# Patient Record
Sex: FEMALE | Race: WHITE | Employment: OTHER | URBAN - NONMETROPOLITAN AREA
[De-identification: names, ages, dates, MRNs, and addresses within clinical notes are randomized per-mention and may not be internally consistent; named-entity substitution may affect disease eponyms.]

---

## 2017-01-05 ENCOUNTER — TELEPHONE (OUTPATIENT)
Dept: FAMILY MEDICINE CLINIC | Facility: CLINIC | Age: 72
End: 2017-01-05

## 2017-01-05 ENCOUNTER — PATIENT OUTREACH (OUTPATIENT)
Dept: CASE MANAGEMENT | Age: 72
End: 2017-01-05

## 2017-01-05 RX ORDER — FLUTICASONE PROPIONATE AND SALMETEROL 250; 50 UG/1; UG/1
POWDER RESPIRATORY (INHALATION)
Qty: 2 EACH | Refills: 0 | COMMUNITY
Start: 2017-01-05 | End: 2017-04-13

## 2017-01-05 RX ORDER — ALBUTEROL SULFATE 90 UG/1
2 AEROSOL, METERED RESPIRATORY (INHALATION) EVERY 4 HOURS PRN
Qty: 1 INHALER | Refills: 1 | Status: SHIPPED | OUTPATIENT
Start: 2017-01-05 | End: 2017-03-16

## 2017-01-05 NOTE — TELEPHONE ENCOUNTER
Patient given samples of Advair and advised to use Albuterol inhaler or nebulizer as needed.  Dr Kapil FRITZ RN

## 2017-02-13 RX ORDER — PRAVASTATIN SODIUM 40 MG
TABLET ORAL
Qty: 90 TABLET | Refills: 1 | Status: SHIPPED | OUTPATIENT
Start: 2017-02-13 | End: 2017-09-27

## 2017-02-13 NOTE — TELEPHONE ENCOUNTER
Last rf 10/4/16 #90x1  Last ov 11/10/16  Last labs 11/10/16    Future Appointments  Date Time Provider Scarlett Aileen   5/16/2017 11:00 AM Malick Rene MD [facility]

## 2017-02-27 RX ORDER — SPIRONOLACTONE 25 MG/1
25 TABLET ORAL 2 TIMES DAILY
Qty: 180 TABLET | Refills: 0 | Status: SHIPPED | OUTPATIENT
Start: 2017-02-27 | End: 2017-05-31

## 2017-03-06 ENCOUNTER — TELEPHONE (OUTPATIENT)
Dept: FAMILY MEDICINE CLINIC | Facility: CLINIC | Age: 72
End: 2017-03-06

## 2017-03-13 RX ORDER — FUROSEMIDE 40 MG/1
TABLET ORAL
Qty: 180 TABLET | Refills: 0 | Status: SHIPPED | OUTPATIENT
Start: 2017-03-13 | End: 2017-07-01

## 2017-03-16 ENCOUNTER — TELEPHONE (OUTPATIENT)
Dept: FAMILY MEDICINE CLINIC | Facility: CLINIC | Age: 72
End: 2017-03-16

## 2017-03-16 ENCOUNTER — OFFICE VISIT (OUTPATIENT)
Dept: FAMILY MEDICINE CLINIC | Facility: CLINIC | Age: 72
End: 2017-03-16

## 2017-03-16 VITALS
HEART RATE: 70 BPM | DIASTOLIC BLOOD PRESSURE: 80 MMHG | SYSTOLIC BLOOD PRESSURE: 120 MMHG | TEMPERATURE: 99 F | OXYGEN SATURATION: 98 % | BODY MASS INDEX: 37 KG/M2 | WEIGHT: 210 LBS

## 2017-03-16 DIAGNOSIS — R05.9 COUGH: Primary | ICD-10-CM

## 2017-03-16 PROCEDURE — 99213 OFFICE O/P EST LOW 20 MIN: CPT | Performed by: INTERNAL MEDICINE

## 2017-03-16 NOTE — TELEPHONE ENCOUNTER
Patient said she has been wheezing and sob with back pain below her shoulders this morning and she has a head cold She said she had a \"pinching in her chest\" for a couple days last week.  She has been using her Combivent 4-5 times daily and Advair when sh

## 2017-03-17 ENCOUNTER — TELEPHONE (OUTPATIENT)
Dept: FAMILY MEDICINE CLINIC | Facility: CLINIC | Age: 72
End: 2017-03-17

## 2017-03-17 NOTE — PROGRESS NOTES
HPI:   Leonardo Flores is a 70year old female who presents for upper respiratory symptoms for  3  days. Patient reports congestion, dry cough, chest pain from coughing. She is stressed over her sons poor health. .  He has now lost his job and vision in Esophageal reflux 01/12/2012   • Generalized and unspecified atherosclerosis 11/29/2011   • Congestive heart failure, unspecified ([facility] 11/29/2011   • Coronary atherosclerosis of unspecified type of vessel, native or graft 11/29/2011   • Chronic airway obs auscultation  CARDIO: RRR without murmur  GI: good BS's,no masses, HSM or tenderness    ASSESSMENT AND PLAN:   Vernard Osgood is a 70year old female who presents with Viral Syndrome. PLAN: OTC decongestants, throat lozenges and tylenol.   Please use inh

## 2017-03-29 ENCOUNTER — TELEPHONE (OUTPATIENT)
Dept: FAMILY MEDICINE CLINIC | Facility: CLINIC | Age: 72
End: 2017-03-29

## 2017-03-29 NOTE — TELEPHONE ENCOUNTER
Patient advised, she said that the Asmanex  so that wont work. Patient advised that we did get approval for Combivent she needs samples of something in the meantime.  Patient advised samples of Asmanex left up front for her

## 2017-03-29 NOTE — TELEPHONE ENCOUNTER
Enrolled through December 31st 2017, prescription is currently processing for Combivent per Darya Bustillo they wanted to confirm directions.

## 2017-03-29 NOTE — TELEPHONE ENCOUNTER
What directions for the Asmanex and she also has Albuterol Sulfate (Ventolin), can she use this also?

## 2017-04-05 ENCOUNTER — MED REC SCAN ONLY (OUTPATIENT)
Dept: FAMILY MEDICINE CLINIC | Facility: CLINIC | Age: 72
End: 2017-04-05

## 2017-04-11 ENCOUNTER — TELEPHONE (OUTPATIENT)
Dept: FAMILY MEDICINE CLINIC | Facility: CLINIC | Age: 72
End: 2017-04-11

## 2017-04-11 NOTE — TELEPHONE ENCOUNTER
Patient said she got Combvent delivered. She said she has a friend of hers works at a free clinic in [place] that has inhalers that they could give to her if Dr Ida Cross can fax an order to [phone].  They have Advair 100 or 500, Dulera 100 or 200, Symbic

## 2017-04-13 ENCOUNTER — TELEPHONE (OUTPATIENT)
Dept: FAMILY MEDICINE CLINIC | Facility: CLINIC | Age: 72
End: 2017-04-13

## 2017-04-13 RX ORDER — FLUTICASONE PROPIONATE AND SALMETEROL 250; 50 UG/1; UG/1
1 POWDER RESPIRATORY (INHALATION) EVERY 12 HOURS SCHEDULED
Qty: 1 EACH | Refills: 11 | Status: SHIPPED | OUTPATIENT
Start: 2017-04-13 | End: 2017-11-09 | Stop reason: DRUGHIGH

## 2017-04-13 RX ORDER — FLUTICASONE PROPIONATE AND SALMETEROL 250; 50 UG/1; UG/1
1 POWDER RESPIRATORY (INHALATION) EVERY 12 HOURS SCHEDULED
Qty: 1 EACH | Refills: 11 | Status: SHIPPED | OUTPATIENT
Start: 2017-04-13 | End: 2017-04-13

## 2017-05-16 ENCOUNTER — PRIOR ORIGINAL RECORDS (OUTPATIENT)
Dept: OTHER | Age: 72
End: 2017-05-16

## 2017-05-16 ENCOUNTER — LAB ENCOUNTER (OUTPATIENT)
Dept: LAB | Age: 72
End: 2017-05-16
Attending: INTERNAL MEDICINE
Payer: MEDICARE

## 2017-05-16 ENCOUNTER — OFFICE VISIT (OUTPATIENT)
Dept: FAMILY MEDICINE CLINIC | Facility: CLINIC | Age: 72
End: 2017-05-16

## 2017-05-16 VITALS
HEIGHT: 62 IN | WEIGHT: 205.38 LBS | HEART RATE: 76 BPM | DIASTOLIC BLOOD PRESSURE: 64 MMHG | TEMPERATURE: 98 F | SYSTOLIC BLOOD PRESSURE: 114 MMHG | BODY MASS INDEX: 37.79 KG/M2 | RESPIRATION RATE: 20 BRPM

## 2017-05-16 DIAGNOSIS — E78.2 MIXED HYPERLIPIDEMIA: ICD-10-CM

## 2017-05-16 DIAGNOSIS — D68.2 FACTOR XII DEFICIENCY (HCC): ICD-10-CM

## 2017-05-16 DIAGNOSIS — Z23 NEED FOR VACCINATION: ICD-10-CM

## 2017-05-16 DIAGNOSIS — N18.4 CKD (CHRONIC KIDNEY DISEASE) STAGE 4, GFR 15-29 ML/MIN (HCC): ICD-10-CM

## 2017-05-16 DIAGNOSIS — J43.2 CENTRILOBULAR EMPHYSEMA (HCC): ICD-10-CM

## 2017-05-16 DIAGNOSIS — I48.91 ATRIAL FIBRILLATION, UNSPECIFIED TYPE (HCC): ICD-10-CM

## 2017-05-16 DIAGNOSIS — I50.22 CHRONIC SYSTOLIC CONGESTIVE HEART FAILURE (HCC): ICD-10-CM

## 2017-05-16 DIAGNOSIS — Z12.31 ENCOUNTER FOR SCREENING MAMMOGRAM FOR BREAST CANCER: ICD-10-CM

## 2017-05-16 DIAGNOSIS — M10.00 ACUTE IDIOPATHIC GOUT, UNSPECIFIED SITE: ICD-10-CM

## 2017-05-16 DIAGNOSIS — I10 ESSENTIAL HYPERTENSION: ICD-10-CM

## 2017-05-16 DIAGNOSIS — R73.9 ELEVATED BLOOD SUGAR: ICD-10-CM

## 2017-05-16 DIAGNOSIS — Z00.00 ENCOUNTER FOR ANNUAL HEALTH EXAMINATION: Primary | ICD-10-CM

## 2017-05-16 DIAGNOSIS — I25.708: ICD-10-CM

## 2017-05-16 DIAGNOSIS — R91.1 INCIDENTAL PULMONARY NODULE, > 3MM AND < 8MM: ICD-10-CM

## 2017-05-16 PROCEDURE — 85025 COMPLETE CBC W/AUTO DIFF WBC: CPT

## 2017-05-16 PROCEDURE — 80061 LIPID PANEL: CPT

## 2017-05-16 PROCEDURE — 80053 COMPREHEN METABOLIC PANEL: CPT

## 2017-05-16 PROCEDURE — G0009 ADMIN PNEUMOCOCCAL VACCINE: HCPCS | Performed by: INTERNAL MEDICINE

## 2017-05-16 PROCEDURE — G0439 PPPS, SUBSEQ VISIT: HCPCS | Performed by: INTERNAL MEDICINE

## 2017-05-16 PROCEDURE — 36415 COLL VENOUS BLD VENIPUNCTURE: CPT

## 2017-05-16 PROCEDURE — 83880 ASSAY OF NATRIURETIC PEPTIDE: CPT

## 2017-05-16 PROCEDURE — 90670 PCV13 VACCINE IM: CPT | Performed by: INTERNAL MEDICINE

## 2017-05-16 PROCEDURE — 83036 HEMOGLOBIN GLYCOSYLATED A1C: CPT

## 2017-05-16 NOTE — PROGRESS NOTES
HPI:   Glenn Hurst is a 70year old female who presents for a Medicare Subsequent Annual Wellness visit (Pt already had Initial Annual Wellness). Pt has been at her baseline now.   She still has difficulty finding the funds for respiratory medica puff into the lungs 4 (four) times daily. furosemide 40 MG Oral Tab TAKE 1 TABLET TWICE DAILY   spironolactone (ALDACTONE) 25 MG Oral Tab Take 1 tablet (25 mg total) by mouth 2 (two) times daily.    PRAVASTATIN SODIUM 40 MG Oral Tab TAKE 1 TABLET EVERY DA not drink alcohol or use illicit drugs.      REVIEW OF SYSTEMS:   GENERAL: feels well otherwise  SKIN: denies any unusual skin lesions  EYES: denies blurred vision or double vision  HEENT: denies nasal congestion, sinus pain or ST  LUNGS: denies shortness o in Epic:    Shellie Rosas does not have a Power of  for [employer] on file in [facility]  The patient has this document but we do not have it in [facility], and patient is instructed to get our office a copy of it for scanning into Epic          PLAN:  Th your day to day activities?: 0-No     Have you had any memory issues?: 0-No    Fall/Risk Scoring: 3          Depression Screening (PHQ-2/PHQ-9): Over the LAST 2 WEEKS   Little interest or pleasure in doing things (over the last two weeks)?: Not at all    F Ophthalmology Visit Annually: Diabetics, FHx Glaucoma, AA>50, > 65 No flowsheet data found. Bone Density Screening      Dexascan Every two years Last Dexa Scan:   XR DEXA BONE DENSITOMETRY (CPT=77080) 02/01/2016    No flowsheet data found.     Pa anticonvulsants.)    Potassium  Annually POTASSIUM (mmol/L)   Date Value   11/10/2016 4.7   04/09/2014 4.3   08/13/2012 4.8    No flowsheet data found.     Creatinine  Annually CREATININE (mg/dL)   Date Value   11/10/2016 1.61*   04/09/2014 0.89    No flows

## 2017-05-16 NOTE — PATIENT INSTRUCTIONS
Edith Eubanks's SCREENING SCHEDULE   Tests on this list are recommended by your physician but may not be covered, or covered at this frequency, by your insurer. Please check with your insurance carrier before scheduling to verify coverage.    Gordo Martinez old and have smoked more than 100 cigarettes in their lifetime   • Anyone with a family history    Colorectal Cancer Screening  Covered up to Age 76     Colonoscopy Screen   Covered every 10 years- more often if abnormal Colonoscopy,10 Years due on 02/12/2 11/10/16  -FLU VACC PRSV FREE INC ANTIG    Please get every year    Pneumococcal 13 (Prevnar)  Covered Once after 65   Orders placed or performed in visit on 05/09/16  -PNEUMOCOCCAL VACC, 13 BBOBI IM    Please get once after your 65th birthday    Rhea Moran

## 2017-05-17 DIAGNOSIS — R73.9 ELEVATED BLOOD SUGAR: Primary | ICD-10-CM

## 2017-05-17 PROBLEM — N18.4 CKD (CHRONIC KIDNEY DISEASE) STAGE 4, GFR 15-29 ML/MIN (HCC): Status: ACTIVE | Noted: 2017-05-17

## 2017-05-18 ENCOUNTER — TELEPHONE (OUTPATIENT)
Dept: FAMILY MEDICINE CLINIC | Facility: CLINIC | Age: 72
End: 2017-05-18

## 2017-05-18 ENCOUNTER — PRIOR ORIGINAL RECORDS (OUTPATIENT)
Dept: OTHER | Age: 72
End: 2017-05-18

## 2017-05-18 DIAGNOSIS — E87.5 HYPERKALEMIA: Primary | ICD-10-CM

## 2017-05-18 RX ORDER — POTASSIUM CHLORIDE 750 MG/1
TABLET, FILM COATED, EXTENDED RELEASE ORAL
Qty: 180 TABLET | Refills: 1 | COMMUNITY
Start: 2017-05-18 | End: 2018-07-30

## 2017-05-18 NOTE — TELEPHONE ENCOUNTER
Patient advised of lab results, she is concerned that her potassium is slightly elevated and said she is taking 8 Potassium pills daily. She is seeing Dr Nathalie Sánchez next month.

## 2017-05-18 NOTE — TELEPHONE ENCOUNTER
Patient advised, she said she is taking the Furosemide and  Spironolactone twice daily.  She does not want her Potassium to get low like it did before so is afraid to stop the Potassium completely

## 2017-05-18 NOTE — TELEPHONE ENCOUNTER
----- Message from Charisse Hui MD sent at 5/17/2017  7:50 AM CDT -----  See if you can add an A1C and fax labs to [employer] for upcoming appt in [place].

## 2017-05-24 LAB
ALBUMIN: 4.7 G/DL
ALKALINE PHOSPHATATE(ALK PHOS): 135 IU/L
BILIRUBIN TOTAL: 0.6 MG/DL
BNP: 55 PMOL/L
BUN: 37 MG/DL
CALCIUM: 10 MG/DL
CHLORIDE: 101 MEQ/L
CHOLESTEROL, TOTAL: 163 MG/DL
CREATININE, SERUM: 1.88 MG/DL
GLUCOSE: 102 MG/DL
HDL CHOLESTEROL: 46 MG/DL
HEMATOCRIT: 47.7 %
HEMOGLOBIN A1C: 5.7 %
HEMOGLOBIN: 15.2 G/DL
LDL CHOLESTEROL: 93 MG/DL
PLATELETS: 196 K/UL
POTASSIUM, SERUM: 5.9 MEQ/L
PROTEIN, TOTAL: 8.2 G/DL
RED BLOOD COUNT: 5.1 X 10-6/U
SGOT (AST): 19 IU/L
SGPT (ALT): 29 IU/L
SODIUM: 136 MEQ/L
TRIGLYCERIDES: 120 MG/DL
WHITE BLOOD COUNT: 10.7 X 10-3/U

## 2017-05-31 RX ORDER — SPIRONOLACTONE 25 MG/1
25 TABLET ORAL 2 TIMES DAILY
Qty: 180 TABLET | Refills: 0 | Status: SHIPPED | OUTPATIENT
Start: 2017-05-31 | End: 2017-09-02

## 2017-06-21 ENCOUNTER — PRIOR ORIGINAL RECORDS (OUTPATIENT)
Dept: OTHER | Age: 72
End: 2017-06-21

## 2017-06-22 ENCOUNTER — LAB ENCOUNTER (OUTPATIENT)
Dept: LAB | Age: 72
End: 2017-06-22
Attending: INTERNAL MEDICINE
Payer: MEDICARE

## 2017-06-22 ENCOUNTER — PRIOR ORIGINAL RECORDS (OUTPATIENT)
Dept: OTHER | Age: 72
End: 2017-06-22

## 2017-06-22 DIAGNOSIS — Z79.01 LONG TERM (CURRENT) USE OF ANTICOAGULANTS: ICD-10-CM

## 2017-06-22 DIAGNOSIS — I25.10 CORONARY ATHEROSCLEROSIS OF NATIVE CORONARY ARTERY: Primary | ICD-10-CM

## 2017-06-22 DIAGNOSIS — Z95.1 POSTSURGICAL AORTOCORONARY BYPASS STATUS: ICD-10-CM

## 2017-06-22 DIAGNOSIS — I48.0 PAROXYSMAL ATRIAL FIBRILLATION (HCC): ICD-10-CM

## 2017-06-22 DIAGNOSIS — E78.00 PURE HYPERCHOLESTEROLEMIA: ICD-10-CM

## 2017-06-22 PROCEDURE — 80048 BASIC METABOLIC PNL TOTAL CA: CPT

## 2017-06-22 PROCEDURE — 36415 COLL VENOUS BLD VENIPUNCTURE: CPT

## 2017-06-22 PROCEDURE — 83880 ASSAY OF NATRIURETIC PEPTIDE: CPT

## 2017-06-28 ENCOUNTER — PRIOR ORIGINAL RECORDS (OUTPATIENT)
Dept: OTHER | Age: 72
End: 2017-06-28

## 2017-07-01 ENCOUNTER — TELEPHONE (OUTPATIENT)
Dept: FAMILY MEDICINE CLINIC | Facility: CLINIC | Age: 72
End: 2017-07-01

## 2017-07-01 RX ORDER — FUROSEMIDE 40 MG/1
TABLET ORAL
Qty: 180 TABLET | Refills: 0 | Status: SHIPPED | OUTPATIENT
Start: 2017-07-01 | End: 2017-09-05

## 2017-07-03 ENCOUNTER — TELEPHONE (OUTPATIENT)
Dept: FAMILY MEDICINE CLINIC | Facility: CLINIC | Age: 72
End: 2017-07-03

## 2017-07-03 RX ORDER — DILTIAZEM HYDROCHLORIDE 240 MG/1
240 CAPSULE, COATED, EXTENDED RELEASE ORAL DAILY
Qty: 90 CAPSULE | Refills: 3 | Status: SHIPPED | OUTPATIENT
Start: 2017-07-03 | End: 2018-07-03

## 2017-07-03 NOTE — TELEPHONE ENCOUNTER
Diltiazem HCl ER Coated Beads 240 MG Oral Capsule SR 24 Hr   *PLEASE SEND REFILL TO [facility] IN [place]

## 2017-07-05 LAB
BNP: 97 PMOL/L
BUN: 39 MG/DL
CALCIUM: 9.7 MG/DL
CHLORIDE: 100 MEQ/L
CREATININE, SERUM: 1.86 MG/DL
GLUCOSE: 86 MG/DL
POTASSIUM, SERUM: 5.2 MEQ/L
SODIUM: 135 MEQ/L

## 2017-09-02 RX ORDER — SPIRONOLACTONE 25 MG/1
25 TABLET ORAL 2 TIMES DAILY
Qty: 180 TABLET | Refills: 0 | Status: SHIPPED | OUTPATIENT
Start: 2017-09-02 | End: 2017-10-11

## 2017-09-05 RX ORDER — FUROSEMIDE 40 MG/1
TABLET ORAL
Qty: 180 TABLET | Refills: 0 | Status: SHIPPED | OUTPATIENT
Start: 2017-09-05 | End: 2017-10-26

## 2017-09-27 RX ORDER — PRAVASTATIN SODIUM 40 MG
TABLET ORAL
Qty: 90 TABLET | Refills: 0 | Status: SHIPPED | OUTPATIENT
Start: 2017-09-27 | End: 2018-09-27

## 2017-10-12 ENCOUNTER — TELEPHONE (OUTPATIENT)
Dept: FAMILY MEDICINE CLINIC | Facility: CLINIC | Age: 72
End: 2017-10-12

## 2017-10-12 RX ORDER — SPIRONOLACTONE 25 MG/1
TABLET ORAL
Qty: 180 TABLET | Refills: 0 | Status: SHIPPED | OUTPATIENT
Start: 2017-10-12 | End: 2018-01-09

## 2017-10-24 RX ORDER — PROBENECID 500 MG/1
500 TABLET, FILM COATED ORAL 2 TIMES DAILY
Qty: 180 TABLET | Refills: 0 | Status: SHIPPED | OUTPATIENT
Start: 2017-10-24 | End: 2018-05-03 | Stop reason: ALTCHOICE

## 2017-10-26 ENCOUNTER — TELEPHONE (OUTPATIENT)
Dept: FAMILY MEDICINE CLINIC | Facility: CLINIC | Age: 72
End: 2017-10-26

## 2017-10-26 RX ORDER — PREDNISONE 20 MG/1
40 TABLET ORAL DAILY
Qty: 10 TABLET | Refills: 0 | Status: SHIPPED | OUTPATIENT
Start: 2017-10-26 | End: 2017-10-31

## 2017-10-26 RX ORDER — FUROSEMIDE 40 MG/1
TABLET ORAL
Qty: 180 TABLET | Refills: 0 | Status: SHIPPED | OUTPATIENT
Start: 2017-10-26 | End: 2018-04-17

## 2017-10-26 NOTE — TELEPHONE ENCOUNTER
probenecid IS A TIER 3 & THERE IS NO WAY SHE CAN AFFORD IT, IT IS OVER $100, CAN THIS BE CANCELLED & CAN SHE GET ALLOPURINOL 300MG INSTEAD, SEND TO [place] [facility], CALL PT

## 2017-10-26 NOTE — TELEPHONE ENCOUNTER
You cannot treat gouty flare with allopurinol, it will make it worse. She can take prednisone (cheep) or colchicine   (expensive).  I will order prednisone

## 2017-10-26 NOTE — TELEPHONE ENCOUNTER
Patient said that she has not had to take Probenecid since last February but she said her foot is \"killing her\". She said that she has taken Allopurinol in the past and it will only cost $7. She cancelled the Allopruinol.

## 2017-11-04 ENCOUNTER — TELEPHONE (OUTPATIENT)
Dept: FAMILY MEDICINE CLINIC | Facility: CLINIC | Age: 72
End: 2017-11-04

## 2017-11-09 ENCOUNTER — OFFICE VISIT (OUTPATIENT)
Dept: FAMILY MEDICINE CLINIC | Facility: CLINIC | Age: 72
End: 2017-11-09

## 2017-11-09 ENCOUNTER — APPOINTMENT (OUTPATIENT)
Dept: LAB | Age: 72
End: 2017-11-09
Attending: INTERNAL MEDICINE
Payer: MEDICARE

## 2017-11-09 ENCOUNTER — TELEPHONE (OUTPATIENT)
Dept: FAMILY MEDICINE CLINIC | Facility: CLINIC | Age: 72
End: 2017-11-09

## 2017-11-09 ENCOUNTER — PRIOR ORIGINAL RECORDS (OUTPATIENT)
Dept: OTHER | Age: 72
End: 2017-11-09

## 2017-11-09 VITALS
WEIGHT: 196.13 LBS | SYSTOLIC BLOOD PRESSURE: 100 MMHG | HEART RATE: 95 BPM | DIASTOLIC BLOOD PRESSURE: 58 MMHG | TEMPERATURE: 98 F | BODY MASS INDEX: 36 KG/M2 | OXYGEN SATURATION: 95 %

## 2017-11-09 DIAGNOSIS — J42 CHRONIC BRONCHITIS, UNSPECIFIED CHRONIC BRONCHITIS TYPE (HCC): ICD-10-CM

## 2017-11-09 DIAGNOSIS — I50.22 CHRONIC SYSTOLIC CONGESTIVE HEART FAILURE (HCC): Primary | ICD-10-CM

## 2017-11-09 PROCEDURE — 99214 OFFICE O/P EST MOD 30 MIN: CPT | Performed by: INTERNAL MEDICINE

## 2017-11-09 PROCEDURE — 80053 COMPREHEN METABOLIC PANEL: CPT

## 2017-11-09 PROCEDURE — 36415 COLL VENOUS BLD VENIPUNCTURE: CPT

## 2017-11-09 RX ORDER — FLUTICASONE PROPIONATE AND SALMETEROL 500; 50 UG/1; UG/1
1 POWDER RESPIRATORY (INHALATION) 2 TIMES DAILY
Qty: 60 EACH | Refills: 0 | Status: SHIPPED | OUTPATIENT
Start: 2017-11-09 | End: 2017-11-09

## 2017-11-09 RX ORDER — PREDNISONE 20 MG/1
20 TABLET ORAL DAILY
Qty: 7 TABLET | Refills: 0 | Status: SHIPPED | OUTPATIENT
Start: 2017-11-09 | End: 2017-11-16

## 2017-11-09 RX ORDER — AMOXICILLIN AND CLAVULANATE POTASSIUM 875; 125 MG/1; MG/1
1 TABLET, FILM COATED ORAL 2 TIMES DAILY
Qty: 20 TABLET | Refills: 0 | Status: SHIPPED | OUTPATIENT
Start: 2017-11-09 | End: 2017-11-10 | Stop reason: SINTOL

## 2017-11-09 RX ORDER — FLUTICASONE PROPIONATE AND SALMETEROL 500; 50 UG/1; UG/1
1 POWDER RESPIRATORY (INHALATION) 2 TIMES DAILY
Qty: 60 EACH | Refills: 0 | COMMUNITY
Start: 2017-11-09 | End: 2017-12-09

## 2017-11-10 ENCOUNTER — TELEPHONE (OUTPATIENT)
Dept: FAMILY MEDICINE CLINIC | Facility: CLINIC | Age: 72
End: 2017-11-10

## 2017-11-10 RX ORDER — CEFUROXIME AXETIL 250 MG/1
250 TABLET ORAL 2 TIMES DAILY
Qty: 20 TABLET | Refills: 0 | Status: SHIPPED | OUTPATIENT
Start: 2017-11-10 | End: 2017-11-20

## 2017-11-10 NOTE — TELEPHONE ENCOUNTER
I would hold off on additional antibiotics until diarrhea resolves.   Would recommend use of probiotics and fiber supplement twice daily  Once symptoms resolve would recommend cefuroxime 250 mg twice daily ×10 days

## 2017-11-10 NOTE — TELEPHONE ENCOUNTER
Amoxicillin-Pot Clavulanate 875-125 MG Oral Tab   MICHELLE TOOK THIS YESTERDAY AND STARTED THROWING UP, SHOULD SHE STOP TAKING THIS?

## 2017-11-10 NOTE — TELEPHONE ENCOUNTER
Calling the patient- she took one dose at 7:30pm last night and vomited at 11:30pm and 12:30pm. She did take it with food     She has not taken anything this morning yet.  No vomiting no nausea; but she has developed the diarrhea already (Dr Navneet James advised t

## 2017-11-10 NOTE — PROGRESS NOTES
HPI:   Marisol Aaron is a 67year old female who presents for upper respiratory symptoms for  2  weeks.  Patient reports congestion, cough with yellow to green colored sputum, cough is not keeping pt up at night, sinus pain, wheezing, OTC cold meds have MCG Oral Cap Take 1 tablet by mouth daily. Disp:  Rfl:    aspirin 81 MG Oral Chew Tab Chew 1 tablet (81 mg total) by mouth daily.  Disp: 30 tablet Rfl: 11      Past Medical History:   Diagnosis Date   • Acute bronchitis 11/29/2011   • Chest pain, unspecifie BMI 35.87 kg/m²   GENERAL: well developed, well nourished,in no apparent distress  SKIN: no rashes,no suspicious lesions  EYES:PERRLA, EOMI, normal optic disk,conjunctiva are clear  HEENT: atraumatic, normocephalic,ears and throat are clear  NECK: supple,n

## 2017-11-11 ENCOUNTER — TELEPHONE (OUTPATIENT)
Dept: FAMILY MEDICINE CLINIC | Facility: CLINIC | Age: 72
End: 2017-11-11

## 2017-11-11 NOTE — TELEPHONE ENCOUNTER
Patient advised of lab results. She said she felt better last night so she started the other antibiotics and she has diarrhea again. She had not started the probiotics, advised to start probiotics, push fluids.

## 2017-11-13 ENCOUNTER — PRIOR ORIGINAL RECORDS (OUTPATIENT)
Dept: OTHER | Age: 72
End: 2017-11-13

## 2017-11-20 ENCOUNTER — TELEPHONE (OUTPATIENT)
Dept: FAMILY MEDICINE CLINIC | Facility: CLINIC | Age: 72
End: 2017-11-20

## 2017-11-20 RX ORDER — PREDNISONE 20 MG/1
20 TABLET ORAL DAILY
Qty: 21 TABLET | Refills: 0 | Status: SHIPPED | OUTPATIENT
Start: 2017-11-20 | End: 2017-11-27

## 2017-11-20 RX ORDER — LEVOFLOXACIN 500 MG/1
500 TABLET, FILM COATED ORAL DAILY
Qty: 10 TABLET | Refills: 0 | Status: SHIPPED | OUTPATIENT
Start: 2017-11-20 | End: 2017-11-25

## 2017-11-20 NOTE — TELEPHONE ENCOUNTER
Patient advised. She said that she had a pain under her breast that \"comes and goes\". She said she has been coughing a lot and her neighbor said that maybe she pulled a muscle.  Patient denies pressure in her chest. Advised that if she develops pressure i

## 2017-11-20 NOTE — TELEPHONE ENCOUNTER
ONLY HAS 1 DAY LEFT OF MEDS, STILL COUGHING, CONGESTED, HEADACHE, FEELS LIKE SOMEONE IS STEPPING ON HER BACK, CALL PT

## 2017-11-20 NOTE — TELEPHONE ENCOUNTER
Patient said that her head still hurts and she is wheezing with exertion and feels like an \"elephant is standing on her chest\". She is using her inhalers and doing albuterol nebs 4x daily. She said she took her last antibiotic this morning.

## 2017-11-24 ENCOUNTER — TELEPHONE (OUTPATIENT)
Dept: FAMILY MEDICINE CLINIC | Facility: CLINIC | Age: 72
End: 2017-11-24

## 2017-11-24 RX ORDER — CEFPROZIL 250 MG/1
250 TABLET, FILM COATED ORAL 2 TIMES DAILY
Qty: 20 TABLET | Refills: 0 | Status: SHIPPED | OUTPATIENT
Start: 2017-11-24 | End: 2018-01-26 | Stop reason: ALTCHOICE

## 2017-11-24 NOTE — TELEPHONE ENCOUNTER
Stop the Levaquin. Consider her allergic. Begin Cefzil 250 mg twice a day for the next 10 days.   Call with an update to Dr. Viri Montana tomorrow

## 2017-11-24 NOTE — TELEPHONE ENCOUNTER
Patient called and states that she was started on Levaquin and Prednisone 11/20/17. Patient states since starting medications she gets the shakes and feels like someone is pushing her head back off and on.  Patient states the bottoms of her feet are bright

## 2017-11-24 NOTE — TELEPHONE ENCOUNTER
Patient notified. Patient verbalized understanding. Medication sent to pharmacy. Patient advised if symptoms worsen before calling with update tomorrow to go to ER. Levaquin added to allergy.    Hector Fisher, 11/24/17, 9:41 AM

## 2017-11-24 NOTE — TELEPHONE ENCOUNTER
Pt is taking levaquin and prednisone, she has a horrible headache, bright red feed, allergic reaction to medicine? call Tabatha Davis.

## 2017-11-25 ENCOUNTER — TELEPHONE (OUTPATIENT)
Dept: FAMILY MEDICINE CLINIC | Facility: CLINIC | Age: 72
End: 2017-11-25

## 2017-11-25 NOTE — TELEPHONE ENCOUNTER
Patient said that she was \"shaking so bad\" that she couldn't even feed herself. She said she could not sleep and was having \"anxiety attacks\" and between her toes turned bright red and she was having trouble urinating. and even walking .  She took [employer]

## 2017-11-25 NOTE — TELEPHONE ENCOUNTER
STARTING TO FEEL BETTER, STILL SHAKING A LITTLE BIT. SHE HAD A REACTION TO THE ANTIBIOTIC SO ANOTHER DR SENT A DIFFERENT ONE IN FOR HER YESTERDAY. IF YOU HAVE ANY QUESTIONS YOU CAN CALL HER BACK.

## 2017-11-28 ENCOUNTER — OFFICE VISIT (OUTPATIENT)
Dept: FAMILY MEDICINE CLINIC | Facility: CLINIC | Age: 72
End: 2017-11-28

## 2017-11-28 VITALS
HEIGHT: 62 IN | BODY MASS INDEX: 35.79 KG/M2 | WEIGHT: 194.5 LBS | TEMPERATURE: 98 F | SYSTOLIC BLOOD PRESSURE: 116 MMHG | DIASTOLIC BLOOD PRESSURE: 72 MMHG

## 2017-11-28 DIAGNOSIS — J44.1 COPD EXACERBATION (HCC): Primary | ICD-10-CM

## 2017-11-28 PROCEDURE — 99213 OFFICE O/P EST LOW 20 MIN: CPT | Performed by: INTERNAL MEDICINE

## 2017-11-28 NOTE — PROGRESS NOTES
HPI:   Mariana Rodriguez is a 67year old female who presents for upper respiratory symptoms for  10  days. Patient reports congestion, dry cough, sinus pain.       Current Outpatient Prescriptions:  Cefprozil 250 MG Oral Tab Take 1 tablet (250 mg total) by unspecified type of vessel, native or graft 11/29/2011   • Edema 01/12/2012   • Esophageal reflux 01/12/2012   • Factor XII deficiency disease ([facility] 1999    [place]   • Generalized and unspecified atherosclerosis 11/29/2011   • Hematemesis 12/13/2011 year old female who presents with COPD exacerbation. PLAN: OTC decongestants, throat lozenges and tylenol and on antibiotics and inhailre and feeling better now. .  The patient indicates understanding of these issues and agrees to the plan.   The patient is

## 2017-12-04 LAB
ALBUMIN: 4 G/DL
ALKALINE PHOSPHATATE(ALK PHOS): 128 IU/L
BILIRUBIN TOTAL: 0.7 MG/DL
BUN: 36 MG/DL
CALCIUM: 9.5 MG/DL
CHLORIDE: 96 MEQ/L
CREATININE, SERUM: 1.71 MG/DL
GLUCOSE: 92 MG/DL
POTASSIUM, SERUM: 4.6 MEQ/L
PROTEIN, TOTAL: 8.1 G/DL
SGOT (AST): 19 IU/L
SGPT (ALT): 27 IU/L
SODIUM: 132 MEQ/L

## 2017-12-20 ENCOUNTER — PRIOR ORIGINAL RECORDS (OUTPATIENT)
Dept: OTHER | Age: 72
End: 2017-12-20

## 2018-01-09 ENCOUNTER — TELEPHONE (OUTPATIENT)
Dept: FAMILY MEDICINE CLINIC | Facility: CLINIC | Age: 73
End: 2018-01-09

## 2018-01-09 RX ORDER — SPIRONOLACTONE 25 MG/1
TABLET ORAL
Qty: 180 TABLET | Refills: 0 | Status: SHIPPED | OUTPATIENT
Start: 2018-01-09 | End: 2018-04-10

## 2018-01-15 ENCOUNTER — MED REC SCAN ONLY (OUTPATIENT)
Dept: FAMILY MEDICINE CLINIC | Facility: CLINIC | Age: 73
End: 2018-01-15

## 2018-01-26 ENCOUNTER — TELEPHONE (OUTPATIENT)
Dept: FAMILY MEDICINE CLINIC | Facility: CLINIC | Age: 73
End: 2018-01-26

## 2018-01-26 ENCOUNTER — OFFICE VISIT (OUTPATIENT)
Dept: FAMILY MEDICINE CLINIC | Facility: CLINIC | Age: 73
End: 2018-01-26

## 2018-01-26 VITALS
TEMPERATURE: 98 F | DIASTOLIC BLOOD PRESSURE: 70 MMHG | OXYGEN SATURATION: 98 % | SYSTOLIC BLOOD PRESSURE: 120 MMHG | HEART RATE: 98 BPM | WEIGHT: 200 LBS | BODY MASS INDEX: 37 KG/M2

## 2018-01-26 DIAGNOSIS — M10.232 ACUTE DRUG-INDUCED GOUT OF LEFT WRIST: Primary | ICD-10-CM

## 2018-01-26 DIAGNOSIS — N18.32 CHRONIC KIDNEY DISEASE (CKD) STAGE G3B/A1, MODERATELY DECREASED GLOMERULAR FILTRATION RATE (GFR) BETWEEN 30-44 ML/MIN/1.73 SQUARE METER AND ALBUMINURIA CREATININE RATIO LESS THAN 30 MG/G (HCC): ICD-10-CM

## 2018-01-26 PROCEDURE — 36415 COLL VENOUS BLD VENIPUNCTURE: CPT | Performed by: FAMILY MEDICINE

## 2018-01-26 PROCEDURE — 84550 ASSAY OF BLOOD/URIC ACID: CPT | Performed by: FAMILY MEDICINE

## 2018-01-26 PROCEDURE — 80053 COMPREHEN METABOLIC PANEL: CPT | Performed by: FAMILY MEDICINE

## 2018-01-26 PROCEDURE — 86140 C-REACTIVE PROTEIN: CPT | Performed by: FAMILY MEDICINE

## 2018-01-26 PROCEDURE — 99213 OFFICE O/P EST LOW 20 MIN: CPT | Performed by: FAMILY MEDICINE

## 2018-01-26 RX ORDER — METHYLPREDNISOLONE 4 MG/1
TABLET ORAL
Qty: 1 KIT | Refills: 0 | Status: SHIPPED | OUTPATIENT
Start: 2018-01-26 | End: 2018-02-13 | Stop reason: ALTCHOICE

## 2018-01-26 NOTE — TELEPHONE ENCOUNTER
Pt was having some pain in her hand yesterday. Today, she said she cannot use her hand. Said she cannot bend her fingers, only slightly swollen. Said the pain is radiating through her arm and she couldn't sleep.  Put her in at 2:45 with Dr. Teresa Peña, would

## 2018-01-26 NOTE — TELEPHONE ENCOUNTER
Hand started hurting yesterday. Palm and wrist.  Couldn't  her purse. By last night she couldn't bend her fingers. Took 2 Tylenol with minimal relief. Pain radiates with movement. No known injury. No redness. + swelling.   Can't bend back or f

## 2018-01-26 NOTE — PATIENT INSTRUCTIONS
I discussed the likely diagnosis of acute gouty arthritis in the left wrist.  I discussed medications that may contribute to this especially her diuretics.   I have asked her to increase her fluids and use warm compresses on the left wrist  We will start Me

## 2018-01-26 NOTE — PROGRESS NOTES
HPI:    Patient ID: Leonardo Flores is a 67year old female.   Patient presents with:  Wrist Pain: LEFT WRIST AND HAND PAIN--- NO INJURY--    HPI c/o pain left wrist since yesterday, no injury noted, pt reports past hx of gout, patient with chronic renal vomiting  Codeine                   Colchicine              Other (See Comments)    Comment:She became very swollen and red  Levaquin [Levofloxa*      Norco [Hydrocodone-*       PHYSICAL EXAM:   Physical Exam   Constitutional: She is oriented to person, pl C-Reactive Protein [E]      *Venipuncture    Meds This Visit:  Signed Prescriptions Disp Refills    methylPREDNISolone (MEDROL) 4 MG Oral Tablet Therapy Pack 1 kit 0      Sig: As directed.            Imaging & Referrals:  None       EI#[id number]

## 2018-01-27 ENCOUNTER — TELEPHONE (OUTPATIENT)
Dept: FAMILY MEDICINE CLINIC | Facility: CLINIC | Age: 73
End: 2018-01-27

## 2018-01-27 LAB
ALBUMIN SERPL-MCNC: 4.2 G/DL (ref 3.5–4.8)
ALP LIVER SERPL-CCNC: 111 U/L (ref 55–142)
ALT SERPL-CCNC: 22 U/L (ref 14–54)
AST SERPL-CCNC: 17 U/L (ref 15–41)
BILIRUB SERPL-MCNC: 0.6 MG/DL (ref 0.1–2)
BUN BLD-MCNC: 36 MG/DL (ref 8–20)
C-REACTIVE PROTEIN: 0.77 MG/DL (ref ?–1)
CALCIUM BLD-MCNC: 9.4 MG/DL (ref 8.3–10.3)
CHLORIDE: 100 MMOL/L (ref 101–111)
CO2: 28 MMOL/L (ref 22–32)
CREAT BLD-MCNC: 1.66 MG/DL (ref 0.55–1.02)
GLUCOSE BLD-MCNC: 95 MG/DL (ref 70–99)
M PROTEIN MFR SERPL ELPH: 8.2 G/DL (ref 6.1–8.3)
POTASSIUM SERPL-SCNC: 5.1 MMOL/L (ref 3.6–5.1)
SODIUM SERPL-SCNC: 134 MMOL/L (ref 136–144)
URIC ACID: 12.6 MG/DL (ref 2.4–8)

## 2018-01-27 NOTE — TELEPHONE ENCOUNTER
Dontrell Late had labs drawn yesterday are the results back? She may need a prescription for medicine. Please call pt today.

## 2018-01-29 ENCOUNTER — TELEPHONE (OUTPATIENT)
Dept: FAMILY MEDICINE CLINIC | Facility: CLINIC | Age: 73
End: 2018-01-29

## 2018-01-29 DIAGNOSIS — M10.232 ACUTE DRUG-INDUCED GOUT OF LEFT WRIST: Primary | ICD-10-CM

## 2018-01-29 RX ORDER — ALLOPURINOL 300 MG/1
300 TABLET ORAL DAILY
Qty: 30 TABLET | Refills: 1 | Status: SHIPPED | OUTPATIENT
Start: 2018-01-29 | End: 2018-03-14

## 2018-01-29 NOTE — TELEPHONE ENCOUNTER
Pt calls with an update. States her arm is ~95% better. Has some \"throbbing on inside of wrist\", but is able to bend better.

## 2018-02-02 ENCOUNTER — TELEPHONE (OUTPATIENT)
Dept: FAMILY MEDICINE CLINIC | Facility: CLINIC | Age: 73
End: 2018-02-02

## 2018-02-02 NOTE — TELEPHONE ENCOUNTER
Please fax a copy of the order for Combivent to [phone]  Please include a cover sheet with patient's name,  and address    They provide 90 day supply of medication

## 2018-02-05 ENCOUNTER — TELEPHONE (OUTPATIENT)
Dept: FAMILY MEDICINE CLINIC | Facility: CLINIC | Age: 73
End: 2018-02-05

## 2018-02-05 RX ORDER — DOXYCYCLINE HYCLATE 100 MG
100 TABLET ORAL 2 TIMES DAILY
Qty: 20 TABLET | Refills: 0 | Status: SHIPPED | OUTPATIENT
Start: 2018-02-05 | End: 2018-05-03 | Stop reason: ALTCHOICE

## 2018-02-05 RX ORDER — PREDNISONE 20 MG/1
40 TABLET ORAL DAILY
Qty: 10 TABLET | Refills: 0 | Status: SHIPPED | OUTPATIENT
Start: 2018-02-05 | End: 2018-02-10

## 2018-02-05 NOTE — TELEPHONE ENCOUNTER
Patient said that she is coughing and sneezing, she has a headache from congestion and she has green drainage. She denies a fever. She said if she exerts herself then she has sob. She has been sleeping all day.  She said that someone may be able to pick med

## 2018-02-12 ENCOUNTER — TELEPHONE (OUTPATIENT)
Dept: FAMILY MEDICINE CLINIC | Facility: CLINIC | Age: 73
End: 2018-02-12

## 2018-02-12 NOTE — TELEPHONE ENCOUNTER
Benadryl will make her secretions thicker, use mucinex DM for congested cough, and make appt this week.

## 2018-02-12 NOTE — TELEPHONE ENCOUNTER
[facility]\" She said her face is swollen and bright red, and her sob is worse  this happens after she takes the antibiotic. She thought it was the Prednisone but she stopped that the 10th.  She denies any hives or i

## 2018-02-13 ENCOUNTER — OFFICE VISIT (OUTPATIENT)
Dept: FAMILY MEDICINE CLINIC | Facility: CLINIC | Age: 73
End: 2018-02-13

## 2018-02-13 VITALS
HEIGHT: 62.5 IN | TEMPERATURE: 98 F | DIASTOLIC BLOOD PRESSURE: 70 MMHG | SYSTOLIC BLOOD PRESSURE: 112 MMHG | BODY MASS INDEX: 35.37 KG/M2 | HEART RATE: 88 BPM | OXYGEN SATURATION: 91 % | WEIGHT: 197.13 LBS

## 2018-02-13 DIAGNOSIS — J44.1 COPD WITH ACUTE EXACERBATION (HCC): Primary | ICD-10-CM

## 2018-02-13 DIAGNOSIS — I50.31 ACUTE DIASTOLIC CONGESTIVE HEART FAILURE (HCC): ICD-10-CM

## 2018-02-13 PROCEDURE — 99214 OFFICE O/P EST MOD 30 MIN: CPT | Performed by: INTERNAL MEDICINE

## 2018-02-13 RX ORDER — CEFPROZIL 250 MG/1
250 TABLET, FILM COATED ORAL 2 TIMES DAILY
Qty: 20 TABLET | Refills: 0 | Status: SHIPPED | OUTPATIENT
Start: 2018-02-13 | End: 2018-02-13

## 2018-02-13 RX ORDER — PREDNISONE 10 MG/1
10 TABLET ORAL DAILY
Qty: 20 TABLET | Refills: 0 | Status: SHIPPED | OUTPATIENT
Start: 2018-02-13 | End: 2018-05-03 | Stop reason: ALTCHOICE

## 2018-02-13 RX ORDER — CEFPROZIL 250 MG/1
250 TABLET, FILM COATED ORAL 2 TIMES DAILY
Qty: 20 TABLET | Refills: 0 | Status: SHIPPED | OUTPATIENT
Start: 2018-02-13 | End: 2018-02-22

## 2018-02-13 NOTE — PROGRESS NOTES
HPI:   Valentina Allen is a 67year old female who presents for upper respiratory symptoms for  1/1/2  weeks. Patient reports congestion, green colored nasal discharge, sinus pain.       Current Outpatient Prescriptions:  Cefprozil 250 MG Oral Tab Take 1 airway obstruction, not elsewhere classified 11/29/2011   • Congestive heart failure, unspecified 11/29/2011   • Coronary atherosclerosis of unspecified type of vessel, native or graft 11/29/2011   • Edema 01/12/2012   • Esophageal reflux 01/12/2012   • Fa atraumatic, normocephalic,ears and throat are clear  NECK: supple,no adenopathy,no bruits  LUNGS: clear to auscultation  CARDIO: RRR without murmur  GI: good BS's,no masses, HSM or tenderness    ASSESSMENT AND PLAN:   Suma Nolasco is a 67year old fem

## 2018-02-17 ENCOUNTER — TELEPHONE (OUTPATIENT)
Dept: FAMILY MEDICINE CLINIC | Facility: CLINIC | Age: 73
End: 2018-02-17

## 2018-02-17 NOTE — TELEPHONE ENCOUNTER
Patient advised, she said she also has a terrible headache she thinks it is from sinus pressure and she is coughin. She said she has Tums or Ranitidine at home, can she take one of these and can she also the Ibuprofen for her head?

## 2018-02-17 NOTE — TELEPHONE ENCOUNTER
MICHELLE HAS A QUESTIONS ABOUT TAKING AN ACID REDUCER WITH ALL THE MEDICATIONS SHE IS ON. SHE WASN'T SURE OF THE NAME OF IT, PLEASE CALL HER BACK.

## 2018-02-19 ENCOUNTER — TELEPHONE (OUTPATIENT)
Dept: FAMILY MEDICINE CLINIC | Facility: CLINIC | Age: 73
End: 2018-02-19

## 2018-02-19 NOTE — TELEPHONE ENCOUNTER
Patient said that she is still coughing and SOB, she said that the congestion has loosened up and her head feels better as well as her stomach. She is still taking the Prednisone and antibiotics.  Advised that Dr Barber Butts will address tomorrow, go to ER if ext

## 2018-02-20 ENCOUNTER — TELEPHONE (OUTPATIENT)
Dept: FAMILY MEDICINE CLINIC | Facility: CLINIC | Age: 73
End: 2018-02-20

## 2018-02-20 NOTE — TELEPHONE ENCOUNTER
Sylvia  From the patient assistance program given directions for the Combivent and to dispense 3 inhalers with 3 refills. ESTELA Mcgrath RN

## 2018-02-20 NOTE — TELEPHONE ENCOUNTER
Patient said she is feeling a lot better today, she said she was able to take a shower. She said she still gets SOB with exertion and he face and legs are very swollen. Patient advised that Dr Regino Pizano wants her to take an extra 40mg of Lasix today only.

## 2018-02-22 ENCOUNTER — TELEPHONE (OUTPATIENT)
Dept: FAMILY MEDICINE CLINIC | Facility: CLINIC | Age: 73
End: 2018-02-22

## 2018-02-22 RX ORDER — CEFPROZIL 250 MG/1
250 TABLET, FILM COATED ORAL 2 TIMES DAILY
Qty: 20 TABLET | Refills: 0 | Status: SHIPPED | OUTPATIENT
Start: 2018-02-22 | End: 2018-05-03 | Stop reason: ALTCHOICE

## 2018-02-22 NOTE — TELEPHONE ENCOUNTER
Patient said that she is still coughing, but her cough is more productive with thick yellow drainage. She said that she has one more antibiotic for today and she has 10 more Prednisone left. She said she is still very SOB with exertion.  She said the ana m

## 2018-03-05 ENCOUNTER — TELEPHONE (OUTPATIENT)
Dept: FAMILY MEDICINE CLINIC | Facility: CLINIC | Age: 73
End: 2018-03-05

## 2018-03-05 ENCOUNTER — MED REC SCAN ONLY (OUTPATIENT)
Dept: FAMILY MEDICINE CLINIC | Facility: CLINIC | Age: 73
End: 2018-03-05

## 2018-03-05 NOTE — TELEPHONE ENCOUNTER
Patient advised. Patient also advised the enrollment into [address] Patient assistance Program has been approved. She said she already received her inhaler.

## 2018-03-05 NOTE — TELEPHONE ENCOUNTER
Patient said that she was at [facility] Friday and Saturday they were so swollen that they \"turned black and blue\" and the edema is pitting, she said her face was swollen too so she took an extra 40mg of Lasix (took 3 pills that day) but it [facility]

## 2018-03-06 ENCOUNTER — OFFICE VISIT (OUTPATIENT)
Dept: FAMILY MEDICINE CLINIC | Facility: CLINIC | Age: 73
End: 2018-03-06

## 2018-03-06 VITALS
TEMPERATURE: 98 F | BODY MASS INDEX: 37 KG/M2 | OXYGEN SATURATION: 92 % | DIASTOLIC BLOOD PRESSURE: 80 MMHG | WEIGHT: 203.5 LBS | HEART RATE: 83 BPM | SYSTOLIC BLOOD PRESSURE: 110 MMHG

## 2018-03-06 DIAGNOSIS — J43.2 CENTRILOBULAR EMPHYSEMA (HCC): ICD-10-CM

## 2018-03-06 DIAGNOSIS — I50.812 CHRONIC RIGHT-SIDED CONGESTIVE HEART FAILURE (HCC): ICD-10-CM

## 2018-03-06 DIAGNOSIS — R25.2 CRAMPING OF HANDS: Primary | ICD-10-CM

## 2018-03-06 PROCEDURE — 85025 COMPLETE CBC W/AUTO DIFF WBC: CPT | Performed by: INTERNAL MEDICINE

## 2018-03-06 PROCEDURE — 84443 ASSAY THYROID STIM HORMONE: CPT | Performed by: INTERNAL MEDICINE

## 2018-03-06 PROCEDURE — 99214 OFFICE O/P EST MOD 30 MIN: CPT | Performed by: INTERNAL MEDICINE

## 2018-03-06 PROCEDURE — 80053 COMPREHEN METABOLIC PANEL: CPT | Performed by: INTERNAL MEDICINE

## 2018-03-06 PROCEDURE — 83880 ASSAY OF NATRIURETIC PEPTIDE: CPT | Performed by: INTERNAL MEDICINE

## 2018-03-07 DIAGNOSIS — E83.52 HYPERCALCEMIA: Primary | ICD-10-CM

## 2018-03-07 LAB
ALBUMIN SERPL-MCNC: 4.6 G/DL (ref 3.5–4.8)
ALP LIVER SERPL-CCNC: 92 U/L (ref 55–142)
ALT SERPL-CCNC: 30 U/L (ref 14–54)
AST SERPL-CCNC: 20 U/L (ref 15–41)
BASOPHILS # BLD AUTO: 0.08 X10(3) UL (ref 0–0.1)
BASOPHILS NFR BLD AUTO: 0.6 %
BETA NATRIURETIC PEPTIDE: 67 PG/ML (ref 2–99)
BILIRUB SERPL-MCNC: 0.8 MG/DL (ref 0.1–2)
BUN BLD-MCNC: 40 MG/DL (ref 8–20)
CALCIUM BLD-MCNC: 10.9 MG/DL (ref 8.3–10.3)
CHLORIDE: 95 MMOL/L (ref 101–111)
CO2: 29 MMOL/L (ref 22–32)
CREAT BLD-MCNC: 1.46 MG/DL (ref 0.55–1.02)
EOSINOPHIL # BLD AUTO: 0.26 X10(3) UL (ref 0–0.3)
EOSINOPHIL NFR BLD AUTO: 2 %
ERYTHROCYTE [DISTWIDTH] IN BLOOD BY AUTOMATED COUNT: 15.5 % (ref 11.5–16)
GLUCOSE BLD-MCNC: 89 MG/DL (ref 70–99)
HCT VFR BLD AUTO: 47 % (ref 34–50)
HGB BLD-MCNC: 15.2 G/DL (ref 12–16)
IMMATURE GRANULOCYTE COUNT: 0.31 X10(3) UL (ref 0–1)
IMMATURE GRANULOCYTE RATIO %: 2.3 %
LYMPHOCYTES # BLD AUTO: 2.11 X10(3) UL (ref 0.9–4)
LYMPHOCYTES NFR BLD AUTO: 15.9 %
M PROTEIN MFR SERPL ELPH: 8.3 G/DL (ref 6.1–8.3)
MCH RBC QN AUTO: 31.6 PG (ref 27–33.2)
MCHC RBC AUTO-ENTMCNC: 32.3 G/DL (ref 31–37)
MCV RBC AUTO: 97.7 FL (ref 81–100)
MONOCYTES # BLD AUTO: 1.07 X10(3) UL (ref 0.1–1)
MONOCYTES NFR BLD AUTO: 8 %
NEUTROPHIL ABS PRELIM: 9.48 X10 (3) UL (ref 1.3–6.7)
NEUTROPHILS # BLD AUTO: 9.48 X10(3) UL (ref 1.3–6.7)
NEUTROPHILS NFR BLD AUTO: 71.2 %
PLATELET # BLD AUTO: 191 10(3)UL (ref 150–450)
POTASSIUM SERPL-SCNC: 4.9 MMOL/L (ref 3.6–5.1)
RBC # BLD AUTO: 4.81 X10(6)UL (ref 3.8–5.1)
RED CELL DISTRIBUTION WIDTH-SD: 55.5 FL (ref 35.1–46.3)
SODIUM SERPL-SCNC: 133 MMOL/L (ref 136–144)
TSI SER-ACNC: 4.92 MIU/ML (ref 0.35–5.5)
WBC # BLD AUTO: 13.3 X10(3) UL (ref 4–13)

## 2018-03-07 NOTE — PROGRESS NOTES
Rebecca Burkett is a 67year old female. HPI:   Pt barnes. s had cough and congestion and she was started on an antibiotic and steroid. She has some swelling in the feet and bruising more recently and was asked to take additional lasix.   Her overall crampi mouth daily. Disp:  Rfl:    aspirin 81 MG Oral Chew Tab Chew 1 tablet (81 mg total) by mouth daily.  Disp: 30 tablet Rfl: 11      Past Medical History:   Diagnosis Date   • Acute bronchitis 11/29/2011   • Chest pain, unspecified 12/15/2011   • Chronic airwa I need to check her potassium and BNP. She may have elevated blood sugars with prednisone as well. Low HG would increase work for breathing and pulse which worsens heart failure as well. Recheck TSH.      Orders Placed This Encounter      Comp Metabolic Pa

## 2018-03-12 ENCOUNTER — TELEPHONE (OUTPATIENT)
Dept: FAMILY MEDICINE CLINIC | Facility: CLINIC | Age: 73
End: 2018-03-12

## 2018-03-12 NOTE — TELEPHONE ENCOUNTER
Patient said her hands keep \"locking up, and her jawlocked when she yawned\". She is wondering if there is anything that should be done about her elevate calcium level. She said that this happened after she ate chili with beans.  She said she read somethin

## 2018-03-13 ENCOUNTER — LABORATORY ENCOUNTER (OUTPATIENT)
Dept: LAB | Age: 73
End: 2018-03-13
Attending: INTERNAL MEDICINE
Payer: MEDICARE

## 2018-03-13 DIAGNOSIS — E83.52 HYPERCALCEMIA: ICD-10-CM

## 2018-03-13 DIAGNOSIS — E83.52 HYPERCALCEMIA: Primary | ICD-10-CM

## 2018-03-13 DIAGNOSIS — M10.232 ACUTE DRUG-INDUCED GOUT OF LEFT WRIST: ICD-10-CM

## 2018-03-13 LAB
25-HYDROXYVITAMIN D (TOTAL): 32.8 NG/ML (ref 30–100)
PTH-INTACT SERPL-MCNC: 193.9 PG/ML (ref 11.1–79.5)
URIC ACID: 6.1 MG/DL (ref 2.4–8)

## 2018-03-13 PROCEDURE — 82652 VIT D 1 25-DIHYDROXY: CPT

## 2018-03-13 PROCEDURE — 36415 COLL VENOUS BLD VENIPUNCTURE: CPT

## 2018-03-13 PROCEDURE — 83970 ASSAY OF PARATHORMONE: CPT

## 2018-03-13 PROCEDURE — 84550 ASSAY OF BLOOD/URIC ACID: CPT

## 2018-03-13 PROCEDURE — 82306 VITAMIN D 25 HYDROXY: CPT

## 2018-03-14 ENCOUNTER — TELEPHONE (OUTPATIENT)
Dept: FAMILY MEDICINE CLINIC | Facility: CLINIC | Age: 73
End: 2018-03-14

## 2018-03-14 DIAGNOSIS — E21.3 HYPERPARATHYROIDISM (HCC): Primary | ICD-10-CM

## 2018-03-14 RX ORDER — ALLOPURINOL 300 MG/1
300 TABLET ORAL DAILY
Qty: 30 TABLET | Refills: 1 | Status: SHIPPED | OUTPATIENT
Start: 2018-03-14 | End: 2018-06-05

## 2018-03-14 RX ORDER — ERGOCALCIFEROL 1.25 MG/1
50000 CAPSULE ORAL WEEKLY
Qty: 4 CAPSULE | Refills: 2 | Status: SHIPPED | OUTPATIENT
Start: 2018-03-14 | End: 2018-05-03

## 2018-03-14 NOTE — TELEPHONE ENCOUNTER
----- Message from [employer] sent at 3/14/2018  4:10 PM CDT -----  Contact: PT  CALL PT BACK, SAID SHE WAS EXPECTING CALL FROM OUR OFFICE, BUT HAS NOT HEARD ANYTHING

## 2018-03-14 NOTE — TELEPHONE ENCOUNTER
Patient asked if the headaches could be caused by the hyperparathyroidism. Advised Dr Rafal Hansen said it is a possibility.

## 2018-03-14 NOTE — TELEPHONE ENCOUNTER
Regarding thyroid and the severe headaches she is having.   Forgot to mention when she spoke with you

## 2018-03-14 NOTE — TELEPHONE ENCOUNTER
She has primary hyperparathyroidism and needs to see endocrinology this can cause high calcium and the symptoms she is having. Dr Andrew Burton is in [facility] if that is were she wants to be referred.  THEY will have to watch this and determine when to remove one

## 2018-03-15 ENCOUNTER — TELEPHONE (OUTPATIENT)
Dept: FAMILY MEDICINE CLINIC | Facility: CLINIC | Age: 73
End: 2018-03-15

## 2018-03-15 LAB — 1,25-DIHYDROXYVITAMIN D: 39.3 PG/ML

## 2018-03-15 NOTE — TELEPHONE ENCOUNTER
Please resend the referral/order to Dr Army Lake.   They do not have    Patient called back with the fax number  [phone]

## 2018-03-27 ENCOUNTER — TELEPHONE (OUTPATIENT)
Dept: FAMILY MEDICINE CLINIC | Facility: CLINIC | Age: 73
End: 2018-03-27

## 2018-03-27 NOTE — TELEPHONE ENCOUNTER
Last Tuesday Pt went to [facility], wants to know if we received the results of  The tests that were done.

## 2018-03-27 NOTE — TELEPHONE ENCOUNTER
Patient advised of below and verbalizes understanding. Sees Dr. Valerie Lorenzo next Wednesday. She wanted to make sure Dr. Viri Montana got the results.

## 2018-04-09 ENCOUNTER — TELEPHONE (OUTPATIENT)
Dept: FAMILY MEDICINE CLINIC | Facility: CLINIC | Age: 73
End: 2018-04-09

## 2018-04-09 ENCOUNTER — OFFICE VISIT (OUTPATIENT)
Dept: FAMILY MEDICINE CLINIC | Facility: CLINIC | Age: 73
End: 2018-04-09

## 2018-04-09 VITALS
OXYGEN SATURATION: 94 % | DIASTOLIC BLOOD PRESSURE: 60 MMHG | WEIGHT: 203 LBS | BODY MASS INDEX: 37.36 KG/M2 | TEMPERATURE: 98 F | HEIGHT: 62 IN | SYSTOLIC BLOOD PRESSURE: 94 MMHG | HEART RATE: 65 BPM

## 2018-04-09 DIAGNOSIS — R30.0 DYSURIA: Primary | ICD-10-CM

## 2018-04-09 DIAGNOSIS — N93.9 VAGINAL BLEEDING: ICD-10-CM

## 2018-04-09 PROCEDURE — 99214 OFFICE O/P EST MOD 30 MIN: CPT | Performed by: INTERNAL MEDICINE

## 2018-04-09 PROCEDURE — 87086 URINE CULTURE/COLONY COUNT: CPT | Performed by: INTERNAL MEDICINE

## 2018-04-09 PROCEDURE — 88175 CYTOPATH C/V AUTO FLUID REDO: CPT | Performed by: INTERNAL MEDICINE

## 2018-04-09 PROCEDURE — 81003 URINALYSIS AUTO W/O SCOPE: CPT | Performed by: INTERNAL MEDICINE

## 2018-04-09 NOTE — TELEPHONE ENCOUNTER
SHE FORGOT TO ASK DR PINO WHEN SHE WAS HERE THIS MORNING IF SHE CAN USE EAR DROPS BECAUSE THEY ITCH AND ARE DRY

## 2018-04-10 ENCOUNTER — TELEPHONE (OUTPATIENT)
Dept: FAMILY MEDICINE CLINIC | Facility: CLINIC | Age: 73
End: 2018-04-10

## 2018-04-10 RX ORDER — SPIRONOLACTONE 25 MG/1
25 TABLET ORAL 2 TIMES DAILY
Qty: 180 TABLET | Refills: 0 | Status: SHIPPED | OUTPATIENT
Start: 2018-04-10 | End: 2018-07-03

## 2018-04-10 NOTE — PROGRESS NOTES
Tabatha Lyn is a 67year old female. HPI:   Blood pressure is low normal. She has suprapubic pain and hx of bleeding. She consents to a pelvic exam today and has not have one since 1982. She has not had blood in the urine.  She has not had a colonsc by mouth daily. Disp:  Rfl:    aspirin 81 MG Oral Chew Tab Chew 1 tablet (81 mg total) by mouth daily.  Disp: 30 tablet Rfl: 11      Past Medical History:   Diagnosis Date   • Acute bronchitis 11/29/2011   • Chest pain, unspecified 12/15/2011   • Chronic ai tumor.     Orders Placed This Encounter      Urine Dip, auto without Micro      ThinPrep PAP with HPV Reflex Request      Urine Culture, Routine [E]    Meds & Refills for this Visit:  No prescriptions requested or ordered in this encounter    Imaging & Con

## 2018-04-11 ENCOUNTER — TELEPHONE (OUTPATIENT)
Dept: FAMILY MEDICINE CLINIC | Facility: CLINIC | Age: 73
End: 2018-04-11

## 2018-04-11 RX ORDER — CEPHALEXIN 500 MG/1
500 CAPSULE ORAL 4 TIMES DAILY
Qty: 40 CAPSULE | Refills: 0 | Status: SHIPPED | OUTPATIENT
Start: 2018-04-11 | End: 2018-05-03

## 2018-04-17 ENCOUNTER — TELEPHONE (OUTPATIENT)
Dept: FAMILY MEDICINE CLINIC | Facility: CLINIC | Age: 73
End: 2018-04-17

## 2018-04-17 RX ORDER — FUROSEMIDE 40 MG/1
TABLET ORAL
Qty: 180 TABLET | Refills: 0 | Status: SHIPPED | OUTPATIENT
Start: 2018-04-17 | End: 2018-06-18

## 2018-04-26 ENCOUNTER — TELEPHONE (OUTPATIENT)
Dept: FAMILY MEDICINE CLINIC | Facility: CLINIC | Age: 73
End: 2018-04-26

## 2018-04-26 ENCOUNTER — NURSE ONLY (OUTPATIENT)
Dept: FAMILY MEDICINE CLINIC | Facility: CLINIC | Age: 73
End: 2018-04-26

## 2018-04-26 DIAGNOSIS — R30.0 DYSURIA: Primary | ICD-10-CM

## 2018-04-26 PROCEDURE — 87086 URINE CULTURE/COLONY COUNT: CPT | Performed by: INTERNAL MEDICINE

## 2018-04-26 PROCEDURE — 81003 URINALYSIS AUTO W/O SCOPE: CPT | Performed by: INTERNAL MEDICINE

## 2018-04-26 NOTE — TELEPHONE ENCOUNTER
Pt called stated she was on an antibiotic for a UTI and she stated that last night she was in a lot of pain. She stated she finished the antibiotic sat.

## 2018-04-27 ENCOUNTER — TELEPHONE (OUTPATIENT)
Dept: FAMILY MEDICINE CLINIC | Facility: CLINIC | Age: 73
End: 2018-04-27

## 2018-04-27 NOTE — TELEPHONE ENCOUNTER
BAD HEADACHE AND STIFF NECK AFTER TAKING BACTRIM THAT WAS PRESCRIBED YESTERDAY. PLEASE ADVISE IF SHE SHOULD TAKE IT AGAIN.

## 2018-04-27 NOTE — TELEPHONE ENCOUNTER
Pt. Advised of treatment per Dr. Princess Suresh. Pt. Is to stop taking Bactrim and go to ER if headache gets any worse.

## 2018-04-27 NOTE — TELEPHONE ENCOUNTER
Stop the bactrim  No meds at moment  We are waiting for the culture and sensitivity to return    If headache is worse  Would report to the er

## 2018-04-27 NOTE — TELEPHONE ENCOUNTER
Pt. Was treated for cellulitis, bladder infection, and sinus infection with Bactrim yesterday. Pt. Took Bactrim at 7:40 PM and approx. An hour later had an excrutiating headache.   She has had a stiff neck for the past couple months previous to this causing

## 2018-04-28 ENCOUNTER — TELEPHONE (OUTPATIENT)
Dept: FAMILY MEDICINE CLINIC | Facility: CLINIC | Age: 73
End: 2018-04-28

## 2018-04-28 RX ORDER — CEPHALEXIN 500 MG/1
500 CAPSULE ORAL 4 TIMES DAILY
Qty: 40 CAPSULE | Refills: 0 | Status: SHIPPED | OUTPATIENT
Start: 2018-04-28 | End: 2018-05-08

## 2018-04-28 NOTE — TELEPHONE ENCOUNTER
Jean Owusu said that Dr Sebastian Chicas advised her to stop the Bactrim because she had an allergic reaction to it. She said the rash is gone but her legs look \"really bad\" and she still has pelvic pain.

## 2018-04-28 NOTE — TELEPHONE ENCOUNTER
DR Jacqulynn Spatz STOPPED HER ANTIBIOTIC YESTERDAY DUE TO SIDE EFFECTS.    SHE STILL THINKS SHE NEEDS AN ANTIBIOTIC    PLEASE CALL PATIENT

## 2018-05-03 ENCOUNTER — TELEPHONE (OUTPATIENT)
Dept: FAMILY MEDICINE CLINIC | Facility: CLINIC | Age: 73
End: 2018-05-03

## 2018-05-03 ENCOUNTER — OFFICE VISIT (OUTPATIENT)
Dept: FAMILY MEDICINE CLINIC | Facility: CLINIC | Age: 73
End: 2018-05-03

## 2018-05-03 ENCOUNTER — HOSPITAL ENCOUNTER (OUTPATIENT)
Dept: GENERAL RADIOLOGY | Age: 73
Discharge: HOME | End: 2018-05-03
Attending: INTERNAL MEDICINE
Payer: MEDICARE

## 2018-05-03 VITALS
OXYGEN SATURATION: 90 % | HEART RATE: 115 BPM | BODY MASS INDEX: 38.74 KG/M2 | SYSTOLIC BLOOD PRESSURE: 122 MMHG | HEIGHT: 62 IN | TEMPERATURE: 99 F | WEIGHT: 210.5 LBS | DIASTOLIC BLOOD PRESSURE: 70 MMHG

## 2018-05-03 DIAGNOSIS — M54.2 NECK PAIN: ICD-10-CM

## 2018-05-03 DIAGNOSIS — M54.2 NECK PAIN: Primary | ICD-10-CM

## 2018-05-03 PROCEDURE — 72052 X-RAY EXAM NECK SPINE 6/>VWS: CPT | Performed by: INTERNAL MEDICINE

## 2018-05-03 PROCEDURE — 99212 OFFICE O/P EST SF 10 MIN: CPT | Performed by: INTERNAL MEDICINE

## 2018-05-03 RX ORDER — ERGOCALCIFEROL 1.25 MG/1
50000 CAPSULE ORAL WEEKLY
Qty: 12 CAPSULE | Refills: 2 | Status: SHIPPED | OUTPATIENT
Start: 2018-05-03 | End: 2019-02-04

## 2018-05-03 NOTE — PROGRESS NOTES
Leonardo Flores is a 67year old female. HPI:   Severe head and neck pain for weeks. Sleeping in a recliner. Current Outpatient Prescriptions:  cephALEXin (KEFLEX) 500 MG Oral Cap Take 1 capsule (500 mg total) by mouth 4 (four) times daily.  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

## 2018-05-03 NOTE — TELEPHONE ENCOUNTER
Patient said that she has had a headache and stiff neck since February. She said that she has pain up into her skull and \"brain\" and into her ear and it is making her nauseated.  She said that Tylenol has not been helping, she cannot even lay her head donta

## 2018-05-10 ENCOUNTER — TELEPHONE (OUTPATIENT)
Dept: FAMILY MEDICINE CLINIC | Facility: CLINIC | Age: 73
End: 2018-05-10

## 2018-05-10 DIAGNOSIS — G89.29 CHRONIC NECK PAIN: Primary | ICD-10-CM

## 2018-05-10 DIAGNOSIS — M54.2 CHRONIC NECK PAIN: Primary | ICD-10-CM

## 2018-05-10 NOTE — TELEPHONE ENCOUNTER
MICHELLE IS STILL HAVING NECK ISSUES, SHE WAS WONDERING IF SHE NEEDS TO COME BACK TO SEE DR Eloina Cruz OR GO TO A CHRIOPRACTOR?

## 2018-05-10 NOTE — TELEPHONE ENCOUNTER
Patient said the neck pain is still there and here ears are bothering her. She said she doesn't know if she needs to see an ENT or another specialist. She said it hurts more when she looks up and it \"hurts her brain\". She said it is not constant.

## 2018-05-16 ENCOUNTER — TELEPHONE (OUTPATIENT)
Dept: FAMILY MEDICINE CLINIC | Facility: CLINIC | Age: 73
End: 2018-05-16

## 2018-05-16 NOTE — TELEPHONE ENCOUNTER
MICHELLE WOULD LIKE TO SPEAK TO ANAMIKA ABOUT THE PAIN WITH HER EAR, SHE COULDN'T OPEN HER MOUTH TO EAT BECUASE IT HURT SO BAD. PLEASE CALL HER.

## 2018-05-16 NOTE — TELEPHONE ENCOUNTER
Patient called back and said that she spoke with the tech who started her MRI yesterday and she said that if she has metal in there from surgery it would be surgical steel which is acceptable in the MRI.  Everette Lynch would still like to know if there is somethi

## 2018-05-16 NOTE — TELEPHONE ENCOUNTER
I am concerned about where the pain is coming from the MRI will help us understand ---may have trigeminal neuralgia.

## 2018-05-16 NOTE — TELEPHONE ENCOUNTER
Patient said that she was supposed to have an MRI yesterday and could not complete it because she \"panicked\" and couldn't breath.  She said that the radiology tech also works at [employer] and told her that they have larger MRI and it would be quicker and she

## 2018-05-19 ENCOUNTER — TELEPHONE (OUTPATIENT)
Dept: FAMILY MEDICINE CLINIC | Facility: CLINIC | Age: 73
End: 2018-05-19

## 2018-05-21 ENCOUNTER — PRIOR ORIGINAL RECORDS (OUTPATIENT)
Dept: OTHER | Age: 73
End: 2018-05-21

## 2018-05-21 ENCOUNTER — OFFICE VISIT (OUTPATIENT)
Dept: FAMILY MEDICINE CLINIC | Facility: CLINIC | Age: 73
End: 2018-05-21

## 2018-05-21 ENCOUNTER — APPOINTMENT (OUTPATIENT)
Dept: LAB | Age: 73
End: 2018-05-21
Attending: INTERNAL MEDICINE
Payer: MEDICARE

## 2018-05-21 VITALS
TEMPERATURE: 98 F | SYSTOLIC BLOOD PRESSURE: 110 MMHG | HEART RATE: 83 BPM | OXYGEN SATURATION: 95 % | DIASTOLIC BLOOD PRESSURE: 80 MMHG | HEIGHT: 62 IN | WEIGHT: 200.38 LBS | BODY MASS INDEX: 36.87 KG/M2

## 2018-05-21 DIAGNOSIS — N18.4 CKD (CHRONIC KIDNEY DISEASE) STAGE 4, GFR 15-29 ML/MIN (HCC): ICD-10-CM

## 2018-05-21 DIAGNOSIS — M10.00 ACUTE IDIOPATHIC GOUT, UNSPECIFIED SITE: ICD-10-CM

## 2018-05-21 DIAGNOSIS — Z11.59 NEED FOR HEPATITIS C SCREENING TEST: ICD-10-CM

## 2018-05-21 DIAGNOSIS — I48.0 PAROXYSMAL ATRIAL FIBRILLATION (HCC): ICD-10-CM

## 2018-05-21 DIAGNOSIS — Z13.6 SCREENING FOR CARDIOVASCULAR CONDITION: ICD-10-CM

## 2018-05-21 DIAGNOSIS — Z12.31 VISIT FOR SCREENING MAMMOGRAM: ICD-10-CM

## 2018-05-21 DIAGNOSIS — D68.2 FACTOR XII DEFICIENCY (HCC): ICD-10-CM

## 2018-05-21 DIAGNOSIS — I50.32 CHRONIC DIASTOLIC CONGESTIVE HEART FAILURE (HCC): Primary | ICD-10-CM

## 2018-05-21 DIAGNOSIS — J42 CHRONIC BRONCHITIS, UNSPECIFIED CHRONIC BRONCHITIS TYPE (HCC): ICD-10-CM

## 2018-05-21 DIAGNOSIS — Z23 NEED FOR VACCINATION: ICD-10-CM

## 2018-05-21 DIAGNOSIS — I25.10 CORONARY ARTERY DISEASE INVOLVING NATIVE CORONARY ARTERY OF NATIVE HEART WITHOUT ANGINA PECTORIS: ICD-10-CM

## 2018-05-21 DIAGNOSIS — Z00.00 ENCOUNTER FOR ANNUAL HEALTH EXAMINATION: ICD-10-CM

## 2018-05-21 DIAGNOSIS — E78.1 PURE HYPERGLYCERIDEMIA: ICD-10-CM

## 2018-05-21 DIAGNOSIS — R91.1 INCIDENTAL PULMONARY NODULE, > 3MM AND < 8MM: ICD-10-CM

## 2018-05-21 DIAGNOSIS — M54.12 CERVICAL RADICULOPATHY: ICD-10-CM

## 2018-05-21 DIAGNOSIS — I10 ESSENTIAL HYPERTENSION: ICD-10-CM

## 2018-05-21 PROCEDURE — 90732 PPSV23 VACC 2 YRS+ SUBQ/IM: CPT | Performed by: INTERNAL MEDICINE

## 2018-05-21 PROCEDURE — G0439 PPPS, SUBSEQ VISIT: HCPCS | Performed by: INTERNAL MEDICINE

## 2018-05-21 PROCEDURE — 80061 LIPID PANEL: CPT

## 2018-05-21 PROCEDURE — G0009 ADMIN PNEUMOCOCCAL VACCINE: HCPCS | Performed by: INTERNAL MEDICINE

## 2018-05-21 PROCEDURE — 86803 HEPATITIS C AB TEST: CPT

## 2018-05-21 PROCEDURE — 36415 COLL VENOUS BLD VENIPUNCTURE: CPT

## 2018-05-21 RX ORDER — TRAMADOL HYDROCHLORIDE 50 MG/1
50 TABLET ORAL EVERY 6 HOURS PRN
Qty: 30 TABLET | Refills: 1 | Status: SHIPPED | OUTPATIENT
Start: 2018-05-21 | End: 2019-06-17 | Stop reason: ALTCHOICE

## 2018-05-21 RX ORDER — PREDNISONE 20 MG/1
20 TABLET ORAL DAILY
Qty: 10 TABLET | Refills: 0 | Status: SHIPPED | OUTPATIENT
Start: 2018-05-21 | End: 2018-05-21 | Stop reason: ALTCHOICE

## 2018-05-21 NOTE — PROGRESS NOTES
HPI:   Mattie Mcnair is a 67year old female who presents for a Medicare Subsequent Annual Wellness visit (Pt already had Initial Annual Wellness)    Pt has been having more neck pain and HA.   She thinks her sleep position may have something to do wi greater than 12 months ago.   Smoking status: Former Smoker                                                              Packs/day: 1.00      Years: 50.00        Types: Cigarettes  Smokeless tobacco: Never Used                           Ms. Aleena Ashraf already capsule (50,000 Units total) by mouth once a week. furosemide 40 MG Oral Tab TAKE 1 TABLET TWICE DAILY   spironolactone 25 MG Oral Tab Take 1 tablet (25 mg total) by mouth 2 (two) times daily.    allopurinol 300 MG Oral Tab Take 1 tablet (300 mg total) by Her smoking use included Cigarettes. She has a 50.00 pack-year smoking history. She has never used smokeless tobacco. She reports that she does not drink alcohol or use drugs.      REVIEW OF SYSTEMS:        EXAM:   /80 (BP Location: Left arm, Patient SINGLE D ([address]062) FLU CLINIC 11/10/2016   • Influenza 12/13/2011, 11/07/2014, 11/03/2017   • Pneumococcal (Prevnar 13) 05/16/2017   • Pneumovax 23 05/21/2018   Deferred Date(s) Deferred   • Pneumococcal (Prevnar 13) 11/10/2016        ASSESSMENT AND OTHER RE unspecified chronic bronchitis type ([facility]  CCM, wheezing occasionally     8. Factor XII deficiency ([facility]  No issues at this time, anticoagulated    9. Incidental pulmonary nodule, > 3mm and < 8mm  No imaging needed at this time.      10. Acute idiopathic go Preventative Physical Exam only, or if medically necessary Electrocardiogram date05/09/2016       Colorectal Cancer Screening      Colonoscopy Screen every 10 years Colonoscopy,10 Years due on 02/12/2026 Update Health Maintenance if applicable    Flex Sigm Medicare Part B No vaccine history found This may be covered with your prescription benefits, but Medicare does not cover unless Medically needed    Zoster  Not covered by Medicare Part B No vaccine history found This may be covered with your pharmacy  pre

## 2018-05-21 NOTE — PATIENT INSTRUCTIONS
Edith Eubanks's SCREENING SCHEDULE   Tests on this list are recommended by your physician but may not be covered, or covered at this frequency, by your insurer. Please check with your insurance carrier before scheduling to verify coverage.    Mahi Escobedo previous visit.  Limited to patients who meet one of the following criteria:   • Men who are 73-68 years old and have smoked more than 100 cigarettes in their lifetime   • Anyone with a family history    Colorectal Cancer Screening  Covered up to Age 76 regularly   Immunizations      Influenza  Covered Annually   Orders placed or performed in visit on 11/10/16  -FLU VACC PRSV FREE INC ANTIG    Please get every year    Pneumococcal 13 (Prevnar)  Covered Once after 65   Orders placed or performed in visit o in [place]  www. [email or web address] org  This link also has information from the [facility], [facility] regarding Advance Directives.

## 2018-05-22 ENCOUNTER — HOSPITAL ENCOUNTER (OUTPATIENT)
Dept: GENERAL RADIOLOGY | Age: 73
Discharge: HOME | End: 2018-05-22
Attending: INTERNAL MEDICINE
Payer: MEDICARE

## 2018-05-22 ENCOUNTER — TELEPHONE (OUTPATIENT)
Dept: FAMILY MEDICINE CLINIC | Facility: CLINIC | Age: 73
End: 2018-05-22

## 2018-05-22 ENCOUNTER — HOSPITAL ENCOUNTER (OUTPATIENT)
Dept: MAMMOGRAPHY | Age: 73
Discharge: HOME | End: 2018-05-22
Attending: INTERNAL MEDICINE
Payer: MEDICARE

## 2018-05-22 DIAGNOSIS — Z12.31 VISIT FOR SCREENING MAMMOGRAM: ICD-10-CM

## 2018-05-22 DIAGNOSIS — M25.532 LEFT WRIST PAIN: ICD-10-CM

## 2018-05-22 DIAGNOSIS — M25.532 LEFT WRIST PAIN: Primary | ICD-10-CM

## 2018-05-22 PROCEDURE — 73110 X-RAY EXAM OF WRIST: CPT | Performed by: INTERNAL MEDICINE

## 2018-05-22 PROCEDURE — 77067 SCR MAMMO BI INCL CAD: CPT | Performed by: INTERNAL MEDICINE

## 2018-05-22 NOTE — TELEPHONE ENCOUNTER
Patient very concerned about taking the new medication Dr prescribed. One of the side effects is trouble breathing: patient stated she already has trouble breathing.   She is scared to take it

## 2018-05-22 NOTE — TELEPHONE ENCOUNTER
Barrera Gross said she has patient there for a mammogram. She said that she forgot to mention to Dr Maricarmen Milian when she was in that her left wrist has been bothering her since last week. It is swollen and tender to the touch.  Barrera Gross wants to know if she can xray it wh

## 2018-05-23 ENCOUNTER — TELEPHONE (OUTPATIENT)
Dept: FAMILY MEDICINE CLINIC | Facility: CLINIC | Age: 73
End: 2018-05-23

## 2018-05-26 NOTE — TELEPHONE ENCOUNTER
Patient said that both of her feet and lower legs are swollen, red and hard to touch she said it is pitting. She said she had a wrist xray the other day and never heard anything back.  She said she gets nauseated when she turns her head a certain way she sa

## 2018-05-29 ENCOUNTER — TELEPHONE (OUTPATIENT)
Dept: FAMILY MEDICINE CLINIC | Facility: CLINIC | Age: 73
End: 2018-05-29

## 2018-05-29 NOTE — TELEPHONE ENCOUNTER
Patient advised, she said she is going to the Neurosurgeon Tuesday to see the PA. She said she feels like she has \"something in her ear\". She said that she pulled out a small piece of wax. Should she come in or can she try Debrox?

## 2018-06-04 ENCOUNTER — TELEPHONE (OUTPATIENT)
Dept: FAMILY MEDICINE CLINIC | Facility: CLINIC | Age: 73
End: 2018-06-04

## 2018-06-04 ENCOUNTER — PRIOR ORIGINAL RECORDS (OUTPATIENT)
Dept: OTHER | Age: 73
End: 2018-06-04

## 2018-06-05 ENCOUNTER — TELEPHONE (OUTPATIENT)
Dept: FAMILY MEDICINE CLINIC | Facility: CLINIC | Age: 73
End: 2018-06-05

## 2018-06-05 ENCOUNTER — OFFICE VISIT (OUTPATIENT)
Dept: SURGERY | Facility: CLINIC | Age: 73
End: 2018-06-05

## 2018-06-05 VITALS
HEART RATE: 80 BPM | WEIGHT: 200 LBS | SYSTOLIC BLOOD PRESSURE: 110 MMHG | DIASTOLIC BLOOD PRESSURE: 62 MMHG | RESPIRATION RATE: 18 BRPM | BODY MASS INDEX: 37 KG/M2

## 2018-06-05 DIAGNOSIS — M54.2 BILATERAL POSTERIOR NECK PAIN: ICD-10-CM

## 2018-06-05 DIAGNOSIS — M47.812 ARTHROPATHY OF CERVICAL FACET JOINT: ICD-10-CM

## 2018-06-05 DIAGNOSIS — M48.02 FORAMINAL STENOSIS OF CERVICAL REGION: ICD-10-CM

## 2018-06-05 DIAGNOSIS — M50.30 DDD (DEGENERATIVE DISC DISEASE), CERVICAL: Primary | ICD-10-CM

## 2018-06-05 DIAGNOSIS — H92.01 RIGHT EAR PAIN: ICD-10-CM

## 2018-06-05 DIAGNOSIS — M25.78 OSTEOPHYTE OF CERVICAL SPINE: ICD-10-CM

## 2018-06-05 PROCEDURE — 99215 OFFICE O/P EST HI 40 MIN: CPT | Performed by: PHYSICIAN ASSISTANT

## 2018-06-05 RX ORDER — ALLOPURINOL 300 MG/1
300 TABLET ORAL DAILY
Qty: 30 TABLET | Refills: 1 | Status: SHIPPED | OUTPATIENT
Start: 2018-06-05 | End: 2018-08-03

## 2018-06-05 NOTE — PATIENT INSTRUCTIONS
Refill policies:    • Allow 2-3 business days for refills; controlled substances may take longer.   • Contact your pharmacy at least 5 days prior to running out of medication and have them send an electronic request or submit request through the “request re entire amount billed. Precertification and Prior Authorizations: If your physician has recommended that you have a procedure or additional testing performed.   [facility]) will contact your insurance carrier to obtain pre-certi

## 2018-06-05 NOTE — H&P
NEUROSURGERY CLINIC VISIT    Vernard Osgood  8/4/1945      Patient presents with:  Neck Pain        HPI:   Vernard Osgood is a 67year old female with an extensive managment. The patient rates the pain as 0/10 without movement, 8 or 9 out of 10 with movement. States shortness of breath due to COPD. No current chest pain. Fatigued from COPD and HF. No bladder/bowel incontinence/retention. No current chest pain.     No spironolactone 25 MG Oral Tab Take 1 tablet (25 mg total) by mouth 2 (two) times daily. Disp: 180 tablet Rfl: 0   Ipratropium-Albuterol (COMBIVENT RESPIMAT)  MCG/ACT Inhalation Aero Soln Inhale 1 puff into the lungs 4 (four) times daily.  Disp: 3 In became very swollen and red    REVIEW OF SYSTEMS:  Comprehensive review of systems done. Negative except what is outlined in the above HPI.       PHYSICAL EXAMINATION:  Wt 200 lb   BMI 36.58 kg/m²   GENERAL:  Pleasant patient is a 67year old female in no a Patellar     Ankle    Right       2+             2+      2+         2+       2+    Left       3+            2+        2+         2+       2+              DIAGNOSTIC DATA: None reviewed      IMAGING:  PROCEDURE:  XR CERVICAL SPINE COMPLETE W/FLEX + EXT (CPT Tramadol per PCP  2. Imaging:    - MRI and XR cervical, please see above imaging section for review   - Ordered today:    -Due to significant motion artifact on MRI cervical spine, recommend MRI cervical imaging.   At this time patient states she does not w

## 2018-06-05 NOTE — PROGRESS NOTES
Location of Pain: neck and ear and top of head    Date Pain Began: 1/2018          Work Related:   No        Receiving Work Comp/Disability:   No    Numeric Rating Scale:  Pain at Present:  5

## 2018-06-09 ENCOUNTER — TELEPHONE (OUTPATIENT)
Dept: FAMILY MEDICINE CLINIC | Facility: CLINIC | Age: 73
End: 2018-06-09

## 2018-06-09 DIAGNOSIS — M54.81 OCCIPITAL NEURALGIA, UNSPECIFIED LATERALITY: Primary | ICD-10-CM

## 2018-06-09 NOTE — TELEPHONE ENCOUNTER
Patient said that she saw Dr Irene MYERS and she said that she was told that \"there are people who are in more pain than she is\" and that she should she should get an order for PT and see an ENT.  She said that she is still having the pain up her neck to h

## 2018-06-11 NOTE — TELEPHONE ENCOUNTER
Patient advised, she said that she does not really want to drive out to [place]. She also said that the PA told her that she should do PT 3x weekly for 6 weeks but she said that if they are going to move her neck she doesn't \"want them to touch her\".

## 2018-06-19 RX ORDER — FUROSEMIDE 40 MG/1
TABLET ORAL
Qty: 180 TABLET | Refills: 0 | Status: SHIPPED | OUTPATIENT
Start: 2018-06-19 | End: 2018-08-20

## 2018-06-21 ENCOUNTER — TELEPHONE (OUTPATIENT)
Dept: FAMILY MEDICINE CLINIC | Facility: CLINIC | Age: 73
End: 2018-06-21

## 2018-06-21 LAB
CHOLESTEROL, TOTAL: 133 MG/DL
HDL CHOLESTEROL: 41 MG/DL
LDL CHOLESTEROL: 67 MG/DL
TRIGLYCERIDES: 124 MG/DL

## 2018-06-21 RX ORDER — CYCLOBENZAPRINE HCL 10 MG
10 TABLET ORAL 3 TIMES DAILY
Qty: 30 TABLET | Refills: 1 | Status: SHIPPED | OUTPATIENT
Start: 2018-06-21 | End: 2018-11-21

## 2018-06-21 NOTE — TELEPHONE ENCOUNTER
MICHELLE WAS USING A MASSAGER ON HER NECK AND IT TENSED UP ALL HER MUSCLES. THE WAY IT HAPPENED SHE THOUGHT SHE AS HAVING A STROKE. SHE IS DIZZY AND FEELS LIKE SHE IS FALLING BACKWARDS. SHE THINKS MAYBE A MUSCLE RELAXER WILL HELP. PLEASE CALL HER.  SHE F

## 2018-06-21 NOTE — TELEPHONE ENCOUNTER
Patient said that she was massaging her neck 2 nights ago with a heated massager and it felt good but then \"all of a sudden tensed up\" and her pain in her neck, head and jaw is now \"worse than it has ever been\".  She said she did not make an appointment

## 2018-06-23 ENCOUNTER — TELEPHONE (OUTPATIENT)
Dept: FAMILY MEDICINE CLINIC | Facility: CLINIC | Age: 73
End: 2018-06-23

## 2018-06-23 NOTE — TELEPHONE ENCOUNTER
Patient said that her \"entire head is hurting inside\" and she can barely move her head from side to side and she is having a difficult time swallowing. Appointment scheduled with Dr Reinaldo Cyr Monday at 330pm , advised to go to  if worse.

## 2018-06-25 ENCOUNTER — OFFICE VISIT (OUTPATIENT)
Dept: FAMILY MEDICINE CLINIC | Facility: CLINIC | Age: 73
End: 2018-06-25

## 2018-06-25 VITALS
WEIGHT: 200 LBS | SYSTOLIC BLOOD PRESSURE: 110 MMHG | TEMPERATURE: 99 F | BODY MASS INDEX: 36.8 KG/M2 | HEIGHT: 62 IN | HEART RATE: 87 BPM | DIASTOLIC BLOOD PRESSURE: 60 MMHG | OXYGEN SATURATION: 92 %

## 2018-06-25 DIAGNOSIS — J01.00 SUBACUTE MAXILLARY SINUSITIS: Primary | ICD-10-CM

## 2018-06-25 DIAGNOSIS — M54.81 BILATERAL OCCIPITAL NEURALGIA: ICD-10-CM

## 2018-06-25 PROCEDURE — 99213 OFFICE O/P EST LOW 20 MIN: CPT | Performed by: INTERNAL MEDICINE

## 2018-06-25 RX ORDER — CEPHALEXIN 500 MG/1
500 CAPSULE ORAL 4 TIMES DAILY
Qty: 40 CAPSULE | Refills: 0 | Status: SHIPPED | OUTPATIENT
Start: 2018-06-25 | End: 2018-10-05 | Stop reason: ALTCHOICE

## 2018-06-25 RX ORDER — GABAPENTIN 100 MG/1
100 CAPSULE ORAL 3 TIMES DAILY
Qty: 90 CAPSULE | Refills: 0 | Status: SHIPPED | OUTPATIENT
Start: 2018-06-25 | End: 2019-06-17 | Stop reason: ALTCHOICE

## 2018-06-26 NOTE — PROGRESS NOTES
Jessica Monday is a 67year old female. HPI:   Continued headaches and neck pain since Feb.  Hard to sleep, hard to chew, neck imaging shows DDD. I think she has occipital neuralgia. She is very reluctant to take meds due to her poor breathing.  Incre tab cut in half BID) Disp: 360 tablet Rfl: 0   Biotin 5000 MCG Oral Cap Take 1 tablet by mouth daily. Disp:  Rfl:    aspirin 81 MG Oral Chew Tab Chew 1 tablet (81 mg total) by mouth daily.  Disp: 30 tablet Rfl: 11      Past Medical History:   Diagnosis Date tenderness  EXTREMITIES: no cyanosis, clubbing or edema    ASSESSMENT AND PLAN:   Subacute maxillary sinusitis  (primary encounter diagnosis)  Bilateral occipital neuralgia  Treated sinus disease and will see neurology for headaches.   Needs HELP with neck

## 2018-07-03 ENCOUNTER — PRIOR ORIGINAL RECORDS (OUTPATIENT)
Dept: OTHER | Age: 73
End: 2018-07-03

## 2018-07-03 ENCOUNTER — LABORATORY ENCOUNTER (OUTPATIENT)
Dept: LAB | Age: 73
End: 2018-07-03
Attending: INTERNAL MEDICINE
Payer: MEDICARE

## 2018-07-03 ENCOUNTER — TELEPHONE (OUTPATIENT)
Dept: FAMILY MEDICINE CLINIC | Facility: CLINIC | Age: 73
End: 2018-07-03

## 2018-07-03 DIAGNOSIS — E21.2 OTHER HYPERPARATHYROIDISM (HCC): Primary | ICD-10-CM

## 2018-07-03 LAB
25-HYDROXYVITAMIN D (TOTAL): 69 NG/ML (ref 30–100)
BUN BLD-MCNC: 39 MG/DL (ref 8–20)
CALCIUM BLD-MCNC: 10.2 MG/DL (ref 8.3–10.3)
CHLORIDE: 98 MMOL/L (ref 101–111)
CO2: 27 MMOL/L (ref 22–32)
CREAT BLD-MCNC: 1.64 MG/DL (ref 0.55–1.02)
GLUCOSE BLD-MCNC: 88 MG/DL (ref 70–99)
POTASSIUM SERPL-SCNC: 4.9 MMOL/L (ref 3.6–5.1)
PTH-INTACT SERPL-MCNC: 113.4 PG/ML (ref 11.1–79.5)
SODIUM SERPL-SCNC: 134 MMOL/L (ref 136–144)

## 2018-07-03 PROCEDURE — 82306 VITAMIN D 25 HYDROXY: CPT

## 2018-07-03 PROCEDURE — 36415 COLL VENOUS BLD VENIPUNCTURE: CPT

## 2018-07-03 PROCEDURE — 83970 ASSAY OF PARATHORMONE: CPT

## 2018-07-03 PROCEDURE — 80048 BASIC METABOLIC PNL TOTAL CA: CPT

## 2018-07-03 RX ORDER — SPIRONOLACTONE 25 MG/1
25 TABLET ORAL 2 TIMES DAILY
Qty: 180 TABLET | Refills: 1 | Status: SHIPPED | OUTPATIENT
Start: 2018-07-03 | End: 2019-01-09

## 2018-07-03 RX ORDER — DILTIAZEM HYDROCHLORIDE 240 MG/1
240 CAPSULE, COATED, EXTENDED RELEASE ORAL DAILY
Qty: 90 CAPSULE | Refills: 1 | Status: SHIPPED | OUTPATIENT
Start: 2018-07-03 | End: 2018-12-20

## 2018-07-05 ENCOUNTER — TELEPHONE (OUTPATIENT)
Dept: FAMILY MEDICINE CLINIC | Facility: CLINIC | Age: 73
End: 2018-07-05

## 2018-07-05 NOTE — TELEPHONE ENCOUNTER
Patient advised that Dr Noreen Jimenez said it was ok for her to try Flonase it is OTC.  V.O. Dr Jose Pineda RN

## 2018-07-05 NOTE — TELEPHONE ENCOUNTER
Patient advised that we will forward results to Dr Robert Barfield, she said she has a follow up appointment with Dr Robert Barfield next week. She said that she only has 2 antibiotics left and she still feels the pressure in her ear.  She said a friend of hers had sinus iss

## 2018-07-11 ENCOUNTER — TELEPHONE (OUTPATIENT)
Dept: FAMILY MEDICINE CLINIC | Facility: CLINIC | Age: 73
End: 2018-07-11

## 2018-07-18 ENCOUNTER — MYAURORA ACCOUNT LINK (OUTPATIENT)
Dept: OTHER | Age: 73
End: 2018-07-18

## 2018-07-18 ENCOUNTER — PRIOR ORIGINAL RECORDS (OUTPATIENT)
Dept: OTHER | Age: 73
End: 2018-07-18

## 2018-07-18 LAB
BUN: 39 MG/DL
CALCIUM: 10.2 MG/DL
CHLORIDE: 98 MEQ/L
CREATININE, SERUM: 1.64 MG/DL
GLUCOSE: 88 MG/DL
POTASSIUM, SERUM: 4.9 MEQ/L
PTH, INTACT: 113.4 PG/ML
SODIUM: 134 MEQ/L
VITAMIN D 25-OH: 69 NG/ML

## 2018-07-23 ENCOUNTER — MED REC SCAN ONLY (OUTPATIENT)
Dept: FAMILY MEDICINE CLINIC | Facility: CLINIC | Age: 73
End: 2018-07-23

## 2018-07-30 ENCOUNTER — TELEPHONE (OUTPATIENT)
Dept: FAMILY MEDICINE CLINIC | Facility: CLINIC | Age: 73
End: 2018-07-30

## 2018-07-30 RX ORDER — POTASSIUM CHLORIDE 750 MG/1
TABLET, FILM COATED, EXTENDED RELEASE ORAL
Qty: 180 TABLET | Refills: 1 | COMMUNITY
Start: 2018-07-30 | End: 2018-12-10

## 2018-07-30 NOTE — TELEPHONE ENCOUNTER
Patient said that her wrist is swollen and there is a lump. She said that she is taking Allopurinol 300mg daily that Dr Leah Zambrano had put her on for gout awhile ago. She said she had xrays on this wrist in May.  She also said that she saw Dr Atul Orozco in [place]

## 2018-07-30 NOTE — TELEPHONE ENCOUNTER
Patient advised, she said she still has a lump and swelling in her wrist and that she has been taking Tylenol with some relief. She will continue, advised to call if no improvement.

## 2018-07-30 NOTE — TELEPHONE ENCOUNTER
Patient called back and said that she wants Dr Zenon Spatz to know that Dr Karel Lisa decreased her Potassium to 2 tabs of Potassium 20meq.  Changed on med list.

## 2018-08-03 ENCOUNTER — TELEPHONE (OUTPATIENT)
Dept: FAMILY MEDICINE CLINIC | Facility: CLINIC | Age: 73
End: 2018-08-03

## 2018-08-03 RX ORDER — ALLOPURINOL 300 MG/1
300 TABLET ORAL DAILY
Qty: 30 TABLET | Refills: 0 | Status: SHIPPED | OUTPATIENT
Start: 2018-08-03 | End: 2018-08-30

## 2018-08-03 NOTE — TELEPHONE ENCOUNTER
Last office visit: 2018    Last refill for Allopurinol 300 m2018     Future Appointments  Date Time Provider Scarlett Gallagher   2018 8:30 AM REF [facility] REF [facility] Ref Lab [facility]

## 2018-08-03 NOTE — TELEPHONE ENCOUNTER
allopurinol 300 MG Oral Tab    Call to [facility] in [place]    Patient will be out by end of weekend

## 2018-08-21 RX ORDER — FUROSEMIDE 40 MG/1
TABLET ORAL
Qty: 180 TABLET | Refills: 0 | Status: SHIPPED | OUTPATIENT
Start: 2018-08-21 | End: 2018-10-22

## 2018-08-21 NOTE — TELEPHONE ENCOUNTER
Last refill: 06/19/18  Qty: 180  W/ 0 refills  Last ov: 06/25/18    Future Appointments  Date Time Provider Scarlett Gallagher   8/27/2018 8:30 AM REF [facility] REF [facility] Ref Lab [place]         Pending Prescriptions Disp Refills    FUROSEMIDE 40 MG Ora

## 2018-08-27 ENCOUNTER — LABORATORY ENCOUNTER (OUTPATIENT)
Dept: LAB | Age: 73
End: 2018-08-27
Attending: INTERNAL MEDICINE
Payer: MEDICARE

## 2018-08-27 DIAGNOSIS — R06.00 DYSPNEA, PAROXYSMAL NOCTURNAL: ICD-10-CM

## 2018-08-27 DIAGNOSIS — E78.00 PURE HYPERCHOLESTEROLEMIA: Primary | ICD-10-CM

## 2018-08-27 DIAGNOSIS — I10 ESSENTIAL HYPERTENSION, MALIGNANT: ICD-10-CM

## 2018-08-27 LAB
ANION GAP SERPL CALC-SCNC: 9 MMOL/L (ref 0–18)
BUN BLD-MCNC: 39 MG/DL (ref 8–20)
BUN/CREAT SERPL: 23.1 (ref 10–20)
CALCIUM BLD-MCNC: 10 MG/DL (ref 8.3–10.3)
CHLORIDE SERPL-SCNC: 97 MMOL/L (ref 101–111)
CO2 SERPL-SCNC: 29 MMOL/L (ref 22–32)
CREAT BLD-MCNC: 1.69 MG/DL (ref 0.55–1.02)
GLUCOSE BLD-MCNC: 97 MG/DL (ref 70–99)
OSMOLALITY SERPL CALC.SUM OF ELEC: 289 MOSM/KG (ref 275–295)
POTASSIUM SERPL-SCNC: 4.9 MMOL/L (ref 3.6–5.1)
SODIUM SERPL-SCNC: 135 MMOL/L (ref 136–144)

## 2018-08-27 PROCEDURE — 80048 BASIC METABOLIC PNL TOTAL CA: CPT

## 2018-08-27 PROCEDURE — 36415 COLL VENOUS BLD VENIPUNCTURE: CPT

## 2018-08-30 RX ORDER — ALLOPURINOL 300 MG/1
300 TABLET ORAL DAILY
Qty: 30 TABLET | Refills: 0 | Status: SHIPPED | OUTPATIENT
Start: 2018-08-30 | End: 2018-10-01

## 2018-09-27 RX ORDER — PRAVASTATIN SODIUM 40 MG
TABLET ORAL
Qty: 90 TABLET | Refills: 0 | Status: SHIPPED | OUTPATIENT
Start: 2018-09-27 | End: 2018-11-29

## 2018-10-01 RX ORDER — ALLOPURINOL 300 MG/1
TABLET ORAL
Qty: 30 TABLET | Refills: 0 | Status: SHIPPED | OUTPATIENT
Start: 2018-10-01 | End: 2018-10-31

## 2018-10-05 ENCOUNTER — OFFICE VISIT (OUTPATIENT)
Dept: FAMILY MEDICINE CLINIC | Facility: CLINIC | Age: 73
End: 2018-10-05
Payer: MEDICARE

## 2018-10-05 VITALS
WEIGHT: 194.5 LBS | BODY MASS INDEX: 36 KG/M2 | SYSTOLIC BLOOD PRESSURE: 120 MMHG | OXYGEN SATURATION: 88 % | HEART RATE: 113 BPM | TEMPERATURE: 99 F | DIASTOLIC BLOOD PRESSURE: 78 MMHG

## 2018-10-05 DIAGNOSIS — J32.0 CHRONIC MAXILLARY SINUSITIS: ICD-10-CM

## 2018-10-05 DIAGNOSIS — L30.9 DERMATITIS: Primary | ICD-10-CM

## 2018-10-05 PROCEDURE — 99213 OFFICE O/P EST LOW 20 MIN: CPT | Performed by: FAMILY MEDICINE

## 2018-10-05 RX ORDER — CEPHALEXIN 500 MG/1
500 CAPSULE ORAL 3 TIMES DAILY
Qty: 30 CAPSULE | Refills: 0 | Status: SHIPPED | OUTPATIENT
Start: 2018-10-05 | End: 2018-10-15

## 2018-10-05 NOTE — PATIENT INSTRUCTIONS
Finish kelfex 500 mg 3 times a day for 10 days  Add zyrtec 10 mg daily    triamcianlone cream thinly to neck area 2 times a day

## 2018-10-22 ENCOUNTER — TELEPHONE (OUTPATIENT)
Dept: FAMILY MEDICINE CLINIC | Facility: CLINIC | Age: 73
End: 2018-10-22

## 2018-10-22 NOTE — TELEPHONE ENCOUNTER
COUGH, CONGESTION, COUGHING UP STUFF, WAS ON CEPHALEXIN BEFORE & IT DID NOTHING TO HELP WITH COUGH/ CONGESTION, CALL PT

## 2018-10-22 NOTE — TELEPHONE ENCOUNTER
Recommend she follow-up with Dr. Kiera Guadalupe tomorrow if she is getting acutely worse, she should go to the urgent care center

## 2018-10-22 NOTE — TELEPHONE ENCOUNTER
Patient said that she had a rash on the back of her neck and her arms that are \"welts\" before she saw Dr Michael Moore that is better but she still has a rash under her braline she was given Triamcinilone cream but it burned. She is also taking Zyrtec.  She said

## 2018-10-23 ENCOUNTER — OFFICE VISIT (OUTPATIENT)
Dept: FAMILY MEDICINE CLINIC | Facility: CLINIC | Age: 73
End: 2018-10-23
Payer: MEDICARE

## 2018-10-23 VITALS
HEIGHT: 62 IN | HEART RATE: 88 BPM | DIASTOLIC BLOOD PRESSURE: 60 MMHG | BODY MASS INDEX: 36.25 KG/M2 | WEIGHT: 197 LBS | TEMPERATURE: 98 F | RESPIRATION RATE: 12 BRPM | SYSTOLIC BLOOD PRESSURE: 112 MMHG

## 2018-10-23 DIAGNOSIS — J32.0 CHRONIC MAXILLARY SINUSITIS: Primary | ICD-10-CM

## 2018-10-23 PROCEDURE — 99214 OFFICE O/P EST MOD 30 MIN: CPT | Performed by: FAMILY MEDICINE

## 2018-10-23 RX ORDER — FUROSEMIDE 40 MG/1
TABLET ORAL
Qty: 180 TABLET | Refills: 0 | Status: SHIPPED | OUTPATIENT
Start: 2018-10-23 | End: 2018-12-31

## 2018-10-23 RX ORDER — DOXYCYCLINE HYCLATE 100 MG
100 TABLET ORAL 2 TIMES DAILY
Qty: 20 TABLET | Refills: 0 | Status: SHIPPED | OUTPATIENT
Start: 2018-10-23 | End: 2018-11-28 | Stop reason: ALTCHOICE

## 2018-10-23 NOTE — PROGRESS NOTES
HPI:   Marisol Aaron is a 68year old female who presents for upper respiratory symptoms for  4 weeks. Was seen 10/5/2018 and treated with cephalexin 500 mg tid x 10 days. Is not better and is coughing a lot.   Has rash on anterior chest where bra staps tablet Rfl: 1      Past Medical History:   Diagnosis Date   • Acute bronchitis 11/29/2011   • Chest pain, unspecified 12/15/2011   • Chronic airway obstruction, not elsewhere classified 11/29/2011   • Chronic atrial fibrillation (HCC)    • Congestive heart denies chest pain on exertion  GI: no nausea or abdominal pain  NEURO: denies headaches    EXAM:   /60 (BP Location: Right arm, Patient Position: Sitting, Cuff Size: large)   Pulse 88   Temp 97.9 °F (36.6 °C) (Temporal)   Resp 12   Ht 62\"   Wt 197 l

## 2018-10-31 RX ORDER — ALLOPURINOL 300 MG/1
300 TABLET ORAL
Qty: 90 TABLET | Refills: 0 | Status: SHIPPED | OUTPATIENT
Start: 2018-10-31 | End: 2019-02-13

## 2018-11-15 ENCOUNTER — TELEPHONE (OUTPATIENT)
Dept: FAMILY MEDICINE CLINIC | Facility: CLINIC | Age: 73
End: 2018-11-15

## 2018-11-15 ENCOUNTER — OFFICE VISIT (OUTPATIENT)
Dept: FAMILY MEDICINE CLINIC | Facility: CLINIC | Age: 73
End: 2018-11-15
Payer: MEDICARE

## 2018-11-15 VITALS
DIASTOLIC BLOOD PRESSURE: 66 MMHG | SYSTOLIC BLOOD PRESSURE: 110 MMHG | OXYGEN SATURATION: 92 % | TEMPERATURE: 99 F | WEIGHT: 200.25 LBS | HEART RATE: 94 BPM | BODY MASS INDEX: 37 KG/M2

## 2018-11-15 DIAGNOSIS — J42 CHRONIC BRONCHITIS, UNSPECIFIED CHRONIC BRONCHITIS TYPE (HCC): Primary | ICD-10-CM

## 2018-11-15 PROCEDURE — 99214 OFFICE O/P EST MOD 30 MIN: CPT | Performed by: INTERNAL MEDICINE

## 2018-11-15 RX ORDER — BUDESONIDE AND FORMOTEROL FUMARATE DIHYDRATE 80; 4.5 UG/1; UG/1
1 AEROSOL RESPIRATORY (INHALATION) 2 TIMES DAILY
Qty: 2 INHALER | Refills: 0 | COMMUNITY
Start: 2018-11-15 | End: 2019-06-17 | Stop reason: ALTCHOICE

## 2018-11-15 RX ORDER — CLINDAMYCIN HYDROCHLORIDE 300 MG/1
300 CAPSULE ORAL 3 TIMES DAILY
Qty: 30 CAPSULE | Refills: 0 | Status: SHIPPED | OUTPATIENT
Start: 2018-11-15 | End: 2018-11-25

## 2018-11-15 NOTE — TELEPHONE ENCOUNTER
Patient said that she is \"miserable\". She said she has a bad headache, ear and neck pain. , cough and wheezing.

## 2018-11-17 NOTE — PROGRESS NOTES
HPI:   Suleman Quiroga is a 68year old female who presents for upper respiratory symptoms for  3  weeks. Patient reports congestion, dry cough, cough is keeping pt up at night, chest pain from coughing.       Current Outpatient Medications:  Clindamycin tablet by mouth daily. Disp:  Rfl:    aspirin 81 MG Oral Chew Tab Chew 1 tablet (81 mg total) by mouth daily. Disp: 30 tablet Rfl: 11   allopurinol 300 MG Oral Tab Take 1 tablet (300 mg total) by mouth once daily.  Disp: 90 tablet Rfl: 0      Past Medical H REVIEW OF SYSTEMS:   GENERAL: feels well otherwise  SKIN: no rashes  EYES:denies blurred vision or double vision  HEENT: congested  LUNGS: denies shortness of breath with exertion  CARDIOVASCULAR: denies chest pain on exertion  GI: no nausea or abdomin

## 2018-11-21 ENCOUNTER — TELEPHONE (OUTPATIENT)
Dept: FAMILY MEDICINE CLINIC | Facility: CLINIC | Age: 73
End: 2018-11-21

## 2018-11-21 RX ORDER — CYCLOBENZAPRINE HCL 10 MG
10 TABLET ORAL 2 TIMES DAILY
Qty: 20 TABLET | Refills: 0 | Status: SHIPPED | OUTPATIENT
Start: 2018-11-21 | End: 2018-11-28 | Stop reason: ALTCHOICE

## 2018-11-21 NOTE — TELEPHONE ENCOUNTER
Patient called and said that she is having bad neck pain, she has been taking ES Tylenol but it is making her feet swell. Dr Tristan Henderson states that she will prescribe Flexeril.

## 2018-11-24 ENCOUNTER — TELEPHONE (OUTPATIENT)
Dept: FAMILY MEDICINE CLINIC | Facility: CLINIC | Age: 73
End: 2018-11-24

## 2018-11-24 NOTE — TELEPHONE ENCOUNTER
PT CANNOT MOVE HER HEAD AT ALL. PAIN IS EXTREME. PT SAID DR Mamadou David WOULD CHECK ON PT TODAY. PT HAS NOT HEARD FROM DR Mamadou David YET.  PLEASE CALL

## 2018-11-26 ENCOUNTER — TELEPHONE (OUTPATIENT)
Dept: FAMILY MEDICINE CLINIC | Facility: CLINIC | Age: 73
End: 2018-11-26

## 2018-11-26 NOTE — TELEPHONE ENCOUNTER
You should not be shoveling. If needed have a friend bring you this week OR go to ER for X-Ray and evaluation of the neck.

## 2018-11-26 NOTE — TELEPHONE ENCOUNTER
HPI:   Madelaine Casey is a 68year old female who presents for upper respiratory symptoms for  1  weeks. Patient reports congestion, dry cough, wheezing.       Current Outpatient Medications:  Cyclobenzaprine HCl 10 MG Oral Tab Take 1 tablet (10 mg total tablet Rfl: 0   Biotin 5000 MCG Oral Cap Take 1 tablet by mouth daily. Disp:  Rfl:    aspirin 81 MG Oral Chew Tab Chew 1 tablet (81 mg total) by mouth daily.  Disp: 30 tablet Rfl: 11      Past Medical History:   Diagnosis Date   • Acute bronchitis 11/29/201 otherwise  SKIN: no rashes  EYES:denies blurred vision or double vision  HEENT: congested  LUNGS: denies shortness of breath with exertion  CARDIOVASCULAR: denies chest pain on exertion  GI: no nausea or abdominal pain  NEURO: denies headaches    EXAM:   @

## 2018-11-26 NOTE — TELEPHONE ENCOUNTER
Patient said that she is taking Tramadol 3x daily and the Cyclobenzaprine twice daily and it only helps a little bit but she feels \"Tipsy\". She said she almost fell off her tipsy.

## 2018-11-26 NOTE — TELEPHONE ENCOUNTER
Patient advised, she said she will try to get a ride this week she will call us back, may use same day sick.

## 2018-11-28 ENCOUNTER — HOSPITAL ENCOUNTER (OUTPATIENT)
Dept: GENERAL RADIOLOGY | Age: 73
Discharge: HOME | End: 2018-11-28
Attending: INTERNAL MEDICINE
Payer: MEDICARE

## 2018-11-28 ENCOUNTER — OFFICE VISIT (OUTPATIENT)
Dept: FAMILY MEDICINE CLINIC | Facility: CLINIC | Age: 73
End: 2018-11-28
Payer: MEDICARE

## 2018-11-28 VITALS
DIASTOLIC BLOOD PRESSURE: 70 MMHG | BODY MASS INDEX: 36.34 KG/M2 | HEART RATE: 83 BPM | OXYGEN SATURATION: 94 % | SYSTOLIC BLOOD PRESSURE: 120 MMHG | TEMPERATURE: 98 F | WEIGHT: 197.5 LBS | HEIGHT: 62 IN

## 2018-11-28 DIAGNOSIS — E55.9 VITAMIN D DEFICIENCY: ICD-10-CM

## 2018-11-28 DIAGNOSIS — M54.2 NECK PAIN: ICD-10-CM

## 2018-11-28 DIAGNOSIS — M54.2 NECK PAIN: Primary | ICD-10-CM

## 2018-11-28 DIAGNOSIS — E05.90 HYPERTHYROIDISM: ICD-10-CM

## 2018-11-28 DIAGNOSIS — N18.4 CKD (CHRONIC KIDNEY DISEASE) STAGE 4, GFR 15-29 ML/MIN (HCC): ICD-10-CM

## 2018-11-28 DIAGNOSIS — E83.52 HYPERCALCEMIA: ICD-10-CM

## 2018-11-28 PROCEDURE — 72050 X-RAY EXAM NECK SPINE 4/5VWS: CPT | Performed by: INTERNAL MEDICINE

## 2018-11-28 PROCEDURE — 96372 THER/PROPH/DIAG INJ SC/IM: CPT | Performed by: INTERNAL MEDICINE

## 2018-11-28 PROCEDURE — 99214 OFFICE O/P EST MOD 30 MIN: CPT | Performed by: INTERNAL MEDICINE

## 2018-11-28 RX ORDER — KETOROLAC TROMETHAMINE 30 MG/ML
60 INJECTION, SOLUTION INTRAMUSCULAR; INTRAVENOUS ONCE
Status: COMPLETED | OUTPATIENT
Start: 2018-11-28 | End: 2018-11-28

## 2018-11-28 RX ADMIN — KETOROLAC TROMETHAMINE 60 MG: 30 INJECTION, SOLUTION INTRAMUSCULAR; INTRAVENOUS at 13:25:00

## 2018-11-28 NOTE — PROGRESS NOTES
Colleen Carr is a 68year old female. HPI:   Chronic neck pain exacerbation. She has been suffering all week. Neck is very stiff and painful to turn.   She has tried flexeril at night, this helps with sleep, tylenool and tramadol and these are not e total) by mouth every 6 (six) hours as needed for Pain.  Disp: 30 tablet Rfl: 1      Past Medical History:   Diagnosis Date   • Acute bronchitis 11/29/2011   • Chest pain, unspecified 12/15/2011   • Chronic airway obstruction, not elsewhere classified 11/29 (primary encounter diagnosis)  Hyperthyroidism  Vitamin d deficiency  Ckd (chronic kidney disease) stage 4, gfr 15-29 ml/min (hcc)  Hypercalcemia  Fit with a soft collar, toradol given and may continue use of the flexeril at night.  Labs for endocrine issue

## 2018-11-29 RX ORDER — PRAVASTATIN SODIUM 40 MG
TABLET ORAL
Qty: 90 TABLET | Refills: 0 | Status: SHIPPED | OUTPATIENT
Start: 2018-11-29 | End: 2019-01-30

## 2018-12-10 ENCOUNTER — TELEPHONE (OUTPATIENT)
Dept: FAMILY MEDICINE CLINIC | Facility: CLINIC | Age: 73
End: 2018-12-10

## 2018-12-10 RX ORDER — POTASSIUM CHLORIDE 750 MG/1
TABLET, FILM COATED, EXTENDED RELEASE ORAL
Qty: 180 TABLET | Refills: 0 | Status: SHIPPED | OUTPATIENT
Start: 2018-12-10 | End: 2019-05-22

## 2018-12-12 ENCOUNTER — TELEPHONE (OUTPATIENT)
Dept: FAMILY MEDICINE CLINIC | Facility: CLINIC | Age: 73
End: 2018-12-12

## 2018-12-12 RX ORDER — ONDANSETRON 4 MG/1
4 TABLET, FILM COATED ORAL EVERY 8 HOURS PRN
Qty: 20 TABLET | Refills: 2 | Status: SHIPPED | OUTPATIENT
Start: 2018-12-12 | End: 2019-06-17 | Stop reason: ALTCHOICE

## 2018-12-12 NOTE — TELEPHONE ENCOUNTER
Patient advised not to take the Furosemide for 2 days. Go to ER if not able to keep fluids down.  V.O. Dr Nikolai Sanz RN

## 2018-12-12 NOTE — TELEPHONE ENCOUNTER
Patient said she started getting sick Monday feeling weak. She said that she started yesterday with vomiting and diarrhea. She said she did not take her Lasix this morning. Can Dr Ruben Ojeda give her Zofran and anything else she can do?  She said she can hardly

## 2018-12-12 NOTE — TELEPHONE ENCOUNTER
PT IS EXPERIENCING VOMITTING AND DIAHREAH, IS SHAKY AND WEAK. PT WANTS TO SPEAK WITH NURSE.  PLEASE CALL

## 2018-12-13 ENCOUNTER — TELEPHONE (OUTPATIENT)
Dept: FAMILY MEDICINE CLINIC | Facility: CLINIC | Age: 73
End: 2018-12-13

## 2018-12-13 NOTE — TELEPHONE ENCOUNTER
Patient advised, she said that after she took the zofran she \"fell back three times\". She did not fall to the ground, one time sh landed on the Cole and the other time she landed in her chair.  Advised that if she feels dixzzy to the point wher she ceferino

## 2018-12-13 NOTE — TELEPHONE ENCOUNTER
Patient said that she took a Zofran last night and she is not nauseated but is having watery diarrhea. She was incontinent of watery diarrhea x 1 today. She does not know if she has a fever but has been alternating \"burning up and having chills\".  She holley

## 2018-12-20 RX ORDER — DILTIAZEM HYDROCHLORIDE 240 MG/1
240 CAPSULE, COATED, EXTENDED RELEASE ORAL DAILY
Qty: 90 CAPSULE | Refills: 1 | Status: SHIPPED | OUTPATIENT
Start: 2018-12-20 | End: 2019-01-17

## 2019-01-02 RX ORDER — FUROSEMIDE 40 MG/1
TABLET ORAL
Qty: 180 TABLET | Refills: 0 | Status: SHIPPED | OUTPATIENT
Start: 2019-01-02 | End: 2019-03-06

## 2019-01-08 ENCOUNTER — TELEPHONE (OUTPATIENT)
Dept: FAMILY MEDICINE CLINIC | Facility: CLINIC | Age: 74
End: 2019-01-08

## 2019-01-08 NOTE — TELEPHONE ENCOUNTER
TAKES DILTIAZEM, ALL ALONG SHE HAS BEEN EATING GRAPEFRUIT WHILE TAKING IT, NORMALLY SHE GETS IT FILLED @ [facility], BUT THIS LAST TIME SHE GOT IT FILLED WITH [employer] & WHEN IT CAME IN MAIL IT SAID TO CONTACT DR BEFORE INCLUDING GRAPEFRUIT OR GRAPEFRUIT JUICE

## 2019-01-09 ENCOUNTER — TELEPHONE (OUTPATIENT)
Dept: FAMILY MEDICINE CLINIC | Facility: CLINIC | Age: 74
End: 2019-01-09

## 2019-01-09 ENCOUNTER — APPOINTMENT (OUTPATIENT)
Dept: LAB | Age: 74
End: 2019-01-09
Attending: INTERNAL MEDICINE
Payer: MEDICARE

## 2019-01-09 ENCOUNTER — NURSE ONLY (OUTPATIENT)
Dept: FAMILY MEDICINE CLINIC | Facility: CLINIC | Age: 74
End: 2019-01-09
Payer: MEDICARE

## 2019-01-09 DIAGNOSIS — E83.52 HYPERCALCEMIA: ICD-10-CM

## 2019-01-09 DIAGNOSIS — E55.9 VITAMIN D DEFICIENCY: ICD-10-CM

## 2019-01-09 DIAGNOSIS — R30.0 DYSURIA: Primary | ICD-10-CM

## 2019-01-09 DIAGNOSIS — E05.90 HYPERTHYROIDISM: ICD-10-CM

## 2019-01-09 DIAGNOSIS — R82.90 FOUL SMELLING URINE: ICD-10-CM

## 2019-01-09 DIAGNOSIS — N18.4 CKD (CHRONIC KIDNEY DISEASE) STAGE 4, GFR 15-29 ML/MIN (HCC): ICD-10-CM

## 2019-01-09 LAB
ALBUMIN SERPL-MCNC: 4.5 G/DL (ref 3.1–4.5)
ALBUMIN/GLOB SERPL: 1.2 {RATIO} (ref 1–2)
ALP LIVER SERPL-CCNC: 150 U/L (ref 55–142)
ALT SERPL-CCNC: 23 U/L (ref 14–54)
ANION GAP SERPL CALC-SCNC: 6 MMOL/L (ref 0–18)
AST SERPL-CCNC: 25 U/L (ref 15–41)
BILIRUB SERPL-MCNC: 0.6 MG/DL (ref 0.1–2)
BUN BLD-MCNC: 31 MG/DL (ref 8–20)
BUN/CREAT SERPL: 18.3 (ref 10–20)
CALCIUM BLD-MCNC: 10.2 MG/DL (ref 8.3–10.3)
CHLORIDE SERPL-SCNC: 100 MMOL/L (ref 101–111)
CO2 SERPL-SCNC: 31 MMOL/L (ref 22–32)
CREAT BLD-MCNC: 1.69 MG/DL (ref 0.55–1.02)
GLOBULIN PLAS-MCNC: 3.7 G/DL (ref 2.8–4.4)
GLUCOSE BLD-MCNC: 107 MG/DL (ref 70–99)
M PROTEIN MFR SERPL ELPH: 8.2 G/DL (ref 6.4–8.2)
MULTISTIX LOT#: NORMAL NUMERIC
OSMOLALITY SERPL CALC.SUM OF ELEC: 291 MOSM/KG (ref 275–295)
PH, URINE: 7 (ref 4.5–8)
POTASSIUM SERPL-SCNC: 5.1 MMOL/L (ref 3.6–5.1)
PTH-INTACT SERPL-MCNC: 107.8 PG/ML (ref 11.1–79.5)
SODIUM SERPL-SCNC: 137 MMOL/L (ref 136–144)
SPECIFIC GRAVITY: 1.01 (ref 1–1.03)
T4 FREE SERPL-MCNC: 1.3 NG/DL (ref 0.9–1.8)
TSI SER-ACNC: 2.72 MIU/ML (ref 0.35–5.5)
URINE-COLOR: YELLOW
UROBILINOGEN,SEMI-QN: 0.2 MG/DL (ref 0–1.9)
VIT D+METAB SERPL-MCNC: 92.8 NG/ML (ref 30–100)

## 2019-01-09 PROCEDURE — 80053 COMPREHEN METABOLIC PANEL: CPT

## 2019-01-09 PROCEDURE — 84439 ASSAY OF FREE THYROXINE: CPT

## 2019-01-09 PROCEDURE — 87088 URINE BACTERIA CULTURE: CPT | Performed by: INTERNAL MEDICINE

## 2019-01-09 PROCEDURE — 87086 URINE CULTURE/COLONY COUNT: CPT | Performed by: INTERNAL MEDICINE

## 2019-01-09 PROCEDURE — 83970 ASSAY OF PARATHORMONE: CPT

## 2019-01-09 PROCEDURE — 36415 COLL VENOUS BLD VENIPUNCTURE: CPT

## 2019-01-09 PROCEDURE — 87186 SC STD MICRODIL/AGAR DIL: CPT | Performed by: INTERNAL MEDICINE

## 2019-01-09 PROCEDURE — 82306 VITAMIN D 25 HYDROXY: CPT

## 2019-01-09 PROCEDURE — 84443 ASSAY THYROID STIM HORMONE: CPT

## 2019-01-09 PROCEDURE — 81003 URINALYSIS AUTO W/O SCOPE: CPT | Performed by: INTERNAL MEDICINE

## 2019-01-09 RX ORDER — SPIRONOLACTONE 25 MG/1
25 TABLET ORAL 2 TIMES DAILY
Qty: 28 TABLET | Refills: 0 | Status: SHIPPED | OUTPATIENT
Start: 2019-01-09 | End: 2019-01-09

## 2019-01-09 RX ORDER — SPIRONOLACTONE 25 MG/1
25 TABLET ORAL 2 TIMES DAILY
Qty: 180 TABLET | Refills: 0 | Status: SHIPPED | OUTPATIENT
Start: 2019-01-09 | End: 2019-03-18

## 2019-01-09 NOTE — TELEPHONE ENCOUNTER
spironolactone 25 MG Oral Tab   PLEASE SEND REFILL FOR 2 WEEKS TO [facility] IN [place]  PLEASE SEND FULL REFILL TO [facility].

## 2019-01-15 PROBLEM — E66.01 SEVERE OBESITY (BMI 35.0-39.9) WITH COMORBIDITY (HCC): Chronic | Status: ACTIVE | Noted: 2019-01-15

## 2019-01-17 ENCOUNTER — OFFICE VISIT (OUTPATIENT)
Dept: FAMILY MEDICINE CLINIC | Facility: CLINIC | Age: 74
End: 2019-01-17
Payer: MEDICARE

## 2019-01-17 VITALS
HEIGHT: 62 IN | SYSTOLIC BLOOD PRESSURE: 114 MMHG | OXYGEN SATURATION: 93 % | DIASTOLIC BLOOD PRESSURE: 60 MMHG | TEMPERATURE: 99 F | WEIGHT: 187 LBS | HEART RATE: 95 BPM | BODY MASS INDEX: 34.41 KG/M2

## 2019-01-17 DIAGNOSIS — I48.0 PAROXYSMAL ATRIAL FIBRILLATION (HCC): ICD-10-CM

## 2019-01-17 DIAGNOSIS — I50.32 CHRONIC DIASTOLIC CONGESTIVE HEART FAILURE (HCC): ICD-10-CM

## 2019-01-17 DIAGNOSIS — J42 CHRONIC BRONCHITIS, UNSPECIFIED CHRONIC BRONCHITIS TYPE (HCC): ICD-10-CM

## 2019-01-17 DIAGNOSIS — D68.2 FACTOR XII DEFICIENCY (HCC): ICD-10-CM

## 2019-01-17 DIAGNOSIS — E21.2 OTHER HYPERPARATHYROIDISM (HCC): ICD-10-CM

## 2019-01-17 DIAGNOSIS — E66.01 SEVERE OBESITY (BMI 35.0-39.9) WITH COMORBIDITY (HCC): ICD-10-CM

## 2019-01-17 DIAGNOSIS — R35.0 URINARY FREQUENCY: Primary | ICD-10-CM

## 2019-01-17 DIAGNOSIS — M46.92 INFLAMMATORY SPONDYLOPATHY OF CERVICAL REGION (HCC): ICD-10-CM

## 2019-01-17 LAB
APPEARANCE: CLEAR
BILIRUBIN: NEGATIVE
GLUCOSE (URINE DIPSTICK): NEGATIVE MG/DL
KETONES (URINE DIPSTICK): NEGATIVE MG/DL
LEUKOCYTES: NEGATIVE
MULTISTIX LOT#: NORMAL NUMERIC
NITRITE, URINE: NEGATIVE
OCCULT BLOOD: NEGATIVE
PH, URINE: 7 (ref 4.5–8)
PROTEIN (URINE DIPSTICK): NEGATIVE MG/DL
SPECIFIC GRAVITY: 1.01 (ref 1–1.03)
URINE-COLOR: YELLOW
UROBILINOGEN,SEMI-QN: 0.2 MG/DL (ref 0–1.9)

## 2019-01-17 PROCEDURE — G0008 ADMIN INFLUENZA VIRUS VAC: HCPCS | Performed by: INTERNAL MEDICINE

## 2019-01-17 PROCEDURE — 90653 IIV ADJUVANT VACCINE IM: CPT | Performed by: INTERNAL MEDICINE

## 2019-01-17 PROCEDURE — 87086 URINE CULTURE/COLONY COUNT: CPT | Performed by: INTERNAL MEDICINE

## 2019-01-17 PROCEDURE — 99213 OFFICE O/P EST LOW 20 MIN: CPT | Performed by: INTERNAL MEDICINE

## 2019-01-17 PROCEDURE — 81003 URINALYSIS AUTO W/O SCOPE: CPT | Performed by: INTERNAL MEDICINE

## 2019-01-17 RX ORDER — DILTIAZEM HYDROCHLORIDE 240 MG/1
480 CAPSULE, COATED, EXTENDED RELEASE ORAL DAILY
Qty: 90 CAPSULE | Refills: 1 | COMMUNITY
Start: 2019-01-17 | End: 2019-02-13

## 2019-01-19 NOTE — PROGRESS NOTES
Colleen Carr is a 68year old female. HPI:   Pt had a UTI and completed course of antibiotic. She still feels a little dizzy and off balance. She has neck pain or chest pain today. Her lung have been ok.       Current Outpatient Medications:  DilTI by mouth 3 (three) times daily. Disp: 90 capsule Rfl: 0   TraMADol HCl 50 MG Oral Tab Take 1 tablet (50 mg total) by mouth every 6 (six) hours as needed for Pain.  Disp: 30 tablet Rfl: 1      Past Medical History:   Diagnosis Date   • Acute bronchitis 11/29 masses, HSM or tenderness  EXTREMITIES: no cyanosis, clubbing or edema    ASSESSMENT AND PLAN:     Urinary frequency  (primary encounter diagnosis)  Other hyperparathyroidism (hcc)  Factor xii deficiency (hcc)  Paroxysmal atrial fibrillation (hcc)  Chronic

## 2019-01-22 ENCOUNTER — TELEPHONE (OUTPATIENT)
Dept: FAMILY MEDICINE CLINIC | Facility: CLINIC | Age: 74
End: 2019-01-22

## 2019-01-22 NOTE — TELEPHONE ENCOUNTER
Patient said that she has been sneezing all day and she is plugged up. She said that she does not want to go out in this weather. She has Benadryl, saline nasal spray.  Advised ok to take one Benadryl every six hours and use saline nasal spray every 2 hours

## 2019-01-31 RX ORDER — PRAVASTATIN SODIUM 40 MG
TABLET ORAL
Qty: 90 TABLET | Refills: 3 | Status: SHIPPED | OUTPATIENT
Start: 2019-01-31 | End: 2019-10-22

## 2019-02-04 RX ORDER — ERGOCALCIFEROL 1.25 MG/1
50000 CAPSULE ORAL WEEKLY
Qty: 12 CAPSULE | Refills: 0 | Status: SHIPPED | OUTPATIENT
Start: 2019-02-04 | End: 2019-04-29

## 2019-02-04 NOTE — TELEPHONE ENCOUNTER
Last refill 5/3/18 with 2 refills, last Vitamin d level 1/9/19 was 92.8, continue? Patient complaining of pain above left eye, and left ear. She said she is coughing up dark yellow thick phlegm with increased SOB.  She said she feels \"lousy and cannot slee

## 2019-02-04 NOTE — TELEPHONE ENCOUNTER
ergocalciferol 31549 units Oral Cap     PLEASE SEND TO [facility] IN [place]. ALSO, PT HAS BAD SINUSES, YELLOW/GREEN DISCHARGE, BLOOD CLOTS ALSO FROM NOSE, COUGHING THICK MUCUS, SEVERE PAIN IN LEFT EARS AND EYES. SHE CANNOT SLEEP DUE TO PAIN.  CAN DR Xiong Delay

## 2019-02-04 NOTE — TELEPHONE ENCOUNTER
Patient advised that Dr Madiha Kapoor prescribed an antibiotic, if not helping then she needs to be seen.  V.O. Dr Ginger Michael RN

## 2019-02-06 ENCOUNTER — TELEPHONE (OUTPATIENT)
Dept: FAMILY MEDICINE CLINIC | Facility: CLINIC | Age: 74
End: 2019-02-06

## 2019-02-06 NOTE — TELEPHONE ENCOUNTER
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  SHE SAID THAT ANAMIKA KNOWS THAT SHE IS COMING IN WITH THAT PAPER

## 2019-02-13 RX ORDER — DILTIAZEM HYDROCHLORIDE 240 MG/1
480 CAPSULE, COATED, EXTENDED RELEASE ORAL DAILY
Qty: 90 CAPSULE | Refills: 1 | Status: SHIPPED | OUTPATIENT
Start: 2019-02-13 | End: 2019-04-17

## 2019-02-13 RX ORDER — ALLOPURINOL 300 MG/1
300 TABLET ORAL
Qty: 90 TABLET | Refills: 0 | Status: SHIPPED | OUTPATIENT
Start: 2019-02-13 | End: 2019-04-17

## 2019-02-28 VITALS
HEART RATE: 72 BPM | DIASTOLIC BLOOD PRESSURE: 80 MMHG | HEIGHT: 64 IN | SYSTOLIC BLOOD PRESSURE: 136 MMHG | BODY MASS INDEX: 34.83 KG/M2 | WEIGHT: 204 LBS

## 2019-02-28 VITALS
HEIGHT: 64 IN | WEIGHT: 197 LBS | HEART RATE: 70 BPM | BODY MASS INDEX: 33.63 KG/M2 | SYSTOLIC BLOOD PRESSURE: 100 MMHG | DIASTOLIC BLOOD PRESSURE: 74 MMHG

## 2019-02-28 VITALS
BODY MASS INDEX: 33.29 KG/M2 | SYSTOLIC BLOOD PRESSURE: 100 MMHG | WEIGHT: 195 LBS | HEIGHT: 64 IN | DIASTOLIC BLOOD PRESSURE: 62 MMHG | HEART RATE: 71 BPM

## 2019-03-06 RX ORDER — FUROSEMIDE 40 MG/1
TABLET ORAL
Qty: 180 TABLET | Refills: 0 | Status: SHIPPED | OUTPATIENT
Start: 2019-03-06 | End: 2019-05-08

## 2019-03-06 NOTE — TELEPHONE ENCOUNTER
Furosemide refill request.     Last office visit: 1/17/2019  Last refill: 1/2/2019, #180  Labs completed 1/9/2019    Future Appointments   Date Time Provider Scarlett Gallagher   5/22/2019 11:00 AM Cong Sebastian MD [facility]

## 2019-03-11 ENCOUNTER — TELEPHONE (OUTPATIENT)
Dept: FAMILY MEDICINE CLINIC | Facility: CLINIC | Age: 74
End: 2019-03-11

## 2019-03-11 NOTE — TELEPHONE ENCOUNTER
Patient said that the doctor raised her Cartia to 2 tablets on  because her HR was 100-124. She said today it was down to 69.  She said she is tired today, she worked at [employer] all weekend after a . Her Blood pressure has been running

## 2019-03-11 NOTE — TELEPHONE ENCOUNTER
Her heart rate is perfect should be greater that 40 and her BP is ok, but should not make her feel dizzy or light headed.

## 2019-03-11 NOTE — TELEPHONE ENCOUNTER
MICHELLE' HEART RATE IS AT 71, SHE TOOK MEDS AT 0830. SHE IS WONDERING IF THIS IS TOO LOW, SHE HAS NO ENERGY.   PLEASE CALL HER

## 2019-03-18 RX ORDER — SPIRONOLACTONE 25 MG/1
TABLET ORAL
Qty: 180 TABLET | Refills: 0 | Status: SHIPPED | OUTPATIENT
Start: 2019-03-18 | End: 2019-05-20

## 2019-03-30 ENCOUNTER — TELEPHONE (OUTPATIENT)
Dept: FAMILY MEDICINE CLINIC | Facility: CLINIC | Age: 74
End: 2019-03-30

## 2019-03-30 NOTE — TELEPHONE ENCOUNTER
Pt states when she gets up and moves around she has SOB. She has a pain that goes up her R side of her head that goes to her temple. She is congested and thinks she needs an antibiotic. She states she is having swelling in both legs and feet.  She elevates

## 2019-04-17 RX ORDER — DILTIAZEM HYDROCHLORIDE 240 MG/1
CAPSULE, EXTENDED RELEASE ORAL
Qty: 180 CAPSULE | Refills: 0 | Status: SHIPPED | OUTPATIENT
Start: 2019-04-17 | End: 2019-08-15

## 2019-04-17 RX ORDER — ALLOPURINOL 300 MG/1
TABLET ORAL
Qty: 90 TABLET | Refills: 0 | Status: SHIPPED | OUTPATIENT
Start: 2019-04-17 | End: 2019-08-15

## 2019-04-18 RX ORDER — PRAVASTATIN SODIUM 40 MG
TABLET ORAL
COMMUNITY
Start: 2016-04-20

## 2019-04-18 RX ORDER — METOPROLOL TARTRATE 50 MG/1
TABLET, FILM COATED ORAL
COMMUNITY
Start: 2016-04-25 | End: 2019-10-16 | Stop reason: DRUGHIGH

## 2019-04-18 RX ORDER — FUROSEMIDE 40 MG/1
TABLET ORAL
COMMUNITY
Start: 2017-12-20 | End: 2020-03-12 | Stop reason: DRUGHIGH

## 2019-04-18 RX ORDER — SPIRONOLACTONE 25 MG/1
TABLET ORAL
COMMUNITY
Start: 2017-06-21 | End: 2020-02-19 | Stop reason: ALTCHOICE

## 2019-04-18 RX ORDER — DILTIAZEM HYDROCHLORIDE 240 MG/1
240 CAPSULE, COATED, EXTENDED RELEASE ORAL DAILY
COMMUNITY
Start: 2016-04-20

## 2019-04-18 RX ORDER — FLUTICASONE PROPIONATE AND SALMETEROL 250; 50 UG/1; UG/1
POWDER RESPIRATORY (INHALATION)
COMMUNITY
Start: 2016-11-16 | End: 2020-02-19 | Stop reason: ALTCHOICE

## 2019-04-21 NOTE — TELEPHONE ENCOUNTER
Patient is at Dr Courtney Clark office. Sumit Nino requested recent labs to be faxed.    Labs were printed and faxed to [phone] S/P appendectomy    S/P CABG (coronary artery bypass graft)

## 2019-04-29 ENCOUNTER — TELEPHONE (OUTPATIENT)
Dept: FAMILY MEDICINE CLINIC | Facility: CLINIC | Age: 74
End: 2019-04-29

## 2019-04-29 DIAGNOSIS — E55.9 VITAMIN D DEFICIENCY: Primary | ICD-10-CM

## 2019-04-29 RX ORDER — ERGOCALCIFEROL 1.25 MG/1
50000 CAPSULE ORAL
Qty: 6 CAPSULE | Refills: 0 | Status: SHIPPED | OUTPATIENT
Start: 2019-04-29 | End: 2019-07-24

## 2019-04-29 NOTE — TELEPHONE ENCOUNTER
Vitamin D (ERGO) 50,000 IU CAP  STR     She takes 1 time a week    Call to [facility] in [place]

## 2019-05-08 RX ORDER — FUROSEMIDE 40 MG/1
TABLET ORAL
Qty: 180 TABLET | Refills: 0 | Status: SHIPPED | OUTPATIENT
Start: 2019-05-08 | End: 2019-09-23

## 2019-05-20 RX ORDER — SPIRONOLACTONE 25 MG/1
TABLET ORAL
Qty: 180 TABLET | Refills: 0 | Status: SHIPPED | OUTPATIENT
Start: 2019-05-20 | End: 2019-07-22

## 2019-05-21 ENCOUNTER — MA CHART PREP (OUTPATIENT)
Dept: FAMILY MEDICINE CLINIC | Facility: CLINIC | Age: 74
End: 2019-05-21

## 2019-05-21 PROBLEM — Z87.891 FORMER SMOKER: Status: ACTIVE | Noted: 2019-05-21

## 2019-05-21 PROBLEM — J84.10 LUNG GRANULOMA (HCC): Status: ACTIVE | Noted: 2019-05-21

## 2019-05-22 ENCOUNTER — OFFICE VISIT (OUTPATIENT)
Dept: FAMILY MEDICINE CLINIC | Facility: CLINIC | Age: 74
End: 2019-05-22
Payer: MEDICARE

## 2019-05-22 ENCOUNTER — LAB ENCOUNTER (OUTPATIENT)
Dept: LAB | Age: 74
End: 2019-05-22
Attending: INTERNAL MEDICINE
Payer: MEDICARE

## 2019-05-22 VITALS
TEMPERATURE: 98 F | SYSTOLIC BLOOD PRESSURE: 120 MMHG | HEART RATE: 58 BPM | DIASTOLIC BLOOD PRESSURE: 70 MMHG | BODY MASS INDEX: 35 KG/M2 | WEIGHT: 190 LBS | OXYGEN SATURATION: 95 %

## 2019-05-22 DIAGNOSIS — N18.4 CKD (CHRONIC KIDNEY DISEASE) STAGE 4, GFR 15-29 ML/MIN (HCC): ICD-10-CM

## 2019-05-22 DIAGNOSIS — M54.2 NECK PAIN: ICD-10-CM

## 2019-05-22 DIAGNOSIS — E83.52 HYPERCALCEMIA: ICD-10-CM

## 2019-05-22 DIAGNOSIS — I25.708 CORONARY ARTERY DISEASE OF BYPASS GRAFT OF NATIVE HEART WITH STABLE ANGINA PECTORIS (HCC): ICD-10-CM

## 2019-05-22 DIAGNOSIS — I50.32 CHRONIC DIASTOLIC CONGESTIVE HEART FAILURE (HCC): ICD-10-CM

## 2019-05-22 DIAGNOSIS — Z12.31 VISIT FOR SCREENING MAMMOGRAM: ICD-10-CM

## 2019-05-22 DIAGNOSIS — J42 CHRONIC BRONCHITIS, UNSPECIFIED CHRONIC BRONCHITIS TYPE (HCC): ICD-10-CM

## 2019-05-22 DIAGNOSIS — I48.0 PAROXYSMAL ATRIAL FIBRILLATION (HCC): ICD-10-CM

## 2019-05-22 DIAGNOSIS — E55.9 VITAMIN D DEFICIENCY: ICD-10-CM

## 2019-05-22 DIAGNOSIS — M46.92 INFLAMMATORY SPONDYLOPATHY OF CERVICAL REGION (HCC): ICD-10-CM

## 2019-05-22 DIAGNOSIS — E05.90 HYPERTHYROIDISM: ICD-10-CM

## 2019-05-22 DIAGNOSIS — E55.9 VITAMIN D DEFICIENCY: Primary | ICD-10-CM

## 2019-05-22 DIAGNOSIS — E66.01 SEVERE OBESITY (BMI 35.0-39.9) WITH COMORBIDITY (HCC): ICD-10-CM

## 2019-05-22 DIAGNOSIS — Z00.00 ENCOUNTER FOR ANNUAL HEALTH EXAMINATION: ICD-10-CM

## 2019-05-22 PROCEDURE — 83970 ASSAY OF PARATHORMONE: CPT

## 2019-05-22 PROCEDURE — 36415 COLL VENOUS BLD VENIPUNCTURE: CPT

## 2019-05-22 PROCEDURE — G0439 PPPS, SUBSEQ VISIT: HCPCS | Performed by: INTERNAL MEDICINE

## 2019-05-22 PROCEDURE — 84439 ASSAY OF FREE THYROXINE: CPT

## 2019-05-22 PROCEDURE — 80053 COMPREHEN METABOLIC PANEL: CPT

## 2019-05-22 PROCEDURE — 96160 PT-FOCUSED HLTH RISK ASSMT: CPT | Performed by: INTERNAL MEDICINE

## 2019-05-22 PROCEDURE — 84443 ASSAY THYROID STIM HORMONE: CPT

## 2019-05-22 PROCEDURE — 99397 PER PM REEVAL EST PAT 65+ YR: CPT | Performed by: INTERNAL MEDICINE

## 2019-05-22 PROCEDURE — 85025 COMPLETE CBC W/AUTO DIFF WBC: CPT

## 2019-05-22 PROCEDURE — 80061 LIPID PANEL: CPT

## 2019-05-22 PROCEDURE — 82306 VITAMIN D 25 HYDROXY: CPT

## 2019-05-22 RX ORDER — CIPROFLOXACIN 500 MG/1
500 TABLET, FILM COATED ORAL 2 TIMES DAILY
Qty: 28 TABLET | Refills: 0 | Status: SHIPPED | OUTPATIENT
Start: 2019-05-22 | End: 2019-06-05

## 2019-05-22 RX ORDER — POTASSIUM CHLORIDE 750 MG/1
TABLET, FILM COATED, EXTENDED RELEASE ORAL
Qty: 180 TABLET | Refills: 0 | Status: SHIPPED | OUTPATIENT
Start: 2019-05-22 | End: 2019-09-23

## 2019-05-22 RX ORDER — POTASSIUM CHLORIDE 750 MG/1
TABLET, FILM COATED, EXTENDED RELEASE ORAL
Qty: 180 TABLET | Refills: 0 | OUTPATIENT
Start: 2019-05-22

## 2019-05-22 NOTE — PATIENT INSTRUCTIONS
Edith Eubanks's SCREENING SCHEDULE   Tests on this list are recommended by your physician but may not be covered, or covered at this frequency, by your insurer. Please check with your insurance carrier before scheduling to verify coverage.    Alejandra Quijano patients who meet one of the following criteria:   • Men who are 73-68 years old and have smoked more than 100 cigarettes in their lifetime   • Anyone with a family history    Colorectal Cancer Screening  Covered up to Age 76     Colonoscopy Screen   Cover Influenza  Covered Annually Orders placed or performed in visit on 11/10/16   • FLU VACC PRSV FREE INC ANTIG    Please get every year    Pneumococcal 13 (Prevnar)  Covered Once after 65 Orders placed or performed in visit on 05/16/17   • PNEUMOCOCCAL VACC, [place])  www. [email or web address] org  This link also has information from the [address] [place] [address] regarding Advance Directives.

## 2019-05-22 NOTE — PROGRESS NOTES
HPI:   Karlie Salguero is a 68year old female who presents for a Medicare Subsequent Annual Wellness visit (Pt already had Initial Annual Wellness). Pt has been having some increased congestion and cough.   Heart disease and kidney disease and COPD, tobacco in the past but quit greater than 12 months ago.   Social History    Tobacco Use      Smoking status: Former Smoker        Packs/day: 1.00        Years: 50.00        Pack years: 50        Types: Cigarettes      Smokeless tobacco: Never Used      Tob ALLERGIES:   She is allergic to bactrim [sulfamethoxazole w/trimethoprim]; levaquin [levofloxacin]; norco [hydrocodone-acetaminophen]; amoxicillin-pot clavulanate; codeine; and colchicine.     CURRENT MEDICATIONS:     Outpatient Medications Marked as Ta (11/29/2011), Gout, Hematemesis (12/13/2011), Parathyroid tumor, and West Nile Virus infection.     She  has a past surgical history that includes tonsillectomy; tubal ligation (1982); skin surgery; cabg (04/05/2011); carotid exam (03/12/2011); other cardio atraumatic   Eyes:  PERRL, conjunctiva/corneas clear, EOM's intact both eyes   Ears:  Normal TM's and external ear canals, both ears   Nose: Nares normal, septum midline,mucosa normal, no drainage or sinus tenderness   Throat: Lips, mucosa, and tongue norm pain    Hyperthyroidism    CKD (chronic kidney disease) stage 4, GFR 15-29 ml/min (HCC)    Hypercalcemia    Encounter for annual health examination    Visit for screening mammogram  -     MARCELINO SCREENING BILAT (CPT=77067); Future     1.  Vitamin D deficiency Light  How would you describe your current health state?: Fair  How do you maintain positive mental well-being?: Games      This section provided for quick review of chart, separate sheet to patient  [address] Internal Immunization Activity if applicable)     Influenza  Covered Annually 1/17/2019 Please get every year    Pneumococcal 13 (Prevnar)  Covered Once after 65 05/16/2017 Please get once after your 65th birthday    Pneumococcal 23 (Pneumovax)  Covered Once after

## 2019-05-28 ENCOUNTER — HOSPITAL ENCOUNTER (OUTPATIENT)
Dept: MAMMOGRAPHY | Age: 74
Discharge: HOME | End: 2019-05-28
Attending: INTERNAL MEDICINE
Payer: MEDICARE

## 2019-05-28 DIAGNOSIS — Z12.31 VISIT FOR SCREENING MAMMOGRAM: ICD-10-CM

## 2019-05-28 PROCEDURE — 77067 SCR MAMMO BI INCL CAD: CPT | Performed by: INTERNAL MEDICINE

## 2019-06-01 ENCOUNTER — TELEPHONE (OUTPATIENT)
Dept: FAMILY MEDICINE CLINIC | Facility: CLINIC | Age: 74
End: 2019-06-01

## 2019-06-01 NOTE — TELEPHONE ENCOUNTER
Pt advised that Dr. Tabatha Pierce has not resulted on her mammogram, but according to the report, the mammogram appeared to be normal and to repeat in 1 year. Pt stated she has noticed a lump near her axillary and is sore to the touch.  Pt states she will wait to s

## 2019-06-17 ENCOUNTER — OFFICE VISIT (OUTPATIENT)
Dept: FAMILY MEDICINE CLINIC | Facility: CLINIC | Age: 74
End: 2019-06-17
Payer: MEDICARE

## 2019-06-17 VITALS
DIASTOLIC BLOOD PRESSURE: 52 MMHG | HEART RATE: 60 BPM | BODY MASS INDEX: 34.96 KG/M2 | HEIGHT: 62 IN | RESPIRATION RATE: 20 BRPM | SYSTOLIC BLOOD PRESSURE: 110 MMHG | WEIGHT: 190 LBS | TEMPERATURE: 98 F

## 2019-06-17 DIAGNOSIS — R21 RASH OF BACK: Primary | ICD-10-CM

## 2019-06-17 PROCEDURE — 99213 OFFICE O/P EST LOW 20 MIN: CPT | Performed by: INTERNAL MEDICINE

## 2019-06-17 RX ORDER — PREDNISONE 20 MG/1
20 TABLET ORAL DAILY
Qty: 7 TABLET | Refills: 0 | Status: SHIPPED | OUTPATIENT
Start: 2019-06-17 | End: 2019-06-24

## 2019-06-17 NOTE — PROGRESS NOTES
Suleman Quiroga is a 68year old female. HPI:   Pt has been having increased itching and rash since October. She has excoriations on her back and shoulders. She has tried some cortisol creams without improvement. She has had no chest pain or SOB. 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

## 2019-07-23 RX ORDER — SPIRONOLACTONE 25 MG/1
TABLET ORAL
Qty: 180 TABLET | Refills: 3 | Status: SHIPPED | OUTPATIENT
Start: 2019-07-23 | End: 2019-09-18 | Stop reason: DRUGHIGH

## 2019-07-24 RX ORDER — ERGOCALCIFEROL 1.25 MG/1
50000 CAPSULE ORAL
Qty: 6 CAPSULE | Refills: 0 | Status: SHIPPED | OUTPATIENT
Start: 2019-07-24 | End: 2019-10-10

## 2019-08-15 RX ORDER — DILTIAZEM HYDROCHLORIDE 240 MG/1
CAPSULE, COATED, EXTENDED RELEASE ORAL
Qty: 180 CAPSULE | Refills: 0 | Status: SHIPPED | OUTPATIENT
Start: 2019-08-15 | End: 2019-08-19

## 2019-08-15 RX ORDER — ALLOPURINOL 300 MG/1
TABLET ORAL
Qty: 90 TABLET | Refills: 0 | Status: SHIPPED | OUTPATIENT
Start: 2019-08-15 | End: 2019-08-19

## 2019-08-15 NOTE — TELEPHONE ENCOUNTER
Needs CARTIA  MG Oral Capsule SR 24 Hr and ALLOPURINOL 300 MG Oral Tab called into [facility]-mail in. Takes 2 a day of the Cartia-only has enough for 8 days. She will call if she needs some called into [facility] - how to [place] to get her through.

## 2019-08-19 ENCOUNTER — TELEPHONE (OUTPATIENT)
Dept: FAMILY MEDICINE CLINIC | Facility: CLINIC | Age: 74
End: 2019-08-19

## 2019-08-19 RX ORDER — DILTIAZEM HYDROCHLORIDE 240 MG/1
CAPSULE, COATED, EXTENDED RELEASE ORAL
Qty: 180 CAPSULE | Refills: 0 | COMMUNITY
Start: 2019-08-19 | End: 2019-08-19

## 2019-08-19 RX ORDER — DILTIAZEM HYDROCHLORIDE 240 MG/1
CAPSULE, COATED, EXTENDED RELEASE ORAL
Qty: 14 CAPSULE | Refills: 0 | Status: SHIPPED | OUTPATIENT
Start: 2019-08-19 | End: 2019-09-23

## 2019-08-19 RX ORDER — ALLOPURINOL 300 MG/1
TABLET ORAL
Qty: 90 TABLET | Refills: 0 | COMMUNITY
Start: 2019-08-19 | End: 2019-09-23

## 2019-08-19 NOTE — TELEPHONE ENCOUNTER
Patient said that the medications were sent to [facility] and not the mail order. She said that she does not know how long it will take and she needs the Diltiazem Thursday. Merline Busman at Pending sale to [facility] states that the two meds are $0. Advised to fill and out with a rush.

## 2019-08-28 ENCOUNTER — APPOINTMENT (OUTPATIENT)
Dept: CARDIOLOGY | Facility: CLINIC | Age: 74
End: 2019-08-28

## 2019-09-17 ENCOUNTER — TELEPHONE (OUTPATIENT)
Dept: FAMILY MEDICINE CLINIC | Facility: CLINIC | Age: 74
End: 2019-09-17

## 2019-09-17 NOTE — TELEPHONE ENCOUNTER
Patient advised to keep appointment tomorrow and go to ER or UC if  Worsens. CLEMENTE Neff/Eda FELIX

## 2019-09-17 NOTE — TELEPHONE ENCOUNTER
Patient said that she has had a cough for 2 weeks and now she is SOB and coughing up thick yellow mucous that is blood tinged. She said her ears hurt, her head is congested.  She thinks she may need oxygen again, she said she has been SOB for \"quite awhile

## 2019-09-18 ENCOUNTER — TELEPHONE (OUTPATIENT)
Dept: FAMILY MEDICINE CLINIC | Facility: CLINIC | Age: 74
End: 2019-09-18

## 2019-09-18 ENCOUNTER — OFFICE VISIT (OUTPATIENT)
Dept: FAMILY MEDICINE CLINIC | Facility: CLINIC | Age: 74
End: 2019-09-18
Payer: MEDICARE

## 2019-09-18 VITALS
DIASTOLIC BLOOD PRESSURE: 64 MMHG | TEMPERATURE: 98 F | OXYGEN SATURATION: 88 % | BODY MASS INDEX: 35.51 KG/M2 | SYSTOLIC BLOOD PRESSURE: 124 MMHG | RESPIRATION RATE: 24 BRPM | WEIGHT: 193 LBS | HEART RATE: 50 BPM | HEIGHT: 62 IN

## 2019-09-18 DIAGNOSIS — I50.32 CHRONIC DIASTOLIC CONGESTIVE HEART FAILURE (HCC): ICD-10-CM

## 2019-09-18 DIAGNOSIS — I25.9 CHEST PAIN DUE TO MYOCARDIAL ISCHEMIA, UNSPECIFIED ISCHEMIC CHEST PAIN TYPE: Primary | ICD-10-CM

## 2019-09-18 DIAGNOSIS — J42 CHRONIC BRONCHITIS, UNSPECIFIED CHRONIC BRONCHITIS TYPE (HCC): ICD-10-CM

## 2019-09-18 DIAGNOSIS — J42 CHRONIC BRONCHITIS, UNSPECIFIED CHRONIC BRONCHITIS TYPE (HCC): Primary | ICD-10-CM

## 2019-09-18 DIAGNOSIS — I25.719 CORONARY ARTERY DISEASE INVOLVING AUTOLOGOUS VEIN CORONARY BYPASS GRAFT WITH ANGINA PECTORIS (HCC): ICD-10-CM

## 2019-09-18 DIAGNOSIS — J44.9 CHRONIC OBSTRUCTIVE PULMONARY DISEASE, UNSPECIFIED COPD TYPE (HCC): ICD-10-CM

## 2019-09-18 PROCEDURE — 93000 ELECTROCARDIOGRAM COMPLETE: CPT | Performed by: INTERNAL MEDICINE

## 2019-09-18 PROCEDURE — 99214 OFFICE O/P EST MOD 30 MIN: CPT | Performed by: INTERNAL MEDICINE

## 2019-09-18 RX ORDER — SPIRONOLACTONE 50 MG/1
50 TABLET, FILM COATED ORAL 2 TIMES DAILY
Qty: 180 TABLET | Refills: 0 | Status: SHIPPED | OUTPATIENT
Start: 2019-09-18 | End: 2019-12-17

## 2019-09-18 RX ORDER — AMOXICILLIN AND CLAVULANATE POTASSIUM 500; 125 MG/1; MG/1
1 TABLET, FILM COATED ORAL 2 TIMES DAILY
Qty: 20 TABLET | Refills: 0 | Status: SHIPPED | OUTPATIENT
Start: 2019-09-18 | End: 2019-09-28

## 2019-09-18 RX ORDER — PREDNISONE 20 MG/1
40 TABLET ORAL DAILY
Qty: 14 TABLET | Refills: 0 | Status: SHIPPED | OUTPATIENT
Start: 2019-09-18 | End: 2019-09-25

## 2019-09-18 RX ORDER — SPIRONOLACTONE 50 MG/1
50 TABLET, FILM COATED ORAL 2 TIMES DAILY
Qty: 60 TABLET | Refills: 0 | Status: SHIPPED | OUTPATIENT
Start: 2019-09-18 | End: 2019-09-18

## 2019-09-18 NOTE — PROGRESS NOTES
HPI:   Angus Montiel is a 76year old female who presents for upper respiratory symptoms for  1  months.  Patient reports dry cough, cough is not keeping pt up at night, chest pain from coughing, has coughed up some blood and some colored sputum in the obstructive pulmonary disease) ([facility]    • Coronary atherosclerosis of unspecified type of vessel, native or graft 11/29/2011   • Edema 01/12/2012   • Esophageal reflux 01/12/2012   • Factor XII deficiency disease ([facility].) 1999    [place]   • Generalized and Temp 98 °F (36.7 °C) (Temporal)   Resp 24   Ht 62\"   Wt 193 lb   SpO2 (!) 88%   BMI 35.30 kg/m²   GENERAL: well developed, well nourished,in no apparent distress  SKIN: no rashes,no suspicious lesions  EYES:PERRLA, EOMI, normal optic disk,conjunctiva are

## 2019-09-18 NOTE — TELEPHONE ENCOUNTER
Dr Shawna Briggs took [facility] place. We need him, but first stress test and treatment with medications.  I am working on O2 order

## 2019-09-18 NOTE — TELEPHONE ENCOUNTER
Pt was advised. Pt is worried about not being able to pay for her pulmonologist and O2. She stated she may need to refuse. Advised that I would mention to Dr. Rafal Hansen to see if there is anything that can be done.

## 2019-09-19 ENCOUNTER — TELEPHONE (OUTPATIENT)
Dept: FAMILY MEDICINE CLINIC | Facility: CLINIC | Age: 74
End: 2019-09-19

## 2019-09-19 NOTE — TELEPHONE ENCOUNTER
Pt does not qualify for oxygen right now, due to being on antibiotic and prednisone, call Dolores Santoyo

## 2019-09-19 NOTE — TELEPHONE ENCOUNTER
Franklyn Ha said that Medicare requires that patient come back and have O2 sats retested when the therapy is completed. The chart notes need to reflect this.

## 2019-09-21 ENCOUNTER — TELEPHONE (OUTPATIENT)
Dept: FAMILY MEDICINE CLINIC | Facility: CLINIC | Age: 74
End: 2019-09-21

## 2019-09-21 NOTE — TELEPHONE ENCOUNTER
TAMI..  Called pt back. Pt states that her O2 dropped down to 85% after coming upstairs. After sitting her O2 went up to 88%. Pt was advised to go to ED. Pt did not want to go because she has a $90 co pay for ED.  Spoke with Dr. Brook Eubanks and he advised that i

## 2019-09-21 NOTE — TELEPHONE ENCOUNTER
Per , Left a detailed message for pt to go to ED. The phones will be turned off at this time, but I will CB to confirm pt received VM.

## 2019-09-23 RX ORDER — POTASSIUM CHLORIDE 750 MG/1
TABLET, FILM COATED, EXTENDED RELEASE ORAL
Qty: 180 TABLET | Refills: 0 | Status: SHIPPED | OUTPATIENT
Start: 2019-09-23 | End: 2019-10-22

## 2019-09-23 RX ORDER — ALLOPURINOL 300 MG/1
TABLET ORAL
Qty: 90 TABLET | Refills: 1 | Status: SHIPPED | OUTPATIENT
Start: 2019-09-23 | End: 2019-10-22

## 2019-09-23 RX ORDER — FUROSEMIDE 40 MG/1
TABLET ORAL
Qty: 180 TABLET | Refills: 1 | Status: SHIPPED | OUTPATIENT
Start: 2019-09-23 | End: 2019-10-22

## 2019-09-23 RX ORDER — DILTIAZEM HYDROCHLORIDE 240 MG/1
CAPSULE, COATED, EXTENDED RELEASE ORAL
Qty: 180 CAPSULE | Refills: 1 | Status: SHIPPED | OUTPATIENT
Start: 2019-09-23 | End: 2019-10-22

## 2019-09-23 NOTE — TELEPHONE ENCOUNTER
Patient said that her oxygen saturation went up to 89% this morning.  She said she thinks something might be wrong with her machine because it said her HR is 100 and it never is that high with the Diltiazem, She said her breathing is \"a little better\" and

## 2019-09-24 NOTE — TELEPHONE ENCOUNTER
Patient said she is not \"tipsy\" like she was yesterday and not as confused but still slightly sob with activity. She said she coughed up \"foam\" this morning. She said she is \"ok\", her O2 sat was 90%.

## 2019-09-30 ENCOUNTER — LAB ENCOUNTER (OUTPATIENT)
Dept: LAB | Age: 74
End: 2019-09-30
Attending: INTERNAL MEDICINE
Payer: MEDICARE

## 2019-09-30 ENCOUNTER — OFFICE VISIT (OUTPATIENT)
Dept: FAMILY MEDICINE CLINIC | Facility: CLINIC | Age: 74
End: 2019-09-30
Payer: MEDICARE

## 2019-09-30 VITALS
WEIGHT: 188 LBS | HEART RATE: 78 BPM | DIASTOLIC BLOOD PRESSURE: 70 MMHG | OXYGEN SATURATION: 90 % | SYSTOLIC BLOOD PRESSURE: 110 MMHG | TEMPERATURE: 98 F | BODY MASS INDEX: 34.6 KG/M2 | RESPIRATION RATE: 91 BRPM | HEIGHT: 62 IN

## 2019-09-30 DIAGNOSIS — J42 CHRONIC BRONCHITIS, UNSPECIFIED CHRONIC BRONCHITIS TYPE (HCC): ICD-10-CM

## 2019-09-30 DIAGNOSIS — I25.719 CORONARY ARTERY DISEASE INVOLVING AUTOLOGOUS VEIN CORONARY BYPASS GRAFT WITH ANGINA PECTORIS (HCC): Primary | ICD-10-CM

## 2019-09-30 DIAGNOSIS — I25.719 CORONARY ARTERY DISEASE INVOLVING AUTOLOGOUS VEIN CORONARY BYPASS GRAFT WITH ANGINA PECTORIS (HCC): ICD-10-CM

## 2019-09-30 DIAGNOSIS — I50.32 CHRONIC DIASTOLIC CONGESTIVE HEART FAILURE (HCC): ICD-10-CM

## 2019-09-30 PROCEDURE — 99214 OFFICE O/P EST MOD 30 MIN: CPT | Performed by: INTERNAL MEDICINE

## 2019-09-30 PROCEDURE — 36415 COLL VENOUS BLD VENIPUNCTURE: CPT

## 2019-09-30 PROCEDURE — 85025 COMPLETE CBC W/AUTO DIFF WBC: CPT

## 2019-09-30 PROCEDURE — 83880 ASSAY OF NATRIURETIC PEPTIDE: CPT

## 2019-09-30 NOTE — PROGRESS NOTES
Suma Nolasco is a 76year old female. HPI:   Pt has been having some improvement after antibiotics and steriods, but still a coughing and it is sometimes productive. She has a stress test tomorrow.   She has marginal O2 sats and work of breathing vis fibrillation    • Congestive heart failure, unspecified 11/29/2011   • COPD (chronic obstructive pulmonary disease) (HCC)    • Coronary atherosclerosis of unspecified type of vessel, native or graft 11/29/2011   • Edema 01/12/2012   • Esophageal reflux 01/ likely more diuretic. O2 may help if there are no blockages apparent. Her living conditions are deplorable and are likely exacerbating her lung problems.    Orders Placed This Encounter      Pro Beta Natriuretic Peptide [E]      CBC W Differential W Platel

## 2019-10-01 ENCOUNTER — HOSPITAL ENCOUNTER (OUTPATIENT)
Dept: CV DIAGNOSTICS | Facility: HOSPITAL | Age: 74
Discharge: HOME | End: 2019-10-01
Attending: INTERNAL MEDICINE
Payer: MEDICARE

## 2019-10-01 DIAGNOSIS — I25.9 CHEST PAIN DUE TO MYOCARDIAL ISCHEMIA, UNSPECIFIED ISCHEMIC CHEST PAIN TYPE: ICD-10-CM

## 2019-10-01 DIAGNOSIS — I25.719 CORONARY ARTERY DISEASE INVOLVING AUTOLOGOUS VEIN CORONARY BYPASS GRAFT WITH ANGINA PECTORIS (HCC): ICD-10-CM

## 2019-10-01 PROCEDURE — 78452 HT MUSCLE IMAGE SPECT MULT: CPT | Performed by: INTERNAL MEDICINE

## 2019-10-01 PROCEDURE — 93017 CV STRESS TEST TRACING ONLY: CPT | Performed by: INTERNAL MEDICINE

## 2019-10-01 PROCEDURE — 93018 CV STRESS TEST I&R ONLY: CPT | Performed by: INTERNAL MEDICINE

## 2019-10-01 RX ORDER — AMINOPHYLLINE DIHYDRATE 25 MG/ML
INJECTION, SOLUTION INTRAVENOUS
Status: COMPLETED
Start: 2019-10-01 | End: 2019-10-01

## 2019-10-01 RX ADMIN — AMINOPHYLLINE DIHYDRATE 125 MG: 25 INJECTION, SOLUTION INTRAVENOUS at 10:15:00

## 2019-10-03 ENCOUNTER — OFFICE VISIT (OUTPATIENT)
Dept: FAMILY MEDICINE CLINIC | Facility: CLINIC | Age: 74
End: 2019-10-03
Payer: MEDICARE

## 2019-10-03 ENCOUNTER — IMMUNIZATION (OUTPATIENT)
Dept: FAMILY MEDICINE CLINIC | Facility: CLINIC | Age: 74
End: 2019-10-03
Payer: MEDICARE

## 2019-10-03 VITALS
SYSTOLIC BLOOD PRESSURE: 130 MMHG | OXYGEN SATURATION: 90 % | HEIGHT: 62 IN | RESPIRATION RATE: 20 BRPM | DIASTOLIC BLOOD PRESSURE: 82 MMHG | HEART RATE: 69 BPM | WEIGHT: 190.13 LBS | TEMPERATURE: 98 F | BODY MASS INDEX: 34.99 KG/M2

## 2019-10-03 DIAGNOSIS — J44.9 CHRONIC OBSTRUCTIVE PULMONARY DISEASE, UNSPECIFIED COPD TYPE (HCC): ICD-10-CM

## 2019-10-03 DIAGNOSIS — Z23 NEED FOR VACCINATION: ICD-10-CM

## 2019-10-03 DIAGNOSIS — I50.32 CHRONIC DIASTOLIC CONGESTIVE HEART FAILURE (HCC): ICD-10-CM

## 2019-10-03 DIAGNOSIS — R09.02 HYPOXIA: ICD-10-CM

## 2019-10-03 DIAGNOSIS — I25.719 CORONARY ARTERY DISEASE INVOLVING AUTOLOGOUS VEIN CORONARY BYPASS GRAFT WITH ANGINA PECTORIS (HCC): Primary | ICD-10-CM

## 2019-10-03 PROCEDURE — 90662 IIV NO PRSV INCREASED AG IM: CPT | Performed by: INTERNAL MEDICINE

## 2019-10-03 PROCEDURE — G0008 ADMIN INFLUENZA VIRUS VAC: HCPCS | Performed by: INTERNAL MEDICINE

## 2019-10-03 PROCEDURE — 99214 OFFICE O/P EST MOD 30 MIN: CPT | Performed by: INTERNAL MEDICINE

## 2019-10-03 NOTE — PROGRESS NOTES
Pincus Olszewski is a 76year old female. HPI:   Pt has returned for reevaluation for SOB and hypoxia.  She has felt increasingly SOB since early August, she presented 9/18 and was given treatment for COPD exacerbation and increased diuretics for uncompen in half BID) Disp: 360 tablet Rfl: 0   Biotin 5000 MCG Oral Cap Take 1 tablet by mouth daily. Disp:  Rfl:    aspirin 81 MG Oral Chew Tab Chew 1 tablet (81 mg total) by mouth daily.  Disp: 30 tablet Rfl: 11      Past Medical History:   Diagnosis Date   • Acu auscultation  CARDIO: RRR without murmur  GI: good BS's,no masses, HSM or tenderness  EXTREMITIES: no cyanosis, clubbing or edema    ASSESSMENT AND PLAN:     Coronary artery disease involving autologous vein coronary bypass graft with angina pectoris (hcc)

## 2019-10-04 ENCOUNTER — TELEPHONE (OUTPATIENT)
Dept: FAMILY MEDICINE CLINIC | Facility: CLINIC | Age: 74
End: 2019-10-04

## 2019-10-04 NOTE — TELEPHONE ENCOUNTER
Advised patient that office note will need to be finished by Dr. Sarah Stein on Monday and then will proceed with order for oxygen. Patient verbalized understanding.     Patient states that she took 40meq of Potassium yesterday because she was cramping in the ni

## 2019-10-07 ENCOUNTER — TELEPHONE (OUTPATIENT)
Dept: FAMILY MEDICINE CLINIC | Facility: CLINIC | Age: 74
End: 2019-10-07

## 2019-10-07 NOTE — TELEPHONE ENCOUNTER
Patient advised. Order, office visit notes and insurance info faxed to [address]. Patient said that cramps in her legs are still bad, she said she is taking 50mg of Sprinolactone in the morning and at night.  She said she is taking Potassium 20MEQ in the morn

## 2019-10-07 NOTE — TELEPHONE ENCOUNTER
Karlee Sexton said they need Dr Olga Wilburn note to state she was 88% on RA at rest and they need the order with the doctor's NPI.

## 2019-10-08 ENCOUNTER — TELEPHONE (OUTPATIENT)
Dept: CARDIOLOGY | Age: 74
End: 2019-10-08

## 2019-10-09 ENCOUNTER — TELEPHONE (OUTPATIENT)
Dept: CARDIOLOGY | Age: 74
End: 2019-10-09

## 2019-10-10 RX ORDER — ERGOCALCIFEROL 1.25 MG/1
50000 CAPSULE ORAL
Qty: 6 CAPSULE | Refills: 0 | Status: SHIPPED | OUTPATIENT
Start: 2019-10-10 | End: 2020-01-08

## 2019-10-16 ENCOUNTER — OFFICE VISIT (OUTPATIENT)
Dept: CARDIOLOGY | Facility: CLINIC | Age: 74
End: 2019-10-16

## 2019-10-16 VITALS
WEIGHT: 191 LBS | DIASTOLIC BLOOD PRESSURE: 70 MMHG | HEART RATE: 56 BPM | SYSTOLIC BLOOD PRESSURE: 100 MMHG | HEIGHT: 62 IN | BODY MASS INDEX: 35.15 KG/M2 | OXYGEN SATURATION: 92 %

## 2019-10-16 DIAGNOSIS — Z79.01 LONG TERM (CURRENT) USE OF ANTICOAGULANTS: ICD-10-CM

## 2019-10-16 DIAGNOSIS — E78.00 PURE HYPERCHOLESTEROLEMIA: ICD-10-CM

## 2019-10-16 DIAGNOSIS — R06.02 SOB (SHORTNESS OF BREATH) ON EXERTION: ICD-10-CM

## 2019-10-16 DIAGNOSIS — J44.9 CHRONIC OBSTRUCTIVE PULMONARY DISEASE, UNSPECIFIED COPD TYPE (CMD): ICD-10-CM

## 2019-10-16 DIAGNOSIS — Z95.1 HX OF CABG: ICD-10-CM

## 2019-10-16 DIAGNOSIS — I48.21 PERMANENT ATRIAL FIBRILLATION (CMD): ICD-10-CM

## 2019-10-16 DIAGNOSIS — I25.10 CORONARY ARTERY DISEASE INVOLVING NATIVE CORONARY ARTERY OF NATIVE HEART WITHOUT ANGINA PECTORIS: Primary | ICD-10-CM

## 2019-10-16 PROCEDURE — 99215 OFFICE O/P EST HI 40 MIN: CPT | Performed by: INTERNAL MEDICINE

## 2019-10-16 ASSESSMENT — PATIENT HEALTH QUESTIONNAIRE - PHQ9
SUM OF ALL RESPONSES TO PHQ9 QUESTIONS 1 AND 2: 0
SUM OF ALL RESPONSES TO PHQ9 QUESTIONS 1 AND 2: 0
2. FEELING DOWN, DEPRESSED OR HOPELESS: NOT AT ALL
1. LITTLE INTEREST OR PLEASURE IN DOING THINGS: NOT AT ALL

## 2019-10-21 ENCOUNTER — MED REC SCAN ONLY (OUTPATIENT)
Dept: FAMILY MEDICINE CLINIC | Facility: CLINIC | Age: 74
End: 2019-10-21

## 2019-10-22 ENCOUNTER — APPOINTMENT (OUTPATIENT)
Dept: GENERAL RADIOLOGY | Facility: HOSPITAL | Age: 74
DRG: 190 | End: 2019-10-22
Attending: EMERGENCY MEDICINE
Payer: MEDICARE

## 2019-10-22 ENCOUNTER — HOSPITAL ENCOUNTER (OUTPATIENT)
Age: 74
Discharge: ANOTHER HOSPITAL | DRG: 190 | End: 2019-10-22
Attending: EMERGENCY MEDICINE
Payer: MEDICARE

## 2019-10-22 ENCOUNTER — TELEPHONE (OUTPATIENT)
Dept: FAMILY MEDICINE CLINIC | Facility: CLINIC | Age: 74
End: 2019-10-22

## 2019-10-22 ENCOUNTER — HOSPITAL ENCOUNTER (INPATIENT)
Facility: HOSPITAL | Age: 74
LOS: 3 days | Discharge: HOME | DRG: 190 | End: 2019-10-25
Attending: EMERGENCY MEDICINE | Admitting: HOSPITALIST
Payer: MEDICARE

## 2019-10-22 VITALS
SYSTOLIC BLOOD PRESSURE: 118 MMHG | DIASTOLIC BLOOD PRESSURE: 60 MMHG | OXYGEN SATURATION: 96 % | HEART RATE: 84 BPM | TEMPERATURE: 97 F | RESPIRATION RATE: 20 BRPM

## 2019-10-22 DIAGNOSIS — I48.20 ATRIAL FIBRILLATION, CHRONIC (HCC): ICD-10-CM

## 2019-10-22 DIAGNOSIS — I95.9 HYPOTENSION, UNSPECIFIED HYPOTENSION TYPE: ICD-10-CM

## 2019-10-22 DIAGNOSIS — R07.9 ACUTE CHEST PAIN: Primary | ICD-10-CM

## 2019-10-22 DIAGNOSIS — J18.9 COMMUNITY ACQUIRED PNEUMONIA, UNSPECIFIED LATERALITY: Primary | ICD-10-CM

## 2019-10-22 DIAGNOSIS — R07.9 CHEST PAIN OF UNCERTAIN ETIOLOGY: ICD-10-CM

## 2019-10-22 DIAGNOSIS — M54.9 MID BACK PAIN: ICD-10-CM

## 2019-10-22 PROBLEM — R79.89 AZOTEMIA: Status: ACTIVE | Noted: 2019-10-22

## 2019-10-22 PROBLEM — E87.1 HYPONATREMIA: Status: ACTIVE | Noted: 2019-10-22

## 2019-10-22 LAB
ALBUMIN SERPL-MCNC: 4.2 G/DL (ref 3.4–5)
ALBUMIN/GLOB SERPL: 1.1 {RATIO} (ref 1–2)
ALP LIVER SERPL-CCNC: 162 U/L (ref 55–142)
ALT SERPL-CCNC: 25 U/L (ref 13–56)
ANION GAP SERPL CALC-SCNC: 7 MMOL/L (ref 0–18)
AST SERPL-CCNC: 23 U/L (ref 15–37)
ATRIAL RATE: 97 BPM
BASOPHILS # BLD AUTO: 0.05 X10(3) UL (ref 0–0.2)
BASOPHILS NFR BLD AUTO: 0.4 %
BILIRUB SERPL-MCNC: 0.4 MG/DL (ref 0.1–2)
BILIRUB UR QL STRIP.AUTO: NEGATIVE
BUN BLD-MCNC: 50 MG/DL (ref 7–18)
BUN/CREAT SERPL: 25 (ref 10–20)
CALCIUM BLD-MCNC: 9.9 MG/DL (ref 8.5–10.1)
CHLORIDE SERPL-SCNC: 99 MMOL/L (ref 98–112)
CLARITY UR REFRACT.AUTO: CLEAR
CO2 SERPL-SCNC: 26 MMOL/L (ref 21–32)
CREAT BLD-MCNC: 2 MG/DL (ref 0.55–1.02)
DEPRECATED RDW RBC AUTO: 51.9 FL (ref 35.1–46.3)
EOSINOPHIL # BLD AUTO: 0.06 X10(3) UL (ref 0–0.7)
EOSINOPHIL NFR BLD AUTO: 0.5 %
ERYTHROCYTE [DISTWIDTH] IN BLOOD BY AUTOMATED COUNT: 15.3 % (ref 11–15)
GLOBULIN PLAS-MCNC: 3.8 G/DL (ref 2.8–4.4)
GLUCOSE BLD-MCNC: 83 MG/DL (ref 70–99)
GLUCOSE UR STRIP.AUTO-MCNC: NEGATIVE MG/DL
HCT VFR BLD AUTO: 43.8 % (ref 35–48)
HGB BLD-MCNC: 14.3 G/DL (ref 12–16)
IMM GRANULOCYTES # BLD AUTO: 0.18 X10(3) UL (ref 0–1)
IMM GRANULOCYTES NFR BLD: 1.4 %
KETONES UR STRIP.AUTO-MCNC: NEGATIVE MG/DL
LACTATE SERPL-SCNC: 0.8 MMOL/L (ref 0.4–2)
LYMPHOCYTES # BLD AUTO: 1.25 X10(3) UL (ref 1–4)
LYMPHOCYTES NFR BLD AUTO: 9.7 %
M PROTEIN MFR SERPL ELPH: 8 G/DL (ref 6.4–8.2)
MCH RBC QN AUTO: 30.2 PG (ref 26–34)
MCHC RBC AUTO-ENTMCNC: 32.6 G/DL (ref 31–37)
MCV RBC AUTO: 92.4 FL (ref 80–100)
MONOCYTES # BLD AUTO: 1 X10(3) UL (ref 0.1–1)
MONOCYTES NFR BLD AUTO: 7.7 %
NEUTROPHILS # BLD AUTO: 10.37 X10 (3) UL (ref 1.5–7.7)
NEUTROPHILS # BLD AUTO: 10.37 X10(3) UL (ref 1.5–7.7)
NEUTROPHILS NFR BLD AUTO: 80.3 %
NITRITE UR QL STRIP.AUTO: NEGATIVE
OSMOLALITY SERPL CALC.SUM OF ELEC: 286 MOSM/KG (ref 275–295)
PH UR STRIP.AUTO: 7 [PH] (ref 4.5–8)
PLATELET # BLD AUTO: 234 10(3)UL (ref 150–450)
POTASSIUM SERPL-SCNC: 4.8 MMOL/L (ref 3.5–5.1)
PROT UR STRIP.AUTO-MCNC: NEGATIVE MG/DL
Q-T INTERVAL: 382 MS
QRS DURATION: 86 MS
QTC CALCULATION (BEZET): 443 MS
R AXIS: -31 DEGREES
RBC # BLD AUTO: 4.74 X10(6)UL (ref 3.8–5.3)
RBC UR QL AUTO: NEGATIVE
SODIUM SERPL-SCNC: 132 MMOL/L (ref 136–145)
SP GR UR STRIP.AUTO: 1.01 (ref 1–1.03)
T AXIS: 97 DEGREES
TROPONIN I SERPL-MCNC: <0.045 NG/ML (ref ?–0.04)
TROPONIN I SERPL-MCNC: <0.045 NG/ML (ref ?–0.04)
UROBILINOGEN UR STRIP.AUTO-MCNC: <2 MG/DL
VENTRICULAR RATE: 81 BPM
WBC # BLD AUTO: 12.9 X10(3) UL (ref 4–11)

## 2019-10-22 PROCEDURE — 99214 OFFICE O/P EST MOD 30 MIN: CPT

## 2019-10-22 PROCEDURE — 93005 ELECTROCARDIOGRAM TRACING: CPT

## 2019-10-22 PROCEDURE — 99223 1ST HOSP IP/OBS HIGH 75: CPT | Performed by: INTERNAL MEDICINE

## 2019-10-22 PROCEDURE — 99222 1ST HOSP IP/OBS MODERATE 55: CPT | Performed by: INTERNAL MEDICINE

## 2019-10-22 PROCEDURE — 71045 X-RAY EXAM CHEST 1 VIEW: CPT | Performed by: EMERGENCY MEDICINE

## 2019-10-22 PROCEDURE — 93010 ELECTROCARDIOGRAM REPORT: CPT

## 2019-10-22 RX ORDER — ALBUTEROL SULFATE 90 UG/1
1 AEROSOL, METERED RESPIRATORY (INHALATION) 4 TIMES DAILY
Status: DISCONTINUED | OUTPATIENT
Start: 2019-10-22 | End: 2019-10-22

## 2019-10-22 RX ORDER — ALLOPURINOL 300 MG/1
300 TABLET ORAL DAILY
Status: DISCONTINUED | OUTPATIENT
Start: 2019-10-22 | End: 2019-10-23

## 2019-10-22 RX ORDER — PRAVASTATIN SODIUM 40 MG
40 TABLET ORAL NIGHTLY
COMMUNITY
End: 2020-03-10

## 2019-10-22 RX ORDER — HEPARIN SODIUM 5000 [USP'U]/ML
5000 INJECTION, SOLUTION INTRAVENOUS; SUBCUTANEOUS EVERY 12 HOURS SCHEDULED
Status: DISCONTINUED | OUTPATIENT
Start: 2019-10-22 | End: 2019-10-25

## 2019-10-22 RX ORDER — ASPIRIN 325 MG
325 TABLET ORAL DAILY
Status: DISCONTINUED | OUTPATIENT
Start: 2019-10-22 | End: 2019-10-23

## 2019-10-22 RX ORDER — ONDANSETRON 2 MG/ML
4 INJECTION INTRAMUSCULAR; INTRAVENOUS EVERY 6 HOURS PRN
Status: DISCONTINUED | OUTPATIENT
Start: 2019-10-22 | End: 2019-10-25

## 2019-10-22 RX ORDER — IPRATROPIUM BROMIDE AND ALBUTEROL SULFATE 2.5; .5 MG/3ML; MG/3ML
3 SOLUTION RESPIRATORY (INHALATION) EVERY 6 HOURS
Status: DISCONTINUED | OUTPATIENT
Start: 2019-10-22 | End: 2019-10-23

## 2019-10-22 RX ORDER — ALLOPURINOL 300 MG/1
300 TABLET ORAL DAILY
Status: ON HOLD | COMMUNITY
End: 2019-10-25

## 2019-10-22 RX ORDER — FUROSEMIDE 40 MG/1
40 TABLET ORAL 2 TIMES DAILY
COMMUNITY
End: 2020-01-31

## 2019-10-22 RX ORDER — IPRATROPIUM BROMIDE AND ALBUTEROL SULFATE 2.5; .5 MG/3ML; MG/3ML
3 SOLUTION RESPIRATORY (INHALATION) EVERY 6 HOURS PRN
Status: DISCONTINUED | OUTPATIENT
Start: 2019-10-22 | End: 2019-10-25

## 2019-10-22 RX ORDER — ATORVASTATIN CALCIUM 10 MG/1
10 TABLET, FILM COATED ORAL NIGHTLY
Status: DISCONTINUED | OUTPATIENT
Start: 2019-10-22 | End: 2019-10-25

## 2019-10-22 RX ORDER — FUROSEMIDE 40 MG/1
40 TABLET ORAL 2 TIMES DAILY
Status: DISCONTINUED | OUTPATIENT
Start: 2019-10-22 | End: 2019-10-23

## 2019-10-22 RX ORDER — NITROGLYCERIN 0.4 MG/1
0.4 TABLET SUBLINGUAL EVERY 5 MIN PRN
Status: DISCONTINUED | OUTPATIENT
Start: 2019-10-22 | End: 2019-10-25

## 2019-10-22 RX ORDER — POTASSIUM CHLORIDE 750 MG/1
10 TABLET, EXTENDED RELEASE ORAL 2 TIMES DAILY
COMMUNITY
End: 2020-01-06

## 2019-10-22 RX ORDER — FAMOTIDINE 20 MG/1
20 TABLET ORAL DAILY PRN
Status: DISCONTINUED | OUTPATIENT
Start: 2019-10-22 | End: 2019-10-25

## 2019-10-22 RX ORDER — DILTIAZEM HYDROCHLORIDE 240 MG/1
480 CAPSULE, COATED, EXTENDED RELEASE ORAL DAILY
Status: DISCONTINUED | OUTPATIENT
Start: 2019-10-22 | End: 2019-10-23

## 2019-10-22 RX ORDER — ASPIRIN 81 MG/1
81 TABLET ORAL DAILY
COMMUNITY
End: 2020-03-12

## 2019-10-22 RX ORDER — ASPIRIN 81 MG/1
81 TABLET ORAL DAILY
Status: DISCONTINUED | OUTPATIENT
Start: 2019-10-22 | End: 2019-10-22

## 2019-10-22 RX ORDER — RANITIDINE 300 MG/1
300 CAPSULE ORAL EVERY EVENING
Status: ON HOLD | COMMUNITY
End: 2019-12-18

## 2019-10-22 RX ORDER — ALBUTEROL SULFATE 90 UG/1
1 AEROSOL, METERED RESPIRATORY (INHALATION) EVERY 4 HOURS PRN
Status: DISCONTINUED | OUTPATIENT
Start: 2019-10-22 | End: 2019-10-25

## 2019-10-22 RX ORDER — DILTIAZEM HYDROCHLORIDE 240 MG/1
240 CAPSULE, COATED, EXTENDED RELEASE ORAL 2 TIMES DAILY
COMMUNITY
End: 2019-11-14

## 2019-10-22 RX ORDER — FAMOTIDINE 20 MG/1
40 TABLET ORAL DAILY PRN
Status: DISCONTINUED | OUTPATIENT
Start: 2019-10-22 | End: 2019-10-22

## 2019-10-22 RX ORDER — ACETAMINOPHEN 325 MG/1
650 TABLET ORAL EVERY 6 HOURS PRN
Status: DISCONTINUED | OUTPATIENT
Start: 2019-10-22 | End: 2019-10-25

## 2019-10-22 NOTE — TELEPHONE ENCOUNTER
BP was was 86/41 this morning and was 92/41 last night. HR 82 this morning she feels like she could pass out. She is wearing oxygen at 2L her O2 saturation was 92 this morning. She said it drops down into the 80s.  She said she has pain between her shoulder

## 2019-10-22 NOTE — ED PROVIDER NOTES
Patient Seen in: [facility] Emergency Department      History   Patient presents with:  Hypotension (cardiovascular)    Stated Complaint: low bp, back pain    HPI    Patient is a 71-year-old female who states that her blood pressure has been running Surgery    • SKIN SURGERY      Reconstruction of Left side of face   • TONSILLECTOMY     • TUBAL LIGATION  1982                    Social History    Tobacco Use      Smoking status: Former Smoker        Packs/day: 1.00        Years: 50.00        Pack years 25.0 (*)     GFR, Non- 24 (*)     GFR, -American 28 (*)     Alkaline Phosphatase 162 (*)     All other components within normal limits   CBC W/ DIFFERENTIAL - Abnormal; Notable for the following components:    WBC 12.9 (*)     RDW 15 history of atrial fibrillation but has had bleeding complications and refused anticoagulation since. i did speak with cardiology.             Disposition and Plan     Clinical Impression:  Community acquired pneumonia, unspecified laterality  (primary enco

## 2019-10-22 NOTE — ED INITIAL ASSESSMENT (HPI)
Pt with c/o chest pain on Saturday that resolved. Pt states yesterday had upper back pain and lightheadness. Pt took bp at home last night 92/50. Pt states woke up this morning and still felt lightheaded. Pt states checked bp again and was 86/40.   Pt c

## 2019-10-22 NOTE — TELEPHONE ENCOUNTER
BP WAS 98/41 LAST NIGHT    86/41 THIS MORNING. DOES NOT WANT TO TAKE BP MEDICATION TODAY UNTIL TALKS TO NURSE   FATIGUED, NOT FEELING WELL, FEELS LIKE SHE COULD PASS OUT.

## 2019-10-22 NOTE — ED INITIAL ASSESSMENT (HPI)
Patient here with report of chest and back pain sat and yesterday. Today had low BP and was light headed and dizzy.

## 2019-10-22 NOTE — ED PROVIDER NOTES
Patient Seen in: [id number] [facility]      History   Patient presents with:  Dizziness (neurologic)  Hypotension (cardiovascular)    Stated Complaint: blood pressure low    70-year-old female presents today with complaints of hypotension, ches pain, unspecified 12/15/2011   • Chronic airway obstruction, not elsewhere classified 11/29/2011   • Chronic atrial fibrillation    • Congestive heart failure, unspecified 11/29/2011   • COPD (chronic obstructive pulmonary disease) ([facility]    • Coronary athe °F (36.1 °C) (Temporal)   Resp 20   SpO2 96%         Physical Exam  Vitals signs and nursing note reviewed. Constitutional:       Appearance: She is well-developed. HENT:      Head: Normocephalic.    Eyes:      Conjunctiva/sclera: Conjunctivae normal. any intervention at this time. Patient does take daily aspirin but does not take any other blood thinning agents.   Blood pressure in the office today is normal.  Patient did not take her blood pressure medication this morning or last night due to the lowe

## 2019-10-22 NOTE — TELEPHONE ENCOUNTER
Patient advised that Dr Viki Salas wants her to go to [facility], [facility] in [facility]. She said she is going to try to get a ride. She said she would try to drive herself if she can't get a ride. Advised she should not drive herself.

## 2019-10-23 ENCOUNTER — APPOINTMENT (OUTPATIENT)
Dept: CV DIAGNOSTICS | Facility: HOSPITAL | Age: 74
DRG: 190 | End: 2019-10-23
Attending: INTERNAL MEDICINE
Payer: MEDICARE

## 2019-10-23 ENCOUNTER — APPOINTMENT (OUTPATIENT)
Dept: ULTRASOUND IMAGING | Facility: HOSPITAL | Age: 74
DRG: 190 | End: 2019-10-23
Attending: NURSE PRACTITIONER
Payer: MEDICARE

## 2019-10-23 LAB
A1AT SERPL-MCNC: 194 MG/DL (ref 90–200)
ADENOVIRUS PCR:: NEGATIVE
ALLENS TEST: POSITIVE
ANION GAP SERPL CALC-SCNC: 5 MMOL/L (ref 0–18)
ARTERIAL BLD GAS O2 SATURATION: 93 % (ref 92–100)
ARTERIAL BLOOD GAS BASE EXCESS: -0.2 MMOL/L (ref ?–2)
ARTERIAL BLOOD GAS HCO3: 24.5 MEQ/L (ref 22–26)
ARTERIAL BLOOD GAS PCO2: 40 MM HG (ref 35–45)
ARTERIAL BLOOD GAS PH: 7.41 (ref 7.35–7.45)
ARTERIAL BLOOD GAS PO2: 68 MM HG (ref 80–105)
B PERT DNA SPEC QL NAA+PROBE: NEGATIVE
BASOPHILS # BLD AUTO: 0.04 X10(3) UL (ref 0–0.2)
BASOPHILS NFR BLD AUTO: 0.4 %
BUN BLD-MCNC: 43 MG/DL (ref 7–18)
BUN/CREAT SERPL: 22.8 (ref 10–20)
C PNEUM DNA SPEC QL NAA+PROBE: NEGATIVE
CALCIUM BLD-MCNC: 10.3 MG/DL (ref 8.5–10.1)
CALCULATED O2 SATURATION: 93 % (ref 92–100)
CARBOXYHEMOGLOBIN: 1.4 % SAT (ref 0–3)
CHLORIDE SERPL-SCNC: 99 MMOL/L (ref 98–112)
CHOLEST SMN-MCNC: 163 MG/DL (ref ?–200)
CO2 SERPL-SCNC: 30 MMOL/L (ref 21–32)
CORONAVIRUS 229E PCR:: NEGATIVE
CORONAVIRUS HKU1 PCR:: NEGATIVE
CORONAVIRUS NL63 PCR:: NEGATIVE
CORONAVIRUS OC43 PCR:: NEGATIVE
CREAT BLD-MCNC: 1.89 MG/DL (ref 0.55–1.02)
CRP SERPL-MCNC: <0.29 MG/DL (ref ?–0.3)
DEPRECATED RDW RBC AUTO: 53.6 FL (ref 35.1–46.3)
EOSINOPHIL # BLD AUTO: 0.1 X10(3) UL (ref 0–0.7)
EOSINOPHIL NFR BLD AUTO: 1.1 %
ERYTHROCYTE [DISTWIDTH] IN BLOOD BY AUTOMATED COUNT: 15.7 % (ref 11–15)
FLUAV RNA SPEC QL NAA+PROBE: NEGATIVE
FLUBV RNA SPEC QL NAA+PROBE: NEGATIVE
GLUCOSE BLD-MCNC: 97 MG/DL (ref 70–99)
HCT VFR BLD AUTO: 43.9 % (ref 35–48)
HDLC SERPL-MCNC: 45 MG/DL (ref 40–59)
HGB BLD-MCNC: 14.3 G/DL (ref 12–16)
IMM GRANULOCYTES # BLD AUTO: 0.14 X10(3) UL (ref 0–1)
IMM GRANULOCYTES NFR BLD: 1.5 %
L PNEUMO AG UR QL: NEGATIVE
LDLC SERPL CALC-MCNC: 94 MG/DL (ref ?–100)
LYMPHOCYTES # BLD AUTO: 1.48 X10(3) UL (ref 1–4)
LYMPHOCYTES NFR BLD AUTO: 16.1 %
MCH RBC QN AUTO: 30.7 PG (ref 26–34)
MCHC RBC AUTO-ENTMCNC: 32.6 G/DL (ref 31–37)
MCV RBC AUTO: 94.2 FL (ref 80–100)
METAPNEUMOVIRUS PCR:: NEGATIVE
METHEMOGLOBIN: 0.6 % SAT (ref 0.4–1.5)
MONOCYTES # BLD AUTO: 0.95 X10(3) UL (ref 0.1–1)
MONOCYTES NFR BLD AUTO: 10.3 %
MYCOPLASMA PNEUMONIA PCR:: NEGATIVE
NEUTROPHILS # BLD AUTO: 6.51 X10 (3) UL (ref 1.5–7.7)
NEUTROPHILS # BLD AUTO: 6.51 X10(3) UL (ref 1.5–7.7)
NEUTROPHILS NFR BLD AUTO: 70.6 %
NONHDLC SERPL-MCNC: 118 MG/DL (ref ?–130)
OSMOLALITY SERPL CALC.SUM OF ELEC: 289 MOSM/KG (ref 275–295)
PARAINFLUENZA 1 PCR:: NEGATIVE
PARAINFLUENZA 2 PCR:: NEGATIVE
PARAINFLUENZA 3 PCR:: NEGATIVE
PARAINFLUENZA 4 PCR:: NEGATIVE
PATIENT TEMPERATURE: 98.6 F
PLATELET # BLD AUTO: 210 10(3)UL (ref 150–450)
POTASSIUM SERPL-SCNC: 5.4 MMOL/L (ref 3.5–5.1)
POTASSIUM SERPL-SCNC: 5.6 MMOL/L (ref 3.5–5.1)
PROCALCITONIN SERPL-MCNC: 0.13 NG/ML
RBC # BLD AUTO: 4.66 X10(6)UL (ref 3.8–5.3)
RHINOVIRUS/ENTERO PCR:: NEGATIVE
RSV RNA SPEC QL NAA+PROBE: NEGATIVE
SED RATE-ML: 13 MM/HR (ref 0–25)
SODIUM SERPL-SCNC: 134 MMOL/L (ref 136–145)
STREP PNEUMO ANTIGEN, URINE: NEGATIVE
T4 FREE SERPL-MCNC: 1.2 NG/DL (ref 0.8–1.7)
TOTAL HEMOGLOBIN: 14.5 G/DL (ref 11.7–16)
TRIGL SERPL-MCNC: 118 MG/DL (ref 30–149)
TROPONIN I SERPL-MCNC: <0.045 NG/ML (ref ?–0.04)
TSI SER-ACNC: 4.64 MIU/ML (ref 0.36–3.74)
VLDLC SERPL CALC-MCNC: 24 MG/DL (ref 0–30)
WBC # BLD AUTO: 9.2 X10(3) UL (ref 4–11)

## 2019-10-23 PROCEDURE — 99232 SBSQ HOSP IP/OBS MODERATE 35: CPT | Performed by: INTERNAL MEDICINE

## 2019-10-23 PROCEDURE — 93306 TTE W/DOPPLER COMPLETE: CPT | Performed by: INTERNAL MEDICINE

## 2019-10-23 PROCEDURE — 93970 EXTREMITY STUDY: CPT | Performed by: NURSE PRACTITIONER

## 2019-10-23 RX ORDER — METOPROLOL TARTRATE 50 MG/1
50 TABLET, FILM COATED ORAL
Status: DISCONTINUED | OUTPATIENT
Start: 2019-10-23 | End: 2019-10-25

## 2019-10-23 RX ORDER — ALLOPURINOL 100 MG/1
100 TABLET ORAL DAILY
Status: DISCONTINUED | OUTPATIENT
Start: 2019-10-24 | End: 2019-10-23 | Stop reason: SDUPTHER

## 2019-10-23 RX ORDER — ASPIRIN 81 MG/1
81 TABLET ORAL DAILY
Status: DISCONTINUED | OUTPATIENT
Start: 2019-10-24 | End: 2019-10-25

## 2019-10-23 RX ORDER — ALLOPURINOL 100 MG/1
100 TABLET ORAL DAILY
Status: DISCONTINUED | OUTPATIENT
Start: 2019-10-24 | End: 2019-10-25

## 2019-10-23 RX ORDER — IPRATROPIUM BROMIDE AND ALBUTEROL SULFATE 2.5; .5 MG/3ML; MG/3ML
3 SOLUTION RESPIRATORY (INHALATION)
Status: DISCONTINUED | OUTPATIENT
Start: 2019-10-23 | End: 2019-10-25

## 2019-10-23 RX ORDER — FUROSEMIDE 40 MG/1
40 TABLET ORAL DAILY
Status: DISCONTINUED | OUTPATIENT
Start: 2019-10-24 | End: 2019-10-25

## 2019-10-23 RX ORDER — DILTIAZEM HYDROCHLORIDE 240 MG/1
240 CAPSULE, COATED, EXTENDED RELEASE ORAL 2 TIMES DAILY
Status: DISCONTINUED | OUTPATIENT
Start: 2019-10-23 | End: 2019-10-25

## 2019-10-23 NOTE — CONSULTS
[facility]    Cardiology Consultation    Negin Sol Patient Status:  Inpatient    1945 MRN [id number]   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    [facility]   • Generalized and unspecified atherosclerosis 11/29/2011 inhaler 1 puff, 1 puff, Inhalation, Daily  •  allopurinol (ZYLOPRIM) tab 300 mg, 300 mg, Oral, Daily  •  dilTIAZem HCl ER Coated Beads (CARDIZEM CD) 24 hr cap 480 mg, 480 mg, Oral, Daily  •  furosemide (LASIX) tab 40 mg, 40 mg, Oral, BID  •  metoprolol tar 10/22/2019 1954  Gross per 24 hour   Intake 250 ml   Output —   Net 250 ml     Wt Readings from Last 3 Encounters:  10/22/19 : 187 lb 13.3 oz (85.2 kg)  10/03/19 : 190 lb 2 oz (86.2 kg)  09/30/19 : 188 lb (85.3 kg)      Physical Exam:   General: Alert and 22-OCT-2019 12:54, Septal infarct is now Present Confirmed by Britta Plaza M.D., [address] ([id number]) on 10/22/2019 7:10:49 PM    Ekg 12-lead    Result Date: 10/22/2019  Atrial fibrillation Left axis deviation Incomplete right bundle branch block Abnormal QRS-T angle, c Troponin is negative x2 low suspicion for cardiac ischemia. No further checks needed. Problem #5 COPD exacerbation  Further management as per primary team.  Pulmonary consult pending. On broad-spectrum antibiotics.     Problem #6 CKD  At baseline creat

## 2019-10-23 NOTE — PROGRESS NOTES
[facility]  Cardiology Progress Note    Subjective:  +SOB, no CP    Objective:  /60 (BP Location: Left arm)   Pulse 97   Temp 97.9 °F (36.6 °C) (Oral)   Resp 21   Ht 5' 2\" (1.575 m)   Wt 188 lb 6.4 oz (85.5 kg)   SpO2 96%   BMI 34.46 kg/m² · GERD/reflux   · Former smoker    Plan:  · ECHO pending  · RLE swollen, bilateral LE US  · Appears euvolemic on PO diuretics  · Appreciate reqs from pulmonology      ROBINSON Portillo  10/23/2019  11:07 AM    Seen and examined.  Just seen in office las

## 2019-10-23 NOTE — PLAN OF CARE
Assumed care for this patient at 0730: patient alert and oriented. Admitted with hypoxia. Sats 94% on room air at rest. [facility] [place] 250 ft on room air sats 90%. Flu panel negative. PFT's. Plan for nebs and IV abx.  Cardiology adjusted some of

## 2019-10-23 NOTE — PROCEDURES
Joylene Merlin is a 20-year-old  female who stands 5 feet 2 inches tall and weighs 188 pounds. She underwent pre-and postbronchodilator spirometry on 10/23/2019. She carries a diagnosis of COPD.   She has a 40-pack-year smoking history but is st

## 2019-10-23 NOTE — PLAN OF CARE
NURSING ADMISSION NOTE      Patient admitted via CART. Oriented to room. Safety precautions initiated. Bed in low position. Call light in reach. ADMITTED A 74 YRS OLD FEMALE FROM ER ALERT AND ORIENTED X4 WITH DIAGNOSIS OF CHEST PAIN.  PLACED ON BED

## 2019-10-23 NOTE — PROGRESS NOTES
[facility] Note:  Renal Dose Adjustment    Glenn Hurst has been prescribed famotidine (PEPCID) 40 mg orally every 24 hours prn. Estimated Creatinine Clearance: 19.5 mL/min (A) (based on SCr of 2 mg/dL (H)).     Her calculated creatinine clearance

## 2019-10-23 NOTE — HISTORICAL OFFICE NOTE
Carolyn Ludwig MD - 10/16/2019 4:45 PM CDT  Formatting of this note might be different from the original.  10/16/2019    [address] [place] [address] [facility]  Clinic Note    Helga Myles  : 1945  PCP: Endarterectomy   right carotid endarterectomy 3/11     Family Hx:    Family History   Problem Relation Age of Onset   • Coronary Artery Disease Neg Hx   Negative for premature CAD   • Aneurysm Neg Hx   Negative for AAA     Social Hx:    Social History HPI    Physical Exam:  Vitals:   Visit Vitals  /70   Pulse 56   Ht 5' 2\" (1.575 m)   Wt 86.6 kg (191 lb)   SpO2 92%   BMI 34.93 kg/m²     General: No acute distress.   HEENT: Mucosa moist, no scleral icterus  Neck: JVP normal  Lungs: Cear to ausculta Mario Camarillo MD at 10/16/2019 5:06 PM CDT

## 2019-10-23 NOTE — PROGRESS NOTES
[facility]  Progress Note     Chaparro Reilly Patient Status:  Inpatient    1945 MRN [id number]   [facility] 2NE-A Attending Kamini Haley MD   Hosp Day # 1 PCP Susannah Lawton MD     Chief Complaint:  SOB  S:   Feeling be hypotension and hypoxia  1. IV antibiotics  Zithro/ rocephin   2. Nebulizer treatments around-the-clock and as needed  3. Oxygen  4. Pulmonary following  5. US legs   2.  Chest pain with presyncopal episode, CAD with prior CABG, paroxysmal A. Fib, history o

## 2019-10-23 NOTE — RESPIRATORY THERAPY NOTE
Patient received on room air sating 96% this AM. Breath sounds- diminished. ABG, nasal swab, sputum, IS, and bedside PFT done. Patient instructed on use of MDI and given this AM. Plan to continue nebs as ordered. Will continue to monitor patient.

## 2019-10-23 NOTE — CONSULTS
[facility]  Report of Consultation    Joshua Lopez Patient Status:  Inpatient    1945 MRN [id number]   [facility] 2NE-A Attending Edgardo Edwards MD   Hosp Day # 1 PCP Austin العلي MD     Reason for Consultation: Hypotens fibrillation. She has had multiple courses of anticoagulants in the past (Coumadin, Xarelto, Pradaxa) and is always had bleeding complications in association with these medications.   She understandably refuses additional anticoagulation measures at this t Oral Tab, Take 40 mg by mouth 2 (two) times daily. , Disp: , Rfl: , 10/22/2019 at 0900  metoprolol Tartrate 25 MG Oral Tab, Take 12.5 mg by mouth 2 (two) times daily.   , Disp: , Rfl: , 10/22/2019 at 0900  Potassium Chloride ER 10 MEQ Oral Tab CR, Take 10 mE Negative for myalgias, arthralgias, muscle weakness. Neurological: Negative for headaches, dizziness, seizures, memory problems, trouble swallowing, speech problems, gait problems and weakness. Behavioral/Psych: Negative for active tobacco use.   Endocrin ABGHCO3, ABGBE, TEMP, , SITE, DEV, THGB in the last 168 hours.     Invalid input(s): RCH70MIX, CHOB      Cultures: No positive cultures-multiple specimens pending    Radiology: Chest x-ray with some mild basilar atelectatic/scarring-acute overlying inf

## 2019-10-23 NOTE — PAYOR COMM NOTE
--------------  ADMISSION REVIEW     Payor: [employer] ADV PPO  Subscriber #:  [id number]  Authorization Number: [id number]    Admit date: 10/22/19  Admit time: 2058       Admitting Physician: Jaydon Ritchie MD  Attending Physician:  Jagdish Sosa, Enderterectomy / [place]   • CHOLECYSTECTOMY     • OTHER      14in of colon removed   • OTHER CARDIOLOGY      Peripheral Vascular Surgery    • SKIN SURGERY      Reconstruction of Left side of face   • TONSILLECTOMY       Social History    Tobacco Use concerning for infiltrates. Patient did have elevated white count. Patient states he has been coughing was started on antibiotics for likely pneumonia. Patient will be admitted for further evaluation.     I did speak with the [facility] hospitalist.  Patient chest pain radiating to the back 8 out of 10 on and off and feeling lightheaded like she was going to pass out according to patient. Patient has history of CAD with prior CABG and atrial fibrillation and CHF and follows up with cardiology Dr. Fernie Reno.   D extremities. No swelling noted. Integument: No lesions. No erythema. Psychiatric: Appropriate mood and affect.     Laboratory Data:   Lab Results   Component Value Date    WBC 12.9 10/22/2019    HGB 14.3 10/22/2019    HCT 43.8 10/22/2019    .0 10/ patient.    Per ER notes patient reported chest pain however during my examination she states that she did not have chest pain and mostly experienced back pain which was severe radiating to bilateral shoulders. No chest pain now.   Still feels short of julieta Pulmonary consult pending. On broad-spectrum antibiotics.     Problem #6 CKD  At baseline creatinine of 2.0 continue to monitor with BMPs.     Problem #7 CAD status post bypass.   Asymptomatic recent stress test showed small area of reversible anterior is bypass grafting here in about 2011. Additionally, the patient has chronic atrial fibrillation.   She has had multiple courses of anticoagulants in the past (Coumadin, Xarelto, Pradaxa) and is always had bleeding complications in association with these medi    • ipratropium-albuterol  3 mL Nebulization QID   • Fluticasone-Umeclidin-Vilant  1 puff Inhalation Daily   • allopurinol  300 mg Oral Daily   • dilTIAZem HCl ER Coated Beads  480 mg Oral Daily   • furosemide  40 mg Oral BID   • metoprolol tartrate ipratropium-albuterol (DUONEB) nebulizer solution 3 mL     Date Action Dose Route User    10/22/2019 2255 Given 3 mL Nebulization Kaleigh Lassiter      ipratropium-albuterol (DUONEB) nebulizer solution 3 mL     Date Action Dose Route User    10/23/2019 131

## 2019-10-23 NOTE — PROGRESS NOTES
10/23/19 1808   Clinical Encounter Type   Visited With Patient   Routine Visit Introduction   Referral From   (consult)   Referral To    Mu-ism Encounters   Mu-ism Needs Prayer   Spiritual Requests During Visit / Hospitalization Marisa MALIN

## 2019-10-23 NOTE — H&P
[facility]                                                               History & Physical         Nancy Austin Patient Status:  Emergency    1945 MRN [id number]   Location [address] [facility] Attending Wandy Quick MD (chronic obstructive pulmonary disease) ([facility]    • Coronary atherosclerosis of unspecified type of vessel, native or graft 11/29/2011   • Edema 01/12/2012   • Esophageal reflux 01/12/2012   • Factor XII deficiency disease ([facility] Bernadine Rothman Medications:  (Not in a hospital admission)      Review of Systems:  A comprehensive 14 point review of systems was completed. Pertinent positives and negatives noted in the the HPI.     Physical Exam:     Vital signs: Blood pressure 131/56, pulse 88, temp BP and was light headed and dizzy. FINDINGS:  Lung volumes are large consistent with COPD. No large cystic bulla. Increased markings are seen at both lung bases consistent scarring.   There a few new linear and curvilinear opacities at the left l

## 2019-10-24 LAB
ANION GAP SERPL CALC-SCNC: 5 MMOL/L (ref 0–18)
BUN BLD-MCNC: 44 MG/DL (ref 7–18)
BUN/CREAT SERPL: 22.1 (ref 10–20)
CALCIUM BLD-MCNC: 10.1 MG/DL (ref 8.5–10.1)
CHLORIDE SERPL-SCNC: 99 MMOL/L (ref 98–112)
CO2 SERPL-SCNC: 28 MMOL/L (ref 21–32)
CREAT BLD-MCNC: 1.99 MG/DL (ref 0.55–1.02)
GLUCOSE BLD-MCNC: 93 MG/DL (ref 70–99)
OSMOLALITY SERPL CALC.SUM OF ELEC: 285 MOSM/KG (ref 275–295)
POTASSIUM SERPL-SCNC: 4.8 MMOL/L (ref 3.5–5.1)
SODIUM SERPL-SCNC: 132 MMOL/L (ref 136–145)

## 2019-10-24 PROCEDURE — 99232 SBSQ HOSP IP/OBS MODERATE 35: CPT | Performed by: INTERNAL MEDICINE

## 2019-10-24 NOTE — PROGRESS NOTES
Up ambulating slowly in room. Feels okay yet breathing not yet at baseline.  Congested upper airway cough    afebrile  Heart rates controlled  Good BP's    Lungs rhonchi and wheezes at bases  Ht irregular  abd soft  Ext no edema    Negative fluid balance

## 2019-10-24 NOTE — OCCUPATIONAL THERAPY NOTE
OCCUPATIONAL THERAPY EVALUATION - INPATIENT     Room Number: 8777/0943-D  Evaluation Date: 10/24/2019  Type of Evaluation: Initial  Presenting Problem: COPD    Physician Order: IP Consult to Occupational Therapy  Reason for Therapy: ADL/IADL Dysfunction an of Left side of face   • TONSILLECTOMY     • TUBAL LIGATION  1982       OCCUPATIONAL PROFILE    HOME SITUATION  Type of Home: House  Home Layout: Multi-level  Lives With: Alone    Toilet and Equipment: Comfort height toilet  Shower/Tub and Equipment: Tub-s drying)?: A Little  -   Toileting, which includes using toilet, bedpan or urinal? : None  -   Putting on and taking off regular upper body clothing?: None  -   Taking care of personal grooming such as brushing teeth?: None       AM-PAC Score: comorbidities nor modifications of tasks    Clinical Decision Making LOW - Analysis of occupational profile, problem-focused assessments, limited treatment options    Overall Complexity LOW     OT Discharge Recommendations: Home with home health PT/OT

## 2019-10-24 NOTE — CM/SW NOTE
Pt with exacerbation of COPD, will need home nebublized inhaled corticosteroids noted per Dr. Roxanne Valdivia. Anticipate d/c home tomorrow per bedside RN.     Referral made to [facility],[address] via [address], in [place] [phone]   Ref made to [facility]

## 2019-10-24 NOTE — PROGRESS NOTES
[facility]  Progress Note    Indira Anderson Patient Status:  Inpatient    1945 MRN [id number]   [facility] 2NE-A Attending Jagdish Sosa MD   Hosp Day # 2 PCP Ivanna Vogt MD     Subjective:  Indira Anderson is a(n) 76 ye CL 99 99  --  99   CO2 26.0 30.0  --  28.0   BUN 50* 43*  --  44*   CREATSERUM 2.00* 1.89*  --  1.99*     No results for input(s): PTP, INR, PTT in the last 168 hours.     Cultures: Viral cultures negative  Sputum thus far as normal respiratory ravinder bloo musculoskeletal  4. Coronary artery disease, status post coronary artery bypass grafting-cardiology following  5. Chronic atrial fibrillation  6. Peripheral vascular disease status post right carotid endarterectomy in the distant past  7.  History of hyperp

## 2019-10-24 NOTE — PLAN OF CARE
Pt is A&Ox4. RA, lungs diminished/inspiratory wheezing. Afib on tele, refusing hep SQ. Continent B&B. Up with SBA. R PIV flushes well. Iv abx given. POC updated with patient, she verbalized understanding. Will monitor.       Problem: Patient/Family Goals Oxygen supplementation based on oxygen saturation or ABGs  - Provide Smoking Cessation handout, if applicable  - Encourage broncho-pulmonary hygiene including cough, deep breathe, Incentive Spirometry  - Assess the need for suctioning and perform as needed

## 2019-10-24 NOTE — PLAN OF CARE
Assumed pt care at 0730. Pt A&O x 4, SPO2 94% on RA, A Fib on tele, up ad zoila in the room, up with standby assist in the hallway. Mild RANDALL, expiratory wheezing, maintained on Duonebs QID and Trelegy.  Maintained on IV antibiotics for pneumonia, no adverse r status  - Assess for changes in mentation and behavior  - Position to facilitate oxygenation and minimize respiratory effort  - Oxygen supplementation based on oxygen saturation or ABGs  - Provide Smoking Cessation handout, if applicable  - Encourage bron

## 2019-10-24 NOTE — CM/SW NOTE
ANDERSON milan for Bebeto Chisholm. Met with patient. Pt lives at [address] [address] in [place], [place]. She refuses HHC. Pt reports that she still drives and will follow up with her PCP after d/c. Pt reports that she uses home O2 through [address], she declined having any d/c needs.

## 2019-10-24 NOTE — PLAN OF CARE
Problem: Patient/Family Goals  Goal: Patient/Family Long Term Goal  Description  Patient's Long Term Goal: discharge home     Interventions:  - Pt/OT eval   - reassess 02 needs, 02 walk BID   Outcome: Progressing  Goal: Patient/Family Short Term Goal  De

## 2019-10-24 NOTE — PHYSICAL THERAPY NOTE
PHYSICAL THERAPY QUICK EVALUATION - INPATIENT    Room Number: 5630/2041-B  Evaluation Date: 10/24/2019  Presenting Problem: COPD exacerbation  Physician Order: PT Eval and Treat    Problem List  Principal Problem:    Community acquired pneumonia, unspeci Alone  Drives: Yes  Patient Owned Equipment: Cane  Patient Regularly Uses: Glasses    Prior Level of [place]: Pt reports was ind PTA. Pt has a cane that uses intermittently pending chronic back pain.        SUBJECTIVE  \"All they did was tell me to pu supervision. Pt taking O2 tank at one time to see if providing stability. Pt negotiates 1 flight of stairs with supervision and step to pattern with use of 1 railing. O2 sats at 85% upon return to room.   Pt recovered to 91% within 30 seconds and cues fo

## 2019-10-25 VITALS
SYSTOLIC BLOOD PRESSURE: 119 MMHG | DIASTOLIC BLOOD PRESSURE: 55 MMHG | HEIGHT: 62 IN | HEART RATE: 66 BPM | BODY MASS INDEX: 34.77 KG/M2 | RESPIRATION RATE: 20 BRPM | TEMPERATURE: 98 F | OXYGEN SATURATION: 93 % | WEIGHT: 188.94 LBS

## 2019-10-25 LAB
ANION GAP SERPL CALC-SCNC: 4 MMOL/L (ref 0–18)
BUN BLD-MCNC: 41 MG/DL (ref 7–18)
BUN/CREAT SERPL: 20.9 (ref 10–20)
CALCIUM BLD-MCNC: 9.8 MG/DL (ref 8.5–10.1)
CHLORIDE SERPL-SCNC: 101 MMOL/L (ref 98–112)
CO2 SERPL-SCNC: 29 MMOL/L (ref 21–32)
CREAT BLD-MCNC: 1.96 MG/DL (ref 0.55–1.02)
GLUCOSE BLD-MCNC: 94 MG/DL (ref 70–99)
OSMOLALITY SERPL CALC.SUM OF ELEC: 288 MOSM/KG (ref 275–295)
POTASSIUM SERPL-SCNC: 4.9 MMOL/L (ref 3.5–5.1)
SODIUM SERPL-SCNC: 134 MMOL/L (ref 136–145)

## 2019-10-25 PROCEDURE — 99232 SBSQ HOSP IP/OBS MODERATE 35: CPT | Performed by: INTERNAL MEDICINE

## 2019-10-25 PROCEDURE — 99239 HOSP IP/OBS DSCHRG MGMT >30: CPT | Performed by: INTERNAL MEDICINE

## 2019-10-25 RX ORDER — IPRATROPIUM BROMIDE AND ALBUTEROL SULFATE 2.5; .5 MG/3ML; MG/3ML
3 SOLUTION RESPIRATORY (INHALATION) EVERY 6 HOURS PRN
Qty: 100 VIAL | Refills: 1 | Status: SHIPPED | OUTPATIENT
Start: 2019-10-25 | End: 2020-09-23 | Stop reason: ALTCHOICE

## 2019-10-25 RX ORDER — BUDESONIDE 0.5 MG/2ML
0.5 INHALANT ORAL 2 TIMES DAILY
Qty: 60 AMPULE | Refills: 1 | Status: SHIPPED | OUTPATIENT
Start: 2019-10-25 | End: 2019-12-11

## 2019-10-25 RX ORDER — METOPROLOL TARTRATE 50 MG/1
50 TABLET, FILM COATED ORAL 2 TIMES DAILY
Qty: 60 TABLET | Refills: 1 | Status: SHIPPED | OUTPATIENT
Start: 2019-10-25 | End: 2019-11-14

## 2019-10-25 RX ORDER — ALLOPURINOL 100 MG/1
100 TABLET ORAL DAILY
Qty: 30 TABLET | Refills: 0 | Status: SHIPPED | OUTPATIENT
Start: 2019-10-25 | End: 2020-06-18

## 2019-10-25 RX ORDER — CEFUROXIME AXETIL 500 MG/1
500 TABLET ORAL 2 TIMES DAILY
Qty: 14 TABLET | Refills: 0 | Status: SHIPPED | OUTPATIENT
Start: 2019-10-25 | End: 2019-11-01

## 2019-10-25 NOTE — HOME CARE LIAISON
Received referral for [facility] from [facility]. [facility] is unable to accept this patient at this time- out of our service area. Voicemail left to [facility] with information stated above.

## 2019-10-25 NOTE — PLAN OF CARE
Problem: Patient/Family Goals  Goal: Patient/Family Long Term Goal  Description  Patient's Long Term Goal: discharge home     Interventions:  - Pt/OT eval   - reassess 02 needs, 02 walk BID   Outcome: Progressing  Goal: Patient/Family Short Term Goal  De suctioning and perform as needed  - Assess and instruct to report SOB or any respiratory difficulty  - Respiratory Therapy support as indicated  - Manage/alleviate anxiety  - Monitor for signs/symptoms of CO2 retention  Outcome: Progressing     Problem: Im

## 2019-10-25 NOTE — CM/SW NOTE
[facility] is not in network with the patient's insurance. MSW re-referred to [place] for nebulizer. Awaiting response.     Werner Waller, [facility]

## 2019-10-25 NOTE — PROGRESS NOTES
[facility]  Cardiology Progress Note    Kami Srinivasan Patient Status:  Inpatient    1945 MRN [id number]   [facility] 2NE-A Attending Ibis Mathias MD   Hosp Day # 3 PCP Milton Mackay MD     Subjective:  States she is more f Sodium (Porcine)  5,000 Units Subcutaneous 2 times per day   • cefTRIAXone  1 g Intravenous Q24H         Assessment:  · COPD exacerbation, suspected bronchitis vs evolving pneumonia on Abx,nebs per pulmonary  · CAD h/o CABG- stable   · atrial fibrillation:

## 2019-10-25 NOTE — PLAN OF CARE
ALERT AND ORIENTED X4 ON TELE MONITOR HR 70'S AFIB. DENIES ANY PAIN. UPDATED W/ POC AND VERBALIZED UNDERSTANDING. ALL NEEDS ATTENDED AND WILL CONTINUE TO MONITOR. CALL LIGHT WITHIN REACH.   Problem: Patient/Family Goals  Goal: Patient/Family Long Term Goal saturation or ABGs  - Provide Smoking Cessation handout, if applicable  - Encourage broncho-pulmonary hygiene including cough, deep breathe, Incentive Spirometry  - Assess the need for suctioning and perform as needed  - Assess and instruct to report SOB o

## 2019-10-25 NOTE — PROGRESS NOTES
[facility]  Progress Note    Mike Howard Patient Status:  Inpatient    1945 MRN [id number]   [facility] 2NE-A Attending Yolanda Chairez MD   [address] Day # 3 PCP Joshua Worrell MD     Subjective:  Mikemarybeth Howard is a(n) 29 ye 132* 134*  --  132* 134*   K 4.8 5.6* 5.4* 4.8 4.9   CL 99 99  --  99 101   CO2 26.0 30.0  --  28.0 29.0   ALKPHO 162*  --   --   --   --    AST 23  --   --   --   --    ALT 25  --   --   --   --    BILT 0.4  --   --   --   --    TP 8.0  --   --   --   --

## 2019-10-25 NOTE — CM/SW NOTE
[employer] is out of covered service area for pt per Mike Nelson the rep for the agency.   Thus new referal made to [address] [place] [address] via [facility] for nebulized inhaled corticosteroids   per Dr. Aleida Koch:  Patient needs new home nebulizer as her machine at home now is broken  Budesonide

## 2019-10-25 NOTE — PROGRESS NOTES
[facility]  Progress Note     Ammon Barraganler Patient Status:  Inpatient    1945 MRN [id number]   [facility] 2NE-A Attending Reanna Otero MD   Hosp Day # 3 PCP Apple Lee MD     Chief Complaint:  SOB  S:  Stressed ab <0.045        Imaging: Imaging data reviewed in Epic. ASSESSMENT / PLAN:   1. COPD exacerbation, left lower lobe pneumonia with hypotension and hypoxia  1. Complete therapy w ceftin    2. Nebulizer treatments   3. Oxygen as needed   Has home o2   4.  Pulmo

## 2019-10-25 NOTE — PLAN OF CARE
Patient d/c home with a nebulizer machine, pullmicort and antibiotics per hospital [facility]. Patient taken in a w/c to [facility]. All belongings accompany.  Patient v/u of d/c instructions

## 2019-10-26 ENCOUNTER — TELEPHONE (OUTPATIENT)
Dept: FAMILY MEDICINE CLINIC | Facility: CLINIC | Age: 74
End: 2019-10-26

## 2019-10-26 LAB — ALPHA-1-ANTITRYPSIN: 182 MG/DL

## 2019-10-26 NOTE — DISCHARGE SUMMARY
[facility]  DISCHARGE SUMMARY     [facility] Patient Status:  Inpatient    1945 MRN [id number]   [facility] Attending No att. providers found   [address] Day # 3 PCP Haylie Craig MD     Date of Admission: 10/22/2019  Da took for 10 days. Patient states her cough continued even after the antibiotic course. Productive cough and bringing up a lot of dark yellow phlegm according to patient.   Associated with chest pain, squeezing substernal chest pain radiating to the back 8 fraction was 55%. The study is not technically sufficient to allow evaluation of LV   diastolic function. 2. Regional wall motion abnormality: Cannot exclude mild hypokinesis of the   basal inferior myocardium.   3. Aortic valve: Probably trileaflet; n legs negative for DVT    Lab/Test results pending at Discharge:   · None    Consultants:  • Cardiology pulmonology social work    Discharge Medication List:     Discharge Medications      START taking these medications      Instructions Prescription detail CONTINUE taking these medications      Instructions Prescription details   aspirin EC 81 MG Tbec      Take 81 mg by mouth daily. Refills:  0     BIOTIN OR      Take 1 tablet by mouth daily.    Refills:  0     dilTIAZem HCl ER Coated Beads 240 MG Cp24  [facility] [id number]  [phone]    In 2 weeks  hospital follow up       Vital signs:  Temp:  [98.1 °F (36.7 °C)] 98.1 °F (36.7 °C)  Pulse:  [66-73] 66  Resp:  [18-20] 20  BP: (119)/(55) 119/55    Physical Exam:    General: No acute distress.  AO

## 2019-10-26 NOTE — TELEPHONE ENCOUNTER
Atrial fibrillation, chronic [I48.20]  Chest pain of uncertain etiology [Z79.29]  Hypotension, unspecified hypotension type [I95.9]  Community acquired pneumonia, unspecified laterality [J18.9]     But basically they thought is was a COPD exacerbation  She

## 2019-10-28 ENCOUNTER — PATIENT OUTREACH (OUTPATIENT)
Dept: CASE MANAGEMENT | Age: 74
End: 2019-10-28

## 2019-10-28 DIAGNOSIS — Z02.9 ENCOUNTERS FOR UNSPECIFIED ADMINISTRATIVE PURPOSE: ICD-10-CM

## 2019-10-28 NOTE — PAYOR COMM NOTE
--------------  DISCHARGE REVIEW    Payor: [employer]  Subscriber #:  [id number]  Authorization Number: [id number]    Admit date: 10/22/19  Admit time:  2058  Discharge Date: 10/25/2019  6:14 PM     Admitting Physician: MD Greg Johnson bronchitis and evolving pneumonia severe disease with significant bronchodilator response  2. Chronic bronchitis question of benefit of starting Zithromax 3 times a week  3. CAD remote CABG  4. Chronic A. fib not on anticoagulation patient refuses  5.  PVD pulmonology have recommended changes to her inhalers has been given new nebulizer machine and prescriptions. Pulmonology wanted her to complete outpatient antibiotic with Ceftin.   Cardiology is follow her at good rate control of A. fib infectious work-up increased,  in the range of 40mm Hg to 45mm Hg.     Impressions: This study is compared with previous dated 12/13/15: Within  the technical limitations of the current study, lvef has decreased mildly.   It is difficult to tell if the inferobase is unchange known as:  COMBIVENT RESPIMAT  What changed:  Another medication with the same name was added. Make sure you understand how and when to take each. Inhale 1 puff into the lungs 4 (four) times daily.    Quantity:  3 Inhaler  Refills:  3     ipratropium-a Stop taking on:  December 17, 2019  Quantity:  180 tablet  Refills:  0           Where to Get Your Medications      Please  your prescriptions at the location directed by your doctor or nurse    Bring a paper prescription for each of these medicatio

## 2019-10-31 ENCOUNTER — OFFICE VISIT (OUTPATIENT)
Dept: FAMILY MEDICINE CLINIC | Facility: CLINIC | Age: 74
End: 2019-10-31
Payer: MEDICARE

## 2019-10-31 VITALS
OXYGEN SATURATION: 95 % | HEART RATE: 64 BPM | WEIGHT: 188 LBS | TEMPERATURE: 99 F | BODY MASS INDEX: 34.6 KG/M2 | DIASTOLIC BLOOD PRESSURE: 60 MMHG | SYSTOLIC BLOOD PRESSURE: 98 MMHG | HEIGHT: 62 IN | RESPIRATION RATE: 18 BRPM

## 2019-10-31 DIAGNOSIS — I48.20 ATRIAL FIBRILLATION, CHRONIC (HCC): ICD-10-CM

## 2019-10-31 DIAGNOSIS — I25.719 CORONARY ARTERY DISEASE INVOLVING AUTOLOGOUS VEIN CORONARY BYPASS GRAFT WITH ANGINA PECTORIS (HCC): ICD-10-CM

## 2019-10-31 DIAGNOSIS — J18.9 COMMUNITY ACQUIRED PNEUMONIA, UNSPECIFIED LATERALITY: ICD-10-CM

## 2019-10-31 DIAGNOSIS — J44.9 CHRONIC OBSTRUCTIVE PULMONARY DISEASE, UNSPECIFIED COPD TYPE (HCC): Primary | ICD-10-CM

## 2019-10-31 PROCEDURE — 99496 TRANSJ CARE MGMT HIGH F2F 7D: CPT | Performed by: INTERNAL MEDICINE

## 2019-10-31 NOTE — PROGRESS NOTES
HPI:    Madelaine Casey is a 76year old female here today for hospital follow up.    She was discharged from Inpatient hospital, [facility] to Home   Admission Date: 10/22/19   Discharge Date: 10/25/19  Hospital Discharge Diagnoses (since 10/1/201 amoxicillin-pot clavulanate; codeine; and colchicine. Current Meds:  allopurinol 100 MG Oral Tab, Take 1 tablet (100 mg total) by mouth daily.  (Patient taking differently: Take 150 mg by mouth daily.  )  metoprolol Tartrate 50 MG Oral Tab, Take 1 tablet unspecified (11/29/2011), COPD (chronic obstructive pulmonary disease) ([facility] Coronary atherosclerosis of unspecified type of vessel, native or graft (11/29/2011), Edema (01/12/2012), Esophageal reflux (01/12/2012), Factor XII deficiency disease ([facility] (199 this encounter: 62\". Weight as of this encounter: 188 lb (85.3 kg).    BP 98/60 (BP Location: Right arm, Patient Position: Sitting, Cuff Size: large)   Pulse 64   Temp 98.8 °F (37.1 °C) (Temporal)   Resp 18   Ht 62\"   Wt 188 lb (85.3 kg)   SpO2 95%   B Complications, Morbidity, and/or Mortality: moderate    Overall Risk:   moderate    Patient seen within 7 days from date of discharge.      Dia Turner MD, 10/31/2019

## 2019-11-02 ENCOUNTER — TELEPHONE (OUTPATIENT)
Dept: FAMILY MEDICINE CLINIC | Facility: CLINIC | Age: 74
End: 2019-11-02

## 2019-11-02 NOTE — TELEPHONE ENCOUNTER
Pt states that there was a chemical found in the ranitidine and all the medication was recalled. Pt is wanting to know what medication she should take. Advised pt that Dr. Sravanthi Crandall is out of the office and message will hold until Monday.  Pt was agreeable

## 2019-11-04 RX ORDER — GABAPENTIN 100 MG/1
100 CAPSULE ORAL 3 TIMES DAILY
Qty: 30 CAPSULE | Refills: 0 | Status: SHIPPED | OUTPATIENT
Start: 2019-11-04 | End: 2019-12-04

## 2019-11-04 NOTE — TELEPHONE ENCOUNTER
Oh no it flares up from time to time, I would suggest gabapentin ONLY at night everything else makes her nauseated and overtired.

## 2019-11-04 NOTE — TELEPHONE ENCOUNTER
Patient advised, she asked what dose. She said that she has muscle pain on the side of her neck and she \"can hardly get out of her chair\". She said she doesn't know what to take because Tylenol has not been helping latley. She has not tried ice or heat.

## 2019-11-08 ENCOUNTER — TELEPHONE (OUTPATIENT)
Dept: FAMILY MEDICINE CLINIC | Facility: CLINIC | Age: 74
End: 2019-11-08

## 2019-11-08 ENCOUNTER — TELEPHONE (OUTPATIENT)
Dept: CARDIOLOGY | Age: 74
End: 2019-11-08

## 2019-11-08 NOTE — TELEPHONE ENCOUNTER
Patient states that she felt like she was going to pass out. Her blood pressure was 73/38. She was able to sit down in a chair. She retook her pressure while on the phone and was 94/58.   Patient stated that she was in the hospital a few weeks ago and bp

## 2019-11-09 ENCOUNTER — TELEPHONE (OUTPATIENT)
Dept: FAMILY MEDICINE CLINIC | Facility: CLINIC | Age: 74
End: 2019-11-09

## 2019-11-09 NOTE — TELEPHONE ENCOUNTER
Patient said she feels slightly week and was slightly disoriented this morning. She said yesterday afternoon she felt like she was going to pass out, her BP was 73/38.  She said she called Dr Laz Edwards yesterday and his nurse told her to go to the hospital via

## 2019-11-09 NOTE — TELEPHONE ENCOUNTER
Patient advised to call Dr Kamila Bryan office Monday. She asked if she should take the Spironolactone, advised not to take the Lasix or Spironolactone.  ANAHI Phipps RN

## 2019-11-09 NOTE — TELEPHONE ENCOUNTER
BP GOT DOWN TO 73/38 YESTERDAY AFTERNOON & THEN IT WENT UP /101, WANTS TO KNOW WHAT IS CAUSING THIS, SHE DID NOT GO BACK TO HOSPITAL, CALL PT

## 2019-11-11 ENCOUNTER — TELEPHONE (OUTPATIENT)
Dept: FAMILY MEDICINE CLINIC | Facility: CLINIC | Age: 74
End: 2019-11-11

## 2019-11-11 NOTE — TELEPHONE ENCOUNTER
Patient called back and said that she spoke with Dr Maday Solo nurse and they want her to either see Dr Ida Cross or one of the NPs at Dr Maday Solo office this week.  Patient said that she would rather see Dr Ida Cross, appointment scheduled Thursday at 1130am. Dr Agusto Vines

## 2019-11-11 NOTE — TELEPHONE ENCOUNTER
Kya at Dr Beatriz Cole office advised of patient's symptoms and that Dr Blanca Jacobs made some adjustments to her medications. Advised patient could not get through to their office. She will put a note through to Dr Lorne Schmid nurse and they will call her today.

## 2019-11-11 NOTE — TELEPHONE ENCOUNTER
Pt states that Friday her BP was 73/38 and 2 hrs later was 151/101 she is not able to get ahold of her Cardio dr please call

## 2019-11-11 NOTE — TELEPHONE ENCOUNTER
She said later in the day Saturday after she called here her blood pressure was high. She said it does this frequently. She said today her blood pressure was 110/70 pulse was 82.  She said she could not reach Dr Joel Huntley office, she keeps getting put on hol

## 2019-11-11 NOTE — TELEPHONE ENCOUNTER
Patient advised. She sounded SOB on the phone and she said she is Addie Marcio. She said she has been wearing her oxygen but took it off to go to the bathroom. Advised to put it back on and slow her breathing down taking breaths through her nose.

## 2019-11-14 ENCOUNTER — OFFICE VISIT (OUTPATIENT)
Dept: FAMILY MEDICINE CLINIC | Facility: CLINIC | Age: 74
End: 2019-11-14
Payer: MEDICARE

## 2019-11-14 VITALS
HEIGHT: 62 IN | TEMPERATURE: 98 F | SYSTOLIC BLOOD PRESSURE: 130 MMHG | OXYGEN SATURATION: 92 % | HEART RATE: 72 BPM | DIASTOLIC BLOOD PRESSURE: 62 MMHG | WEIGHT: 193 LBS | RESPIRATION RATE: 20 BRPM | BODY MASS INDEX: 35.51 KG/M2

## 2019-11-14 DIAGNOSIS — I95.1 ORTHOSTATIC HYPOTENSION: Primary | ICD-10-CM

## 2019-11-14 PROCEDURE — 99214 OFFICE O/P EST MOD 30 MIN: CPT | Performed by: INTERNAL MEDICINE

## 2019-11-14 RX ORDER — METOPROLOL TARTRATE 50 MG/1
25 TABLET, FILM COATED ORAL 2 TIMES DAILY
Qty: 60 TABLET | Refills: 1 | COMMUNITY
Start: 2019-11-14 | End: 2020-09-23 | Stop reason: ALTCHOICE

## 2019-11-14 NOTE — PROGRESS NOTES
Jonna Falcon is a 76year old female. HPI:   Patient presents for recheck of her hypertension. Pt has had to stop her diuretics and BP meds due to LOW blood pressure after hospitalization for COPD, she uses her oxygen full time.  'V systolic 0.5-2.5 (3) MG/3ML Inhalation Solution Take 3 mL by nebulization every 6 (six) hours as needed. 100 vial 1   • Fluticasone-Umeclidin-Vilant 100-62.5-25 MCG/INH Inhalation Aerosol Powder, Breath Activated Inhale 1 puff into the lungs daily.  1 each 1   • bud Past Surgical History:   Procedure Laterality Date   • CABG  04/05/2011    Dr.Brian Lala   • CAROTID EXAM  03/12/2011    r Carotid Enderterectomy / Dr Antonieta Yu   • [address]; 5/2012   • OTHER      14in of colon removed   • OTHER

## 2019-11-18 ENCOUNTER — TELEPHONE (OUTPATIENT)
Dept: FAMILY MEDICINE CLINIC | Facility: CLINIC | Age: 74
End: 2019-11-18

## 2019-11-18 NOTE — TELEPHONE ENCOUNTER
Patient advised, she said this morning her BP was 110/59 and HR was 67, this afternoon 103/56. Saturday it was 97/74 in the morning and 110/69 in the afternoon.

## 2019-11-19 NOTE — TELEPHONE ENCOUNTER
Patient said she is having \" a bunch of chest pain\" yesterday and today and pain down her left arm this afternoon. She said she felt like her heart was racing and her pulse was 77. She said her blood pressure was 100/61 this morning and 103/57 just now.

## 2019-11-19 NOTE — TELEPHONE ENCOUNTER
You call 911 if the pain persists. I have reached out to Dr Jm Foster.   I was hoping you were better, but since you are not we have to do something MORE> cath, pacer, something you cannot refuse these things!!

## 2019-11-20 NOTE — PROGRESS NOTES
Multiple attempts to reach pt and messages left with no return call. Pt went in for HFU appt with PCP on 10/31/19. Encounter closing.

## 2019-11-21 ENCOUNTER — TELEPHONE (OUTPATIENT)
Dept: FAMILY MEDICINE CLINIC | Facility: CLINIC | Age: 74
End: 2019-11-21

## 2019-11-21 NOTE — TELEPHONE ENCOUNTER
Patient advised, she said that she did not refuse \"anything\". She said that Dr Jazmine Bonilla told her that she may eventually need a pacemaker but that he did not want to do anything \"invasive\" right now.  Advised dr Ladi Saenz wants her to go to [facility] and get a

## 2019-11-21 NOTE — TELEPHONE ENCOUNTER
Patient called back and said she is at [facility] and they are going to keep her overnight.  She said the cardiologist told her that the lab work was negative for heart issues and that the blockage was minimal looking at previous stress test. She said that they

## 2019-11-21 NOTE — TELEPHONE ENCOUNTER
I seriously think she needs a cardiac cath and consideration of a pacer, but since she refused in Yassine's office, he told me he had nothing else to offer. Please reconsider!! This problem is not going away.  You don't need a ambulance but ER lakshmi and I wi

## 2019-11-21 NOTE — TELEPHONE ENCOUNTER
Patient said that since before 9am she has had a dull ache in the upper left arm. She wants to know if she should be concerned. She said that her blood pressure early this morning was 94/54 pulse was 79 just now blood pressure was 111/60.  She denies chest

## 2019-11-23 ENCOUNTER — TELEPHONE (OUTPATIENT)
Dept: FAMILY MEDICINE CLINIC | Facility: CLINIC | Age: 74
End: 2019-11-23

## 2019-11-23 NOTE — TELEPHONE ENCOUNTER
Patient just wanted Dr Ida Cross to know that she is still in the hospital. She said they want a physical therapist to come and see her.  She said she is supposed to go home today and they want her to have someone come in and do therapy at home but she does not

## 2019-11-23 NOTE — TELEPHONE ENCOUNTER
PATIENT CALLING IN TO SPEAK TO NURSE, SHE IS STILL AT HOSPITAL. WAS NOT DISCHARGED YESTERDAY.   CALL PATIENT ON CELL

## 2019-11-25 ENCOUNTER — TELEPHONE (OUTPATIENT)
Dept: FAMILY MEDICINE CLINIC | Facility: CLINIC | Age: 74
End: 2019-11-25

## 2019-11-25 NOTE — TELEPHONE ENCOUNTER
spironolactone 50 MG Oral Tab    Patient states that the [facility] [employer] mail delivery rx company will be calling to get a refill on this medication. Patient still has enough and does not need a refill at this time. Please cx the refill on this medication.  Please c

## 2019-12-04 ENCOUNTER — TELEPHONE (OUTPATIENT)
Dept: FAMILY MEDICINE CLINIC | Facility: CLINIC | Age: 74
End: 2019-12-04

## 2019-12-07 ENCOUNTER — TELEPHONE (OUTPATIENT)
Dept: FAMILY MEDICINE CLINIC | Facility: CLINIC | Age: 74
End: 2019-12-07

## 2019-12-07 ENCOUNTER — NURSE ONLY (OUTPATIENT)
Dept: FAMILY MEDICINE CLINIC | Facility: CLINIC | Age: 74
End: 2019-12-07
Payer: MEDICARE

## 2019-12-07 DIAGNOSIS — R30.0 DYSURIA: Primary | ICD-10-CM

## 2019-12-07 PROCEDURE — 81003 URINALYSIS AUTO W/O SCOPE: CPT | Performed by: INTERNAL MEDICINE

## 2019-12-07 PROCEDURE — 87086 URINE CULTURE/COLONY COUNT: CPT | Performed by: INTERNAL MEDICINE

## 2019-12-07 PROCEDURE — 87186 SC STD MICRODIL/AGAR DIL: CPT | Performed by: INTERNAL MEDICINE

## 2019-12-07 PROCEDURE — 87077 CULTURE AEROBIC IDENTIFY: CPT | Performed by: INTERNAL MEDICINE

## 2019-12-07 RX ORDER — SULFAMETHOXAZOLE AND TRIMETHOPRIM 800; 160 MG/1; MG/1
1 TABLET ORAL 2 TIMES DAILY
Qty: 20 TABLET | Refills: 0 | Status: ON HOLD | OUTPATIENT
Start: 2019-12-07 | End: 2019-12-18

## 2019-12-07 RX ORDER — SPIRONOLACTONE 50 MG/1
50 TABLET, FILM COATED ORAL 2 TIMES DAILY
Qty: 60 TABLET | Refills: 0 | Status: ON HOLD | COMMUNITY
Start: 2019-12-07 | End: 2019-12-17

## 2019-12-07 NOTE — TELEPHONE ENCOUNTER
Tabatha Button called and would like to speak with Massiel Castro, she states that her legs are all swelled up. That is all that she would say to me.

## 2019-12-07 NOTE — TELEPHONE ENCOUNTER
Patient said that her legs are \"swelled up\" and she has a lump the size of a \"goose egg\". She said they have been swollen since she got out of the hospital. She said that she started having painful urination yesterday and her urine is cloudy.  She said

## 2019-12-09 ENCOUNTER — OFFICE VISIT (OUTPATIENT)
Dept: FAMILY MEDICINE CLINIC | Facility: CLINIC | Age: 74
End: 2019-12-09
Payer: MEDICARE

## 2019-12-09 ENCOUNTER — TELEPHONE (OUTPATIENT)
Dept: FAMILY MEDICINE CLINIC | Facility: CLINIC | Age: 74
End: 2019-12-09

## 2019-12-09 VITALS
OXYGEN SATURATION: 93 % | SYSTOLIC BLOOD PRESSURE: 100 MMHG | BODY MASS INDEX: 37 KG/M2 | WEIGHT: 200 LBS | HEART RATE: 90 BPM | DIASTOLIC BLOOD PRESSURE: 70 MMHG | TEMPERATURE: 99 F

## 2019-12-09 DIAGNOSIS — J44.1 COPD EXACERBATION (HCC): Primary | ICD-10-CM

## 2019-12-09 PROCEDURE — 94640 AIRWAY INHALATION TREATMENT: CPT | Performed by: INTERNAL MEDICINE

## 2019-12-09 PROCEDURE — 99214 OFFICE O/P EST MOD 30 MIN: CPT | Performed by: INTERNAL MEDICINE

## 2019-12-09 PROCEDURE — 96372 THER/PROPH/DIAG INJ SC/IM: CPT | Performed by: INTERNAL MEDICINE

## 2019-12-09 RX ORDER — PREDNISONE 20 MG/1
40 TABLET ORAL DAILY
Qty: 14 TABLET | Refills: 0 | Status: ON HOLD | OUTPATIENT
Start: 2019-12-09 | End: 2019-12-18

## 2019-12-09 RX ORDER — METHYLPREDNISOLONE SODIUM SUCCINATE 125 MG/2ML
125 INJECTION, POWDER, LYOPHILIZED, FOR SOLUTION INTRAMUSCULAR; INTRAVENOUS ONCE
Status: COMPLETED | OUTPATIENT
Start: 2019-12-09 | End: 2019-12-09

## 2019-12-09 RX ORDER — ALBUTEROL SULFATE 2.5 MG/3ML
2.5 SOLUTION RESPIRATORY (INHALATION) ONCE
Status: COMPLETED | OUTPATIENT
Start: 2019-12-09 | End: 2019-12-09

## 2019-12-09 RX ADMIN — METHYLPREDNISOLONE SODIUM SUCCINATE 125 MG: 125 INJECTION, POWDER, LYOPHILIZED, FOR SOLUTION INTRAMUSCULAR; INTRAVENOUS at 12:02:00

## 2019-12-09 RX ADMIN — ALBUTEROL SULFATE 2.5 MG: 2.5 SOLUTION RESPIRATORY (INHALATION) at 12:14:00

## 2019-12-09 NOTE — PROGRESS NOTES
HPI:   Mattie Mcnair is a 76year old female who presents for upper respiratory symptoms for  2  days. Patient reports difficulty breathing. She called the EMS this morning because she could not speak or catch her breath.  She had a neb treatment and r (two) times daily. 180 tablet 0   • Ipratropium-Albuterol (COMBIVENT RESPIMAT)  MCG/ACT Inhalation Aero Soln Inhale 1 puff into the lungs 4 (four) times daily. 3 Inhaler 3   • BIOTIN OR Take 1 tablet by mouth daily.           Past Medical History:   D since quittin.9      Smokeless tobacco: Never Used      Tobacco comment: quit a few years ago    Alcohol use: No      Alcohol/week: 0.0 standard drinks    Drug use: No        REVIEW OF SYSTEMS:   GENERAL: feels well otherwise  SKIN: no rashes  EYES:den

## 2019-12-09 NOTE — TELEPHONE ENCOUNTER
Rios Seth had to call an ambulance this morning because she couldn't get any air, she didn't for to the ER

## 2019-12-09 NOTE — TELEPHONE ENCOUNTER
Spoke with patient who states she woke up this morning having a hard time breathing. She took her O2, and it was 85%. She called an ambulance who had her increase her O2 from 2L to 3L, and they also gave her a neb treatment.  Patient states they informed he

## 2019-12-09 NOTE — TELEPHONE ENCOUNTER
Take an additional 20 mg lasix today (so a total of 60 mg) and 50 mg spironolactone twice a day. Take weight and WEAR O2 all the time.

## 2019-12-09 NOTE — TELEPHONE ENCOUNTER
Spoke with patient regarding Dr. Sal Finley recommendations and she verbalized understanding. She would like to be seen today by Dr. Sravanthi Crandall. Patient scheduled.   Future Appointments   Date Time Provider Scarlett Gallagher   12/9/2019 11:30 AM Freda Joseph MD E

## 2019-12-11 ENCOUNTER — TELEPHONE (OUTPATIENT)
Dept: FAMILY MEDICINE CLINIC | Facility: CLINIC | Age: 74
End: 2019-12-11

## 2019-12-11 RX ORDER — BUDESONIDE 0.5 MG/2ML
0.5 INHALANT ORAL 2 TIMES DAILY
Qty: 60 AMPULE | Refills: 1 | Status: SHIPPED | OUTPATIENT
Start: 2019-12-11 | End: 2020-07-06

## 2019-12-11 NOTE — TELEPHONE ENCOUNTER
Patient advised. She said her heart rate has not gone above 110. Patient said she had to do a neb treatment 3 hours apart last night because she \"couldn't breath\" and then her heart was \"racing\".  Advised if she can try to spread the Albuterol treatment

## 2019-12-11 NOTE — TELEPHONE ENCOUNTER
Patient advised yes she should continue. Patient was audibly wheezing, Dr Julia Prabhakar spoke with patient and advised her to do \"back to back\" neb treatments.  Appointment scheduled tomorrow at 415pm.

## 2019-12-11 NOTE — TELEPHONE ENCOUNTER
Pt has 10 days of budesonide left, she is supposed to take it twice a day for a month, should she continue? Needs more.

## 2019-12-11 NOTE — TELEPHONE ENCOUNTER
Can stop prednisone and start budesonide twice a day, can mix the albuterol and budesonide, but will need to use the albuterol separately as well. WATCH HEART RATE if >120 may be in AFIB, they captured this rhythm when at [facility].

## 2019-12-12 ENCOUNTER — APPOINTMENT (OUTPATIENT)
Dept: ULTRASOUND IMAGING | Facility: HOSPITAL | Age: 74
DRG: 291 | End: 2019-12-12
Attending: INTERNAL MEDICINE
Payer: MEDICARE

## 2019-12-12 ENCOUNTER — APPOINTMENT (OUTPATIENT)
Dept: CV DIAGNOSTICS | Facility: HOSPITAL | Age: 74
DRG: 291 | End: 2019-12-12
Attending: HOSPITALIST
Payer: MEDICARE

## 2019-12-12 ENCOUNTER — HOSPITAL ENCOUNTER (INPATIENT)
Facility: HOSPITAL | Age: 74
LOS: 6 days | Discharge: HOME HEALTH | DRG: 291 | End: 2019-12-18
Attending: HOSPITALIST | Admitting: HOSPITALIST
Payer: MEDICARE

## 2019-12-12 ENCOUNTER — TELEPHONE (OUTPATIENT)
Dept: FAMILY MEDICINE CLINIC | Facility: CLINIC | Age: 74
End: 2019-12-12

## 2019-12-12 ENCOUNTER — MED REC SCAN ONLY (OUTPATIENT)
Dept: FAMILY MEDICINE CLINIC | Facility: CLINIC | Age: 74
End: 2019-12-12

## 2019-12-12 DIAGNOSIS — R12 HEARTBURN: ICD-10-CM

## 2019-12-12 DIAGNOSIS — R13.10 DYSPHAGIA, UNSPECIFIED TYPE: ICD-10-CM

## 2019-12-12 DIAGNOSIS — K92.1 MELENA: ICD-10-CM

## 2019-12-12 PROBLEM — J96.01 ACUTE RESPIRATORY FAILURE WITH HYPOXEMIA (HCC): Status: ACTIVE | Noted: 2019-12-12

## 2019-12-12 PROCEDURE — 99223 1ST HOSP IP/OBS HIGH 75: CPT | Performed by: HOSPITALIST

## 2019-12-12 PROCEDURE — 93306 TTE W/DOPPLER COMPLETE: CPT | Performed by: HOSPITALIST

## 2019-12-12 PROCEDURE — 93970 EXTREMITY STUDY: CPT | Performed by: INTERNAL MEDICINE

## 2019-12-12 RX ORDER — FUROSEMIDE 10 MG/ML
40 INJECTION INTRAMUSCULAR; INTRAVENOUS
Status: DISCONTINUED | OUTPATIENT
Start: 2019-12-12 | End: 2019-12-16

## 2019-12-12 RX ORDER — ALLOPURINOL 100 MG/1
100 TABLET ORAL DAILY
Status: DISCONTINUED | OUTPATIENT
Start: 2019-12-12 | End: 2019-12-18

## 2019-12-12 RX ORDER — BUDESONIDE 0.5 MG/2ML
0.5 INHALANT ORAL 2 TIMES DAILY
Status: DISCONTINUED | OUTPATIENT
Start: 2019-12-12 | End: 2019-12-18

## 2019-12-12 RX ORDER — METOCLOPRAMIDE HYDROCHLORIDE 5 MG/ML
10 INJECTION INTRAMUSCULAR; INTRAVENOUS EVERY 8 HOURS PRN
Status: DISCONTINUED | OUTPATIENT
Start: 2019-12-12 | End: 2019-12-16

## 2019-12-12 RX ORDER — HEPARIN SODIUM 5000 [USP'U]/ML
5000 INJECTION, SOLUTION INTRAVENOUS; SUBCUTANEOUS EVERY 8 HOURS SCHEDULED
Status: DISCONTINUED | OUTPATIENT
Start: 2019-12-12 | End: 2019-12-16

## 2019-12-12 RX ORDER — DILTIAZEM HYDROCHLORIDE 240 MG/1
240 CAPSULE, EXTENDED RELEASE ORAL 2 TIMES DAILY
COMMUNITY
End: 2020-06-05

## 2019-12-12 RX ORDER — FAMOTIDINE 10 MG
20 TABLET ORAL NIGHTLY PRN
COMMUNITY
End: 2020-01-07 | Stop reason: DRUGHIGH

## 2019-12-12 RX ORDER — ECHINACEA PURPUREA EXTRACT 125 MG
1 TABLET ORAL
Status: DISCONTINUED | OUTPATIENT
Start: 2019-12-12 | End: 2019-12-18

## 2019-12-12 RX ORDER — ONDANSETRON 2 MG/ML
4 INJECTION INTRAMUSCULAR; INTRAVENOUS EVERY 6 HOURS PRN
Status: DISCONTINUED | OUTPATIENT
Start: 2019-12-12 | End: 2019-12-18

## 2019-12-12 RX ORDER — SODIUM CHLORIDE/ALOE VERA
GEL (GRAM) NASAL AS NEEDED
Status: DISCONTINUED | OUTPATIENT
Start: 2019-12-12 | End: 2019-12-18

## 2019-12-12 RX ORDER — DILTIAZEM HYDROCHLORIDE 120 MG/1
240 CAPSULE, EXTENDED RELEASE ORAL 2 TIMES DAILY
Status: DISCONTINUED | OUTPATIENT
Start: 2019-12-12 | End: 2019-12-18

## 2019-12-12 RX ORDER — IPRATROPIUM BROMIDE AND ALBUTEROL SULFATE 2.5; .5 MG/3ML; MG/3ML
3 SOLUTION RESPIRATORY (INHALATION)
Status: DISCONTINUED | OUTPATIENT
Start: 2019-12-12 | End: 2019-12-18

## 2019-12-12 RX ORDER — ENOXAPARIN SODIUM 100 MG/ML
40 INJECTION SUBCUTANEOUS DAILY
Status: DISCONTINUED | OUTPATIENT
Start: 2019-12-12 | End: 2019-12-12

## 2019-12-12 RX ORDER — FAMOTIDINE 20 MG/1
20 TABLET ORAL DAILY
Status: DISCONTINUED | OUTPATIENT
Start: 2019-12-12 | End: 2019-12-16

## 2019-12-12 RX ORDER — ASPIRIN 81 MG/1
81 TABLET ORAL DAILY
Status: DISCONTINUED | OUTPATIENT
Start: 2019-12-12 | End: 2019-12-16

## 2019-12-12 RX ORDER — BUDESONIDE 0.5 MG/2ML
0.5 INHALANT ORAL 2 TIMES DAILY
Status: DISCONTINUED | OUTPATIENT
Start: 2019-12-12 | End: 2019-12-12

## 2019-12-12 RX ORDER — METHYLPREDNISOLONE SODIUM SUCCINATE 125 MG/2ML
80 INJECTION, POWDER, LYOPHILIZED, FOR SOLUTION INTRAMUSCULAR; INTRAVENOUS EVERY 8 HOURS
Status: DISCONTINUED | OUTPATIENT
Start: 2019-12-12 | End: 2019-12-13

## 2019-12-12 RX ORDER — ACETAMINOPHEN 325 MG/1
650 TABLET ORAL EVERY 6 HOURS PRN
Status: DISCONTINUED | OUTPATIENT
Start: 2019-12-12 | End: 2019-12-18

## 2019-12-12 NOTE — H&P
[facility]  History and Physical     Yehuda Borrego Patient Status:  Inpatient    1945 MRN [id number]   [facility] 5NW-A Attending Darvin Hood MD   Hosp Day # 0 PCP Coni Luciano MD     Chief Complaint: Dyspnea    Histo Reconstruction of Left side of face   • TONSILLECTOMY     • TUBAL LIGATION  1982       Social History:  reports that she quit smoking about 9 years ago. Her smoking use included cigarettes. She has a 50.00 pack-year smoking history.  She has never used smok times daily for 10 days. , Disp: 20 tablet, Rfl: 0  Metoprolol Tartrate 50 MG Oral Tab, Take 0.5 tablets (25 mg total) by mouth 2 (two) times daily. , Disp: 60 tablet, Rfl: 1  allopurinol 100 MG Oral Tab, Take 1 tablet (100 mg total) by mouth daily.  (Patient bilaterally. No wheezes. No rhonchi. Cardiovascular: S1, S2. Regular rate and rhythm. No murmurs, rubs or gallops. Equal pulses. Chest and Back: No tenderness or deformity. Abdomen: Soft, nontender, nondistended. Positive bowel sounds.  No rebound, gua H+P.   Based on patients current state of illness, I anticipate that, after discharge, patient will require TBD.

## 2019-12-12 NOTE — HISTORICAL OFFICE NOTE
Pippa Castillo  : 1945  PCP: Roberto Carlos Miller MD    Reason for Visit:    Chief Complaint   Patient presents with   • Follow-up   3 months   • Convey Results   Nuclear Stress Test   • Coronary Artery Disease   • Shortness of Breath     History of Pre Social Hx:    Social History     Tobacco Use   • Smoking status: Former Smoker   Packs/day: 1.00   Years: 50.00   Pack years: 50.00   Types: Cigarettes   Last attempt to quit: 2011   Years since quittin.7   • Smokeless tobacco: Never Used   • Tobac normal  Lungs: Cear to auscultation  Cardiac: RRR   Abdomen: Soft, non tender, difficult to assess abd aorta  Musculoskeletal: ambulatory  Extremities: pulses intact  Skin: Warm  Neuro: A & O  Psychiatric: Normal affect.     Labs:    Cholesterol, Total (mg/

## 2019-12-12 NOTE — TELEPHONE ENCOUNTER
Patient said that she was very SOB this morning and her face and throat were swollen. She thinks she may have had a reaction to the Bactrim. She called 911 and was taken to [facility]. , they are going to transfer her to [facility]. Dr Madiha Kapoor aware

## 2019-12-12 NOTE — CONSULTS
[facility]     [facility] Consult Note    Kami Srinivasan Patient Status:  Inpatient    1945 MRN [id number]   [facility] 5NW-A Attending Mirna Peña MD   Hosp Day # 0 PCP Milton Mackay MD     Reason for consult: SOB 01/12/2012   • Esophageal reflux 01/12/2012   • Factor XII deficiency disease ([facility] [address] 1999    Cipriano   • Generalized and unspecified atherosclerosis 11/29/2011   • Gout    • Hematemesis 12/13/2011   • Parathyroid tumor    • West Nile Virus infection DS (BACTRIM DS) 800-160 MG Oral Tab per tablet, Take 1 tablet by mouth 2 (two) times daily for 10 days. Metoprolol Tartrate 50 MG Oral Tab, Take 0.5 tablets (25 mg total) by mouth 2 (two) times daily.   allopurinol 100 MG Oral Tab, Take 1 tablet (100 mg to III/VI TEMI at apex with resp variation consistent with TR murmur  Lungs: poor air movement, +end exp wheezing diffusely  Abdomen: Soft, non-tender. BS-present. Extremities: 2+ ble edema  Neurologic: Non-focal  Skin: Warm and dry.      LABS:  No results for the range of 40mm Hg to 45mm Hg.     MPI, 9/2019   1. Abnormal rest/stress gated SPECT myocardial perfusion scan. There is a small reversible anterior wall perfusion defect. 2. Normal left ventricular systolic function. The right ventricle is prominent.

## 2019-12-12 NOTE — PROGRESS NOTES
12/12/19 7865   Clinical Encounter Type   Visited With Patient   Routine Visit   (Responded to request for consult - Druze)   Patient's Supportive Strategies/Resources  offered non anxious presence and support including active listening and a

## 2019-12-12 NOTE — PROGRESS NOTES
Multidisciplinary Discharge Rounds held 12/12/2019. Treatment team members present today include , , Charge Nurse, Nurse, RT, PT and Pharmacy caring for [employer].      Other care providers present:    Mobility Goal: Up t

## 2019-12-12 NOTE — TELEPHONE ENCOUNTER
Sheila Severino calling she went by ambulance to the hospital and would like to talk to Rafy Riggs.   Call transferred to [facility].

## 2019-12-13 ENCOUNTER — APPOINTMENT (OUTPATIENT)
Dept: NUCLEAR MEDICINE | Facility: HOSPITAL | Age: 74
DRG: 291 | End: 2019-12-13
Attending: HOSPITALIST
Payer: MEDICARE

## 2019-12-13 ENCOUNTER — APPOINTMENT (OUTPATIENT)
Dept: GENERAL RADIOLOGY | Facility: HOSPITAL | Age: 74
DRG: 291 | End: 2019-12-13
Attending: INTERNAL MEDICINE
Payer: MEDICARE

## 2019-12-13 PROCEDURE — 99232 SBSQ HOSP IP/OBS MODERATE 35: CPT | Performed by: HOSPITALIST

## 2019-12-13 PROCEDURE — 71045 X-RAY EXAM CHEST 1 VIEW: CPT | Performed by: INTERNAL MEDICINE

## 2019-12-13 PROCEDURE — 99221 1ST HOSP IP/OBS SF/LOW 40: CPT | Performed by: INTERNAL MEDICINE

## 2019-12-13 PROCEDURE — 78582 LUNG VENTILAT&PERFUS IMAGING: CPT | Performed by: HOSPITALIST

## 2019-12-13 RX ORDER — METHYLPREDNISOLONE SODIUM SUCCINATE 125 MG/2ML
60 INJECTION, POWDER, LYOPHILIZED, FOR SOLUTION INTRAMUSCULAR; INTRAVENOUS EVERY 8 HOURS
Status: DISCONTINUED | OUTPATIENT
Start: 2019-12-13 | End: 2019-12-14

## 2019-12-13 NOTE — CM/SW NOTE
MSW acknowledged order for [place] [id number] evaluation. MSW paged [facility]  P:[phone]  C:[phone] with referral.  They will meet with the patient at bedside to discuss services, provide choice, and financial disclosure.     MI Holliday

## 2019-12-13 NOTE — PROGRESS NOTES
[facility] Pulmonary/Critical Care Progress Note     SUBJECTIVE/Interval history: All events, procedures, notes reviewed. No acute events, she feels better today but still quite dyspneic. No cough.  No pain or fevers 12/13/19  0611   RBC 3.66*   HGB 10.9*   HCT 34.3*   MCV 93.7   MCH 29.8   MCHC 31.8   RDW 14.9   NEPRELIM 15.33*   WBC 16.8*   .0     No results for input(s): BNP in the last 168 hours. No results for input(s): TROP, CK in the last 168 hours.   No Nebulization BID   • famoTIDine  20 mg Oral Daily     acetaminophen, ondansetron HCl, Metoclopramide HCl, ayr saline nasal, Saline Nasal Spray, benzocaine-menthol    Radiology  12/13 CXR reviewed which shows pulm vasc congestion, possibly small effusions

## 2019-12-13 NOTE — HOME CARE LIAISON
Received referral from [place], [address] for home health care. [facility] is unable to accept patient- patient's home address is outside of [facility] service area. Jae Max, made aware. Attempted to meet with patient to discuss if re-referral would be needed.  Patient not i

## 2019-12-13 NOTE — PROGRESS NOTES
[facility] Pharmacy Note:  Renal Dose Adjustment    Valentina Allen has been prescribed famotidine (PEPCID) 20 mg orally every 12 hours.     Her calculated creatinine clearance is < 50 ml/min, therefore the dose of famotidine (Pepcid) has been changed to 20 mg o

## 2019-12-13 NOTE — PROGRESS NOTES
[facility]  Progress Note     Jessica Monday Patient Status:  Inpatient    1945 MRN [id number]   [facility] 5NW-A Attending Clifton Ghosh MD   Hosp Day # 1 PCP Juvenal Russell MD     Chief Complaint: Dyspnea    S: Patient fee Daily(Beta Blocker)   • ipratropium-albuterol  3 mL Nebulization 6 times per day   • MethylPREDNISolone Sodium Succ  80 mg Intravenous Q8H   • furosemide  40 mg Intravenous BID (Diuretic)   • Heparin Sodium (Porcine)  5,000 Units Subcutaneous Q8H [facility]   • b

## 2019-12-13 NOTE — PLAN OF CARE
Pt A&Ox4. Patient is very anxious. Patient maintaining o2 sats on 5L nc with humidified O2. Patient in controlled a-fib on tele w/ history. Iv steroids and abx. Patient is up standby and voids. C/o headache after nebulizer treatment and tylenol given.  Niharika

## 2019-12-13 NOTE — PROGRESS NOTES
NURSING ADMISSION NOTE      Patient admitted via Ambulance  Oriented to room. Safety precautions initiated. Bed in low position. Call light in reach. Patient admitted around 1200. Admission navigator complete. Patient oriented to room.  MD paged f

## 2019-12-13 NOTE — PAYOR COMM NOTE
--------------  ADMISSION REVIEW     [facility]  Subscriber #:  [id number]  Authorization Number: [id number]    Admit date: 12/12/19  Admit time: 18       Admitting Physician: Ssuan Hall MD  Attending Physician:  Susan Hall MD / Dr Theresa Perdomo   • Fabien Nunez [address]; 5/2012   • OTHER      14in of colon removed   • OTHER CARDIOLOGY      Peripheral Vascular Surgery    • SKIN SURGERY      Reconstruction of Left side of face   • TONSILLECTOMY         Social History:  re (two) times daily. , Disp: 60 tablet, Rfl: 0  Sulfamethoxazole-TMP DS (BACTRIM DS) 800-160 MG Oral Tab per tablet, Take 1 tablet by mouth 2 (two) times daily for 10 days. , Disp: 20 tablet, Rfl: 0  Metoprolol Tartrate 50 MG Oral Tab, Take 0.5 tablets (25 mg exacerbation: Management as above  3. Possible heart failure: IV diuresis, CHF protocol, cardiology to see  4. Hypertension  5. CKD  6. Coronary artery disease  7.  Paroxysmal atrial fibrillation      CARDS:  Reason for consult: SOB     HPI:  75 y/o F with mixed picture with      1. SOB: likely mixed picture of HFpEF + COPD exacerbation. Can not exclude PE however given recent sedentary state and frequent hospitalizations. - pulmonary following; to manage COPD  - check cxr, bnp, echo  - IV diuresis.  Strict however overall things improved. Her cough has been productive of small amounts of blood streaking as well as associated nasal bleeding. She is unaware of any fevers chills or sweats.   She does note a heaviness midepigastric with exertion and at times wi Received patient on 5L nasal cannula. Breath sounds diminished.  ABG drawn, results paged to MD. Receiving neb treatments Q4     Lab 12/12/19 1923   ABGPHT 7.45   FNWRXS2C 39   PJGVU3I 81   ABGHCO3 26.5*   ABGBE 2.5*   TEMP 98.9    Positive   SITE Lef Given 20 mg Oral Marilee Osborne RN      furosemide (LASIX) injection 40 mg     Date Action Dose Route User    12/12/2019 1651 Given 40 mg Intravenous Marilee Osborne RN      ipratropium-albuterol (DUONEB) nebulizer solution 3 mL     Date Action Dose R

## 2019-12-13 NOTE — PLAN OF CARE
Pt is A&Ox4. Pt is anxious and then feels like she can't breathe told pt to take deep breaths. Pt felt her nose was congested gave PRN nasal decongestants see MAR. O2 sats WNL on 5L o2 with humidifier. 2L O2 is pts baseline has home oxygen per pt report.  O

## 2019-12-13 NOTE — PROGRESS NOTES
[facility] Cardiology Progress Note    Subjective:  Was very foggy yesterday. A little better today, yet breathing still \"tight\", and very sob getting up to BR, as well as ankles are swollen.      Objective:  /48 (BP Location: Right arm)   Pulse 89   T Note reviewed and labs reviewed.  Agree with above assessment and plan. HFpEF - echo with LVEF unchanged but worsening MR/PHTN. Continue diuresis through the weekend. LE US negative for PE but VQ scan still pending.   Regarding afib - rate control strate

## 2019-12-14 ENCOUNTER — APPOINTMENT (OUTPATIENT)
Dept: GENERAL RADIOLOGY | Facility: HOSPITAL | Age: 74
DRG: 291 | End: 2019-12-14
Attending: INTERNAL MEDICINE
Payer: MEDICARE

## 2019-12-14 ENCOUNTER — TELEPHONE (OUTPATIENT)
Dept: FAMILY MEDICINE CLINIC | Facility: CLINIC | Age: 74
End: 2019-12-14

## 2019-12-14 PROCEDURE — 71045 X-RAY EXAM CHEST 1 VIEW: CPT | Performed by: INTERNAL MEDICINE

## 2019-12-14 PROCEDURE — 99232 SBSQ HOSP IP/OBS MODERATE 35: CPT | Performed by: HOSPITALIST

## 2019-12-14 PROCEDURE — 99232 SBSQ HOSP IP/OBS MODERATE 35: CPT | Performed by: NURSE PRACTITIONER

## 2019-12-14 RX ORDER — METHYLPREDNISOLONE SODIUM SUCCINATE 40 MG/ML
40 INJECTION, POWDER, LYOPHILIZED, FOR SOLUTION INTRAMUSCULAR; INTRAVENOUS EVERY 8 HOURS
Status: DISCONTINUED | OUTPATIENT
Start: 2019-12-14 | End: 2019-12-15

## 2019-12-14 NOTE — PLAN OF CARE
Problem: Patient/Family Goals  Goal: Patient/Family Long Term Goal  Description  Patient's Long Term Goal:   12/12: Go home breathing easier    Interventions:  - nebs, steroids, pulm consult, O2,    - See additional Care Plan goals for specific intervent patient updated on POC, endorses understanding, WCTM.

## 2019-12-14 NOTE — PROGRESS NOTES
[facility]  Progress Note     Suma Arevalo Patient Status:  Inpatient    1945 MRN [id number]   [facility] 5NW-A Attending Susan Hall MD   Hosp Day # 2 PCP Amparo Harrell MD     Chief Complaint: Dyspnea    S: Patient wit Epic.    Medications:   • MethylPREDNISolone Sodium Succ  60 mg Intravenous Q8H   • allopurinol  100 mg Oral Daily   • aspirin EC  81 mg Oral Daily   • dilTIAZem HCl ER  240 mg Oral BID   • Fluticasone-Umeclidin-Vilant  1 puff Inhalation Daily   • Metoprol

## 2019-12-14 NOTE — PHYSICAL THERAPY NOTE
PHYSICAL THERAPY QUICK EVALUATION - INPATIENT    Room Number: 991/728-A  Evaluation Date: 12/14/2019  Presenting Problem: SOB  Physician Order: PT Eval and Treat    Problem List  Active Problems:    Acute respiratory failure with hypoxemia ([facility] [address]      Pas OBJECTIVE  Precautions: Bed/chair alarm  Fall Risk: Standard fall risk    WEIGHT BEARING RESTRICTION                   PAIN ASSESSMENT  Rating: Unable to rate  Location: left hip  Management Techniques: Activity promotion; Body mechanics; Relaxation;Repo previous date, however is not limiting ability to mobilize at this time. Pt in agreement- does not need to demo stairs at this time. O2 sats at 89% and above while on 3L with all activity.     Patient End of Session: Up in chair;Needs met;RN aware of sess

## 2019-12-14 NOTE — PROGRESS NOTES
· [facility]      Subjective:  Comfortable at rest, dyspneic with activity.       Objective:  131/84  124/47  Afebrile  AFib 80s  I/O - 2290  BUN/cr 54/2.04  Hgb 11.3    Neuro: awake/alert  HEENT: mild JVD  Cardiac: S1 S2 irregular  Lungs: bila

## 2019-12-14 NOTE — PROGRESS NOTES
[facility] Pulmonary/Critical Care Progress Note     SUBJECTIVE/Interval history: All events, procedures, notes reviewed. No acute events, she feels the same today, overall better. No cough.  No pain or fevers      Revi * 175* 182*   BUN 63* 54* 54*   CREATSERUM 2.53* 2.16* 2.04*   GFRAA 21* 25* 27*   GFRNAA 18* 22* 23*   CA 8.6 9.2 8.8   * 135* 135*   K 5.0 5.0 4.2    100 100   CO2 28.0 28.0 31.0     Recent Labs   Lab 12/13/19  0611 12/13/19  1207 12 loss and small effusions.     Dictated by: Carlota Valentine MD on 12/13/2019 at 8:47     Approved by: Carlota Valentine MD on 12/13/2019 at 8:48          Nm Lung Vq Vent / Perfusion Scan  (IAX=51955)    Result Date: 12/13/2019  CONCLUSION:  There are multiple ma pressures  · Continue systemic steroids and bronchodilators  · Agree with lasix; cardiology following  · Monitor weights and I/Os closely  · Wean o2 for sats >89%  · Mobilize/OOBTC as tolerated    Usama Monroy MD  [facility] [place]

## 2019-12-14 NOTE — TELEPHONE ENCOUNTER
TAMI Soto Pt is at [facility], pt is not allowed to take bactrim, she had a reaction.

## 2019-12-14 NOTE — DIETARY NOTE
[facility]     Admitting diagnosis:  COPD EXACERBATION   Acute respiratory failure with hypoxemia (HCC)    Ht:  5'2\"  Wt: 92.8 kg (204 lb 9.6 oz). This is 185% of IBW  Body mass index is 37.42 kg/m².   IBW: 50

## 2019-12-15 PROCEDURE — 99232 SBSQ HOSP IP/OBS MODERATE 35: CPT | Performed by: NURSE PRACTITIONER

## 2019-12-15 PROCEDURE — 99232 SBSQ HOSP IP/OBS MODERATE 35: CPT | Performed by: HOSPITALIST

## 2019-12-15 RX ORDER — PREDNISONE 20 MG/1
40 TABLET ORAL
Status: DISCONTINUED | OUTPATIENT
Start: 2019-12-16 | End: 2019-12-18

## 2019-12-15 RX ORDER — FUROSEMIDE 40 MG/1
40 TABLET ORAL 2 TIMES DAILY
Status: DISCONTINUED | OUTPATIENT
Start: 2019-12-16 | End: 2019-12-18

## 2019-12-15 RX ORDER — METHYLPREDNISOLONE SODIUM SUCCINATE 40 MG/ML
40 INJECTION, POWDER, LYOPHILIZED, FOR SOLUTION INTRAMUSCULAR; INTRAVENOUS EVERY 8 HOURS
Status: COMPLETED | OUTPATIENT
Start: 2019-12-15 | End: 2019-12-15

## 2019-12-15 RX ORDER — SPIRONOLACTONE 25 MG/1
50 TABLET ORAL 2 TIMES DAILY
Status: DISCONTINUED | OUTPATIENT
Start: 2019-12-15 | End: 2019-12-18

## 2019-12-15 NOTE — PROGRESS NOTES
[facility]  Progress Note     Pincus Olszewski Patient Status:  Inpatient    1945 MRN [id number]   [facility] 5NW-A Attending Oanh De Jesus MD   Hosp Day # 3 PCP Teresita Orta MD     Chief Complaint: Dyspnea    S: Patient fee MethylPREDNISolone Sodium Succ  40 mg Intravenous Q8H   • allopurinol  100 mg Oral Daily   • aspirin EC  81 mg Oral Daily   • dilTIAZem HCl ER  240 mg Oral BID   • Fluticasone-Umeclidin-Vilant  1 puff Inhalation Daily   • Metoprolol Tartrate  25 mg Oral 2x

## 2019-12-15 NOTE — PROGRESS NOTES
· [facility]      Subjective:  Breathing improved, still with cough and RANDALL    Objective:  101/49  Afebrile  AFib 80s  I/O - 630  BUN/cr 57/1.78    Neuro: awake/alert  HEENT: mild JVD  Cardiac: S1 S2 irregular  Lungs: coarse BS with expiratory

## 2019-12-15 NOTE — PLAN OF CARE
Problem: Patient/Family Goals  Goal: Patient/Family Long Term Goal  Description  Patient's Long Term Goal:   12/12: Go home breathing easier    Interventions:  - nebs, steroids, pulm consult, O2,    - See additional Care Plan goals for specific intervent 3LNC, baseline 2LNC, duonebs q4, solumedrol. VSS, Tele shows controlled AFib, HR within goal range, standing daily weights, electrolyte protocol. Refuses heparin. Voids. Pt currently denies nausea, vomiting. PRN medications administered via eMAR.   Up w

## 2019-12-15 NOTE — PROGRESS NOTES
[facility] Pulmonary/Critical Care Progress Note     SUBJECTIVE/Interval history: All events, procedures, notes reviewed. No acute events, she feels better today, less dyspnea and fatigue.   Had difficulty with yesterda noted   Skin:No rashes or lesions.   Lymph nodes:Not examined          Lab Data Review:   Recent Labs   Lab 12/13/19  0611 12/14/19  0621 12/15/19  0625   * 182* 177*   BUN 54* 54* 57*   CREATSERUM 2.16* 2.04* 1.78*   GFRAA 25* 27* 32*   GFRNAA 22* 2 8:57          Xr Chest Ap Portable  (cpt=71045)    Result Date: 12/13/2019  CONCLUSION:  Mild bilateral pulmonary venous congestion with bibasilar volume loss and small effusions.     Dictated by: Candy Winn MD on 12/13/2019 at 8:47     Approved by: Gwenlyn Lesch pending  · GERD  · Chronic kidney disease--creatinine 2.9 at [facility] with plans to repeat now         Plan:  · BLE duplex negative for DVT; VQ negative  · Echo with worsening pulm pressures, actively diuresing and cardiology following  · Continue systemic

## 2019-12-15 NOTE — PLAN OF CARE
Problem: Pneumonia    Data: Patient alert and oriented. Patient complains of hip pain, treated with PRN Tylenol. SPO2 remains greater then 92% on 3 liters of oxygen per nasal cannula. Lung sounds diminished with expiratory wheeze, neb treatments per RT.  IV of CO2 retention  Outcome: Progressing     Problem: Impaired Functional Mobility  Goal: Achieve highest/safest level of mobility/gait  Description  Interventions:  - Assess patient's functional ability and stability  - Promote increasing activity/tolerance

## 2019-12-15 NOTE — PLAN OF CARE
Assumed patient care at 0730. Vital signs stable. Patient denies pain - still short of breath with exertion and has expiratory wheeze.     Problem: Patient/Family Goals  Goal: Patient/Family Long Term Goal  Description  Patient's Long Term Goal:   12/12: precautions  -Ambulate 3+ times per day in hallway   Outcome: Progressing

## 2019-12-16 ENCOUNTER — ANESTHESIA EVENT (OUTPATIENT)
Dept: ENDOSCOPY | Facility: HOSPITAL | Age: 74
DRG: 291 | End: 2019-12-16
Payer: MEDICARE

## 2019-12-16 ENCOUNTER — TELEPHONE (OUTPATIENT)
Dept: FAMILY MEDICINE CLINIC | Facility: CLINIC | Age: 74
End: 2019-12-16

## 2019-12-16 PROCEDURE — 99233 SBSQ HOSP IP/OBS HIGH 50: CPT | Performed by: HOSPITALIST

## 2019-12-16 PROCEDURE — 99232 SBSQ HOSP IP/OBS MODERATE 35: CPT | Performed by: INTERNAL MEDICINE

## 2019-12-16 RX ORDER — METOCLOPRAMIDE HYDROCHLORIDE 5 MG/ML
5 INJECTION INTRAMUSCULAR; INTRAVENOUS EVERY 8 HOURS PRN
Status: DISCONTINUED | OUTPATIENT
Start: 2019-12-16 | End: 2019-12-18

## 2019-12-16 RX ORDER — POTASSIUM CHLORIDE 20 MEQ/1
40 TABLET, EXTENDED RELEASE ORAL ONCE
Status: COMPLETED | OUTPATIENT
Start: 2019-12-16 | End: 2019-12-16

## 2019-12-16 NOTE — PROGRESS NOTES
[facility] Pulmonary/Critical Care Progress Note     SUBJECTIVE/Interval history: All events, procedures, notes reviewed. Developed melena yesterday and earlier today.  She notes having new abdominal cramping pain in LUQ lesions.   Lymph nodes:Not examined          Lab Data Review:   Recent Labs   Lab 12/14/19  0621 12/15/19  0625 12/16/19  0552   * 177* 174*   BUN 54* 57* 48*   CREATSERUM 2.04* 1.78* 1.42*   GFRAA 27* 32* 42*   GFRNAA 23* 28* 36*   CA 8.8 8.3* 8.5 dilTIAZem HCl ER  240 mg Oral BID   • Fluticasone-Umeclidin-Vilant  1 puff Inhalation Daily   • Metoprolol Tartrate  25 mg Oral 2x Daily(Beta Blocker)   • ipratropium-albuterol  3 mL Nebulization 6 times per day   • budesonide  0.5 mg Nebulization BID >89%  · Mobilize/OOBTC as tolerated    Fanny Lopez MD  [facility] [place]

## 2019-12-16 NOTE — TELEPHONE ENCOUNTER
PT CURRENTLY AT [address] [place] [address] WHAT YEAR SHE HAD COLON SURGERY, CALL HER BACK ASAP

## 2019-12-16 NOTE — PROGRESS NOTES
Pharmacy renal dose adjustment for metoclopramide (Reglan)    Valentina Allen has been prescribed metoclopramide 10 mg every 8 hours as needed for nausea/vomiting. Estimated Creatinine Clearance: 27.5 mL/min (A) (based on SCr of 1.42 mg/dL (H)).     Beatriz Mead

## 2019-12-16 NOTE — PROGRESS NOTES
[facility]  Cardiology Progress Note    Suma Nolasco Patient Status:  Inpatient    1945 MRN [id number]   [facility] 5NW-A Attending Prudencio Buerger, MD   Hosp Day # 4 PCP Carson Green MD     Subjective:  Patient upset about to Assessment:  · COPD exacerbation, better on steroids and nebs  · Acute on chronic HFpEF, diuresed with IV lasix. · Melena - plans for EGD tomorrow. · CAD with h/o CABG 2011. Recent abnormal nuc with small anterior defect.  [facility] plans, medical

## 2019-12-16 NOTE — PLAN OF CARE
Problem: Patient/Family Goals  Goal: Patient/Family Long Term Goal  Description  Patient's Long Term Goal:   12/12: Go home breathing easier    Interventions:  - nebs, steroids, pulm consult, O2,    - See additional Care Plan goals for specific intervent

## 2019-12-16 NOTE — PROGRESS NOTES
ASSUMED PATIENT CARE AT 2300. SHE IS A&OX4. ADMITS TO BEING SHORT OF BREATH WITH MILD ACTIVITY. + EXP WHEEZE.  + OCCASIONAL NON PRODUCTIVE COUGH. REPORTS SHE CAN FEEL SHE HAS SECRETIONS BUT SHE IS NOT ABLE TO BRING IT UP. REFUSING FLUTTER VALVE.   Sharyn Razo

## 2019-12-16 NOTE — CONSULTS
[facility]                       Gastroenterology [address] [facility] Gastroenterology    Mike Lee Patient Status:  Inpatient    1945 MRN [id number]   [facility] 5NW-A Attending Irina Georges MD   [facility] Day # 4 JIMENEZ Wood Diagnosis Date   • Acute bronchitis 11/29/2011   • Chest pain, unspecified 12/15/2011   • Chronic airway obstruction, not elsewhere classified 11/29/2011   • Chronic atrial fibrillation (HCC)    • Congestive heart disease ([facility] [address]    • Congestive heart fail PRN  ondansetron HCl (ZOFRAN) injection 4 mg, 4 mg, Intravenous, Q6H PRN  Metoclopramide HCl (REGLAN) injection 10 mg, 10 mg, Intravenous, Q8H PRN  ipratropium-albuterol (DUONEB) nebulizer solution 3 mL, 3 mL, Nebulization, 6 times per day  budesonide (PUL anxiety, suicidal ideation, or homicidal ideation           Oncologic: The patient reports no history of prior solid tumor or hematologic malignancy           ENT: The patient reports no hoarseness of voice, hearing loss, sinus congestion, tinnitus Imaging:   PROCEDURE:  XR CHEST AP PORTABLE  (CPT=71045)     TECHNIQUE:  AP chest radiograph was obtained.     COMPARISON:  [facility] , XR, XR CHEST AP PORTABLE  (CPT=71045), 12/13/2019, 6:10.     INDICATIONS:  hypoxia     PATIENT STATED HISTORY: (As tr also reports nose bleed yesterday which may have been ingested resulting in dark stools   2. Dysphagia to solids  3. Acute on chronic GERD  4.  LLQ pain: no pain on exam, developed after prolonged coughing and returns with coughing only, improved with hot p

## 2019-12-16 NOTE — PLAN OF CARE
Pt a&ox4, anxious and crying this morning. VSS. Complaining of stomach pain, especially when coughing. MD notified. GI consulted d/t pain and melena. 2 black stools noted today. Plan for EGD in morning, consent signed and on chart. NPO at midnight.  Clear l steroids, pulm consult, cards consult  - See additional Care Plan goals for specific interventions         Outcome: Progressing     Problem: RESPIRATORY - ADULT  Goal: Achieves optimal ventilation and oxygenation  Description  INTERVENTIONS:  - Assess for

## 2019-12-16 NOTE — PROGRESS NOTES
[facility]  Progress Note     Mattie Mcnair Patient Status:  Inpatient    1945 MRN [id number]   [facility] 5NW-A Attending Joselito Merrill MD   [facility] Day # 4 PCP Rosey Jensen MD     Chief Complaint: Dyspnea    S: Patient sta (based on SCr of 1.42 mg/dL (H)). No results for input(s): PTP, INR in the last 168 hours. No results for input(s): TROP, CK in the last 168 hours. Imaging: Imaging data reviewed in Epic.     Medications:   • pantoprazole (PROTONIX) IV push  4 MD

## 2019-12-17 ENCOUNTER — APPOINTMENT (OUTPATIENT)
Dept: CT IMAGING | Facility: HOSPITAL | Age: 74
DRG: 291 | End: 2019-12-17
Attending: HOSPITALIST
Payer: MEDICARE

## 2019-12-17 ENCOUNTER — ANESTHESIA (OUTPATIENT)
Dept: ENDOSCOPY | Facility: HOSPITAL | Age: 74
DRG: 291 | End: 2019-12-17
Payer: MEDICARE

## 2019-12-17 PROCEDURE — 0DB28ZX EXCISION OF MIDDLE ESOPHAGUS, VIA NATURAL OR ARTIFICIAL OPENING ENDOSCOPIC, DIAGNOSTIC: ICD-10-PCS | Performed by: INTERNAL MEDICINE

## 2019-12-17 PROCEDURE — 0DB38ZX EXCISION OF LOWER ESOPHAGUS, VIA NATURAL OR ARTIFICIAL OPENING ENDOSCOPIC, DIAGNOSTIC: ICD-10-PCS | Performed by: INTERNAL MEDICINE

## 2019-12-17 PROCEDURE — 99232 SBSQ HOSP IP/OBS MODERATE 35: CPT | Performed by: INTERNAL MEDICINE

## 2019-12-17 PROCEDURE — 99233 SBSQ HOSP IP/OBS HIGH 50: CPT | Performed by: HOSPITALIST

## 2019-12-17 PROCEDURE — 0DB68ZX EXCISION OF STOMACH, VIA NATURAL OR ARTIFICIAL OPENING ENDOSCOPIC, DIAGNOSTIC: ICD-10-PCS | Performed by: INTERNAL MEDICINE

## 2019-12-17 PROCEDURE — 74176 CT ABD & PELVIS W/O CONTRAST: CPT | Performed by: HOSPITALIST

## 2019-12-17 RX ORDER — DEXTROSE MONOHYDRATE 25 G/50ML
50 INJECTION, SOLUTION INTRAVENOUS
Status: DISCONTINUED | OUTPATIENT
Start: 2019-12-17 | End: 2019-12-17 | Stop reason: HOSPADM

## 2019-12-17 RX ORDER — HYDROCODONE BITARTRATE AND ACETAMINOPHEN 10; 325 MG/1; MG/1
2 TABLET ORAL AS NEEDED
Status: DISCONTINUED | OUTPATIENT
Start: 2019-12-17 | End: 2019-12-17 | Stop reason: HOSPADM

## 2019-12-17 RX ORDER — POLYETHYLENE GLYCOL 3350 17 G/17G
17 POWDER, FOR SOLUTION ORAL DAILY
Status: DISCONTINUED | OUTPATIENT
Start: 2019-12-17 | End: 2019-12-18

## 2019-12-17 RX ORDER — SENNOSIDES 8.6 MG
8.6 TABLET ORAL 2 TIMES DAILY
Status: DISCONTINUED | OUTPATIENT
Start: 2019-12-17 | End: 2019-12-18

## 2019-12-17 RX ORDER — DOCUSATE SODIUM 100 MG/1
100 CAPSULE, LIQUID FILLED ORAL 2 TIMES DAILY
Status: DISCONTINUED | OUTPATIENT
Start: 2019-12-17 | End: 2019-12-18

## 2019-12-17 RX ORDER — ONDANSETRON 2 MG/ML
4 INJECTION INTRAMUSCULAR; INTRAVENOUS AS NEEDED
Status: DISCONTINUED | OUTPATIENT
Start: 2019-12-17 | End: 2019-12-17 | Stop reason: HOSPADM

## 2019-12-17 RX ORDER — METOCLOPRAMIDE HYDROCHLORIDE 5 MG/ML
10 INJECTION INTRAMUSCULAR; INTRAVENOUS AS NEEDED
Status: DISCONTINUED | OUTPATIENT
Start: 2019-12-17 | End: 2019-12-17 | Stop reason: HOSPADM

## 2019-12-17 RX ORDER — HYDROMORPHONE HYDROCHLORIDE 1 MG/ML
0.4 INJECTION, SOLUTION INTRAMUSCULAR; INTRAVENOUS; SUBCUTANEOUS EVERY 5 MIN PRN
Status: DISCONTINUED | OUTPATIENT
Start: 2019-12-17 | End: 2019-12-17 | Stop reason: HOSPADM

## 2019-12-17 RX ORDER — PANTOPRAZOLE SODIUM 40 MG/1
40 TABLET, DELAYED RELEASE ORAL
Status: DISCONTINUED | OUTPATIENT
Start: 2019-12-17 | End: 2019-12-18

## 2019-12-17 RX ORDER — SODIUM CHLORIDE, SODIUM LACTATE, POTASSIUM CHLORIDE, CALCIUM CHLORIDE 600; 310; 30; 20 MG/100ML; MG/100ML; MG/100ML; MG/100ML
INJECTION, SOLUTION INTRAVENOUS CONTINUOUS
Status: DISCONTINUED | OUTPATIENT
Start: 2019-12-17 | End: 2019-12-17

## 2019-12-17 RX ORDER — LIDOCAINE HYDROCHLORIDE 10 MG/ML
INJECTION, SOLUTION EPIDURAL; INFILTRATION; INTRACAUDAL; PERINEURAL AS NEEDED
Status: DISCONTINUED | OUTPATIENT
Start: 2019-12-17 | End: 2019-12-17 | Stop reason: SURG

## 2019-12-17 RX ORDER — HYDROCODONE BITARTRATE AND ACETAMINOPHEN 10; 325 MG/1; MG/1
1 TABLET ORAL AS NEEDED
Status: DISCONTINUED | OUTPATIENT
Start: 2019-12-17 | End: 2019-12-17 | Stop reason: HOSPADM

## 2019-12-17 RX ORDER — NALOXONE HYDROCHLORIDE 0.4 MG/ML
80 INJECTION, SOLUTION INTRAMUSCULAR; INTRAVENOUS; SUBCUTANEOUS AS NEEDED
Status: DISCONTINUED | OUTPATIENT
Start: 2019-12-17 | End: 2019-12-17 | Stop reason: HOSPADM

## 2019-12-17 RX ADMIN — LIDOCAINE HYDROCHLORIDE 25 MG: 10 INJECTION, SOLUTION EPIDURAL; INFILTRATION; INTRACAUDAL; PERINEURAL at 09:57:00

## 2019-12-17 NOTE — OPERATIVE REPORT
Operative Report-Esophagogastroduodenoscopy      PREOPERATIVE DIAGNOSIS/INDICATION: Melena, heartburn, dysphagia    POSTOPERTATIVE DIAGNOSIS: Gastritis, irregular Z line     PROCEDURE PERFORMED: EGD    INFORMED CONSENT: Once a brie Akira Jones  12/17/2019  10:09 AM

## 2019-12-17 NOTE — CM/SW NOTE
[facility] liaison has re-referred [place] [address] to [facility]. SW spoke with intake at [facility] who confirms pt has been accepted for [place] [address] services. MD orders obtained for RN/PT post discharge. RN updated.   / to remain available for support and/or discharge

## 2019-12-17 NOTE — PROGRESS NOTES
[facility] Pulmonary/Critical Care Progress Note     SUBJECTIVE/Interval history: All events, procedures, notes reviewed. Continued melena yesterday/today.   c/o LLQ, worse with movement and cough, although no pain with Data Review:   Recent Labs   Lab 12/15/19  0625 12/16/19  0552 12/17/19  0632   * 174* 127*   BUN 57* 48* 44*   CREATSERUM 1.78* 1.42* 1.54*   GFRAA 32* 42* 38*   GFRNAA 28* 36* 33*   CA 8.3* 8.5 9.2    137 136   K 4.2 3.7 4.5    99 98 nasal, Saline Nasal Spray, benzocaine-menthol    Radiology  12/13 CXR reviewed which shows pulm vasc congestion, possibly small effusions  12/14 CXR reviewed and is unchanged, still with pulm vasc congestion  No new chest imaging  No new chest imaging    A

## 2019-12-17 NOTE — PROGRESS NOTES
[facility] H[facility]PITAL[facility]  Progress Note     Mike Howard Patient Status:  Inpatient    1945 MRN [id number]   [facility] 5NW-A Attending Kiran Field MD   Hosp Day # 5 PCP Joshua Worrell MD     Chief Complaint: Dyspnea    S: Patient see --   --    ALT 31  --   --   --   --    BILT 0.4  --   --   --   --    TP 6.8  --   --   --   --     < > = values in this interval not displayed. Estimated Creatinine Clearance: 25.3 mL/min (A) (based on SCr of 1.54 mg/dL (H)).     Recent Labs   Lab 1 Yes  Estimated date of discharge: Tomorrow  Discharge is dependent on: Clinical course  At this point Ms. Telma Napier is expected to be discharge to: Home    Plan of care discussed with patient, RN, pulmonary and GI team.     Cinthia Hanks MD

## 2019-12-17 NOTE — PROGRESS NOTES
[facility]  Cardiology Progress Note    Usmangisela Limon Patient Status:  Inpatient    1945 MRN [id number]   [facility] 5NW-A Attending Rhoda Mtz MD   Hosp Day # 5 PCP Rhonda Griggs MD     Subjective:  Would like to go home,    · DUODENUM:  Normal duodenum.        Physical Exam:  General: Alert and oriented in no apparent distress. HEENT: No focal deficits. Neck: No JVD   Cardiac: irregular rate and rhythm, S1, S2 normal   Lungs: Clear    Abdomen: Soft, non-tender.    Extre spironolactone and CCB. OK to discharge from cardiac standpoint and follow-up with Dr. Kamini Hutchins in the [facility] outreach clinic.   Will follow peripherally but call with further questions or concerns.  Daniel Subramanian MD

## 2019-12-17 NOTE — PLAN OF CARE
Problem: Patient/Family Goals  Goal: Patient/Family Long Term Goal  Description  Patient's Long Term Goal:   12/12: Go home breathing easier    Interventions:  - nebs, steroids, pulm consult, O2,    - See additional Care Plan goals for specific intervent WNL on 2L. C/o pain on rt side of abdomen. Small dark brown stool tonight. NPO at [facility] for procedure tomorrow. All meds given per [facility]. Will continue to monitor.

## 2019-12-17 NOTE — ANESTHESIA POSTPROCEDURE EVALUATION
[address] Patient Status:  Inpatient   Age/Gender 76year old female MRN [id number]   Location [address] [facility]. Attending Annette Michaud, [address] [facility] [address] Day # 5 PCP Yamile James MD       Anesthesia Post-op Note    Procedure(s

## 2019-12-18 VITALS
OXYGEN SATURATION: 100 % | HEART RATE: 66 BPM | BODY MASS INDEX: 35 KG/M2 | DIASTOLIC BLOOD PRESSURE: 50 MMHG | RESPIRATION RATE: 19 BRPM | SYSTOLIC BLOOD PRESSURE: 103 MMHG | WEIGHT: 189.31 LBS | TEMPERATURE: 98 F

## 2019-12-18 PROCEDURE — 99239 HOSP IP/OBS DSCHRG MGMT >30: CPT | Performed by: HOSPITALIST

## 2019-12-18 RX ORDER — PREDNISONE 10 MG/1
TABLET ORAL
Qty: 18 TABLET | Refills: 0 | Status: SHIPPED | OUTPATIENT
Start: 2019-12-18 | End: 2020-01-30 | Stop reason: ALTCHOICE

## 2019-12-18 RX ORDER — IPRATROPIUM BROMIDE AND ALBUTEROL SULFATE 2.5; .5 MG/3ML; MG/3ML
3 SOLUTION RESPIRATORY (INHALATION)
Status: DISCONTINUED | OUTPATIENT
Start: 2019-12-18 | End: 2019-12-18

## 2019-12-18 RX ORDER — PANTOPRAZOLE SODIUM 40 MG/1
40 TABLET, DELAYED RELEASE ORAL
Qty: 30 TABLET | Refills: 1 | Status: SHIPPED | OUTPATIENT
Start: 2019-12-19 | End: 2020-01-07

## 2019-12-18 NOTE — CM/SW NOTE
Call received by rn, patient states she does not have home o2 equipment available, family unable to access her home.  Call to patient's [address] [place] [address] Road, spoke w/Ross [phone] to get a portable o2 tank delivered to bedside prior to d/c today, will

## 2019-12-18 NOTE — PROGRESS NOTES
[facility] Pulmonary/Critical Care Progress Note     SUBJECTIVE/Interval history: All events, procedures, notes reviewed. No acute events overnight, she feels better today. LLQ pain has resolved.  Still with stools overn Lab Data Review:   Recent Labs   Lab 12/15/19  0625 12/16/19  0552 12/17/19  0632   * 174* 127*   BUN 57* 48* 44*   CREATSERUM 1.78* 1.42* 1.54*   GFRAA 32* 42* 38*   GFRNAA 28* 36* 33*   CA 8.3* 8.5 9.2    137 136   K 4.2 3.7 4.5    8.6 mg Oral BID   • predniSONE  40 mg Oral Daily with breakfast   • furosemide  40 mg Oral BID   • spironolactone  50 mg Oral BID   • allopurinol  100 mg Oral Daily   • dilTIAZem HCl ER  240 mg Oral BID   • Fluticasone-Umeclidin-Vilant  1 puff Inhalation D

## 2019-12-18 NOTE — DISCHARGE SUMMARY
[facility] [place] HOSPITALIST  DISCHARGE SUMMARY     Vernard Osgood Patient Status:  Inpatient    1945 MRN [id number]   [facility] 5NW-A Attending Eula Finn MD   [facility] Day # 6 PCP Jessica Riggins MD     Date of Admission: 2019  Date of follow-up with GI. Home today on steroids taper, nebs, BD and oral diuretics. Patient has home oxygen.      Lace+ Score: 79  59-90 High Risk  29-58 Medium Risk  0-28   Low Risk       Discharge Medication List:     Discharge Medications      CHANGE how you t Tabs  Commonly known as:  LOPRESSOR      Take 0.5 tablets (25 mg total) by mouth 2 (two) times daily. Quantity:  60 tablet  Refills:  1     Potassium Chloride ER 10 MEQ Tbcr  Commonly known as:  K-DUR      Take 10 mEq by mouth 2 (two) times daily.    Refi arrive 15 minutes prior to your scheduled appointment time.                        Vital signs:  Temp:  [97.6 °F (36.4 °C)-98.2 °F (36.8 °C)] 97.9 °F (36.6 °C)  Pulse:  [60-91] 66  Resp:  [18-22] 19  BP: (103-130)/(50-73) 103/50    Physical Exam:    General

## 2019-12-18 NOTE — PLAN OF CARE
Problem: Patient/Family Goals  Goal: Patient/Family Long Term Goal  Description  Patient's Long Term Goal:   12/12: Go home breathing easier    Interventions:  - nebs, steroids, pulm consult, O2,    - See additional Care Plan goals for specific intervent Outcome: Progressing   Pt is alert and oriented x4. VSS. Maintaining o2 sats on 2L, 2-3L is baseline. Irregular HR on tele, hx afib. Pt voiding well. No BM this shift. C/o hip pain. Medication given.   Pt up standby assist.  Plan to d/c home tomorr

## 2019-12-19 ENCOUNTER — PATIENT OUTREACH (OUTPATIENT)
Dept: CASE MANAGEMENT | Age: 74
End: 2019-12-19

## 2019-12-19 ENCOUNTER — TELEPHONE (OUTPATIENT)
Dept: FAMILY MEDICINE CLINIC | Facility: CLINIC | Age: 74
End: 2019-12-19

## 2019-12-19 DIAGNOSIS — J96.01 ACUTE RESPIRATORY FAILURE WITH HYPOXEMIA (HCC): ICD-10-CM

## 2019-12-19 DIAGNOSIS — Z02.9 ENCOUNTERS FOR UNSPECIFIED ADMINISTRATIVE PURPOSE: ICD-10-CM

## 2019-12-19 PROCEDURE — 1111F DSCHRG MED/CURRENT MED MERGE: CPT

## 2019-12-19 NOTE — TELEPHONE ENCOUNTER
Yes, metoprolol 1/2 BID and yes on spironolactone and allopurinol.  Looks like she got Suzanne Diop and REED

## 2019-12-19 NOTE — TELEPHONE ENCOUNTER
Patient advised, she said that she has Senna at home. Advised ok to take that. V.O. Dr Kia Blake. Patient said she is very unsteady on her feet, she said her neighbor has been coming over and helping her.  She said the bathroom is directly behind her chair and

## 2019-12-19 NOTE — PROGRESS NOTES
Initial Post Discharge Follow Up   Discharge Date: 12/18/19  Contact Date: 12/19/2019    Consent Verification:  Assessment Completed With: Patient  HIPAA Verified?   Yes    Discharge Dx:     Acute hypoxic respiratory failure d/t COPD exacerbation   acute coughing  o Aggravating factors coughing and soreness returns after meals. o Is the pain manageable at home? yes; the patient will be contacting GI today to discuss ongoing pain and FU appointment.    • How well was your pain managed while in the hospital budesonide 0.5 MG/2ML Inhalation Suspension Take 2 mL (0.5 mg total) by nebulization 2 (two) times daily. 60 ampule 1   • Metoprolol Tartrate 50 MG Oral Tab Take 0.5 tablets (25 mg total) by mouth 2 (two) times daily.  60 tablet 1   • allopurinol 100 MG Ora ordered at D/C? No; Pt refused. DME ordered at D/C? Yes   What? Oxygen   Have you received your (DME)? yes; using 3L at home    COPD:  Have you participated in a pulmonary rehab program?   no   Would you like more information?   no  We reviewed your m 23, 2019  9:30 AM CST Exam - Established with MD Bernardino Mckeon9, [place] ([facility])        Jan 07, 2020  9:00 AM CST Follow-Up OV with [facility],  [address] [place] [address] ([address] [place] [address] 1/1/2020    [x]  Discharge Summary, Discharge medications reviewed/discussed/and reconciled against outpatient medications with patient,  and orders reviewed and discussed. Any changes or updates to medications and or orders sent to PCP.

## 2019-12-19 NOTE — PAYOR COMM NOTE
--------------  DISCHARGE REVIEW    Payor: [employer] MED[employer]ARE ADV PPO  Subscriber #:  [id number]  Authorization Number: [id number]    Admit date: 12/12/19  Admit time:  5953  Discharge Date: 12/18/2019 12:00 PM     Admitting Physician: MD Carter Culver kidney disease     History of Present Illness: Suma Nolasco is a 76year old female with past medical history of chronic COPD, CKD, gastroesophageal reflux disease, history of atrial fibrillation, coronary artery disease status post CABG presents to t Take 240 mg by mouth 2 (two) times daily. Refills:  0     ergocalciferol 1.25 MG (52330 UT) Caps  Commonly known as:  DRISDOL/VITAMIN D2      Take 1 capsule (50,000 Units total) by mouth every 14 (fourteen) days.    Stop taking on:  January 8, 2020  [facility] Tabs  Commonly known as:  Garfield Guevara  Ask about: Should I take this medication? Take 2 tablets (40 mg total) by mouth daily for 7 days.    Stop taking on:  December 16, 2019  Quantity:  14 tablet  Refills:  0     spironolactone 50 MG Tabs  Commonly know

## 2019-12-19 NOTE — TELEPHONE ENCOUNTER
Patient said that she came home yesterday. She said that she can hardly walk up her stairs because she is really weak. She said that on her release papers it said to stop taking the Spironolactone but it also says to ask your provider how to take it.  She s

## 2019-12-23 ENCOUNTER — OFFICE VISIT (OUTPATIENT)
Dept: FAMILY MEDICINE CLINIC | Facility: CLINIC | Age: 74
End: 2019-12-23
Payer: MEDICARE

## 2019-12-23 VITALS
HEIGHT: 62 IN | HEART RATE: 68 BPM | SYSTOLIC BLOOD PRESSURE: 110 MMHG | TEMPERATURE: 98 F | WEIGHT: 180 LBS | DIASTOLIC BLOOD PRESSURE: 50 MMHG | BODY MASS INDEX: 33.13 KG/M2

## 2019-12-23 DIAGNOSIS — J44.1 COPD EXACERBATION (HCC): Primary | ICD-10-CM

## 2019-12-23 DIAGNOSIS — K29.50 OTHER CHRONIC GASTRITIS WITHOUT HEMORRHAGE: ICD-10-CM

## 2019-12-23 PROCEDURE — 99495 TRANSJ CARE MGMT MOD F2F 14D: CPT | Performed by: INTERNAL MEDICINE

## 2019-12-23 RX ORDER — PREDNISONE 1 MG/1
TABLET ORAL
Qty: 8 TABLET | Refills: 0 | Status: SHIPPED | OUTPATIENT
Start: 2019-12-23 | End: 2020-01-02

## 2019-12-23 NOTE — PROGRESS NOTES
HPI:    Ammon Neff is a 76year old female here today for hospital follow up. She was discharged from Inpatient hospital, [facility] to home.    Admission Date: 12/12/19   Discharge Date: 12/18/19 COPD exacerbation, GI bleed, allergy to [facility] mouth every morning before breakfast.  dilTIAZem HCl  MG Oral Capsule SR 24 Hr, Take 240 mg by mouth 2 (two) times daily. famotidine 10 MG Oral Tab, Take 10 mg by mouth nightly as needed for Heartburn.   budesonide 0.5 MG/2ML Inhalation Suspension, T (11/29/2011), COPD (chronic obstructive pulmonary disease) ([facility] Coronary atherosclerosis of unspecified type of vessel, native or graft (11/29/2011), Edema (01/12/2012), Esophageal reflux (01/12/2012), Factor XII deficiency disease ([facility] (1999), Ruthie Patel 62\".    Weight as of this encounter: 180 lb (81.6 kg).    /50 (BP Location: Left arm, Patient Position: Sitting, Cuff Size: large)   Pulse 68   Temp 98.3 °F (36.8 °C) (Temporal)   Ht 62\"   Wt 180 lb (81.6 kg)   BMI 32.92 kg/m²    GENERAL: well devel

## 2019-12-24 ENCOUNTER — TELEPHONE (OUTPATIENT)
Dept: CARDIOLOGY | Age: 74
End: 2019-12-24

## 2019-12-26 ENCOUNTER — APPOINTMENT (OUTPATIENT)
Dept: CARDIOLOGY | Age: 74
End: 2019-12-26

## 2019-12-28 PROBLEM — R73.03 PREDIABETES: Chronic | Status: ACTIVE | Noted: 2019-12-28

## 2019-12-30 ENCOUNTER — TELEPHONE (OUTPATIENT)
Dept: FAMILY MEDICINE CLINIC | Facility: CLINIC | Age: 74
End: 2019-12-30

## 2019-12-30 DIAGNOSIS — K29.60 EROSIVE GASTRITIS: Primary | ICD-10-CM

## 2019-12-30 NOTE — TELEPHONE ENCOUNTER
GI Bleed was noted from recent hospital stay.  She was inpatient @ [facility].     Patient was seen by Dr Lawrence Fairly 12/23/19   Ok to add the referral??

## 2019-12-30 NOTE — TELEPHONE ENCOUNTER
[facility]. Specialist office contacted the patient and told her she needs a referral before her appointment on 01/07/19. Please call patient to discuss.

## 2019-12-30 NOTE — TELEPHONE ENCOUNTER
Called the patient- I advised that the I will place the referral for [facility]. I asked who she is scheduled to see and she is not sure.    I advised that we will call them to find out who she is scheduled to see and then we will place the referral.   Pa

## 2019-12-31 NOTE — TELEPHONE ENCOUNTER
Patient has an appointment with Dr Rosey Pierre 1/7/20. Referral placed and approved, faxed to [address] [place] [address].

## 2020-01-06 RX ORDER — POTASSIUM CHLORIDE 750 MG/1
TABLET, EXTENDED RELEASE ORAL
Qty: 180 TABLET | Refills: 0 | Status: SHIPPED | OUTPATIENT
Start: 2020-01-06 | End: 2020-03-10 | Stop reason: ALTCHOICE

## 2020-01-06 NOTE — TELEPHONE ENCOUNTER
Last office visit: 12/23/196  Last refill: LAST REFILLED ON 12/16/19 BY DR. Jeny Arguello.   Last labs: 12/18/19  Future Appointments   Date Time Provider Scarlett Gallagher   1/7/2020  9:00 AM MELODY MasonP [facility] GI

## 2020-01-07 PROBLEM — E66.9 CLASS 1 OBESITY: Status: ACTIVE | Noted: 2019-11-21

## 2020-01-07 PROBLEM — E66.811 CLASS 1 OBESITY: Status: ACTIVE | Noted: 2019-11-21

## 2020-01-07 PROBLEM — R06.02 SOB (SHORTNESS OF BREATH) ON EXERTION: Status: ACTIVE | Noted: 2019-10-16

## 2020-01-07 PROBLEM — M50.30 DEGENERATION OF CERVICAL INTERVERTEBRAL DISC: Status: ACTIVE | Noted: 2019-03-30

## 2020-01-13 ENCOUNTER — TELEPHONE (OUTPATIENT)
Dept: FAMILY MEDICINE CLINIC | Facility: CLINIC | Age: 75
End: 2020-01-13

## 2020-01-13 RX ORDER — SPIRONOLACTONE 50 MG/1
50 TABLET, FILM COATED ORAL 2 TIMES DAILY
Qty: 60 TABLET | Refills: 0 | COMMUNITY
Start: 2020-01-13 | End: 2020-03-12

## 2020-01-13 NOTE — TELEPHONE ENCOUNTER
Patient said that she has been taking a new antibiotic since Thursday (Fluconazole) that was prescribed by the pulmonologist. She said that he legs are so swollen that she cannot get shoes on and that when she presses with her finger it leaves a deep any.

## 2020-01-13 NOTE — TELEPHONE ENCOUNTER
PT STATES SHE HAS SEVERE SWELLING IN LEGS AND FEET. WANT TO KNOW WHAT SHE CAN DO I OFFERED APPT. SHE STATES SHE CANT EVEN PUT SHOES ON.        PLEASE CALL PT BACK

## 2020-01-13 NOTE — TELEPHONE ENCOUNTER
Patient advised, also advised to wear compression stockings if she is able to get them on.  V.O. Dr Jacqueline Sanders RN

## 2020-01-16 ENCOUNTER — OFFICE VISIT (OUTPATIENT)
Dept: FAMILY MEDICINE CLINIC | Facility: CLINIC | Age: 75
End: 2020-01-16
Payer: MEDICARE

## 2020-01-16 ENCOUNTER — TELEPHONE (OUTPATIENT)
Dept: FAMILY MEDICINE CLINIC | Facility: CLINIC | Age: 75
End: 2020-01-16

## 2020-01-16 VITALS
TEMPERATURE: 98 F | HEART RATE: 59 BPM | DIASTOLIC BLOOD PRESSURE: 80 MMHG | SYSTOLIC BLOOD PRESSURE: 100 MMHG | OXYGEN SATURATION: 93 %

## 2020-01-16 DIAGNOSIS — J96.01 ACUTE RESPIRATORY FAILURE WITH HYPOXEMIA (HCC): ICD-10-CM

## 2020-01-16 DIAGNOSIS — I25.719 CORONARY ARTERY DISEASE INVOLVING AUTOLOGOUS VEIN CORONARY BYPASS GRAFT WITH ANGINA PECTORIS (HCC): ICD-10-CM

## 2020-01-16 DIAGNOSIS — I50.812 CHRONIC RIGHT-SIDED CONGESTIVE HEART FAILURE (HCC): ICD-10-CM

## 2020-01-16 DIAGNOSIS — R60.0 LOCALIZED EDEMA: Primary | ICD-10-CM

## 2020-01-16 DIAGNOSIS — I48.19 OTHER PERSISTENT ATRIAL FIBRILLATION (HCC): ICD-10-CM

## 2020-01-16 DIAGNOSIS — D68.2 FACTOR XII DEFICIENCY (HCC): ICD-10-CM

## 2020-01-16 DIAGNOSIS — J44.1 COPD EXACERBATION (HCC): ICD-10-CM

## 2020-01-16 DIAGNOSIS — N18.4 CHRONIC KIDNEY DISEASE, STAGE 4 (SEVERE) (HCC): ICD-10-CM

## 2020-01-16 DIAGNOSIS — M46.92 INFLAMMATORY SPONDYLOPATHY OF CERVICAL REGION (HCC): ICD-10-CM

## 2020-01-16 DIAGNOSIS — E66.01 SEVERE OBESITY (BMI 35.0-39.9) WITH COMORBIDITY (HCC): ICD-10-CM

## 2020-01-16 DIAGNOSIS — E21.2 OTHER HYPERPARATHYROIDISM (HCC): ICD-10-CM

## 2020-01-16 PROCEDURE — 99214 OFFICE O/P EST MOD 30 MIN: CPT | Performed by: INTERNAL MEDICINE

## 2020-01-16 NOTE — PROGRESS NOTES
Joshua Lopez is a 76year old female. HPI:   Pt has been feeling tired and has more edema in the legs bilaterally. SHe has been taking Lasix 40 BID and spironolactone 25 BID until last week then 50 mg BID. No improvement.   O2 sats are normal and she mg by mouth 2 (two) times daily. • Pravastatin Sodium 40 MG Oral Tab Take 40 mg by mouth nightly. • Ipratropium-Albuterol (COMBIVENT RESPIMAT)  MCG/ACT Inhalation Aero Soln Inhale 1 puff into the lungs 4 (four) times daily.  3 Inhaler 3   • BI developed, well nourished,in no apparent distress  SKIN: no rashes,no suspicious lesions  HEENT: atraumatic, normocephalic,ears and throat are clear  NECK: supple,no adenopathy,no bruits  LUNGS: clear to auscultation  CARDIO: RRR without murmur  GI: good B

## 2020-01-16 NOTE — TELEPHONE ENCOUNTER
Patient said that her legs are twice as big as they have been and she cannot even walk. Advised she needs to be seen, appointment scheduled at 11am. She said she cannot get shoes on, she will wear non-slip socks.

## 2020-01-16 NOTE — TELEPHONE ENCOUNTER
LEGS & FEET STILL SO SWOLLEN SHE CAN'T WALK, WAS TAKING MORE OF HER MEDS LIKE  RECOMMENDED BUT IT'S NOT HELPING, CALL PT ASAP

## 2020-01-24 ENCOUNTER — TELEPHONE (OUTPATIENT)
Dept: FAMILY MEDICINE CLINIC | Facility: CLINIC | Age: 75
End: 2020-01-24

## 2020-01-24 NOTE — TELEPHONE ENCOUNTER
This is what is charted. She walked in the office today and sats decreased to 88-86% at rest on room air. Antonio Mcknight asks that it be changed to  - She came into office today, and while on room air her O2 Sat decreased to 86 to 88%.   (This is considered w

## 2020-01-24 NOTE — TELEPHONE ENCOUNTER
Medicare is not approving home O2 due to chart notes from 10/3/2019.   Asking for correction of line 4

## 2020-01-29 ENCOUNTER — MA CHART PREP (OUTPATIENT)
Dept: FAMILY MEDICINE CLINIC | Facility: CLINIC | Age: 75
End: 2020-01-29

## 2020-01-29 PROBLEM — I70.0 ABDOMINAL AORTIC ATHEROSCLEROSIS: Status: ACTIVE | Noted: 2020-01-29

## 2020-01-29 PROBLEM — I70.0 ABDOMINAL AORTIC ATHEROSCLEROSIS (HCC): Status: ACTIVE | Noted: 2020-01-29

## 2020-01-29 PROBLEM — I27.20 PULMONARY HTN (HCC): Status: ACTIVE | Noted: 2020-01-29

## 2020-01-30 ENCOUNTER — OFFICE VISIT (OUTPATIENT)
Dept: FAMILY MEDICINE CLINIC | Facility: CLINIC | Age: 75
End: 2020-01-30
Payer: MEDICARE

## 2020-01-30 VITALS
DIASTOLIC BLOOD PRESSURE: 60 MMHG | RESPIRATION RATE: 28 BRPM | HEART RATE: 87 BPM | TEMPERATURE: 98 F | HEIGHT: 62 IN | BODY MASS INDEX: 34.41 KG/M2 | SYSTOLIC BLOOD PRESSURE: 108 MMHG | WEIGHT: 187 LBS | OXYGEN SATURATION: 93 %

## 2020-01-30 DIAGNOSIS — I70.0 ABDOMINAL AORTIC ATHEROSCLEROSIS (HCC): ICD-10-CM

## 2020-01-30 DIAGNOSIS — J44.9 CHRONIC OBSTRUCTIVE PULMONARY DISEASE, UNSPECIFIED COPD TYPE (HCC): ICD-10-CM

## 2020-01-30 DIAGNOSIS — E66.01 SEVERE OBESITY (BMI 35.0-39.9) WITH COMORBIDITY (HCC): ICD-10-CM

## 2020-01-30 DIAGNOSIS — K29.50 OTHER CHRONIC GASTRITIS WITHOUT HEMORRHAGE: ICD-10-CM

## 2020-01-30 DIAGNOSIS — I25.719 CORONARY ARTERY DISEASE INVOLVING AUTOLOGOUS VEIN CORONARY BYPASS GRAFT WITH ANGINA PECTORIS (HCC): ICD-10-CM

## 2020-01-30 DIAGNOSIS — I48.20 ATRIAL FIBRILLATION, CHRONIC (HCC): ICD-10-CM

## 2020-01-30 DIAGNOSIS — N18.4 CHRONIC KIDNEY DISEASE, STAGE 4 (SEVERE) (HCC): ICD-10-CM

## 2020-01-30 DIAGNOSIS — I48.19 OTHER PERSISTENT ATRIAL FIBRILLATION (HCC): ICD-10-CM

## 2020-01-30 DIAGNOSIS — M46.92 INFLAMMATORY SPONDYLOPATHY OF CERVICAL REGION (HCC): ICD-10-CM

## 2020-01-30 DIAGNOSIS — D68.2 FACTOR XII DEFICIENCY (HCC): ICD-10-CM

## 2020-01-30 DIAGNOSIS — Z00.00 ENCOUNTER FOR ANNUAL HEALTH EXAMINATION: ICD-10-CM

## 2020-01-30 DIAGNOSIS — J44.1 COPD EXACERBATION (HCC): ICD-10-CM

## 2020-01-30 DIAGNOSIS — I50.812 CHRONIC RIGHT-SIDED CONGESTIVE HEART FAILURE (HCC): ICD-10-CM

## 2020-01-30 DIAGNOSIS — R60.0 LOCALIZED EDEMA: Primary | ICD-10-CM

## 2020-01-30 DIAGNOSIS — Z12.31 VISIT FOR SCREENING MAMMOGRAM: ICD-10-CM

## 2020-01-30 LAB
ALBUMIN SERPL-MCNC: 4.7 G/DL (ref 3.4–5)
ALBUMIN/GLOB SERPL: 1.2 {RATIO} (ref 1–2)
ALP LIVER SERPL-CCNC: 123 U/L (ref 55–142)
ALT SERPL-CCNC: 18 U/L (ref 13–56)
ANION GAP SERPL CALC-SCNC: 8 MMOL/L (ref 0–18)
AST SERPL-CCNC: 21 U/L (ref 15–37)
BASOPHILS # BLD AUTO: 0.12 X10(3) UL (ref 0–0.2)
BASOPHILS NFR BLD AUTO: 0.8 %
BILIRUB SERPL-MCNC: 0.5 MG/DL (ref 0.1–2)
BUN BLD-MCNC: 55 MG/DL (ref 7–18)
BUN/CREAT SERPL: 22 (ref 10–20)
CALCIUM BLD-MCNC: 10 MG/DL (ref 8.5–10.1)
CHLORIDE SERPL-SCNC: 94 MMOL/L (ref 98–112)
CO2 SERPL-SCNC: 29 MMOL/L (ref 21–32)
CREAT BLD-MCNC: 2.5 MG/DL (ref 0.55–1.02)
DEPRECATED RDW RBC AUTO: 58.6 FL (ref 35.1–46.3)
EOSINOPHIL # BLD AUTO: 0.07 X10(3) UL (ref 0–0.7)
EOSINOPHIL NFR BLD AUTO: 0.5 %
ERYTHROCYTE [DISTWIDTH] IN BLOOD BY AUTOMATED COUNT: 17 % (ref 11–15)
GLOBULIN PLAS-MCNC: 4 G/DL (ref 2.8–4.4)
GLUCOSE BLD-MCNC: 107 MG/DL (ref 70–99)
HCT VFR BLD AUTO: 40.9 % (ref 35–48)
HGB BLD-MCNC: 13 G/DL (ref 12–16)
IMM GRANULOCYTES # BLD AUTO: 0.3 X10(3) UL (ref 0–1)
IMM GRANULOCYTES NFR BLD: 2 %
LYMPHOCYTES # BLD AUTO: 1.58 X10(3) UL (ref 1–4)
LYMPHOCYTES NFR BLD AUTO: 10.8 %
M PROTEIN MFR SERPL ELPH: 8.7 G/DL (ref 6.4–8.2)
MCH RBC QN AUTO: 29.9 PG (ref 26–34)
MCHC RBC AUTO-ENTMCNC: 31.8 G/DL (ref 31–37)
MCV RBC AUTO: 94 FL (ref 80–100)
MONOCYTES # BLD AUTO: 1.06 X10(3) UL (ref 0.1–1)
MONOCYTES NFR BLD AUTO: 7.2 %
NEUTROPHILS # BLD AUTO: 11.55 X10 (3) UL (ref 1.5–7.7)
NEUTROPHILS # BLD AUTO: 11.55 X10(3) UL (ref 1.5–7.7)
NEUTROPHILS NFR BLD AUTO: 78.7 %
OSMOLALITY SERPL CALC.SUM OF ELEC: 288 MOSM/KG (ref 275–295)
PATIENT FASTING Y/N/NP: NO
PLATELET # BLD AUTO: 220 10(3)UL (ref 150–450)
POTASSIUM SERPL-SCNC: 5.9 MMOL/L (ref 3.5–5.1)
RBC # BLD AUTO: 4.35 X10(6)UL (ref 3.8–5.3)
SODIUM SERPL-SCNC: 131 MMOL/L (ref 136–145)
WBC # BLD AUTO: 14.7 X10(3) UL (ref 4–11)

## 2020-01-30 PROCEDURE — 80053 COMPREHEN METABOLIC PANEL: CPT | Performed by: INTERNAL MEDICINE

## 2020-01-30 PROCEDURE — 85025 COMPLETE CBC W/AUTO DIFF WBC: CPT | Performed by: INTERNAL MEDICINE

## 2020-01-30 PROCEDURE — 96160 PT-FOCUSED HLTH RISK ASSMT: CPT | Performed by: INTERNAL MEDICINE

## 2020-01-30 PROCEDURE — 99397 PER PM REEVAL EST PAT 65+ YR: CPT | Performed by: INTERNAL MEDICINE

## 2020-01-30 PROCEDURE — G0439 PPPS, SUBSEQ VISIT: HCPCS | Performed by: INTERNAL MEDICINE

## 2020-01-30 NOTE — PATIENT INSTRUCTIONS
Edith Eubanks's SCREENING SCHEDULE   Tests on this list are recommended by your physician but may not be covered, or covered at this frequency, by your insurer. Please check with your insurance carrier before scheduling to verify coverage.    [employer] to patients who meet one of the following criteria:   • Men who are 73-68 years old and have smoked more than 100 cigarettes in their lifetime   • Anyone with a family history    Colorectal Cancer Screening  Covered up to Age 76     Colonoscopy Scr Influenza  Covered Annually Orders placed or performed in visit on 10/03/19   • FLU VACC HIGH DOSE PRSV FREE   Orders placed or performed in visit on 11/10/16   • FLU VACC PRSV FREE INC ANTIG    Please get every year    Pneumococcal 13 (Prevnar)  [employer] home computer and printer. (the forms are also available in [address])  [email or web address]  This link also has information from the [facility] regarding Advance Directives.

## 2020-01-30 NOTE — PROGRESS NOTES
HPI:   Sharifa Jones is a 76year old female who presents for a Medicare Subsequent Annual Wellness visit (Pt already had Initial Annual Wellness). Pt has been having increased swelling in the legs and SOB.   She uses O2 full time and has become ve Advanced Directive:   She does NOT have a Living Will on file in [address] [place] [address] Rd.    Advance care planning including the explanation and discussion of advance directives standard forms performed Face to Face with patient and Family/surrogate (if present), and forms [facility])     Former smoker     Lung granuloma ([facility]     Hyponatremia     Azotemia     Community acquired pneumonia     Community acquired pneumonia, unspecified laterality     Chest pain of uncertain etiology     Hypotension, unspecified hypotension type DAY  dilTIAZem HCl  MG Oral Capsule SR 24 Hr, Take 240 mg by mouth 2 (two) times daily. budesonide 0.5 MG/2ML Inhalation Suspension, Take 2 mL (0.5 mg total) by nebulization 2 (two) times daily.   Metoprolol Tartrate 50 MG Oral Tab, Take 0.5 tablets carotid exam (03/12/2011); other cardiology; cholecystectomy; and other.     Her family history includes Breast Cancer in her paternal aunt, paternal aunt, paternal cousin female, paternal cousin female, paternal cousin female, and another family member; Ca 25dBHL - 2000 Hz:  Pass Right Ear @ 25dBHL - 2000 Hz:  Pass   Left Ear @ 25dBHL - 4000 Hz:  Pass Right Ear @ 25dBHL - 4000 Hz:  Pass   Left Ear @ 40dBHL - 500 Hz:  Pass Right Ear @ 40 dBHL - 500 Hz:  Pass   Left Ear @ 40dBHL - 1000 Hz:  Pass Right Ear @ 40 YRS & Older PRSV Free (94[address]) 10/03/2019   • FLUAD High Dose 72 yr and older (24[address]) 01/17/2019   • Fluzone Vaccine Medicare () 11/03/2017   • HIGH DOSE FLU 65 YRS AND OLDER PRSV FREE SINGLE D ([address]) FLU CLINIC 11/10/2016   • Influenza 12/13/2011, 11 ([facility]  Decompensated, no cardiologic fix, meds only, oxygen and diuretics    4. COPD exacerbation (HCC)  O2, no wheezing currently    5. Chronic kidney disease, stage 4 (severe) ([facility])  Watching, creat elevated due to dehydrated state    6.  Other persistent CHOLESTROL (mg/dL)   Date Value   01/02/2014 91        EKG - w/ Initial Preventative Physical Exam only, or if medically necessary Electrocardiogram date10/22/2019       Colorectal Cancer Screening      Colonoscopy Screen every 10 years Colonoscopy due on with a cut with metal- TD and TDaP Not covered by Medicare Part B No vaccine history found This may be covered with your prescription benefits, but Medicare does not cover unless Medically needed    Zoster  Not covered by Medicare Part B No vaccine history

## 2020-01-31 RX ORDER — FUROSEMIDE 40 MG/1
40 TABLET ORAL DAILY
Qty: 1 TABLET | Refills: 0 | COMMUNITY
Start: 2020-01-31 | End: 2020-04-15

## 2020-02-19 ENCOUNTER — OFFICE VISIT (OUTPATIENT)
Dept: CARDIOLOGY | Facility: CLINIC | Age: 75
End: 2020-02-19

## 2020-02-19 VITALS
DIASTOLIC BLOOD PRESSURE: 60 MMHG | BODY MASS INDEX: 33.49 KG/M2 | HEART RATE: 60 BPM | WEIGHT: 182 LBS | SYSTOLIC BLOOD PRESSURE: 110 MMHG | HEIGHT: 62 IN

## 2020-02-19 DIAGNOSIS — I25.10 CORONARY ARTERY DISEASE INVOLVING NATIVE CORONARY ARTERY OF NATIVE HEART WITHOUT ANGINA PECTORIS: ICD-10-CM

## 2020-02-19 DIAGNOSIS — R06.02 SOB (SHORTNESS OF BREATH) ON EXERTION: ICD-10-CM

## 2020-02-19 DIAGNOSIS — E78.00 PURE HYPERCHOLESTEROLEMIA: ICD-10-CM

## 2020-02-19 DIAGNOSIS — I48.21 PERMANENT ATRIAL FIBRILLATION (CMD): ICD-10-CM

## 2020-02-19 DIAGNOSIS — N18.4 CKD (CHRONIC KIDNEY DISEASE) STAGE 4, GFR 15-29 ML/MIN (CMD): ICD-10-CM

## 2020-02-19 DIAGNOSIS — J44.9 CHRONIC OBSTRUCTIVE PULMONARY DISEASE, UNSPECIFIED COPD TYPE (CMD): Primary | ICD-10-CM

## 2020-02-19 DIAGNOSIS — Z79.01 LONG TERM (CURRENT) USE OF ANTICOAGULANTS: ICD-10-CM

## 2020-02-19 DIAGNOSIS — R60.1 GENERALIZED EDEMA: ICD-10-CM

## 2020-02-19 DIAGNOSIS — Z95.1 HX OF CABG: ICD-10-CM

## 2020-02-19 PROCEDURE — 99215 OFFICE O/P EST HI 40 MIN: CPT | Performed by: INTERNAL MEDICINE

## 2020-02-19 RX ORDER — METOPROLOL TARTRATE 50 MG/1
50 TABLET, FILM COATED ORAL 2 TIMES DAILY
COMMUNITY
End: 2020-08-19 | Stop reason: ALTCHOICE

## 2020-02-19 RX ORDER — SPIRONOLACTONE 50 MG/1
50 TABLET, FILM COATED ORAL 2 TIMES DAILY
COMMUNITY
End: 2020-03-12 | Stop reason: DRUGHIGH

## 2020-02-19 RX ORDER — ALLOPURINOL 100 MG/1
100 TABLET ORAL DAILY
COMMUNITY

## 2020-02-19 RX ORDER — POTASSIUM CHLORIDE 10 MEQ
10 TABLET, EXT RELEASE, PARTICLES/CRYSTALS ORAL DAILY
COMMUNITY
End: 2020-02-21 | Stop reason: ALTCHOICE

## 2020-02-19 ASSESSMENT — PATIENT HEALTH QUESTIONNAIRE - PHQ9
2. FEELING DOWN, DEPRESSED OR HOPELESS: NOT AT ALL
SUM OF ALL RESPONSES TO PHQ9 QUESTIONS 1 AND 2: 0
SUM OF ALL RESPONSES TO PHQ9 QUESTIONS 1 AND 2: 0
1. LITTLE INTEREST OR PLEASURE IN DOING THINGS: NOT AT ALL

## 2020-02-20 ENCOUNTER — MED REC SCAN ONLY (OUTPATIENT)
Dept: FAMILY MEDICINE CLINIC | Facility: CLINIC | Age: 75
End: 2020-02-20

## 2020-02-20 ENCOUNTER — LABORATORY ENCOUNTER (OUTPATIENT)
Dept: LAB | Age: 75
End: 2020-02-20
Attending: INTERNAL MEDICINE
Payer: MEDICARE

## 2020-02-20 ENCOUNTER — TELEPHONE (OUTPATIENT)
Dept: FAMILY MEDICINE CLINIC | Facility: CLINIC | Age: 75
End: 2020-02-20

## 2020-02-20 DIAGNOSIS — J44.9 COPD (CHRONIC OBSTRUCTIVE PULMONARY DISEASE) (HCC): Primary | ICD-10-CM

## 2020-02-20 DIAGNOSIS — I48.91 A-FIB (HCC): ICD-10-CM

## 2020-02-20 DIAGNOSIS — R06.02 SOB (SHORTNESS OF BREATH): ICD-10-CM

## 2020-02-20 LAB
ANION GAP SERPL CALC-SCNC: 7 MMOL/L (ref 0–18)
BUN BLD-MCNC: 50 MG/DL (ref 7–18)
BUN/CREAT SERPL: 20.8 (ref 10–20)
CALCIUM BLD-MCNC: 9.9 MG/DL (ref 8.5–10.1)
CHLORIDE SERPL-SCNC: 97 MMOL/L (ref 98–112)
CO2 SERPL-SCNC: 28 MMOL/L (ref 21–32)
CREAT BLD-MCNC: 2.4 MG/DL (ref 0.55–1.02)
GLUCOSE BLD-MCNC: 97 MG/DL (ref 70–99)
OSMOLALITY SERPL CALC.SUM OF ELEC: 287 MOSM/KG (ref 275–295)
PATIENT FASTING Y/N/NP: YES
POTASSIUM SERPL-SCNC: 5.4 MMOL/L (ref 3.5–5.1)
SODIUM SERPL-SCNC: 132 MMOL/L (ref 136–145)

## 2020-02-20 PROCEDURE — 80048 BASIC METABOLIC PNL TOTAL CA: CPT

## 2020-02-20 PROCEDURE — 36415 COLL VENOUS BLD VENIPUNCTURE: CPT

## 2020-02-20 NOTE — TELEPHONE ENCOUNTER
Pt wanted Dr. Brandee Collins to be aware that Dr. Felisa Sprague ordered a BMP, which she had done today. He also made adjustments to pt's medication. He discontinued Aspirin 81mg until told otherwise. Pt had multiple bruises.  Dr Felisa Sprague also increased pt's Furosemide to

## 2020-02-21 ENCOUNTER — TELEPHONE (OUTPATIENT)
Dept: CARDIOLOGY | Age: 75
End: 2020-02-21

## 2020-02-21 DIAGNOSIS — I25.10 CORONARY ARTERY DISEASE INVOLVING NATIVE CORONARY ARTERY OF NATIVE HEART WITHOUT ANGINA PECTORIS: Primary | ICD-10-CM

## 2020-02-28 ENCOUNTER — LAB ENCOUNTER (OUTPATIENT)
Dept: LAB | Age: 75
End: 2020-02-28
Attending: INTERNAL MEDICINE
Payer: MEDICARE

## 2020-02-28 DIAGNOSIS — R06.02 SOB (SHORTNESS OF BREATH): ICD-10-CM

## 2020-02-28 DIAGNOSIS — J44.9 COPD (CHRONIC OBSTRUCTIVE PULMONARY DISEASE) (HCC): Primary | ICD-10-CM

## 2020-02-28 DIAGNOSIS — I48.91 A-FIB (HCC): ICD-10-CM

## 2020-02-28 LAB
ANION GAP SERPL CALC-SCNC: 8 MMOL/L
ANION GAP SERPL CALC-SCNC: 8 MMOL/L (ref 0–18)
BUN BLD-MCNC: 53 MG/DL (ref 7–18)
BUN SERPL-MCNC: 53 MG/DL
BUN/CREAT SERPL: 21.7
BUN/CREAT SERPL: 21.7 (ref 10–20)
CALCIUM BLD-MCNC: 10.1 MG/DL (ref 8.5–10.1)
CALCIUM SERPL-MCNC: 10.1 MG/DL
CHLORIDE SERPL-SCNC: 95 MMOL/L
CHLORIDE SERPL-SCNC: 95 MMOL/L (ref 98–112)
CO2 SERPL-SCNC: 27 MMOL/L
CO2 SERPL-SCNC: 27 MMOL/L (ref 21–32)
CREAT BLD-MCNC: 2.44 MG/DL (ref 0.55–1.02)
CREAT SERPL-MCNC: 2.44 MG/DL
GLUCOSE BLD-MCNC: 113 MG/DL (ref 70–99)
GLUCOSE SERPL-MCNC: 113 MG/DL
LENGTH OF FAST TIME PATIENT: NORMAL H
OSMOLALITY SERPL CALC.SUM OF ELEC: 285 MOSM/KG (ref 275–295)
PATIENT FASTING Y/N/NP: YES
POTASSIUM SERPL-SCNC: 5.5 MMOL/L
POTASSIUM SERPL-SCNC: 5.5 MMOL/L (ref 3.5–5.1)
SODIUM SERPL-SCNC: 130 MMOL/L
SODIUM SERPL-SCNC: 130 MMOL/L (ref 136–145)

## 2020-02-28 PROCEDURE — 36415 COLL VENOUS BLD VENIPUNCTURE: CPT

## 2020-02-28 PROCEDURE — 80048 BASIC METABOLIC PNL TOTAL CA: CPT

## 2020-03-03 ENCOUNTER — TELEPHONE (OUTPATIENT)
Dept: CARDIOLOGY | Age: 75
End: 2020-03-03

## 2020-03-03 DIAGNOSIS — I25.10 CORONARY ARTERY DISEASE INVOLVING NATIVE CORONARY ARTERY OF NATIVE HEART WITHOUT ANGINA PECTORIS: Primary | ICD-10-CM

## 2020-03-03 DIAGNOSIS — E87.5 HYPERKALEMIA: ICD-10-CM

## 2020-03-09 ENCOUNTER — TELEPHONE (OUTPATIENT)
Dept: FAMILY MEDICINE CLINIC | Facility: CLINIC | Age: 75
End: 2020-03-09

## 2020-03-09 NOTE — TELEPHONE ENCOUNTER
Patient advised that she needs to be seen, appointment scheduled with Dr Sarah Stein tomorrow. Advised if condition worsens she needs to go to the ER. Script for [employer] and faxed to [phone] \[facility] [place] [facility]\"" [facility].  ANAHI Michaud

## 2020-03-09 NOTE — TELEPHONE ENCOUNTER
Patient said that she has pain between her shoulder blades and her arm feel too \"heavy to lift\". She said she feels like she is \"dragging\". She said that she also feels like she is losing her balance and is \"going to collapse\".  She said this has been

## 2020-03-10 ENCOUNTER — LAB ENCOUNTER (OUTPATIENT)
Dept: LAB | Age: 75
End: 2020-03-10
Attending: INTERNAL MEDICINE
Payer: MEDICARE

## 2020-03-10 ENCOUNTER — OFFICE VISIT (OUTPATIENT)
Dept: FAMILY MEDICINE CLINIC | Facility: CLINIC | Age: 75
End: 2020-03-10
Payer: MEDICARE

## 2020-03-10 VITALS
WEIGHT: 175 LBS | TEMPERATURE: 98 F | BODY MASS INDEX: 32 KG/M2 | DIASTOLIC BLOOD PRESSURE: 40 MMHG | OXYGEN SATURATION: 98 % | SYSTOLIC BLOOD PRESSURE: 110 MMHG | HEART RATE: 60 BPM

## 2020-03-10 DIAGNOSIS — I48.20 ATRIAL FIBRILLATION, CHRONIC (HCC): ICD-10-CM

## 2020-03-10 DIAGNOSIS — I50.32 CHRONIC DIASTOLIC CONGESTIVE HEART FAILURE (HCC): ICD-10-CM

## 2020-03-10 DIAGNOSIS — N18.4 CHRONIC KIDNEY DISEASE, STAGE 4 (SEVERE) (HCC): ICD-10-CM

## 2020-03-10 DIAGNOSIS — M54.2 NECK PAIN: ICD-10-CM

## 2020-03-10 DIAGNOSIS — J96.01 ACUTE RESPIRATORY FAILURE WITH HYPOXEMIA (HCC): ICD-10-CM

## 2020-03-10 DIAGNOSIS — N18.4 CHRONIC KIDNEY DISEASE, STAGE 4 (SEVERE) (HCC): Primary | ICD-10-CM

## 2020-03-10 LAB
ALBUMIN SERPL-MCNC: 4.2 G/DL
ALBUMIN SERPL-MCNC: 4.2 G/DL (ref 3.4–5)
ALBUMIN/GLOB SERPL: 1.1 {RATIO} (ref 1–2)
ALBUMIN/GLOB SERPL: NORMAL {RATIO}
ALP LIVER SERPL-CCNC: 126 U/L (ref 55–142)
ALP SERPL-CCNC: 126 U/L
ALT SERPL-CCNC: 18 U/L
ALT SERPL-CCNC: 18 U/L (ref 13–56)
ANION GAP SERPL CALC-SCNC: 4 MMOL/L (ref 0–18)
ANION GAP SERPL CALC-SCNC: NORMAL MMOL/L
AST SERPL-CCNC: 15 U/L
AST SERPL-CCNC: 15 U/L (ref 15–37)
BASOPHILS # BLD AUTO: 0.09 X10(3) UL (ref 0–0.2)
BASOPHILS NFR BLD AUTO: 0.5 %
BILIRUB SERPL-MCNC: 0.3 MG/DL
BILIRUB SERPL-MCNC: 0.3 MG/DL (ref 0.1–2)
BUN BLD-MCNC: 71 MG/DL (ref 7–18)
BUN SERPL-MCNC: 71 MG/DL
BUN/CREAT SERPL: 31.4 (ref 10–20)
BUN/CREAT SERPL: NORMAL
CALCIUM BLD-MCNC: 10 MG/DL (ref 8.5–10.1)
CALCIUM SERPL-MCNC: 10 MG/DL
CHLORIDE SERPL-SCNC: 94 MMOL/L
CHLORIDE SERPL-SCNC: 94 MMOL/L (ref 98–112)
CO2 SERPL-SCNC: 29 MMOL/L (ref 21–32)
CO2 SERPL-SCNC: NORMAL MMOL/L
CREAT BLD-MCNC: 2.26 MG/DL (ref 0.55–1.02)
CREAT SERPL-MCNC: 2.26 MG/DL
DEPRECATED RDW RBC AUTO: 51 FL (ref 35.1–46.3)
EOSINOPHIL # BLD AUTO: 0.07 X10(3) UL (ref 0–0.7)
EOSINOPHIL NFR BLD AUTO: 0.4 %
ERYTHROCYTE [DISTWIDTH] IN BLOOD BY AUTOMATED COUNT: 15.7 % (ref 11–15)
GLOBULIN PLAS-MCNC: 3.8 G/DL (ref 2.8–4.4)
GLOBULIN SER-MCNC: 3.8 G/DL
GLUCOSE BLD-MCNC: 110 MG/DL (ref 70–99)
GLUCOSE SERPL-MCNC: 110 MG/DL
HAV IGM SER QL: 2.6 MG/DL (ref 1.6–2.6)
HCT VFR BLD AUTO: 34.4 % (ref 35–48)
HGB BLD-MCNC: 10.8 G/DL (ref 12–16)
IMM GRANULOCYTES # BLD AUTO: 0.19 X10(3) UL (ref 0–1)
IMM GRANULOCYTES NFR BLD: 1.1 %
LENGTH OF FAST TIME PATIENT: NORMAL H
LYMPHOCYTES # BLD AUTO: 1.66 X10(3) UL (ref 1–4)
LYMPHOCYTES NFR BLD AUTO: 9.5 %
M PROTEIN MFR SERPL ELPH: 8 G/DL (ref 6.4–8.2)
MCH RBC QN AUTO: 28.4 PG (ref 26–34)
MCHC RBC AUTO-ENTMCNC: 31.4 G/DL (ref 31–37)
MCV RBC AUTO: 90.5 FL (ref 80–100)
MONOCYTES # BLD AUTO: 1.11 X10(3) UL (ref 0.1–1)
MONOCYTES NFR BLD AUTO: 6.3 %
NEUTROPHILS # BLD AUTO: 14.44 X10 (3) UL (ref 1.5–7.7)
NEUTROPHILS # BLD AUTO: 14.44 X10(3) UL (ref 1.5–7.7)
NEUTROPHILS NFR BLD AUTO: 82.2 %
NT-PROBNP SERPL-MCNC: 583 PG/ML (ref ?–125)
OSMOLALITY SERPL CALC.SUM OF ELEC: 285 MOSM/KG (ref 275–295)
PATIENT FASTING Y/N/NP: NO
PLATELET # BLD AUTO: 241 10(3)UL (ref 150–450)
POTASSIUM SERPL-SCNC: 5.8 MMOL/L
POTASSIUM SERPL-SCNC: 5.8 MMOL/L (ref 3.5–5.1)
PROT SERPL-MCNC: 8 G/DL
RBC # BLD AUTO: 3.8 X10(6)UL (ref 3.8–5.3)
SODIUM SERPL-SCNC: 127 MMOL/L
SODIUM SERPL-SCNC: 127 MMOL/L (ref 136–145)
WBC # BLD AUTO: 17.6 X10(3) UL (ref 4–11)

## 2020-03-10 PROCEDURE — 83735 ASSAY OF MAGNESIUM: CPT

## 2020-03-10 PROCEDURE — 83880 ASSAY OF NATRIURETIC PEPTIDE: CPT

## 2020-03-10 PROCEDURE — 36415 COLL VENOUS BLD VENIPUNCTURE: CPT

## 2020-03-10 PROCEDURE — 80053 COMPREHEN METABOLIC PANEL: CPT

## 2020-03-10 PROCEDURE — 99214 OFFICE O/P EST MOD 30 MIN: CPT | Performed by: INTERNAL MEDICINE

## 2020-03-10 PROCEDURE — 85025 COMPLETE CBC W/AUTO DIFF WBC: CPT

## 2020-03-10 NOTE — PROGRESS NOTES
Jessica Monday is a 76year old female. HPI:   Pt has been having more arm heaviness and weakness in the legs. She has been confined to her home mostly, not going out to [employer] and friends will take her shopping.   She has a hard time standing long esperanza 1   • BIOTIN OR Take 1 tablet by mouth daily. • aspirin EC 81 MG Oral Tab EC Take 81 mg by mouth daily.         Past Medical History:   Diagnosis Date   • Acute bronchitis 11/29/2011   • Arthritis    • Black stools    • Chest pain, unspecified 12/15/2 clear  NECK: supple,no adenopathy,no bruits  LUNGS: clear to auscultation  CARDIO: RRR without murmur  GI: good BS's,no masses, HSM or tenderness  EXTREMITIES: no cyanosis, clubbing or edema    ASSESSMENT AND PLAN:     Chronic kidney disease, stage 4 (joshua

## 2020-03-12 ENCOUNTER — TELEPHONE (OUTPATIENT)
Dept: CARDIOLOGY | Age: 75
End: 2020-03-12

## 2020-03-12 ENCOUNTER — TELEPHONE (OUTPATIENT)
Dept: FAMILY MEDICINE CLINIC | Facility: CLINIC | Age: 75
End: 2020-03-12

## 2020-03-12 DIAGNOSIS — E87.1 HYPONATREMIA: ICD-10-CM

## 2020-03-12 DIAGNOSIS — E87.5 HYPERKALEMIA: Primary | ICD-10-CM

## 2020-03-12 DIAGNOSIS — N18.4 CKD (CHRONIC KIDNEY DISEASE) STAGE 4, GFR 15-29 ML/MIN (CMD): ICD-10-CM

## 2020-03-12 RX ORDER — SPIRONOLACTONE 25 MG/1
25 TABLET ORAL DAILY
COMMUNITY
End: 2020-08-19 | Stop reason: ALTCHOICE

## 2020-03-12 RX ORDER — SPIRONOLACTONE 50 MG/1
TABLET, FILM COATED ORAL
Qty: 60 TABLET | Refills: 0 | COMMUNITY
Start: 2020-03-12 | End: 2020-04-15

## 2020-03-12 RX ORDER — FUROSEMIDE 40 MG/1
40 TABLET ORAL DAILY
COMMUNITY
End: 2021-02-17 | Stop reason: DRUGHIGH

## 2020-03-12 NOTE — TELEPHONE ENCOUNTER
Patient advised. She said that Dr Bing Schreiber changed her to 1/2 Spironolactone daily and 40mg of Lasix daily, she had been taking 2. She is also off Potassium and baby ASA due to bruising. She said her Potassium was high at the last blood draw.

## 2020-03-13 ENCOUNTER — CLINICAL ABSTRACT (OUTPATIENT)
Dept: CARDIOLOGY | Age: 75
End: 2020-03-13

## 2020-03-18 ENCOUNTER — LABORATORY ENCOUNTER (OUTPATIENT)
Dept: LAB | Age: 75
End: 2020-03-18
Attending: INTERNAL MEDICINE
Payer: MEDICARE

## 2020-03-18 DIAGNOSIS — D72.828 OTHER ELEVATED WHITE BLOOD CELL (WBC) COUNT: ICD-10-CM

## 2020-03-18 LAB — SED RATE-ML: 26 MM/HR (ref 0–25)

## 2020-03-18 PROCEDURE — 36415 COLL VENOUS BLD VENIPUNCTURE: CPT

## 2020-03-18 PROCEDURE — 84165 PROTEIN E-PHORESIS SERUM: CPT

## 2020-03-18 PROCEDURE — 83883 ASSAY NEPHELOMETRY NOT SPEC: CPT

## 2020-03-18 PROCEDURE — 85652 RBC SED RATE AUTOMATED: CPT

## 2020-03-18 PROCEDURE — 86334 IMMUNOFIX E-PHORESIS SERUM: CPT

## 2020-03-19 ENCOUNTER — TELEPHONE (OUTPATIENT)
Dept: FAMILY MEDICINE CLINIC | Facility: CLINIC | Age: 75
End: 2020-03-19

## 2020-03-19 NOTE — TELEPHONE ENCOUNTER
Talked with patient told patient that as soon as labs are back and Alvino Rae looks at them will call her results.

## 2020-03-19 NOTE — TELEPHONE ENCOUNTER
Wants to know if she can get a call back about her labs she had done yesterday. I told her only one was back she still wanted a call.

## 2020-03-19 NOTE — TELEPHONE ENCOUNTER
Elver Taylor from [employer] called. [phone]. They need to recertify patient in order for her oxygen to be covered by medicare. Do we do 6min walks here for oxygen orders? They need this done within 30 days.   Explained to her we are limiting our appts

## 2020-03-19 NOTE — TELEPHONE ENCOUNTER
Called Imelda (Castro Amaro) back. Relayed message from Dr. Malena Schroeder re: patient. Jovan Mays verbalized understanding.

## 2020-03-19 NOTE — TELEPHONE ENCOUNTER
She is too high risk and needs to reduce exposure at this time. I would be surprised if there are not exceptions made due to state of the world right now.

## 2020-03-20 LAB
KAPPA FREE LIGHT CHAIN: 2.28 MG/DL (ref 0.33–1.94)
KAPPA/LAMBDA FLC RATIO: 1.85 (ref 0.26–1.65)
LAMBDA FREE LIGHT CHAIN: 1.23 MG/DL (ref 0.57–2.63)

## 2020-03-23 ENCOUNTER — TELEPHONE (OUTPATIENT)
Dept: FAMILY MEDICINE CLINIC | Facility: CLINIC | Age: 75
End: 2020-03-23

## 2020-03-23 NOTE — TELEPHONE ENCOUNTER
Courtney Johnson was calling because she had labs done 3/18/2020 and she would like the results please call

## 2020-03-23 NOTE — TELEPHONE ENCOUNTER
Explained test results pt is questioning and said tests are still in process. Explained when they are resulted, dr. Lawrence Fairly will address and she will get a call a soon as it is addressed.

## 2020-03-24 LAB
ALBUMIN SERPL ELPH-MCNC: 4.15 G/DL (ref 3.75–5.21)
ALBUMIN/GLOB SERPL: 1.57 {RATIO} (ref 1–2)
ALPHA1 GLOB SERPL ELPH-MCNC: 0.47 G/DL (ref 0.19–0.46)
ALPHA2 GLOB SERPL ELPH-MCNC: 0.82 G/DL (ref 0.48–1.05)
B-GLOBULIN SERPL ELPH-MCNC: 0.85 G/DL (ref 0.68–1.23)
GAMMA GLOB SERPL ELPH-MCNC: 0.5 G/DL (ref 0.62–1.7)
M PROTEIN MFR SERPL ELPH: 6.8 G/DL (ref 6.4–8.2)

## 2020-03-26 ENCOUNTER — TELEPHONE (OUTPATIENT)
Dept: FAMILY MEDICINE CLINIC | Facility: CLINIC | Age: 75
End: 2020-03-26

## 2020-03-26 ENCOUNTER — CLINICAL ABSTRACT (OUTPATIENT)
Dept: CARDIOLOGY | Age: 75
End: 2020-03-26

## 2020-03-26 NOTE — TELEPHONE ENCOUNTER
Lasix 40 mg an extra one for another 3 days. Have her friend  some tylenol cold and sinus and take every 6 hours. Call back if cough or SOB.

## 2020-03-26 NOTE — TELEPHONE ENCOUNTER
Patient said that she has had thick green drainage from her nose since last week when she came in for labs. She denies fever and the SOB she has is not more than usual. She siad she uses her nebulizer and that helps. She said that he legs were swollen up t

## 2020-03-26 NOTE — TELEPHONE ENCOUNTER
SINUS INF?  NASAL CONGESTION, NO OTHER SYMPTOMS, WANTS MEDS TO [place] [facility], ALSO HER LEGS & ANKLES ARE SWOLLEN, SHE TOOK AN ADDITIONAL LASIX, CALL PT

## 2020-04-13 RX ORDER — DILTIAZEM HYDROCHLORIDE 240 MG/1
CAPSULE, COATED, EXTENDED RELEASE ORAL
Qty: 180 CAPSULE | Refills: 0 | Status: SHIPPED | OUTPATIENT
Start: 2020-04-13 | End: 2020-11-09

## 2020-04-13 NOTE — TELEPHONE ENCOUNTER
LOV  03/10/2020    LAST LAB  03/10/2020    LAST RX  N/A    Next OV  No future appointments.     PROTOCOL    : DILTIAZEM HYDROCHLORIDE  MG Capsule Extended Release 24 Hour          Will file in chart as: DILTIAZEM HCL ER COATED BEADS 240 MG Oral Capsul

## 2020-04-14 ENCOUNTER — TELEPHONE (OUTPATIENT)
Dept: FAMILY MEDICINE CLINIC | Facility: CLINIC | Age: 75
End: 2020-04-14

## 2020-04-14 NOTE — TELEPHONE ENCOUNTER
thr office visit from that date did discuss oxygen saturation on room air. Notes faxed to Dmitriy Ortega in [place].

## 2020-04-14 NOTE — TELEPHONE ENCOUNTER
Patient was just given oxygen at home. Can someone make an addendum to the 10/03/2019 notes clarifying that patient was at 86% at rest on room air. That will then meet the Medicare Criteria for Home Oxygen. Any questions please call Lois Victor back.

## 2020-04-15 ENCOUNTER — VIRTUAL PHONE E/M (OUTPATIENT)
Dept: FAMILY MEDICINE CLINIC | Facility: CLINIC | Age: 75
End: 2020-04-15
Payer: MEDICARE

## 2020-04-15 DIAGNOSIS — I50.9 CHF (NYHA CLASS III, ACC/AHA STAGE C) (HCC): Primary | ICD-10-CM

## 2020-04-15 PROCEDURE — 99442 PHONE E/M BY PHYS 11-20 MIN: CPT | Performed by: INTERNAL MEDICINE

## 2020-04-15 RX ORDER — FUROSEMIDE 40 MG/1
40 TABLET ORAL 3 TIMES DAILY
Qty: 270 TABLET | Refills: 0 | Status: SHIPPED | OUTPATIENT
Start: 2020-04-15 | End: 2020-06-04

## 2020-04-15 RX ORDER — SPIRONOLACTONE 50 MG/1
50 TABLET, FILM COATED ORAL 2 TIMES DAILY
Qty: 180 TABLET | Refills: 0 | Status: SHIPPED | OUTPATIENT
Start: 2020-04-15 | End: 2020-04-21

## 2020-04-15 NOTE — PROGRESS NOTES
Virtual Telephone Check-In    Phil Hudson verbally consents to a Virtual/Telephone Check-In visit on 04/15/20. Patient understands and accepts financial responsibility for any deductible, co-insurance and/or co-pays associated with this service. Fluticasone-Umeclidin-Vilant 100-62.5-25 MCG/INH Inhalation Aerosol Powder, Breath Activated Inhale 1 puff into the lungs daily. 1 each 1   • BIOTIN OR Take 1 tablet by mouth daily.           Past Medical History:   Diagnosis Date   • Acute bronchitis 11/29 Social History    Tobacco Use      Smoking status: Former Smoker        Packs/day: 1.00        Years: 50.00        Pack years: 50        Types: Cigarettes        Quit date: 1/1/2010        Years since quitting: 10.2      Smokeless tobacco: Never Used

## 2020-04-17 ENCOUNTER — TELEPHONE (OUTPATIENT)
Dept: FAMILY MEDICINE CLINIC | Facility: CLINIC | Age: 75
End: 2020-04-17

## 2020-04-17 NOTE — TELEPHONE ENCOUNTER
Take lasix 80 am and 40 pm and keep weight check call again Monday with new weight. No antibiotic yet. Maybe Monday if still lingering.   Cardiac wheezing from CHF>

## 2020-04-17 NOTE — TELEPHONE ENCOUNTER
Patient calling back with symptom check. Patient states that yesterday and today it still hurts to breath. Some improvement. Weight today is 178lb. Patient was 180lb when she spoke to Dr. Madiha Kapoor on 4/15.   Patient would like to know how to proceed wi

## 2020-04-17 NOTE — TELEPHONE ENCOUNTER
Mariela Aguillon states Dr. Bledsoe Messsammy uped her meds 4/15 and she is supposed to call nurse today and let her now how she was doing please call back .

## 2020-04-20 ENCOUNTER — VIRTUAL PHONE E/M (OUTPATIENT)
Dept: FAMILY MEDICINE CLINIC | Facility: CLINIC | Age: 75
End: 2020-04-20
Payer: MEDICARE

## 2020-04-20 ENCOUNTER — TELEPHONE (OUTPATIENT)
Dept: FAMILY MEDICINE CLINIC | Facility: CLINIC | Age: 75
End: 2020-04-20

## 2020-04-20 DIAGNOSIS — I50.9 CHF (NYHA CLASS III, ACC/AHA STAGE C) (HCC): Primary | ICD-10-CM

## 2020-04-20 DIAGNOSIS — M54.6 ACUTE BILATERAL THORACIC BACK PAIN: ICD-10-CM

## 2020-04-20 DIAGNOSIS — N18.4 CHRONIC KIDNEY DISEASE, STAGE 4 (SEVERE) (HCC): ICD-10-CM

## 2020-04-20 PROCEDURE — 99442 PHONE E/M BY PHYS 11-20 MIN: CPT | Performed by: INTERNAL MEDICINE

## 2020-04-20 NOTE — PROGRESS NOTES
Virtual Telephone Check-In    Marisol Aaron verbally consents to a Virtual/Telephone Check-In visit on 04/20/20. Patient understands and accepts financial responsibility for any deductible, co-insurance and/or co-pays associated with this service. Oral Tab Take 0.5 tablets (25 mg total) by mouth 2 (two) times daily. 60 tablet 1   • allopurinol 100 MG Oral Tab Take 1 tablet (100 mg total) by mouth daily.  30 tablet 0   • ipratropium-albuterol 0.5-2.5 (3) MG/3ML Inhalation Solution Take 3 mL by [employer] headaches    EXAM:   Pulse 48-63, stats 94% and /63 wt 177 HT 5 2  GENERAL: well developed, well nourished,in no apparent distress  SKIN    ASSESSMENT AND PLAN:     Chf (nyha class iii, acc/aha stage c) (hcc)  (primary encounter diagnosis)  Chronic k

## 2020-04-20 NOTE — TELEPHONE ENCOUNTER
Please clarify directions on Furosemide and Spironolactone for [facility] [facility].

## 2020-04-20 NOTE — TELEPHONE ENCOUNTER
Weight:  176.5 yesterday and today  177 today    Patient has swelling in bilateral lower extremities today and is very short of breath. Having difficult time talking in complete sentences. Tele visit scheduled for today with Dr. Tabatha Pierce.     Patient stat

## 2020-04-21 ENCOUNTER — TELEPHONE (OUTPATIENT)
Dept: FAMILY MEDICINE CLINIC | Facility: CLINIC | Age: 75
End: 2020-04-21

## 2020-04-21 RX ORDER — SPIRONOLACTONE 50 MG/1
50 TABLET, FILM COATED ORAL 2 TIMES DAILY
Qty: 180 TABLET | Refills: 0 | COMMUNITY
Start: 2020-04-21 | End: 2020-06-04

## 2020-04-21 NOTE — TELEPHONE ENCOUNTER
spironolactone 50 MG Oral Tab    PATIENT STATES THAT SHE JUST GOT OFF THE PHONE WITH THE MAIL ORDER PHARMACY ([facility])  AND THEY WILL NOT MAIL OUT ANY OF HER REFILLS UNTIL THEY SPEAK WITH DR Lacho Sanchez REGARDING HER DOSAGE AMOUNT CHANGED TO 50 MG (SUN

## 2020-04-21 NOTE — TELEPHONE ENCOUNTER
I faxed them a hard copy yesterday and just corrected it in the chart, spironolactoine shoud be 50 mg BID and Lasix 40 TID

## 2020-05-29 ENCOUNTER — TELEMEDICINE (OUTPATIENT)
Dept: FAMILY MEDICINE CLINIC | Facility: CLINIC | Age: 75
End: 2020-05-29
Payer: MEDICARE

## 2020-05-29 ENCOUNTER — HOSPITAL ENCOUNTER (OUTPATIENT)
Dept: MAMMOGRAPHY | Age: 75
Discharge: HOME | End: 2020-05-29
Attending: INTERNAL MEDICINE
Payer: MEDICARE

## 2020-05-29 ENCOUNTER — TELEPHONE (OUTPATIENT)
Dept: FAMILY MEDICINE CLINIC | Facility: CLINIC | Age: 75
End: 2020-05-29

## 2020-05-29 DIAGNOSIS — J44.9 CHRONIC OBSTRUCTIVE PULMONARY DISEASE, UNSPECIFIED COPD TYPE (HCC): ICD-10-CM

## 2020-05-29 DIAGNOSIS — R06.00 DYSPNEA ON EXERTION: Primary | ICD-10-CM

## 2020-05-29 DIAGNOSIS — I25.719 CORONARY ARTERY DISEASE INVOLVING AUTOLOGOUS VEIN CORONARY BYPASS GRAFT WITH ANGINA PECTORIS (HCC): ICD-10-CM

## 2020-05-29 DIAGNOSIS — Z12.31 VISIT FOR SCREENING MAMMOGRAM: ICD-10-CM

## 2020-05-29 PROCEDURE — 99213 OFFICE O/P EST LOW 20 MIN: CPT | Performed by: INTERNAL MEDICINE

## 2020-05-29 PROCEDURE — 77067 SCR MAMMO BI INCL CAD: CPT | Performed by: INTERNAL MEDICINE

## 2020-05-29 NOTE — TELEPHONE ENCOUNTER
Irena Carver states patient is there for a mammogram. She said she is c/o SOB, temp 100.8 and told Irena Carver she feels like she has \"pneumonia\". Spoke with Dr Julieta Michelle and she states patient needs to go to THE [facility] ER now.  Irena Carver said her temp has gone down to 98.8 bu

## 2020-05-29 NOTE — TELEPHONE ENCOUNTER
Darryl Hernandez called back and said that patient refused to go to the ER because her windows are open at home. She said she will call us from home. Darryl Hernandez said patient is also   Covered in bruises and complaining that her sinuses are congested.  She said Emily Valadez

## 2020-05-29 NOTE — TELEPHONE ENCOUNTER
Patient said that she is \"ok\". She said that she is coughing \"stuff up\" that is bright yellow for the last couple months. She said it gets better with antibiotics and then \"comes right back\".  She said she did not have her oxygen on when she went in f

## 2020-05-30 NOTE — PROGRESS NOTES
Josemanuel Cottrell is a 76year old female. HPI:   Pt was in for a mammogram today and staff noticed her SOB and she had a low grade temp, but had just driven in the heat from [place] without air conditioning. She is coughing, but always coughs.   She ha Breath Activated Inhale 1 puff into the lungs daily. 1 each 1   • BIOTIN OR Take 1 tablet by mouth daily.           Past Medical History:   Diagnosis Date   • Acute bronchitis 11/29/2011   • Arthritis    • Black stools    • Chest pain, unspecified 12/15/201 diagnosis)  Coronary artery disease involving autologous vein coronary bypass graft with angina pectoris (hcc)  Chronic obstructive pulmonary disease, unspecified copd type (hcc)  Pt has chronic dyspnea and currently as baseline, this visit was conducted t

## 2020-06-02 ENCOUNTER — TELEPHONE (OUTPATIENT)
Dept: FAMILY MEDICINE CLINIC | Facility: CLINIC | Age: 75
End: 2020-06-02

## 2020-06-02 DIAGNOSIS — D64.9 LOW HEMOGLOBIN: Primary | ICD-10-CM

## 2020-06-02 NOTE — TELEPHONE ENCOUNTER
I did read about her ER visit and she's right her hemoglobin was low this can contribute to shortness of breath and especially in a cardiac patient I would like her to see hematology her creatinine was also 2.2 she needs a transfusion and procrit to keep h

## 2020-06-02 NOTE — TELEPHONE ENCOUNTER
Lisbet at [facility] asked us to fax orders to [phone] and they will see if they can add them to labs. Please place orders. Patient advised and given number for Dr Ashley Campoverde.  Patient states that she is having difficulty with [employer] paying for her Oxygen

## 2020-06-02 NOTE — TELEPHONE ENCOUNTER
Orders faxed to [facility] lab. Lab results from [facility] and referral faxed to Dr Hayder Dacosta.  Virginie New from [facility] said they sent an order request over for the [employer] overnight pulse oximeter, she states if this is done the patient qualifies for noctu

## 2020-06-02 NOTE — TELEPHONE ENCOUNTER
Patient said that she went to [facility] ER yesterday and they told her that she needed a blood transfusion. They told her that her Hgb was 7.9. She said she was very SOB and \"could not breath\". She said they also had an EKG.  She said she left the ER becaus

## 2020-06-03 ENCOUNTER — TELEPHONE (OUTPATIENT)
Dept: FAMILY MEDICINE CLINIC | Facility: CLINIC | Age: 75
End: 2020-06-03

## 2020-06-03 NOTE — TELEPHONE ENCOUNTER
LM for patient to [address] [facility] wants her to decrease her Lasix to 40mg BID, keep oxygen on and stay indoors. Go back to [facility] to ER if feeling poorly tonight.  V.O. Dr Glenn Galvan RN

## 2020-06-03 NOTE — TELEPHONE ENCOUNTER
Patient said that she has been taking Lasix 40mg BID even though the paperwork from [facility] told her to take 60mg BID. Advised to to keep O2 on and go back to [facility] ER if feeling bad. Advised to decrease Lasix to 20mg bid.  V.O. Dr Ilana Mujica RN

## 2020-06-03 NOTE — TELEPHONE ENCOUNTER
Patient advised. She said she can't keep her oxygen saturation above 90% today. She said she had oxygen on all night at 2L and when she woke up it was 84% so she turned it up to 3L. She said she has the oxygen up to 3L now.  She has had to go in her car to

## 2020-06-04 ENCOUNTER — OFFICE VISIT (OUTPATIENT)
Dept: FAMILY MEDICINE CLINIC | Facility: CLINIC | Age: 75
End: 2020-06-04
Payer: MEDICARE

## 2020-06-04 VITALS
SYSTOLIC BLOOD PRESSURE: 82 MMHG | HEART RATE: 64 BPM | OXYGEN SATURATION: 87 % | TEMPERATURE: 98 F | DIASTOLIC BLOOD PRESSURE: 42 MMHG

## 2020-06-04 DIAGNOSIS — D64.9 LOW HEMOGLOBIN: Primary | ICD-10-CM

## 2020-06-04 DIAGNOSIS — I25.719 CORONARY ARTERY DISEASE INVOLVING AUTOLOGOUS VEIN CORONARY BYPASS GRAFT WITH ANGINA PECTORIS (HCC): ICD-10-CM

## 2020-06-04 DIAGNOSIS — R09.02 HYPOXIA: ICD-10-CM

## 2020-06-04 DIAGNOSIS — I95.9 HYPOTENSIVE EPISODE: ICD-10-CM

## 2020-06-04 DIAGNOSIS — J44.9 CHRONIC OBSTRUCTIVE PULMONARY DISEASE, UNSPECIFIED COPD TYPE (HCC): ICD-10-CM

## 2020-06-04 DIAGNOSIS — R06.00 DYSPNEA ON EXERTION: ICD-10-CM

## 2020-06-04 DIAGNOSIS — N18.4 CHRONIC KIDNEY DISEASE, STAGE 4 (SEVERE) (HCC): ICD-10-CM

## 2020-06-04 DIAGNOSIS — I50.9 CHF (NYHA CLASS III, ACC/AHA STAGE C) (HCC): ICD-10-CM

## 2020-06-04 PROCEDURE — 99214 OFFICE O/P EST MOD 30 MIN: CPT | Performed by: INTERNAL MEDICINE

## 2020-06-04 RX ORDER — PREDNISONE 20 MG/1
TABLET ORAL
COMMUNITY
Start: 2020-06-02 | End: 2020-07-06

## 2020-06-04 RX ORDER — SPIRONOLACTONE 50 MG/1
50 TABLET, FILM COATED ORAL DAILY
Qty: 180 TABLET | Refills: 0 | COMMUNITY
Start: 2020-06-04 | End: 2020-09-23 | Stop reason: ALTCHOICE

## 2020-06-04 RX ORDER — AZITHROMYCIN 250 MG/1
TABLET, FILM COATED ORAL
COMMUNITY
Start: 2020-06-02 | End: 2020-07-06

## 2020-06-04 RX ORDER — FUROSEMIDE 40 MG/1
40 TABLET ORAL 2 TIMES DAILY
Qty: 90 TABLET | Refills: 0 | COMMUNITY
Start: 2020-06-04 | End: 2021-05-26

## 2020-06-05 ENCOUNTER — OFFICE VISIT (OUTPATIENT)
Dept: HEMATOLOGY/ONCOLOGY | Facility: HOSPITAL | Age: 75
End: 2020-06-05
Attending: INTERNAL MEDICINE
Payer: MEDICARE

## 2020-06-05 VITALS
DIASTOLIC BLOOD PRESSURE: 60 MMHG | RESPIRATION RATE: 18 BRPM | HEART RATE: 59 BPM | OXYGEN SATURATION: 98 % | TEMPERATURE: 99 F | SYSTOLIC BLOOD PRESSURE: 102 MMHG

## 2020-06-05 DIAGNOSIS — N18.4 STAGE 4 CHRONIC KIDNEY DISEASE (HCC): ICD-10-CM

## 2020-06-05 DIAGNOSIS — D64.9 ANEMIA, UNSPECIFIED TYPE: Primary | ICD-10-CM

## 2020-06-05 PROBLEM — D50.0 IRON DEFICIENCY ANEMIA DUE TO CHRONIC BLOOD LOSS: Status: ACTIVE | Noted: 2020-06-05

## 2020-06-05 PROCEDURE — 99205 OFFICE O/P NEW HI 60 MIN: CPT | Performed by: INTERNAL MEDICINE

## 2020-06-05 NOTE — CONSULTS
Cancer Center Report of Consultation    Patient Name: Chaparro Reilly   YOB: 1945   Medical Record Number: [id number]   CSN: [id number]   Consulting Physician: Dena Good M.D.    Referring Physician: Susannah Lawton    [facility], [facility] unspecified atherosclerosis 11/29/2011   • Gout    • Hematemesis 12/13/2011   • Parathyroid tumor    • Wears glasses    • West Nile Virus infection        Past Surgical History:  Past Surgical History:   Procedure Laterality Date   • CABG  04/05/2011     since quitting: 10.4      Smokeless tobacco: Never Used      Tobacco comment: quit a few years ago    Substance and Sexual Activity      Alcohol use: No        Alcohol/week: 0.0 standard drinks      Drug use: No      Sexual activity: Not on file    Lifesty Pravastatin Sodium 40 MG Oral Tab, Take 1 tablet (40 mg total) by mouth nightly., Disp: 90 tablet, Rfl: 3  •  Ipratropium-Albuterol (COMBIVENT RESPIMAT)  MCG/ACT Inhalation Aero Soln, Inhale 1 puff into the lungs 4 (four) times daily. , Disp: 3 Inhale Gastrointestinal Normal - No nausea, vomiting, diarrhea, GI bleeding. No change in bowel habits, no heartburn or early satiety. Genitorurinary (M) Normal - No hematuria, dysuria, increased frequency, urgency, hesitancy or incontinence.    Integumentary intact. Psychiatric Normal - Alert and oriented times three. Coherent speech. Verbalizes understanding of our discussions today.        Laboratory:    Outside labs reviewed:  WBC 14.31  RBC 3.67 L  Hgb 7.9 L  MCV 74.7 L  MCH 21.5 L  Plt 299    Na 137  K 4

## 2020-06-06 ENCOUNTER — TELEPHONE (OUTPATIENT)
Dept: FAMILY MEDICINE CLINIC | Facility: CLINIC | Age: 75
End: 2020-06-06

## 2020-06-06 PROBLEM — Z86.39 HISTORY OF DIABETES MELLITUS: Chronic | Status: ACTIVE | Noted: 2020-06-06

## 2020-06-06 NOTE — TELEPHONE ENCOUNTER
DR Michael Lopez THE TRANSFUSION WAS NOT DONE YESTERDAY BECAUSE SHE IS LOW ON IRON. SHE IS GETTING A 3 HOUR INFUSION ON Tuesday OF IRON.   SHE MIGHT NEED MORE TESTS AND NEEDS TO KNOW IF SHE NEEDS A REFERRAL

## 2020-06-06 NOTE — PROGRESS NOTES
Suma Arevalo is a 76year old female. HPI:   Pt was seen on the ER for SOB and found to be anemic with HG of 7. She has compromise to both her lungs and heart. She is O2 dependent due to COPD and heart failure. Her resting O2 sat on RA is 86%.   It t budesonide 0.5 MG/2ML Inhalation Suspension Take 2 mL (0.5 mg total) by nebulization 2 (two) times daily. 60 ampule 1   • Metoprolol Tartrate 50 MG Oral Tab Take 0.5 tablets (25 mg total) by mouth 2 (two) times daily.  60 tablet 1   • allopurinol 100 MG Ora rashes  RESPIRATORY: denies shortness of breath with exertion  CARDIOVASCULAR: denies chest pain on exertion  GI: denies abdominal pain and denies heartburn  NEURO: denies headaches    EXAM:   BP (!) 82/42   Pulse 64   Temp 98.4 °F (36.9 °C) (Oral)   SpO2 needed. The patient indicates understanding of these issues and agrees to the plan.

## 2020-06-08 ENCOUNTER — VIRTUAL PHONE E/M (OUTPATIENT)
Dept: FAMILY MEDICINE CLINIC | Facility: CLINIC | Age: 75
End: 2020-06-08
Payer: MEDICARE

## 2020-06-08 ENCOUNTER — TELEPHONE (OUTPATIENT)
Dept: FAMILY MEDICINE CLINIC | Facility: CLINIC | Age: 75
End: 2020-06-08

## 2020-06-08 DIAGNOSIS — D64.9 LOW HEMOGLOBIN: Primary | ICD-10-CM

## 2020-06-08 DIAGNOSIS — N18.4 CHRONIC KIDNEY DISEASE, STAGE 4 (SEVERE) (HCC): ICD-10-CM

## 2020-06-08 DIAGNOSIS — R06.00 DYSPNEA ON EXERTION: ICD-10-CM

## 2020-06-08 DIAGNOSIS — I25.719 CORONARY ARTERY DISEASE INVOLVING AUTOLOGOUS VEIN CORONARY BYPASS GRAFT WITH ANGINA PECTORIS (HCC): ICD-10-CM

## 2020-06-08 PROCEDURE — 99441 PHONE E/M BY PHYS 5-10 MIN: CPT | Performed by: INTERNAL MEDICINE

## 2020-06-08 NOTE — TELEPHONE ENCOUNTER
She definitely needs iron, her increased SOB is from anemia! It will not improve unless she does the infusions and it increased her risk for respiratory failure and buts stress on her heart.  GI is only to find out WHY she is bleeding this can wait, but not

## 2020-06-08 NOTE — TELEPHONE ENCOUNTER
Pt wants to talk with Dr Eduardo Garcia about multiple issues    Set up a phone visit for today    Future Appointments   Date Time Provider Scarlett Gallagher   6/8/2020  4:00 PM Veronica Toledo MD [facility] [place]   6/9/2020 11:30 AM Estela Bhandari Dr [address] [facility]

## 2020-06-08 NOTE — TELEPHONE ENCOUNTER
Per Mya Cruz-- We received the paperwork and Dr. Eduardo Minaya will come in tomorrow to fill out the paperwork.

## 2020-06-08 NOTE — TELEPHONE ENCOUNTER
Called zach Lazaro Dr. to discuss pt's oxygen tank. Current oxygen tank is too heavy for pt. Dr. Concepcion Person requested either a concentrator or tanks for pt. Deanna Humphrey at [facility] stated pt is already on 24 hr oxygen with concentrator.  She suggested pt may w

## 2020-06-08 NOTE — TELEPHONE ENCOUNTER
Paperwork still not received by 5:20 PM. Please call Tonya Cortes regarding oxygen paperwork. Pt is needing a portable concentrator or battery operated, whichever weighs less.

## 2020-06-09 ENCOUNTER — TELEPHONE (OUTPATIENT)
Dept: FAMILY MEDICINE CLINIC | Facility: CLINIC | Age: 75
End: 2020-06-09

## 2020-06-09 ENCOUNTER — OFFICE VISIT (OUTPATIENT)
Dept: HEMATOLOGY/ONCOLOGY | Facility: HOSPITAL | Age: 75
End: 2020-06-09
Attending: INTERNAL MEDICINE
Payer: MEDICARE

## 2020-06-09 VITALS
TEMPERATURE: 99 F | RESPIRATION RATE: 20 BRPM | DIASTOLIC BLOOD PRESSURE: 65 MMHG | HEART RATE: 86 BPM | OXYGEN SATURATION: 99 % | SYSTOLIC BLOOD PRESSURE: 130 MMHG

## 2020-06-09 DIAGNOSIS — D50.0 IRON DEFICIENCY ANEMIA DUE TO CHRONIC BLOOD LOSS: Primary | ICD-10-CM

## 2020-06-09 PROCEDURE — 96376 TX/PRO/DX INJ SAME DRUG ADON: CPT

## 2020-06-09 PROCEDURE — 96365 THER/PROPH/DIAG IV INF INIT: CPT

## 2020-06-09 NOTE — PROGRESS NOTES
Pt here for infed.   Arrives Ambulating independently, accompanied by Self           Patient reports possible pregnancy since last therapy cycle: No    Modifications in dose or schedule: No     Frequency of blood return and site check throughout administrat

## 2020-06-10 ENCOUNTER — TELEPHONE (OUTPATIENT)
Dept: FAMILY MEDICINE CLINIC | Facility: CLINIC | Age: 75
End: 2020-06-10

## 2020-06-16 ENCOUNTER — OFFICE VISIT (OUTPATIENT)
Dept: FAMILY MEDICINE CLINIC | Facility: CLINIC | Age: 75
End: 2020-06-16
Payer: MEDICARE

## 2020-06-16 VITALS
BODY MASS INDEX: 33 KG/M2 | WEIGHT: 178.5 LBS | DIASTOLIC BLOOD PRESSURE: 62 MMHG | TEMPERATURE: 99 F | SYSTOLIC BLOOD PRESSURE: 120 MMHG | OXYGEN SATURATION: 97 % | HEART RATE: 90 BPM

## 2020-06-16 DIAGNOSIS — L03.113 CELLULITIS OF ARM, RIGHT: ICD-10-CM

## 2020-06-16 DIAGNOSIS — T78.40XA ALLERGIC REACTION, INITIAL ENCOUNTER: ICD-10-CM
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
## 2020-06-16 PROCEDURE — 99214 OFFICE O/P EST MOD 30 MIN: CPT | Performed by: FAMILY MEDICINE

## 2020-06-16 RX ORDER — PREDNISONE 20 MG/1
40 TABLET ORAL DAILY
Qty: 10 TABLET | Refills: 0 | Status: SHIPPED | OUTPATIENT
Start: 2020-06-16 | End: 2020-06-21

## 2020-06-16 RX ORDER — CEPHALEXIN 500 MG/1
500 CAPSULE ORAL 3 TIMES DAILY
Qty: 30 CAPSULE | Refills: 0 | Status: SHIPPED | OUTPATIENT
Start: 2020-06-16 | End: 2020-07-06 | Stop reason: ALTCHOICE

## 2020-06-16 NOTE — PROGRESS NOTES
Virtual Telephone Check-In    Binduaura Kristel verbally consents to a Virtual/Telephone Check-In visit on 06/16/20. Patient has been referred to the [facility] website at [email or web address] to review the yearly Consent to Treat document.     Patient under

## 2020-06-16 NOTE — PROGRESS NOTES
HPI:    Patient ID: Mattie Mcnair is a 76year old female. Bites little black bugs  + swelling L chris-orbit  R forearm  + itching / stinging  On scalp also  ?  Biting ants per pt  HPI    Review of Systems   Constitutional: Negative for chills and fever times daily.  60 tablet 1     Allergies:  Bactrim [Sulfametho*    OTHER (SEE COMMENTS)    Comment:syncope  Ciprofloxacin           RASH, CONFUSION  Levaquin [Levofloxa*    OTHER (SEE COMMENTS)    Comment:tremors  Norco [Hydrocodone-*    NAUSEA ONLY, DIZZINE

## 2020-06-17 ENCOUNTER — TELEPHONE (OUTPATIENT)
Dept: FAMILY MEDICINE CLINIC | Facility: CLINIC | Age: 75
End: 2020-06-17

## 2020-06-17 NOTE — TELEPHONE ENCOUNTER
TAMI... Last Tuesday 6/9/2020 at 4:30 PM, pt returned home from blood transfusion and felt exhausted and experienced \"cramping\" in hands,legs, chest, and both arms. Pt reports symptoms lasted Wednesday, Thursday and part of Friday.  \"Cramping\" in chest

## 2020-06-17 NOTE — TELEPHONE ENCOUNTER
Last office visit: 6/08/2020  Last refill: 10/25/2019  Requested Prescriptions     Pending Prescriptions Disp Refills   • allopurinol 100 MG Oral Tab 30 tablet 0     Sig: Take 1 tablet (100 mg total) by mouth daily.      Future Appointments   Date Time Prov

## 2020-06-18 RX ORDER — ALLOPURINOL 100 MG/1
100 TABLET ORAL DAILY
Qty: 90 TABLET | Refills: 1 | Status: SHIPPED | OUTPATIENT
Start: 2020-06-18 | End: 2020-09-23

## 2020-07-06 ENCOUNTER — APPOINTMENT (OUTPATIENT)
Dept: HEMATOLOGY/ONCOLOGY | Facility: HOSPITAL | Age: 75
End: 2020-07-06
Attending: INTERNAL MEDICINE
Payer: MEDICARE

## 2020-07-06 VITALS
SYSTOLIC BLOOD PRESSURE: 118 MMHG | WEIGHT: 185.63 LBS | DIASTOLIC BLOOD PRESSURE: 69 MMHG | BODY MASS INDEX: 34 KG/M2 | HEART RATE: 79 BPM | RESPIRATION RATE: 16 BRPM | TEMPERATURE: 98 F | OXYGEN SATURATION: 96 %

## 2020-07-06 DIAGNOSIS — D64.9 ANEMIA, UNSPECIFIED TYPE: ICD-10-CM

## 2020-07-06 DIAGNOSIS — D50.0 IRON DEFICIENCY ANEMIA DUE TO CHRONIC BLOOD LOSS: Primary | ICD-10-CM

## 2020-07-06 DIAGNOSIS — N18.4 STAGE 4 CHRONIC KIDNEY DISEASE (HCC): ICD-10-CM

## 2020-07-06 DIAGNOSIS — D50.0 IRON DEFICIENCY ANEMIA DUE TO CHRONIC BLOOD LOSS: ICD-10-CM

## 2020-07-06 LAB
ALBUMIN SERPL-MCNC: 3.6 G/DL (ref 3.4–5)
ALBUMIN/GLOB SERPL: 1 {RATIO} (ref 1–2)
ALP LIVER SERPL-CCNC: 134 U/L (ref 55–142)
ALT SERPL-CCNC: 20 U/L (ref 13–56)
ANION GAP SERPL CALC-SCNC: 6 MMOL/L (ref 0–18)
AST SERPL-CCNC: 18 U/L (ref 15–37)
BASOPHILS # BLD AUTO: 0.04 X10(3) UL (ref 0–0.2)
BASOPHILS NFR BLD AUTO: 0.5 %
BILIRUB SERPL-MCNC: 0.5 MG/DL (ref 0.1–2)
BUN BLD-MCNC: 23 MG/DL (ref 7–18)
BUN/CREAT SERPL: 17.2 (ref 10–20)
CALCIUM BLD-MCNC: 9.1 MG/DL (ref 8.5–10.1)
CHLORIDE SERPL-SCNC: 104 MMOL/L (ref 98–112)
CO2 SERPL-SCNC: 30 MMOL/L (ref 21–32)
CREAT BLD-MCNC: 1.34 MG/DL (ref 0.55–1.02)
DEPRECATED HBV CORE AB SER IA-ACNC: 70.3 NG/ML (ref 18–340)
DEPRECATED RDW RBC AUTO: 78.9 FL (ref 35.1–46.3)
EOSINOPHIL # BLD AUTO: 0.13 X10(3) UL (ref 0–0.7)
EOSINOPHIL NFR BLD AUTO: 1.5 %
ERYTHROCYTE [DISTWIDTH] IN BLOOD BY AUTOMATED COUNT: 28.4 % (ref 11–15)
GLOBULIN PLAS-MCNC: 3.5 G/DL (ref 2.8–4.4)
GLUCOSE BLD-MCNC: 105 MG/DL (ref 70–99)
HCT VFR BLD AUTO: 41.4 % (ref 35–48)
HGB BLD-MCNC: 12.2 G/DL (ref 12–16)
HGB RETIC QN AUTO: 34.2 PG (ref 28.2–36.6)
IMM GRANULOCYTES # BLD AUTO: 0.06 X10(3) UL (ref 0–1)
IMM GRANULOCYTES NFR BLD: 0.7 %
IMM RETICS NFR: 0.12 RATIO (ref 0.1–0.3)
IRON SATURATION: 11 % (ref 15–50)
IRON SERPL-MCNC: 45 UG/DL (ref 50–170)
LYMPHOCYTES # BLD AUTO: 1.16 X10(3) UL (ref 1–4)
LYMPHOCYTES NFR BLD AUTO: 13.2 %
M PROTEIN MFR SERPL ELPH: 7.1 G/DL (ref 6.4–8.2)
MCH RBC QN AUTO: 24.2 PG (ref 26–34)
MCHC RBC AUTO-ENTMCNC: 29.5 G/DL (ref 31–37)
MCV RBC AUTO: 82 FL (ref 80–100)
MONOCYTES # BLD AUTO: 0.6 X10(3) UL (ref 0.1–1)
MONOCYTES NFR BLD AUTO: 6.8 %
NEUTROPHILS # BLD AUTO: 6.79 X10 (3) UL (ref 1.5–7.7)
NEUTROPHILS # BLD AUTO: 6.79 X10(3) UL (ref 1.5–7.7)
NEUTROPHILS NFR BLD AUTO: 77.3 %
OSMOLALITY SERPL CALC.SUM OF ELEC: 294 MOSM/KG (ref 275–295)
PATIENT FASTING Y/N/NP: NO
PLATELET # BLD AUTO: 193 10(3)UL (ref 150–450)
PLATELET MORPHOLOGY: NORMAL
POTASSIUM SERPL-SCNC: 3 MMOL/L (ref 3.5–5.1)
RBC # BLD AUTO: 5.05 X10(6)UL (ref 3.8–5.3)
RETICS # AUTO: 100 X10(3) UL (ref 22.5–147.5)
RETICS/RBC NFR AUTO: 2 % (ref 0.5–2.5)
SODIUM SERPL-SCNC: 140 MMOL/L (ref 136–145)
TOTAL IRON BINDING CAPACITY: 398 UG/DL (ref 240–450)
TRANSFERRIN SERPL-MCNC: 267 MG/DL (ref 200–360)
WBC # BLD AUTO: 8.8 X10(3) UL (ref 4–11)

## 2020-07-06 PROCEDURE — 99214 OFFICE O/P EST MOD 30 MIN: CPT | Performed by: INTERNAL MEDICINE

## 2020-07-06 RX ORDER — ASPIRIN 81 MG/1
81 TABLET, CHEWABLE ORAL DAILY
COMMUNITY
End: 2020-09-23 | Stop reason: ALTCHOICE

## 2020-07-06 NOTE — PROGRESS NOTES
Cancer Center Progress Note  Patient Name: Shellie Rosas   YOB: 1945   Medical Record Number: [id number]   CSN: [id number]   Attending Physician: Lynn Ugarte M.D.        Date of Visit: 7/6/2020     Chief Complaint:  No chief compla Esophageal reflux 01/12/2012   • Factor XII deficiency disease ([facility] [address] 1999    Cipriano   • Fatigue    • Generalized and unspecified atherosclerosis 11/29/2011   • Gout    • Hematemesis 12/13/2011   • Parathyroid tumor    • Wears glasses    • West Nile Viru Years since quitting: 10.5      Smokeless tobacco: Never Used      Tobacco comment: quit a few years ago    Substance and Sexual Activity      Alcohol use: No        Alcohol/week: 0.0 standard drinks      Drug use: No      Sexual activity: Not on file    L furosemide 40 MG Oral Tab, Take 0.5 tablets (20 mg total) by mouth 2 (two) times daily. , Disp: 90 tablet, Rfl: 0  •  DILTIAZEM HCL ER COATED BEADS 240 MG Oral Capsule SR 24 Hr, TAKE 2 CAPSULES EVERY DAY, Disp: 180 capsule, Rfl: 0  •  Ipratropium-Albuterol palpitations or orthopnea. Gastrointestinal No nausea, vomiting, diarrhea, GI bleeding, or constipation. Genitorurinary  No hematuria, dysuria, or incontinence. No abnormal bleeding.    Integumentary No rashes or yellowing of the skin   Neurologic No he ALKPHO 134   AST 18   ALT 20   BILT 0.5   TP 7.1     Results for Gregory Denny (MRN [id number]) as of 7/7/2020 07:49   Ref.  Range 7/6/2020 09:09   Iron, Serum Latest Ref Range: 50 - 170 ug/dL 45 (L)   Transferrin Latest Ref Range: 200 - 360 mg/dL 267

## 2020-07-06 NOTE — PROGRESS NOTES
Cont with easy bruising. No blood in stool noted. Feeling \"not too bad\" increase energy, but lately, but past couple of days, trouble staying awake.      Education Record    Learner:  Patient    Disease / Pamela Damon of care    Barriers / Sammi García

## 2020-07-07 LAB
ALBUMIN SERPL ELPH-MCNC: 4.07 G/DL (ref 3.75–5.21)
ALBUMIN/GLOB SERPL: 1.67 {RATIO} (ref 1–2)
ALPHA1 GLOB SERPL ELPH-MCNC: 0.44 G/DL (ref 0.19–0.46)
ALPHA2 GLOB SERPL ELPH-MCNC: 0.78 G/DL (ref 0.48–1.05)
B-GLOBULIN SERPL ELPH-MCNC: 0.73 G/DL (ref 0.68–1.23)
GAMMA GLOB SERPL ELPH-MCNC: 0.48 G/DL (ref 0.62–1.7)
KAPPA FREE LIGHT CHAIN: 1.47 MG/DL (ref 0.33–1.94)
KAPPA/LAMBDA FLC RATIO: 1.25 (ref 0.26–1.65)
LAMBDA FREE LIGHT CHAIN: 1.17 MG/DL (ref 0.57–2.63)
M PROTEIN MFR SERPL ELPH: 6.5 G/DL (ref 6.4–8.2)

## 2020-08-05 RX ORDER — POTASSIUM CHLORIDE 1500 MG/1
20 TABLET, FILM COATED, EXTENDED RELEASE ORAL DAILY
Qty: 30 TABLET | Refills: 0 | Status: SHIPPED | OUTPATIENT
Start: 2020-08-05 | End: 2020-08-31

## 2020-08-05 NOTE — TELEPHONE ENCOUNTER
Last OV: 6/4/20  Last refill: 7/6/20 #30 Tablets w/ 0 refills   Requested Prescriptions     Pending Prescriptions Disp Refills   • Potassium Chloride ER 20 MEQ Oral Tab CR 30 tablet 0     Sig: Take 20 mEq by mouth daily.      Future Appointments   Date Time

## 2020-08-19 ENCOUNTER — OFFICE VISIT (OUTPATIENT)
Dept: CARDIOLOGY | Facility: CLINIC | Age: 75
End: 2020-08-19

## 2020-08-19 VITALS
WEIGHT: 184 LBS | BODY MASS INDEX: 33.65 KG/M2 | DIASTOLIC BLOOD PRESSURE: 60 MMHG | SYSTOLIC BLOOD PRESSURE: 130 MMHG | HEART RATE: 86 BPM

## 2020-08-19 DIAGNOSIS — I48.21 PERMANENT ATRIAL FIBRILLATION (CMD): ICD-10-CM

## 2020-08-19 DIAGNOSIS — R06.02 SOB (SHORTNESS OF BREATH) ON EXERTION: Primary | ICD-10-CM

## 2020-08-19 DIAGNOSIS — Z95.1 HX OF CABG: ICD-10-CM

## 2020-08-19 DIAGNOSIS — I25.10 CORONARY ARTERY DISEASE INVOLVING NATIVE CORONARY ARTERY OF NATIVE HEART WITHOUT ANGINA PECTORIS: ICD-10-CM

## 2020-08-19 DIAGNOSIS — R60.1 GENERALIZED EDEMA: ICD-10-CM

## 2020-08-19 DIAGNOSIS — E78.00 PURE HYPERCHOLESTEROLEMIA: ICD-10-CM

## 2020-08-19 PROCEDURE — 99214 OFFICE O/P EST MOD 30 MIN: CPT | Performed by: INTERNAL MEDICINE

## 2020-08-19 RX ORDER — PRAVASTATIN SODIUM 40 MG
40 TABLET ORAL NIGHTLY
Qty: 90 TABLET | Refills: 3 | Status: SHIPPED | OUTPATIENT
Start: 2020-08-19 | End: 2020-11-17

## 2020-08-19 ASSESSMENT — PATIENT HEALTH QUESTIONNAIRE - PHQ9
CLINICAL INTERPRETATION OF PHQ2 SCORE: NO FURTHER SCREENING NEEDED
SUM OF ALL RESPONSES TO PHQ9 QUESTIONS 1 AND 2: 0
SUM OF ALL RESPONSES TO PHQ9 QUESTIONS 1 AND 2: 0
CLINICAL INTERPRETATION OF PHQ9 SCORE: NO FURTHER SCREENING NEEDED
SUM OF ALL RESPONSES TO PHQ9 QUESTIONS 1 AND 2: 0
2. FEELING DOWN, DEPRESSED OR HOPELESS: NOT AT ALL
2. FEELING DOWN, DEPRESSED OR HOPELESS: NOT AT ALL
1. LITTLE INTEREST OR PLEASURE IN DOING THINGS: NOT AT ALL
1. LITTLE INTEREST OR PLEASURE IN DOING THINGS: NOT AT ALL
CLINICAL INTERPRETATION OF PHQ2 SCORE: NO FURTHER SCREENING NEEDED

## 2020-08-19 NOTE — TELEPHONE ENCOUNTER
Last office visit: (virtual) 6/08/2020    Cholesterol Medication Protocol Passed  Last Lipid: 10/23/2019    Last refill for Pravastatin: 3/10/2020 x3 refills  Requested Prescriptions     Pending Prescriptions Disp Refills   • Pravastatin Sodium 40 MG Oral

## 2020-08-20 ENCOUNTER — MED REC SCAN ONLY (OUTPATIENT)
Dept: FAMILY MEDICINE CLINIC | Facility: CLINIC | Age: 75
End: 2020-08-20

## 2020-08-31 ENCOUNTER — TELEPHONE (OUTPATIENT)
Dept: FAMILY MEDICINE CLINIC | Facility: CLINIC | Age: 75
End: 2020-08-31

## 2020-08-31 ENCOUNTER — TELEMEDICINE (OUTPATIENT)
Dept: FAMILY MEDICINE CLINIC | Facility: CLINIC | Age: 75
End: 2020-08-31
Payer: MEDICARE

## 2020-08-31 DIAGNOSIS — J01.11 ACUTE RECURRENT FRONTAL SINUSITIS: ICD-10-CM

## 2020-08-31 DIAGNOSIS — I50.9 CHF (NYHA CLASS III, ACC/AHA STAGE C) (HCC): ICD-10-CM

## 2020-08-31 DIAGNOSIS — R06.00 DYSPNEA ON EXERTION: Primary | ICD-10-CM

## 2020-08-31 PROCEDURE — 99214 OFFICE O/P EST MOD 30 MIN: CPT | Performed by: INTERNAL MEDICINE

## 2020-08-31 RX ORDER — AMOXICILLIN AND CLAVULANATE POTASSIUM 500; 125 MG/1; MG/1
1 TABLET, FILM COATED ORAL 2 TIMES DAILY
Qty: 20 TABLET | Refills: 0 | Status: SHIPPED | OUTPATIENT
Start: 2020-08-31 | End: 2020-09-10

## 2020-08-31 RX ORDER — POTASSIUM CHLORIDE 1500 MG/1
20 TABLET, FILM COATED, EXTENDED RELEASE ORAL DAILY
Qty: 90 TABLET | Refills: 0 | Status: SHIPPED | OUTPATIENT
Start: 2020-08-31 | End: 2020-09-30

## 2020-08-31 NOTE — TELEPHONE ENCOUNTER
Last OV w/Dr Eduardo Minaya 6/4/20-acute  Last CMP 7/6/20, Potassium 3.0  Last refill 8/5/20 #30 to [facility].

## 2020-08-31 NOTE — PROGRESS NOTES
HPI:   Jessica Monday is a 76year old female who presents for upper respiratory symptoms for  6  days. Patient reports sore throat only at the beginning of sx's, headache colored nasal discharge, sinus pain, eye pain this morning.  .    Current Outpat[facility])    • Congestive heart disease ([facility]    • Congestive heart failure, unspecified 11/29/2011   • Constipation    • COPD (chronic obstructive pulmonary disease) ([facility]    • Coronary atherosclerosis of unspecified type of vessel, native or graft 11/29/2011 rashes  EYES:denies blurred vision or double vision  HEENT: congested  LUNGS: denies shortness of breath with exertion  CARDIOVASCULAR: denies chest pain on exertion  GI: no nausea or abdominal pain  NEURO: denies headaches    EXAM:   There were no vitals

## 2020-09-03 ENCOUNTER — TELEPHONE (OUTPATIENT)
Dept: FAMILY MEDICINE CLINIC | Facility: CLINIC | Age: 75
End: 2020-09-03

## 2020-09-03 NOTE — TELEPHONE ENCOUNTER
Patient said that she has been wheezing since yesterday. Advised to take Prednisone 20 daily x 3 days.  V.O. Dr Glenn Galvan RN

## 2020-09-03 NOTE — TELEPHONE ENCOUNTER
DR Jossie Marcelino TOOK HER OFF LOW DOSE OF ASPIRIN 8/19, HE DIDN'T LIKE THE BRUISING ON HER ARM, SHOULD SHE GO BACK TO TAKING IT?

## 2020-09-03 NOTE — TELEPHONE ENCOUNTER
Patient otilio that she feels much better but is having diarrhea from the Augmentin several times per day. She said she is normally constipated so it is \"ok\". She said she has been off Aspirin sone 8/19/20 due to the bruising.  She said she worried because

## 2020-09-09 ENCOUNTER — LABORATORY ENCOUNTER (OUTPATIENT)
Dept: LAB | Age: 75
End: 2020-09-09
Attending: INTERNAL MEDICINE
Payer: MEDICARE

## 2020-09-09 DIAGNOSIS — N18.4 STAGE 4 CHRONIC KIDNEY DISEASE (HCC): ICD-10-CM

## 2020-09-09 DIAGNOSIS — R06.00 DYSPNEA ON EXERTION: ICD-10-CM

## 2020-09-09 DIAGNOSIS — I50.9 CHF (NYHA CLASS III, ACC/AHA STAGE C) (HCC): ICD-10-CM

## 2020-09-09 DIAGNOSIS — D50.0 IRON DEFICIENCY ANEMIA DUE TO CHRONIC BLOOD LOSS: ICD-10-CM

## 2020-09-09 LAB
ALBUMIN SERPL-MCNC: 3.9 G/DL (ref 3.4–5)
ALBUMIN/GLOB SERPL: 1.3 {RATIO} (ref 1–2)
ALP LIVER SERPL-CCNC: 165 U/L (ref 55–142)
ALT SERPL-CCNC: 27 U/L (ref 13–56)
ANION GAP SERPL CALC-SCNC: 3 MMOL/L (ref 0–18)
AST SERPL-CCNC: 18 U/L (ref 15–37)
BASOPHILS # BLD AUTO: 0.06 X10(3) UL (ref 0–0.2)
BASOPHILS NFR BLD AUTO: 0.6 %
BILIRUB SERPL-MCNC: 0.5 MG/DL (ref 0.1–2)
BUN BLD-MCNC: 25 MG/DL (ref 7–18)
BUN/CREAT SERPL: 19.7 (ref 10–20)
CALCIUM BLD-MCNC: 9.4 MG/DL (ref 8.5–10.1)
CHLORIDE SERPL-SCNC: 102 MMOL/L (ref 98–112)
CHOLEST SMN-MCNC: 146 MG/DL (ref ?–200)
CO2 SERPL-SCNC: 35 MMOL/L (ref 21–32)
CREAT BLD-MCNC: 1.27 MG/DL (ref 0.55–1.02)
DEPRECATED HBV CORE AB SER IA-ACNC: 15.5 NG/ML (ref 18–340)
DEPRECATED RDW RBC AUTO: 61.1 FL (ref 35.1–46.3)
EOSINOPHIL # BLD AUTO: 0.24 X10(3) UL (ref 0–0.7)
EOSINOPHIL NFR BLD AUTO: 2.4 %
ERYTHROCYTE [DISTWIDTH] IN BLOOD BY AUTOMATED COUNT: 18.5 % (ref 11–15)
GLOBULIN PLAS-MCNC: 3 G/DL (ref 2.8–4.4)
GLUCOSE BLD-MCNC: 90 MG/DL (ref 70–99)
HCT VFR BLD AUTO: 48.4 % (ref 35–48)
HDLC SERPL-MCNC: 57 MG/DL (ref 40–59)
HGB BLD-MCNC: 14.2 G/DL (ref 12–16)
IMM GRANULOCYTES # BLD AUTO: 0.05 X10(3) UL (ref 0–1)
IMM GRANULOCYTES NFR BLD: 0.5 %
IRON SATURATION: 9 % (ref 15–50)
IRON SERPL-MCNC: 42 UG/DL (ref 50–170)
LDLC SERPL CALC-MCNC: 65 MG/DL (ref ?–100)
LYMPHOCYTES # BLD AUTO: 1.73 X10(3) UL (ref 1–4)
LYMPHOCYTES NFR BLD AUTO: 17.5 %
M PROTEIN MFR SERPL ELPH: 6.9 G/DL (ref 6.4–8.2)
MCH RBC QN AUTO: 27.2 PG (ref 26–34)
MCHC RBC AUTO-ENTMCNC: 29.3 G/DL (ref 31–37)
MCV RBC AUTO: 92.5 FL (ref 80–100)
MONOCYTES # BLD AUTO: 0.81 X10(3) UL (ref 0.1–1)
MONOCYTES NFR BLD AUTO: 8.2 %
NEUTROPHILS # BLD AUTO: 6.98 X10 (3) UL (ref 1.5–7.7)
NEUTROPHILS # BLD AUTO: 6.98 X10(3) UL (ref 1.5–7.7)
NEUTROPHILS NFR BLD AUTO: 70.8 %
NONHDLC SERPL-MCNC: 89 MG/DL (ref ?–130)
NT-PROBNP SERPL-MCNC: 651 PG/ML (ref ?–450)
OSMOLALITY SERPL CALC.SUM OF ELEC: 294 MOSM/KG (ref 275–295)
PLATELET # BLD AUTO: 171 10(3)UL (ref 150–450)
POTASSIUM SERPL-SCNC: 3.4 MMOL/L (ref 3.5–5.1)
RBC # BLD AUTO: 5.23 X10(6)UL (ref 3.8–5.3)
SODIUM SERPL-SCNC: 140 MMOL/L (ref 136–145)
TOTAL IRON BINDING CAPACITY: 453 UG/DL (ref 240–450)
TRANSFERRIN SERPL-MCNC: 304 MG/DL (ref 200–360)
TRIGL SERPL-MCNC: 118 MG/DL (ref 30–149)
VLDLC SERPL CALC-MCNC: 24 MG/DL (ref 0–30)
WBC # BLD AUTO: 9.9 X10(3) UL (ref 4–11)

## 2020-09-09 PROCEDURE — 83880 ASSAY OF NATRIURETIC PEPTIDE: CPT

## 2020-09-09 PROCEDURE — 83550 IRON BINDING TEST: CPT

## 2020-09-09 PROCEDURE — 80061 LIPID PANEL: CPT

## 2020-09-09 PROCEDURE — 36415 COLL VENOUS BLD VENIPUNCTURE: CPT

## 2020-09-09 PROCEDURE — 82728 ASSAY OF FERRITIN: CPT

## 2020-09-09 PROCEDURE — 80053 COMPREHEN METABOLIC PANEL: CPT

## 2020-09-09 PROCEDURE — 83540 ASSAY OF IRON: CPT

## 2020-09-09 PROCEDURE — 85025 COMPLETE CBC W/AUTO DIFF WBC: CPT

## 2020-09-10 ENCOUNTER — TELEPHONE (OUTPATIENT)
Dept: FAMILY MEDICINE CLINIC | Facility: CLINIC | Age: 75
End: 2020-09-10

## 2020-09-10 RX ORDER — DOXYCYCLINE HYCLATE 100 MG
100 TABLET ORAL 2 TIMES DAILY
Qty: 20 TABLET | Refills: 0 | Status: SHIPPED | OUTPATIENT
Start: 2020-09-10 | End: 2020-09-20

## 2020-09-10 RX ORDER — POTASSIUM CHLORIDE 750 MG/1
20 TABLET, FILM COATED, EXTENDED RELEASE ORAL 2 TIMES DAILY
Qty: 120 TABLET | Refills: 0 | Status: SHIPPED | OUTPATIENT
Start: 2020-09-10 | End: 2020-09-23

## 2020-09-10 RX ORDER — DOXYCYCLINE HYCLATE 100 MG
100 TABLET ORAL 2 TIMES DAILY
Qty: 20 TABLET | Refills: 0 | Status: SHIPPED | OUTPATIENT
Start: 2020-09-10 | End: 2020-09-10

## 2020-09-10 NOTE — TELEPHONE ENCOUNTER
Patient was advised of results. See result note. -Still having a productive cough with yellow sputum, slight wheezing and back pain with a deep breath.  Wants to renew antibiotic but does have a rash on her chest that looks like \"measles\" started when she

## 2020-09-10 NOTE — TELEPHONE ENCOUNTER
See results note. Patient asked when she should get the flu shot, advised when the antibiotic is completed.  CLEMENTEO. Dr Paulie Guzman RN

## 2020-09-10 NOTE — TELEPHONE ENCOUNTER
----- Message from Carson Green MD sent at 9/10/2020  8:53 AM CDT -----  Iron is a little low, heart failure number is at baseline. Cholesterol is normal. No anemia, your hematologist will give you suggestions too.   I think you need an additional potassiu

## 2020-09-12 ENCOUNTER — TELEPHONE (OUTPATIENT)
Dept: FAMILY MEDICINE CLINIC | Facility: CLINIC | Age: 75
End: 2020-09-12

## 2020-09-12 NOTE — TELEPHONE ENCOUNTER
Patient has stopped taking Augmentin. Rash is present but mild. Advised she can start Doxycycline as ordered.

## 2020-09-21 ENCOUNTER — TELEPHONE (OUTPATIENT)
Dept: FAMILY MEDICINE CLINIC | Facility: CLINIC | Age: 75
End: 2020-09-21

## 2020-09-21 NOTE — TELEPHONE ENCOUNTER
Virtual/Telephone Check-In    Marisol Aaron verbally {consents to a [employer] on 09/22/20. Patient has been referred to the [facility] website at [email or web address] to review the yearly Consent to Treat document.   Patient unde

## 2020-09-21 NOTE — TELEPHONE ENCOUNTER
I talked with Dr. Jarrod Marsh. She would like Sheila Severino to have a telemed visit tomorrow. I left Sheila Severino a message to call back to schedule.

## 2020-09-21 NOTE — TELEPHONE ENCOUNTER
She is almost done with the 2nd antibiotic and she is still wheezy, coughing. Pt. Has to go for another infusion at the [facility] center on Wed.

## 2020-09-21 NOTE — TELEPHONE ENCOUNTER
She had a telemed visit on 8/31, started on Augmentin. She did take all of the doses but it did cause a rash. Switched on Doxycycline on 9/12/20. She is having coughing up thick yellow sputum. She has been using the Combivent 3-4 times per day.  She is havi

## 2020-09-22 ENCOUNTER — TELEMEDICINE (OUTPATIENT)
Dept: FAMILY MEDICINE CLINIC | Facility: CLINIC | Age: 75
End: 2020-09-22
Payer: MEDICARE

## 2020-09-22 ENCOUNTER — TELEPHONE (OUTPATIENT)
Dept: FAMILY MEDICINE CLINIC | Facility: CLINIC | Age: 75
End: 2020-09-22

## 2020-09-22 DIAGNOSIS — J44.1 COPD WITH EXACERBATION (HCC): Primary | ICD-10-CM

## 2020-09-22 DIAGNOSIS — I50.9 CHF (NYHA CLASS III, ACC/AHA STAGE C) (HCC): ICD-10-CM

## 2020-09-22 PROCEDURE — 99213 OFFICE O/P EST LOW 20 MIN: CPT | Performed by: INTERNAL MEDICINE

## 2020-09-22 RX ORDER — METHYLPREDNISOLONE 4 MG/1
TABLET ORAL
Qty: 1 KIT | Refills: 0 | Status: SHIPPED | OUTPATIENT
Start: 2020-09-22 | End: 2021-04-20

## 2020-09-22 RX ORDER — CLINDAMYCIN HYDROCHLORIDE 150 MG/1
150 CAPSULE ORAL 3 TIMES DAILY
Qty: 30 CAPSULE | Refills: 0 | Status: SHIPPED | OUTPATIENT
Start: 2020-09-22 | End: 2020-10-02

## 2020-09-22 NOTE — TELEPHONE ENCOUNTER
guaiFENesin-Codeine 200-8 MG/5ML Oral Liquid   PT. STATES SHE IS HIGHLY ALLERGIC TO CODEINE AND CANNOT TAKE THIS. PT. GOING TO  THE ANTIBIOTIC NOW. SHE IS GOING TO GET THE HIGH DOSE FLU SHOT TODAY.

## 2020-09-22 NOTE — PROGRESS NOTES
HPI:   Indira Anderson is a 76year old female who presents for upper respiratory symptoms for  12  days. Patient reports still coughing and productive. No fever or chills, hurts to cough, thick secretions. Wearing O2 in video and sats 93%.   Weight up 3 Take 3 mL by nebulization every 6 (six) hours as needed. 100 vial 1   • BIOTIN OR Take 1 tablet by mouth daily.           Past Medical History:   Diagnosis Date   • Acute bronchitis 11/29/2011   • Arthritis    • Black stools    • Chest pain, unspecified 12/ Packs/day: 1.00        Years: 50.00        Pack years: 48        Types: Cigarettes        Quit date: 1/1/2010        Years since quitting: 10.7      Smokeless tobacco: Never Used      Tobacco comment: quit a few years ago    Alcohol use: No      Alcohol/we

## 2020-09-23 ENCOUNTER — OFFICE VISIT (OUTPATIENT)
Dept: HEMATOLOGY/ONCOLOGY | Age: 75
End: 2020-09-23
Attending: INTERNAL MEDICINE
Payer: MEDICARE

## 2020-09-23 VITALS
WEIGHT: 191.19 LBS | DIASTOLIC BLOOD PRESSURE: 66 MMHG | HEART RATE: 117 BPM | OXYGEN SATURATION: 94 % | BODY MASS INDEX: 35 KG/M2 | RESPIRATION RATE: 18 BRPM | SYSTOLIC BLOOD PRESSURE: 117 MMHG

## 2020-09-23 VITALS — HEART RATE: 108 BPM | DIASTOLIC BLOOD PRESSURE: 71 MMHG | SYSTOLIC BLOOD PRESSURE: 132 MMHG

## 2020-09-23 DIAGNOSIS — N18.2 ANEMIA OF CHRONIC RENAL FAILURE, STAGE 2 (MILD): ICD-10-CM

## 2020-09-23 DIAGNOSIS — D50.0 IRON DEFICIENCY ANEMIA DUE TO CHRONIC BLOOD LOSS: Primary | ICD-10-CM

## 2020-09-23 DIAGNOSIS — D63.1 ANEMIA OF CHRONIC RENAL FAILURE, STAGE 2 (MILD): ICD-10-CM

## 2020-09-23 PROCEDURE — 99213 OFFICE O/P EST LOW 20 MIN: CPT | Performed by: INTERNAL MEDICINE

## 2020-09-23 PROCEDURE — 96365 THER/PROPH/DIAG IV INF INIT: CPT

## 2020-09-23 RX ORDER — ALLOPURINOL 100 MG/1
100 TABLET ORAL DAILY
COMMUNITY

## 2020-09-23 NOTE — PROGRESS NOTES
Cancer Center Progress Note  Patient Name: Vernard Osgood   YOB: 1945   Medical Record Number: [id number]   CSN: [id number]   Attending Physician: Marina Reilly M.D.        Date of Visit: 9/23/2020    Chief Complaint:  Patient present • Chronic atrial fibrillation (HCC)    • Congestive heart disease (HCC)    • Congestive heart failure, unspecified 11/29/2011   • Constipation    • COPD (chronic obstructive pulmonary disease) (HCC)    • Coronary atherosclerosis of unspecified type of ve Worry: Not on file        Inability: Not on file      Transportation needs        Medical: Not on file        Non-medical: Not on file    Tobacco Use      Smoking status: Former Smoker        Packs/day: 1.00        Years: 50.00        Pack years: 48 (150 mg total) by mouth 3 (three) times daily for 10 days. , Disp: 30 capsule, Rfl: 0  •  guaiFENesin-Codeine 200-8 MG/5ML Oral Liquid, Take 5 mL by mouth 3 (three) times daily as needed. , Disp: 250 mL, Rfl: 0  •  methylPREDNISolone (MEDROL) 4 MG Oral Table orthopnea. Gastrointestinal No nausea, vomiting, diarrhea, GI bleeding, or constipation. NL appetite, No Early Satiety. Genitorurinary No hematuria, dysuria, abnormal bleeding. Integumentary No rashes, No yellowing.    Neurologic No headache, blurred Range: 0.55 - 1.02 mg/dL 1.27 (H)   CALCIUM Latest Ref Range: 8.5 - 10.1 mg/dL 9.4   BUN/CREAT Ratio Latest Ref Range: 10.0 - 20.0  19.7   eGFR NON-AFR.  AMERICAN Latest Ref Range: >=60  41 (L)   eGFR  Latest Ref Range: >=60  48 (L)   ANION 46.3 fL 61.1 (H)   Prelim Neutrophil Abs Latest Ref Range: 1.50 - 7.70 x10 (3) uL 6.98   Neutrophils Absolute Latest Ref Range: 1.50 - 7.70 x10(3) uL 6.98   Lymphocytes Absolute Latest Ref Range: 1.00 - 4.00 x10(3) uL 1.73   Monocytes Absolute Latest Ref R family. Electronically Signed by: Freddie Dillon M.D.   THE MEDICAL [facility]

## 2020-09-26 ENCOUNTER — MED REC SCAN ONLY (OUTPATIENT)
Dept: FAMILY MEDICINE CLINIC | Facility: CLINIC | Age: 75
End: 2020-09-26

## 2020-10-01 ENCOUNTER — TELEPHONE (OUTPATIENT)
Dept: FAMILY MEDICINE CLINIC | Facility: CLINIC | Age: 75
End: 2020-10-01

## 2020-10-01 NOTE — TELEPHONE ENCOUNTER
FEVER  TEMP ALL OVER TODAY- JUST BOUGHT NEW THERMOMETER TODAY- TEMPORAL TOUCH    PATIENT REFUSED TO SCHEDULE VIRTUAL VISIT  STATES SHE HAS BEEN CALLING.

## 2020-10-01 NOTE — TELEPHONE ENCOUNTER
Patient said she took her temperature and it was 100.2 then 99.7 then 102. 3. she said she just took it now and it is 98.9. She said it is a temporal thermometer. She said she feels ok but just tired.  She said the only place she's been is the cancer center

## 2020-10-05 ENCOUNTER — LAB ENCOUNTER (OUTPATIENT)
Dept: LAB | Age: 75
End: 2020-10-05
Attending: INTERNAL MEDICINE
Payer: MEDICARE

## 2020-10-05 ENCOUNTER — TELEPHONE (OUTPATIENT)
Dept: FAMILY MEDICINE CLINIC | Facility: CLINIC | Age: 75
End: 2020-10-05

## 2020-10-05 ENCOUNTER — OFFICE VISIT (OUTPATIENT)
Dept: FAMILY MEDICINE CLINIC | Facility: CLINIC | Age: 75
End: 2020-10-05
Payer: MEDICARE

## 2020-10-05 VITALS
DIASTOLIC BLOOD PRESSURE: 72 MMHG | BODY MASS INDEX: 34.78 KG/M2 | HEART RATE: 88 BPM | HEIGHT: 62 IN | SYSTOLIC BLOOD PRESSURE: 118 MMHG | RESPIRATION RATE: 16 BRPM | WEIGHT: 189 LBS | TEMPERATURE: 98 F | OXYGEN SATURATION: 95 %

## 2020-10-05 DIAGNOSIS — R63.5 WEIGHT GAIN: ICD-10-CM

## 2020-10-05 DIAGNOSIS — R05.9 COUGH: Primary | ICD-10-CM

## 2020-10-05 DIAGNOSIS — J44.1 COPD WITH EXACERBATION (HCC): ICD-10-CM

## 2020-10-05 DIAGNOSIS — R05.9 COUGH: ICD-10-CM

## 2020-10-05 PROCEDURE — 84439 ASSAY OF FREE THYROXINE: CPT

## 2020-10-05 PROCEDURE — 3008F BODY MASS INDEX DOCD: CPT | Performed by: INTERNAL MEDICINE

## 2020-10-05 PROCEDURE — 3074F SYST BP LT 130 MM HG: CPT | Performed by: INTERNAL MEDICINE

## 2020-10-05 PROCEDURE — 87205 SMEAR GRAM STAIN: CPT

## 2020-10-05 PROCEDURE — 87070 CULTURE OTHR SPECIMN AEROBIC: CPT

## 2020-10-05 PROCEDURE — 36415 COLL VENOUS BLD VENIPUNCTURE: CPT

## 2020-10-05 PROCEDURE — 3078F DIAST BP <80 MM HG: CPT | Performed by: INTERNAL MEDICINE

## 2020-10-05 PROCEDURE — 99214 OFFICE O/P EST MOD 30 MIN: CPT | Performed by: INTERNAL MEDICINE

## 2020-10-05 PROCEDURE — 84443 ASSAY THYROID STIM HORMONE: CPT

## 2020-10-05 PROCEDURE — 83880 ASSAY OF NATRIURETIC PEPTIDE: CPT

## 2020-10-05 RX ORDER — POTASSIUM CHLORIDE 750 MG/1
40 TABLET, FILM COATED, EXTENDED RELEASE ORAL
COMMUNITY
End: 2021-09-27

## 2020-10-05 RX ORDER — BUDESONIDE AND FORMOTEROL FUMARATE DIHYDRATE 160; 4.5 UG/1; UG/1
2 AEROSOL RESPIRATORY (INHALATION) 2 TIMES DAILY
Qty: 3 INHALER | Refills: 0 | COMMUNITY
Start: 2020-10-05 | End: 2021-01-03

## 2020-10-05 RX ORDER — POTASSIUM CHLORIDE 1500 MG/1
40 TABLET, FILM COATED, EXTENDED RELEASE ORAL DAILY
Qty: 180 TABLET | Refills: 1 | Status: SHIPPED | OUTPATIENT
Start: 2020-10-05 | End: 2021-01-03

## 2020-10-05 RX ORDER — DILTIAZEM HYDROCHLORIDE 240 MG/1
240 CAPSULE, EXTENDED RELEASE ORAL 2 TIMES DAILY
COMMUNITY
End: 2020-11-30

## 2020-10-05 NOTE — PROGRESS NOTES
HPI:   Rea Razo is a 76year old female who presents for upper respiratory symptoms for  2  weeks. Patient reports congestion, low grade fever, dry cough, wheezing.   She has frquent exacerbations of COPD and heart failure, but her symptoms are mos Congestive heart failure, unspecified 11/29/2011   • Constipation    • COPD (chronic obstructive pulmonary disease) (HCC)    • Coronary atherosclerosis of unspecified type of vessel, native or graft 11/29/2011   • Edema 01/12/2012   • Esophageal reflux 01/ vision  HEENT: congested  LUNGS: denies shortness of breath with exertion  CARDIOVASCULAR: denies chest pain on exertion  GI: no nausea or abdominal pain  NEURO: denies headaches    EXAM:   /72 (BP Location: Right arm, Patient Position: Sitting, Cuff

## 2020-10-05 NOTE — TELEPHONE ENCOUNTER
Patient asking for a refill of her Potassium to [facility]. She said that she cannot swallow the Potassium from her mail order because they disintegrate in her mouth. Please confirm dose, is patient to take 20MEQ daily?  If so she would like to [facility] t

## 2020-10-06 ENCOUNTER — TELEPHONE (OUTPATIENT)
Dept: FAMILY MEDICINE CLINIC | Facility: CLINIC | Age: 75
End: 2020-10-06

## 2020-10-06 DIAGNOSIS — R05.9 COUGH: Primary | ICD-10-CM

## 2020-10-06 NOTE — TELEPHONE ENCOUNTER
Per Dinah Hart at THE [facility] lab, the Covid AB test cannot be run because a gold tube was not drawn.

## 2020-10-06 NOTE — TELEPHONE ENCOUNTER
Patient advised.  She said that she thought Dr Barber Butts was doing a Covid test not the antibody test.

## 2020-10-06 NOTE — TELEPHONE ENCOUNTER
No thyroid problem to address weight gain, NO HEART FAILURE, volume status is perfect.   COVID antibody was not drawn by phlebotomist.  Peri Perkins we can do this anytime, so Earline carranza has a recliner that she wants to get rid of asked if you would be available 1

## 2020-10-07 NOTE — TELEPHONE ENCOUNTER
Patient advised. She said that she feels good right now and she does not have a fever. She does not want to be tested right now.

## 2020-10-07 NOTE — TELEPHONE ENCOUNTER
No, I think that's just a nasty lady and I dont want to do it for her satisfaction, I dont think that is the problem, but if you want to know you have to have a nasal swab at another location, if I order this it will likely be in [place].     If I though you

## 2020-10-16 ENCOUNTER — TELEPHONE (OUTPATIENT)
Dept: FAMILY MEDICINE CLINIC | Facility: CLINIC | Age: 75
End: 2020-10-16

## 2020-10-24 ENCOUNTER — TELEPHONE (OUTPATIENT)
Dept: FAMILY MEDICINE CLINIC | Facility: CLINIC | Age: 75
End: 2020-10-24

## 2020-10-24 NOTE — TELEPHONE ENCOUNTER
PT WILL BE HERE 11/9 TO DO LABS FOR DR FULLER, IF DR PINO WANTS ANY LABS DONE AS WELL NEED ORDERS PUT IN EPIC

## 2020-11-09 ENCOUNTER — LAB ENCOUNTER (OUTPATIENT)
Dept: LAB | Age: 75
End: 2020-11-09
Attending: INTERNAL MEDICINE
Payer: MEDICARE

## 2020-11-09 ENCOUNTER — OFFICE VISIT (OUTPATIENT)
Dept: FAMILY MEDICINE CLINIC | Facility: CLINIC | Age: 75
End: 2020-11-09
Payer: MEDICARE

## 2020-11-09 VITALS
WEIGHT: 183 LBS | HEART RATE: 77 BPM | OXYGEN SATURATION: 97 % | SYSTOLIC BLOOD PRESSURE: 130 MMHG | BODY MASS INDEX: 33.68 KG/M2 | TEMPERATURE: 98 F | DIASTOLIC BLOOD PRESSURE: 77 MMHG | RESPIRATION RATE: 18 BRPM | HEIGHT: 62 IN

## 2020-11-09 DIAGNOSIS — R05.9 COUGH: ICD-10-CM

## 2020-11-09 DIAGNOSIS — D50.0 IRON DEFICIENCY ANEMIA DUE TO CHRONIC BLOOD LOSS: ICD-10-CM

## 2020-11-09 DIAGNOSIS — J34.89 SINUS DRAINAGE: Primary | ICD-10-CM

## 2020-11-09 PROBLEM — D69.6 THROMBOCYTOPENIA (HCC): Chronic | Status: ACTIVE | Noted: 2020-11-09

## 2020-11-09 PROBLEM — D69.6 THROMBOCYTOPENIA: Chronic | Status: ACTIVE | Noted: 2020-11-09

## 2020-11-09 PROCEDURE — 3075F SYST BP GE 130 - 139MM HG: CPT | Performed by: INTERNAL MEDICINE

## 2020-11-09 PROCEDURE — 85025 COMPLETE CBC W/AUTO DIFF WBC: CPT

## 2020-11-09 PROCEDURE — 83550 IRON BINDING TEST: CPT

## 2020-11-09 PROCEDURE — 82728 ASSAY OF FERRITIN: CPT

## 2020-11-09 PROCEDURE — 83540 ASSAY OF IRON: CPT

## 2020-11-09 PROCEDURE — 36415 COLL VENOUS BLD VENIPUNCTURE: CPT

## 2020-11-09 PROCEDURE — 99213 OFFICE O/P EST LOW 20 MIN: CPT | Performed by: INTERNAL MEDICINE

## 2020-11-09 PROCEDURE — 86769 SARS-COV-2 COVID-19 ANTIBODY: CPT

## 2020-11-09 PROCEDURE — 3008F BODY MASS INDEX DOCD: CPT | Performed by: INTERNAL MEDICINE

## 2020-11-09 PROCEDURE — 3078F DIAST BP <80 MM HG: CPT | Performed by: INTERNAL MEDICINE

## 2020-11-09 NOTE — PROGRESS NOTES
Leonardo Flores is a 76year old female. HPI:   Pt has been feeling a little more sinus congestion and PND. She has a productive cough in the morning. She was on antibiotics last month for bronchitis, but this seemed to resolve.   The weather has been obstructive pulmonary disease) (HCC)    • Coronary atherosclerosis of unspecified type of vessel, native or graft 11/29/2011   • Edema 01/12/2012   • Esophageal reflux 01/12/2012   • Factor XII deficiency disease ([facility] 1999    Cipriano   • Fatigue    • Ge encounter. Meds & Refills for this Visit:  Requested Prescriptions      No prescriptions requested or ordered in this encounter       Imaging & Consults:  None    Follow up as needed.      The patient indicates understanding of these issues and agrees

## 2020-11-30 ENCOUNTER — TELEPHONE (OUTPATIENT)
Dept: FAMILY MEDICINE CLINIC | Facility: CLINIC | Age: 75
End: 2020-11-30

## 2020-11-30 RX ORDER — DILTIAZEM HYDROCHLORIDE 240 MG/1
240 CAPSULE, EXTENDED RELEASE ORAL 2 TIMES DAILY
Qty: 180 CAPSULE | Refills: 0 | Status: SHIPPED | OUTPATIENT
Start: 2020-11-30 | End: 2021-05-26

## 2020-11-30 NOTE — TELEPHONE ENCOUNTER
Last OV w/Dr Brandee Collins 11/9/20  Last refill 4/13/20 #180  She said she takes this once daily but wants #1809 tablets because it \"lasts longer\" She said her pulse has been in the 80's, her BP was 115/76.   Patient said that she has had a low grade temp on and

## 2020-11-30 NOTE — TELEPHONE ENCOUNTER
Patient advised. She said she is concerned about the fevers. She said that the side of her head and behind her ear and down her neck hurts especially when she lies down.  She said she is congested and thinks it is her sinuses

## 2020-12-01 ENCOUNTER — TELEPHONE (OUTPATIENT)
Dept: FAMILY MEDICINE CLINIC | Facility: CLINIC | Age: 75
End: 2020-12-01

## 2020-12-01 NOTE — TELEPHONE ENCOUNTER
Wants to know if she can take this. She read about it in the paper, you can order it from a company for chronic sinus issues.

## 2020-12-01 NOTE — TELEPHONE ENCOUNTER
I suggest she use saline rinses and mucinex. Its likely the house is closed and furnace is on blowing indoor allergens around now!

## 2020-12-07 ENCOUNTER — TELEPHONE (OUTPATIENT)
Dept: FAMILY MEDICINE CLINIC | Facility: CLINIC | Age: 75
End: 2020-12-07

## 2020-12-07 NOTE — TELEPHONE ENCOUNTER
Patient said her breast is sore on the side and up into her armpit.  She said that there is a lump on the side of her breast. She said that she has mentioned this to Dr Noreen Jimenez in the past. She said she had her mammogram at the end of May but it wasn't there

## 2020-12-07 NOTE — TELEPHONE ENCOUNTER
She has a lump on her breast and she wants to talk to you about it before making an appointment.  I offered to make the appointment

## 2020-12-08 ENCOUNTER — TELEPHONE (OUTPATIENT)
Dept: FAMILY MEDICINE CLINIC | Facility: CLINIC | Age: 75
End: 2020-12-08

## 2020-12-08 ENCOUNTER — OFFICE VISIT (OUTPATIENT)
Dept: FAMILY MEDICINE CLINIC | Facility: CLINIC | Age: 75
End: 2020-12-08
Payer: MEDICARE

## 2020-12-08 VITALS
DIASTOLIC BLOOD PRESSURE: 70 MMHG | TEMPERATURE: 97 F | RESPIRATION RATE: 16 BRPM | OXYGEN SATURATION: 92 % | HEART RATE: 86 BPM | BODY MASS INDEX: 34.6 KG/M2 | SYSTOLIC BLOOD PRESSURE: 128 MMHG | HEIGHT: 62 IN | WEIGHT: 188 LBS

## 2020-12-08 DIAGNOSIS — J45.40 MODERATE PERSISTENT ASTHMA WITHOUT COMPLICATION: Primary | ICD-10-CM

## 2020-12-08 PROCEDURE — 3008F BODY MASS INDEX DOCD: CPT | Performed by: INTERNAL MEDICINE

## 2020-12-08 PROCEDURE — 99214 OFFICE O/P EST MOD 30 MIN: CPT | Performed by: INTERNAL MEDICINE

## 2020-12-08 PROCEDURE — 3078F DIAST BP <80 MM HG: CPT | Performed by: INTERNAL MEDICINE

## 2020-12-08 PROCEDURE — 3074F SYST BP LT 130 MM HG: CPT | Performed by: INTERNAL MEDICINE

## 2020-12-08 RX ORDER — TIOTROPIUM BROMIDE INHALATION SPRAY 3.12 UG/1
1 SPRAY, METERED RESPIRATORY (INHALATION) DAILY
Qty: 2 INHALER | Refills: 0 | COMMUNITY
Start: 2020-12-08 | End: 2021-02-02

## 2020-12-08 RX ORDER — PRAVASTATIN SODIUM 40 MG
40 TABLET ORAL NIGHTLY
Qty: 90 TABLET | Refills: 3 | COMMUNITY
Start: 2020-12-08

## 2020-12-08 NOTE — TELEPHONE ENCOUNTER
Dr. Irina López gave her a new inhaler today at her visit and forgot to tell her how to use it. Please call her back and go over with her.

## 2020-12-08 NOTE — PROGRESS NOTES
Joshua Lopez is a 76year old female. HPI:   Pt has been having increased productive cough, and left chest wall pain in the pectoralis and up to the left armpit, sometimes raising her arm makes her dizzy.   She has been recording her temp and thinks i atrial fibrillation (HCC)    • Congestive heart disease ([facility]    • Congestive heart failure, unspecified 11/29/2011   • Constipation    • COPD (chronic obstructive pulmonary disease) ([facility])    • Coronary atherosclerosis of unspecified type of vessel, native antibiotic needed. No orders of the defined types were placed in this encounter.       Meds & Refills for this Visit:  Requested Prescriptions     Signed Prescriptions Disp Refills   • Tiotropium Bromide Monohydrate (SPIRIVA RESPIMAT) 2.5 MCG/ACT Inhalati

## 2021-01-25 DIAGNOSIS — Z23 NEED FOR VACCINATION: ICD-10-CM

## 2021-01-28 ENCOUNTER — TELEPHONE (OUTPATIENT)
Dept: FAMILY MEDICINE CLINIC | Facility: CLINIC | Age: 76
End: 2021-01-28

## 2021-01-28 NOTE — TELEPHONE ENCOUNTER
Patient said that she called here last week and asked if she should get her shot at the health department but they wouldn't let her talk to the nurse.  She said that now she can't get it at the health department because she is not on the waiting list. She w

## 2021-01-28 NOTE — TELEPHONE ENCOUNTER
Korin Lorenzo called in today and we spoke about where [facility] would be offering the COVID immunizations and she is not able to make it to either of those locations if you could please discontinue her COVID vaccine order.

## 2021-01-30 NOTE — TELEPHONE ENCOUNTER
Patient said that [facility] has slots open but she can't get on the website. Advised that we will look into it and let her know. She said that she has been wheezing \"for quite awhile\" and has nasal congestion since prior to the last time she saw Dr Kia Blake.  Ca

## 2021-02-01 NOTE — TELEPHONE ENCOUNTER
LM for patient that Akira Hua states patient needs to go on the website to sign up. Unable to locate other pharmacies in the area that are giving vaccines at this time. Advised she should stay on the list at [facility].

## 2021-02-08 ENCOUNTER — TELEPHONE (OUTPATIENT)
Dept: FAMILY MEDICINE CLINIC | Facility: CLINIC | Age: 76
End: 2021-02-08

## 2021-02-08 RX ORDER — ALLOPURINOL 100 MG/1
100 TABLET ORAL DAILY
Qty: 90 TABLET | Refills: 3 | Status: SHIPPED | OUTPATIENT
Start: 2021-02-08 | End: 2022-02-05

## 2021-02-08 NOTE — TELEPHONE ENCOUNTER
Patient has 300mg and 100mg on her list. She said that she has been taking 100mg and if she needs to she takes 200mg. She said she hit her thumb last week by dropping bird see on it. She said that her thumb is sore and the blood blister popped.

## 2021-02-08 NOTE — TELEPHONE ENCOUNTER
allopurinol 100 MG Oral Tab    [address] [place] [phone], [phone]    LAST VISIT WAS ON 12/08/2020    PATIENT WOULD ALSO LIKE TO SPEAK WITH THE NURSE.

## 2021-02-17 ENCOUNTER — MED REC SCAN ONLY (OUTPATIENT)
Dept: FAMILY MEDICINE CLINIC | Facility: CLINIC | Age: 76
End: 2021-02-17

## 2021-02-17 ENCOUNTER — IMMUNIZATION (OUTPATIENT)
Dept: LAB | Age: 76
End: 2021-02-17
Attending: HOSPITALIST
Payer: MEDICARE

## 2021-02-17 ENCOUNTER — OFFICE VISIT (OUTPATIENT)
Dept: CARDIOLOGY | Facility: CLINIC | Age: 76
End: 2021-02-17

## 2021-02-17 VITALS
HEART RATE: 68 BPM | BODY MASS INDEX: 36.44 KG/M2 | SYSTOLIC BLOOD PRESSURE: 128 MMHG | WEIGHT: 198 LBS | DIASTOLIC BLOOD PRESSURE: 60 MMHG | HEIGHT: 62 IN

## 2021-02-17 DIAGNOSIS — R06.02 SOB (SHORTNESS OF BREATH) ON EXERTION: ICD-10-CM

## 2021-02-17 DIAGNOSIS — I48.21 PERMANENT ATRIAL FIBRILLATION (CMD): ICD-10-CM

## 2021-02-17 DIAGNOSIS — J44.9 CHRONIC OBSTRUCTIVE PULMONARY DISEASE, UNSPECIFIED COPD TYPE (CMD): Primary | ICD-10-CM

## 2021-02-17 DIAGNOSIS — R60.1 GENERALIZED EDEMA: ICD-10-CM

## 2021-02-17 DIAGNOSIS — Z95.1 HX OF CABG: ICD-10-CM

## 2021-02-17 DIAGNOSIS — Z23 NEED FOR VACCINATION: Primary | ICD-10-CM

## 2021-02-17 DIAGNOSIS — E78.00 PURE HYPERCHOLESTEROLEMIA: ICD-10-CM

## 2021-02-17 DIAGNOSIS — I25.10 CORONARY ARTERY DISEASE INVOLVING NATIVE CORONARY ARTERY OF NATIVE HEART WITHOUT ANGINA PECTORIS: ICD-10-CM

## 2021-02-17 PROCEDURE — 99214 OFFICE O/P EST MOD 30 MIN: CPT | Performed by: INTERNAL MEDICINE

## 2021-02-17 PROCEDURE — 0001A SARSCOV2 VAC 30MCG/0.3ML IM: CPT

## 2021-02-17 RX ORDER — FAMOTIDINE 20 MG/1
TABLET, FILM COATED ORAL
COMMUNITY

## 2021-02-17 RX ORDER — POTASSIUM CHLORIDE 1500 MG/1
20 TABLET, EXTENDED RELEASE ORAL DAILY
COMMUNITY
Start: 2021-01-02

## 2021-02-17 RX ORDER — FUROSEMIDE 40 MG/1
TABLET ORAL
COMMUNITY
Start: 2020-06-04

## 2021-02-17 SDOH — HEALTH STABILITY: PHYSICAL HEALTH: ON AVERAGE, HOW MANY DAYS PER WEEK DO YOU ENGAGE IN MODERATE TO STRENUOUS EXERCISE (LIKE A BRISK WALK)?: 0 DAYS

## 2021-02-17 SDOH — HEALTH STABILITY: MENTAL HEALTH: HOW OFTEN DO YOU HAVE A DRINK CONTAINING ALCOHOL?: NEVER

## 2021-02-17 SDOH — SOCIAL STABILITY: SOCIAL NETWORK: ARE YOU MARRIED, WIDOWED, DIVORCED, SEPARATED, NEVER MARRIED, OR LIVING WITH A PARTNER?: DIVORCED

## 2021-02-17 SDOH — HEALTH STABILITY: PHYSICAL HEALTH: ON AVERAGE, HOW MANY MINUTES DO YOU ENGAGE IN EXERCISE AT THIS LEVEL?: 0 MIN

## 2021-02-17 ASSESSMENT — PATIENT HEALTH QUESTIONNAIRE - PHQ9
CLINICAL INTERPRETATION OF PHQ9 SCORE: NO FURTHER SCREENING NEEDED
SUM OF ALL RESPONSES TO PHQ9 QUESTIONS 1 AND 2: 0
2. FEELING DOWN, DEPRESSED OR HOPELESS: NOT AT ALL
SUM OF ALL RESPONSES TO PHQ9 QUESTIONS 1 AND 2: 0
CLINICAL INTERPRETATION OF PHQ2 SCORE: NO FURTHER SCREENING NEEDED
1. LITTLE INTEREST OR PLEASURE IN DOING THINGS: NOT AT ALL

## 2021-02-22 ENCOUNTER — TELEPHONE (OUTPATIENT)
Dept: FAMILY MEDICINE CLINIC | Facility: CLINIC | Age: 76
End: 2021-02-22

## 2021-02-22 NOTE — TELEPHONE ENCOUNTER
Patient said that she had a back injury from when she worked at the [employer]". She said she was shoveling last Tuesday. She said she reinjured her back and went to [facility] ER on Friday.  She said that she is taking Tramadol for the pain and

## 2021-02-22 NOTE — TELEPHONE ENCOUNTER
Patient advised. She said she thinks the swelling of her face is better than it was. She denies any tongue swelling or difficulty breathing.

## 2021-02-22 NOTE — TELEPHONE ENCOUNTER
Results  - from Last 3 Months    XR LUMBAR SPINE 2 OR 3 VIEWS (02/19/2021 12:20 PM CST)  XR LUMBAR SPINE 2 OR 3 VIEWS (02/19/2021 12:20 PM CST)   Specimen         XR LUMBAR SPINE 2 OR 3 VIEWS (02/19/2021 12:20 PM CST)   Impressions Performed At   [facility] on L5. Normal lumbar lordosis. Vertebral body heights are maintained. Multilevel disc space narrowing throughout the lumbar spine, most pronounced at L5-S1. Right upper quadrant and pelvic surgical clips. Aortoiliac atherosclerotic calcification.     IMP

## 2021-02-22 NOTE — TELEPHONE ENCOUNTER
I think she can use the tramadol once every 4 hours, no shoveling and I can sent therapy to her home if she would like. Im not sure about her face, a picture would be helpful.

## 2021-03-10 ENCOUNTER — IMMUNIZATION (OUTPATIENT)
Dept: LAB | Age: 76
End: 2021-03-10
Attending: HOSPITALIST
Payer: MEDICARE

## 2021-03-10 DIAGNOSIS — Z23 NEED FOR VACCINATION: Primary | ICD-10-CM

## 2021-03-10 PROCEDURE — 0002A SARSCOV2 VAC 30MCG/0.3ML IM: CPT

## 2021-03-16 ENCOUNTER — TELEPHONE (OUTPATIENT)
Dept: FAMILY MEDICINE CLINIC | Facility: CLINIC | Age: 76
End: 2021-03-16

## 2021-03-16 NOTE — TELEPHONE ENCOUNTER
Pt. Still having issues with her breathing and she wants to discuss with the nurse her side effects from the covid vaccine. [email or web address]

## 2021-03-16 NOTE — TELEPHONE ENCOUNTER
Patient said that she has been very fatigued since getting the Covid vaccine and she had a headache. She said her face and tongue \"swelled\" up over the weekend but it has gotten better. She said that she had to use her nebulizer over the weekend.  She khai

## 2021-03-17 NOTE — TELEPHONE ENCOUNTER
Patient advised that these side effects are likely from the vaccine and should improve within the next couple days. V.O. Dr Hunter London RN. She said her breathing is slightly better. She said her oxygen saturation has been around 93%. Dr Reinaldo camacho.

## 2021-04-08 ENCOUNTER — TELEPHONE (OUTPATIENT)
Dept: FAMILY MEDICINE CLINIC | Facility: CLINIC | Age: 76
End: 2021-04-08

## 2021-04-20 ENCOUNTER — OFFICE VISIT (OUTPATIENT)
Dept: FAMILY MEDICINE CLINIC | Facility: CLINIC | Age: 76
End: 2021-04-20
Payer: MEDICARE

## 2021-04-20 ENCOUNTER — LAB ENCOUNTER (OUTPATIENT)
Dept: LAB | Age: 76
End: 2021-04-20
Attending: INTERNAL MEDICINE
Payer: MEDICARE

## 2021-04-20 ENCOUNTER — TELEPHONE (OUTPATIENT)
Dept: FAMILY MEDICINE CLINIC | Facility: CLINIC | Age: 76
End: 2021-04-20

## 2021-04-20 VITALS
SYSTOLIC BLOOD PRESSURE: 110 MMHG | RESPIRATION RATE: 18 BRPM | WEIGHT: 201 LBS | HEART RATE: 97 BPM | DIASTOLIC BLOOD PRESSURE: 70 MMHG | HEIGHT: 62 IN | OXYGEN SATURATION: 97 % | BODY MASS INDEX: 36.99 KG/M2 | TEMPERATURE: 98 F

## 2021-04-20 DIAGNOSIS — M46.92 INFLAMMATORY SPONDYLOPATHY OF CERVICAL REGION (HCC): ICD-10-CM

## 2021-04-20 DIAGNOSIS — N18.4 STAGE 4 CHRONIC KIDNEY DISEASE (HCC): ICD-10-CM

## 2021-04-20 DIAGNOSIS — D68.2 FACTOR XII DEFICIENCY (HCC): ICD-10-CM

## 2021-04-20 DIAGNOSIS — R09.02 HYPOXIA: ICD-10-CM

## 2021-04-20 DIAGNOSIS — E66.01 SEVERE OBESITY (BMI 35.0-39.9) WITH COMORBIDITY (HCC): ICD-10-CM

## 2021-04-20 DIAGNOSIS — I48.20 ATRIAL FIBRILLATION, CHRONIC (HCC): ICD-10-CM

## 2021-04-20 DIAGNOSIS — J44.9 CHRONIC OBSTRUCTIVE PULMONARY DISEASE, UNSPECIFIED COPD TYPE (HCC): ICD-10-CM

## 2021-04-20 DIAGNOSIS — I70.0 ABDOMINAL AORTIC ATHEROSCLEROSIS (HCC): ICD-10-CM

## 2021-04-20 DIAGNOSIS — D50.0 IRON DEFICIENCY ANEMIA DUE TO CHRONIC BLOOD LOSS: ICD-10-CM

## 2021-04-20 DIAGNOSIS — J96.01 ACUTE RESPIRATORY FAILURE WITH HYPOXEMIA (HCC): ICD-10-CM

## 2021-04-20 DIAGNOSIS — E21.2 OTHER HYPERPARATHYROIDISM (HCC): ICD-10-CM

## 2021-04-20 DIAGNOSIS — I50.9 CHF (NYHA CLASS III, ACC/AHA STAGE C) (HCC): Primary | ICD-10-CM

## 2021-04-20 DIAGNOSIS — I25.719 CORONARY ARTERY DISEASE INVOLVING AUTOLOGOUS VEIN CORONARY BYPASS GRAFT WITH ANGINA PECTORIS (HCC): ICD-10-CM

## 2021-04-20 PROCEDURE — 83540 ASSAY OF IRON: CPT

## 2021-04-20 PROCEDURE — 99214 OFFICE O/P EST MOD 30 MIN: CPT | Performed by: INTERNAL MEDICINE

## 2021-04-20 PROCEDURE — 80053 COMPREHEN METABOLIC PANEL: CPT

## 2021-04-20 PROCEDURE — 83550 IRON BINDING TEST: CPT

## 2021-04-20 PROCEDURE — 80061 LIPID PANEL: CPT

## 2021-04-20 PROCEDURE — 85025 COMPLETE CBC W/AUTO DIFF WBC: CPT

## 2021-04-20 PROCEDURE — 3008F BODY MASS INDEX DOCD: CPT | Performed by: INTERNAL MEDICINE

## 2021-04-20 PROCEDURE — 82728 ASSAY OF FERRITIN: CPT

## 2021-04-20 PROCEDURE — 36415 COLL VENOUS BLD VENIPUNCTURE: CPT

## 2021-04-20 PROCEDURE — 3078F DIAST BP <80 MM HG: CPT | Performed by: INTERNAL MEDICINE

## 2021-04-20 PROCEDURE — 3074F SYST BP LT 130 MM HG: CPT | Performed by: INTERNAL MEDICINE

## 2021-04-20 RX ORDER — POTASSIUM CHLORIDE 20 MEQ/1
20 TABLET, EXTENDED RELEASE ORAL DAILY
COMMUNITY
Start: 2021-01-02 | End: 2021-06-01

## 2021-04-20 NOTE — PROGRESS NOTES
Jessica Monday is a 76year old female. HPI:   Tenderness in the left breast.  She has has been stuck in her house. She has to do everything for herself. Headaches since COVID shot. She has gained 25 pounds this year.   Vacuuming and mopping painful f unspecified atherosclerosis 11/29/2011   • Gout    • Hematemesis 12/13/2011   • Parathyroid tumor    • Wears glasses    • West Nile Virus infection       Social History:  Social History    Tobacco Use      Smoking status: Former Smoker        Packs/day: 1.

## 2021-04-20 NOTE — TELEPHONE ENCOUNTER
Pt wants to be seen for a dull ache in left arm to her hand. She has had a headache since her 2nd covid shot, can she come to the office or respitory clinic?

## 2021-04-22 ENCOUNTER — PATIENT MESSAGE (OUTPATIENT)
Dept: FAMILY MEDICINE CLINIC | Facility: CLINIC | Age: 76
End: 2021-04-22

## 2021-04-22 NOTE — TELEPHONE ENCOUNTER
Patient states she has been having some difficulty swallowing pills and wants to know if it is from her thyroid.

## 2021-04-22 NOTE — TELEPHONE ENCOUNTER
From: Mike Howard  To: Joshua Worrell MD  Sent: 4/22/2021 11:17 AM CDT  Subject: Non-Urgent Medical Question    Thyroid was that included in blood work ?

## 2021-04-22 NOTE — TELEPHONE ENCOUNTER
No its from her esophagus, she has a stricture that is why she takes the famotidine, if it is significant then need GI and EGD and dilation.

## 2021-04-26 ENCOUNTER — TELEPHONE (OUTPATIENT)
Dept: FAMILY MEDICINE CLINIC | Facility: CLINIC | Age: 76
End: 2021-04-26

## 2021-04-26 RX ORDER — IPRATROPIUM/ALBUTEROL SULFATE 20-100 MCG
1 MIST INHALER (GRAM) INHALATION 4 TIMES DAILY
Qty: 3 INHALER | Refills: 3 | Status: SHIPPED | OUTPATIENT
Start: 2021-04-26 | End: 2021-11-15

## 2021-04-26 NOTE — TELEPHONE ENCOUNTER
Patient assistance paperwork was faxed to [facility] for financial assistance with Combivent. Fax received stating they need a prescription for the Combivent. Script faxed.

## 2021-04-27 ENCOUNTER — MED REC SCAN ONLY (OUTPATIENT)
Dept: FAMILY MEDICINE CLINIC | Facility: CLINIC | Age: 76
End: 2021-04-27

## 2021-04-27 ENCOUNTER — TELEPHONE (OUTPATIENT)
Dept: FAMILY MEDICINE CLINIC | Facility: CLINIC | Age: 76
End: 2021-04-27

## 2021-04-27 NOTE — TELEPHONE ENCOUNTER
Patient said that she is having a mammogram June 1st, she wants to make sure she does not need a referral. Patient advised the order is in Epic no referral needed.

## 2021-04-28 NOTE — TELEPHONE ENCOUNTER
JOSE for patient to CB 350pm. Fax received from [facility] that the paperwork patient filled out is an \"old form\" Per Danika Khalil the current form needs to be filled out and signed by patient page 3.

## 2021-04-29 ENCOUNTER — TELEPHONE (OUTPATIENT)
Dept: FAMILY MEDICINE CLINIC | Facility: CLINIC | Age: 76
End: 2021-04-29

## 2021-05-03 ENCOUNTER — TELEPHONE (OUTPATIENT)
Dept: FAMILY MEDICINE CLINIC | Facility: CLINIC | Age: 76
End: 2021-05-03

## 2021-05-03 NOTE — TELEPHONE ENCOUNTER
Per Rony Harris at [facility] states that they received the information on 4/26/21 for the medication assistance program for the Combivent and the medication has been approved through 12/31/21. Patient advised they will ship it on Wed.

## 2021-05-06 ENCOUNTER — MED REC SCAN ONLY (OUTPATIENT)
Dept: FAMILY MEDICINE CLINIC | Facility: CLINIC | Age: 76
End: 2021-05-06

## 2021-05-11 ENCOUNTER — OFFICE VISIT (OUTPATIENT)
Dept: FAMILY MEDICINE CLINIC | Facility: CLINIC | Age: 76
End: 2021-05-11
Payer: MEDICARE

## 2021-05-11 VITALS
WEIGHT: 204 LBS | HEIGHT: 62 IN | TEMPERATURE: 98 F | BODY MASS INDEX: 37.54 KG/M2 | DIASTOLIC BLOOD PRESSURE: 85 MMHG | HEART RATE: 100 BPM | OXYGEN SATURATION: 91 % | SYSTOLIC BLOOD PRESSURE: 133 MMHG | RESPIRATION RATE: 18 BRPM

## 2021-05-11 DIAGNOSIS — I48.20 ATRIAL FIBRILLATION, CHRONIC (HCC): ICD-10-CM

## 2021-05-11 DIAGNOSIS — J44.9 CHRONIC OBSTRUCTIVE PULMONARY DISEASE, UNSPECIFIED COPD TYPE (HCC): ICD-10-CM

## 2021-05-11 DIAGNOSIS — N18.4 STAGE 4 CHRONIC KIDNEY DISEASE (HCC): ICD-10-CM

## 2021-05-11 DIAGNOSIS — Z00.00 ENCOUNTER FOR ANNUAL HEALTH EXAMINATION: Primary | ICD-10-CM

## 2021-05-11 DIAGNOSIS — D68.2 FACTOR XII DEFICIENCY (HCC): ICD-10-CM

## 2021-05-11 DIAGNOSIS — I25.719 CORONARY ARTERY DISEASE INVOLVING AUTOLOGOUS VEIN CORONARY BYPASS GRAFT WITH ANGINA PECTORIS (HCC): ICD-10-CM

## 2021-05-11 DIAGNOSIS — I50.9 CHF (NYHA CLASS III, ACC/AHA STAGE C) (HCC): ICD-10-CM

## 2021-05-11 DIAGNOSIS — I10 ESSENTIAL HYPERTENSION: ICD-10-CM

## 2021-05-11 DIAGNOSIS — J44.1 COPD WITH EXACERBATION (HCC): ICD-10-CM

## 2021-05-11 DIAGNOSIS — R09.02 HYPOXIA: ICD-10-CM

## 2021-05-11 DIAGNOSIS — E66.01 SEVERE OBESITY (BMI 35.0-39.9) WITH COMORBIDITY (HCC): ICD-10-CM

## 2021-05-11 DIAGNOSIS — I70.0 ABDOMINAL AORTIC ATHEROSCLEROSIS (HCC): ICD-10-CM

## 2021-05-11 PROCEDURE — 99397 PER PM REEVAL EST PAT 65+ YR: CPT | Performed by: INTERNAL MEDICINE

## 2021-05-11 PROCEDURE — 3075F SYST BP GE 130 - 139MM HG: CPT | Performed by: INTERNAL MEDICINE

## 2021-05-11 PROCEDURE — 96160 PT-FOCUSED HLTH RISK ASSMT: CPT | Performed by: INTERNAL MEDICINE

## 2021-05-11 PROCEDURE — G0439 PPPS, SUBSEQ VISIT: HCPCS | Performed by: INTERNAL MEDICINE

## 2021-05-11 PROCEDURE — 3079F DIAST BP 80-89 MM HG: CPT | Performed by: INTERNAL MEDICINE

## 2021-05-11 PROCEDURE — 3008F BODY MASS INDEX DOCD: CPT | Performed by: INTERNAL MEDICINE

## 2021-05-11 RX ORDER — PREDNISONE 20 MG/1
20 TABLET ORAL DAILY
Qty: 10 TABLET | Refills: 0 | Status: SHIPPED | OUTPATIENT
Start: 2021-05-11 | End: 2021-05-21

## 2021-05-11 NOTE — PROGRESS NOTES
HPI:   Valentina Allen is a 76year old female who presents for a MA (Medicare Advantage) Supervisit (Once per calendar year). Pt has been having increased SOB. WHeezing at night sometimes.    Her last annual assessment has been over 1 year: Annual Care Team:  Shoail Garcia MD as PCP - General (Family Practice)  Aleida Galan MD ([id number] [facility])  Jessie Yañez PA-C as Consulting Physician (Physician Assistant)    Patient Active Problem List:     Congestive heart failure [facility])     COPD (chronic o 9.2 04/20/2021    ALB 4.3 04/20/2021    TSH 2.910 10/05/2020    CREATSERUM 1.05 (H) 04/20/2021    GLU 90 04/20/2021        CBC  (most recent labs)   Lab Results   Component Value Date    WBC 7.2 04/20/2021    HGB 14.3 04/20/2021    .0 (L) 04/20/2021 includes tonsillectomy; tubal ligation (7844); skin surgery; cabg (04/05/2011); carotid exam (03/12/2011); other cardiology; cholecystectomy; and other.     Her family history includes Breast Cancer in her paternal aunt, paternal aunt, paternal cousin femal Corrected Left Eye Chart Acuity: 20/30   Both Eyes Visual Acuity: Corrected Both Eyes Chart Acuity: 20/20   Able To Tolerate Visual Acuity: Yes      General Appearance:  Alert, cooperative, no distress, appears stated age   Head:  Normocephalic, without ob Pincus Olszewski is a 76year old female who presents for a Medicare Assessment.      PLAN SUMMARY:   Diagnoses and all orders for this visit:    Encounter for annual health examination    CHF (NYHA class III, ACC/AHA stage C) ([facility]    Chronic obstructiv General Health     In the past six months, have you lost more than 10 pounds without trying?: 2 - No  Has your appetite been poor?: No  How does the patient maintain a good energy level?: Other  How would you describe your daily physical activity?: Lig if applicable    Chlamydia  Annually if high risk No results found for: CHLAMYDIA No flowsheet data found. Screening Mammogram      Mammogram Annually to 76, then as discussed There are no preventive care reminders to display for this patient.  Update He Spirometry Testing Annually Spirometry date: 10/23/2019 No flowsheet data found.             Template: [facility] MEDICARE ANNUAL ASSESSMENT FEMALE [26466]

## 2021-05-11 NOTE — PATIENT INSTRUCTIONS
Edith Eubanks's SCREENING SCHEDULE   Tests on this list are recommended by your physician but may not be covered, or covered at this frequency, by your insurer. Please check with your insurance carrier before scheduling to verify coverage.    [employer] to patients who meet one of the following criteria:   • Men who are 73-68 years old and have smoked more than 100 cigarettes in their lifetime   • Anyone with a family history    Colorectal Cancer Screening  Covered up to Age 76     Colonoscopy Scr this Mammogram regularly   Immunizations      Influenza  Covered Annually Orders placed or performed in visit on 10/03/19   • FLU VACC HIGH DOSE PRSV FREE   Orders placed or performed in visit on 11/10/16   • FLU VACC [address] ANTIG    Please get ever for anyone to review and print using their home computer and printer. (the forms are also available in [place]  [email or web address]  This link also has information from the [facility] regarding Advance Directives.

## 2021-05-13 ENCOUNTER — TELEPHONE (OUTPATIENT)
Dept: FAMILY MEDICINE CLINIC | Facility: CLINIC | Age: 76
End: 2021-05-13

## 2021-05-13 NOTE — TELEPHONE ENCOUNTER
Patient said that she was working outside and her oxygen dropped to 75%. She did not have her oxygen on outside. She said that when she went back in the house and put her oxygen on 2L her oxygen saturation went back up to 95%.  She asked when this happens c

## 2021-05-26 ENCOUNTER — TELEPHONE (OUTPATIENT)
Dept: FAMILY MEDICINE CLINIC | Facility: CLINIC | Age: 76
End: 2021-05-26

## 2021-05-26 RX ORDER — FUROSEMIDE 40 MG/1
TABLET ORAL
Qty: 90 TABLET | Refills: 1 | Status: SHIPPED | OUTPATIENT
Start: 2021-05-26 | End: 2021-09-27

## 2021-05-26 RX ORDER — DILTIAZEM HYDROCHLORIDE 240 MG/1
CAPSULE, EXTENDED RELEASE ORAL
Qty: 180 CAPSULE | Refills: 1 | Status: SHIPPED | OUTPATIENT
Start: 2021-05-26

## 2021-05-26 NOTE — TELEPHONE ENCOUNTER
Hypertension Medications Protocol [id number]/26/2021 12:58 PM   CMP or BMP in past 12 months Protocol Details    Last serum creatinine< 2.0     Appointment in past 6 or next 3 months      Last refill -   Furosemide - 6/4/20 - #90 (changed sig to one tab kathy

## 2021-05-26 NOTE — TELEPHONE ENCOUNTER
Patient said she finished her Prednisone last Friday and she has been having increased SOB. She said she wears her oxygen all night at 2L. When she takes it off to do house work her O2 saturation drops to the 80s. She said she has also been fatigued.  She s

## 2021-05-27 ENCOUNTER — OFFICE VISIT (OUTPATIENT)
Dept: FAMILY MEDICINE CLINIC | Facility: CLINIC | Age: 76
End: 2021-05-27
Payer: MEDICARE

## 2021-05-27 VITALS
TEMPERATURE: 99 F | HEART RATE: 86 BPM | RESPIRATION RATE: 28 BRPM | BODY MASS INDEX: 37 KG/M2 | SYSTOLIC BLOOD PRESSURE: 120 MMHG | OXYGEN SATURATION: 95 % | DIASTOLIC BLOOD PRESSURE: 54 MMHG | WEIGHT: 204.19 LBS

## 2021-05-27 DIAGNOSIS — E04.1 THYROID NODULE: Primary | ICD-10-CM

## 2021-05-27 DIAGNOSIS — J44.1 COPD WITH ACUTE EXACERBATION (HCC): ICD-10-CM

## 2021-05-27 PROCEDURE — 3074F SYST BP LT 130 MM HG: CPT | Performed by: INTERNAL MEDICINE

## 2021-05-27 PROCEDURE — 3078F DIAST BP <80 MM HG: CPT | Performed by: INTERNAL MEDICINE

## 2021-05-27 PROCEDURE — 99214 OFFICE O/P EST MOD 30 MIN: CPT | Performed by: INTERNAL MEDICINE

## 2021-05-27 RX ORDER — TIOTROPIUM BROMIDE INHALATION SPRAY 3.12 UG/1
1 SPRAY, METERED RESPIRATORY (INHALATION) DAILY
Qty: 3 EACH | Refills: 0 | COMMUNITY
Start: 2021-05-27 | End: 2021-08-11 | Stop reason: ALTCHOICE

## 2021-05-27 RX ORDER — PREDNISONE 20 MG/1
40 TABLET ORAL DAILY
Qty: 28 TABLET | Refills: 0 | Status: SHIPPED | OUTPATIENT
Start: 2021-05-27 | End: 2021-06-10

## 2021-05-27 NOTE — PROGRESS NOTES
Jessica Monday is a 76year old female. HPI:   RANDALL and sats low with activity and whenever she has to remove for O2 to shower. She has been without fever or productive cough. No chest pain.    Current Outpatient Medications   Medication Sig Dispense R status: Former Smoker        Packs/day: 1.00        Years: 50.00        Pack years: 48        Types: Cigarettes        Quit date: 2010        Years since quittin.4      Smokeless tobacco: Never Used      Tobacco comment: quit a few years ago    Al

## 2021-06-01 ENCOUNTER — HOSPITAL ENCOUNTER (OUTPATIENT)
Dept: MAMMOGRAPHY | Age: 76
Discharge: HOME | End: 2021-06-01
Attending: INTERNAL MEDICINE
Payer: MEDICARE

## 2021-06-01 ENCOUNTER — TELEPHONE (OUTPATIENT)
Dept: FAMILY MEDICINE CLINIC | Facility: CLINIC | Age: 76
End: 2021-06-01

## 2021-06-01 DIAGNOSIS — Z12.31 BREAST CANCER SCREENING BY MAMMOGRAM: ICD-10-CM

## 2021-06-01 PROCEDURE — 77067 SCR MAMMO BI INCL CAD: CPT | Performed by: INTERNAL MEDICINE

## 2021-06-01 RX ORDER — POTASSIUM CHLORIDE 20 MEQ/1
20 TABLET, EXTENDED RELEASE ORAL DAILY
Qty: 90 TABLET | Refills: 0 | Status: SHIPPED | OUTPATIENT
Start: 2021-06-01 | End: 2021-07-01 | Stop reason: ALTCHOICE

## 2021-06-01 NOTE — TELEPHONE ENCOUNTER
MAMMO RESULTS/  SHE ALSO HAS QUESTIONS ON THE PREDNISONE IF SHE SHOULD CONTINUE TAKING 2 OR ONLY TAKE 1

## 2021-06-01 NOTE — TELEPHONE ENCOUNTER
Patient advised. She said her breathing is \"somewhat better\". She said that she had some difficulty breathing in the car today because of the heat. Advised to call if she is not feeling better tomorrow.

## 2021-06-01 NOTE — TELEPHONE ENCOUNTER
Last OV w/Dr Madiha Kapoor 1030am, last CMP 4/20/21  Last refill 10/5/20 #180 w/1 refill for 2 daily to equal 40 MEQ. Patient states that she has been taking 20MEQ daily because the 40MEQ makes her \"swell up\". Prescription refilled.  V.O. Dr Ginger Michael RN

## 2021-06-07 ENCOUNTER — TELEPHONE (OUTPATIENT)
Dept: FAMILY MEDICINE CLINIC | Facility: CLINIC | Age: 76
End: 2021-06-07

## 2021-06-07 NOTE — TELEPHONE ENCOUNTER
Patient advised, states she doesn't think she can get to the pharmacy. States she just woke up. Also c/o dizziness, wheezing, cough, sounds very moist. States she is drinking plenty, sips iced tea all day.  C/O allergy sx, using anti histamine OTC diphenhyd

## 2021-06-07 NOTE — TELEPHONE ENCOUNTER
Stopped taking prednisone on Friday, maintains she is allergic to it. It seemed to start to loosen up the stuff in her lungs but was too miserable to continue. She turns red, her face swells and she thinks her throat was starting to swell.

## 2021-06-07 NOTE — TELEPHONE ENCOUNTER
Spoke with Erasto Honeycutt @ [employer]. She indicated that an air conditioning unit could probably be obtained for this patient. I spoke with Velvet Torrez, she doesn't want it, only has 1 window that can open, can't afford the electric bill.

## 2021-06-08 ENCOUNTER — TELEPHONE (OUTPATIENT)
Dept: FAMILY MEDICINE CLINIC | Facility: CLINIC | Age: 76
End: 2021-06-08

## 2021-06-09 ENCOUNTER — HOSPITAL ENCOUNTER (OUTPATIENT)
Dept: ULTRASOUND IMAGING | Age: 76
Discharge: HOME | End: 2021-06-09
Attending: INTERNAL MEDICINE
Payer: MEDICARE

## 2021-06-09 DIAGNOSIS — E04.1 THYROID NODULE: ICD-10-CM

## 2021-06-09 PROCEDURE — 76536 US EXAM OF HEAD AND NECK: CPT | Performed by: INTERNAL MEDICINE

## 2021-06-29 ENCOUNTER — TELEPHONE (OUTPATIENT)
Dept: FAMILY MEDICINE CLINIC | Facility: CLINIC | Age: 76
End: 2021-06-29

## 2021-06-29 NOTE — TELEPHONE ENCOUNTER
Patient called complaining of being short of breath. At home she has been monitoring her oxygen saturation. Its been dropping below 90%. With exertion it drops even further. I advised her that she needs to go to the emergency room.   She has a history o

## 2021-06-29 NOTE — TELEPHONE ENCOUNTER
Pt. Said she has called [facility] several times. She called [facility] today and they do not call her back. She said she is needing little tanks but they don't call her back. TAMI. She is on her way to the [address] [place] [address], [facility] now.

## 2021-06-29 NOTE — TELEPHONE ENCOUNTER
I spoke with representative from [address]. Viviane Castillo has a portable concentrator, that should be all she needs. It functions on a battery. I did not call her back since she is on her way to the ER.

## 2021-06-29 NOTE — TELEPHONE ENCOUNTER
Patient also reports difficulty swallowing. Moist cough, wheezing, using her inhalers. Scheduled 3:45 in respiratory clinic.

## 2021-06-29 NOTE — TELEPHONE ENCOUNTER
She needs to be evaluated in person. May also be heart failure related. I would see if Dr. Lillian Rosenthal would want to see her today!

## 2021-06-29 NOTE — TELEPHONE ENCOUNTER
Started last night, oxygen level dropping. Today level continues to be low, After lunch today without oxygen, level was down to 88. Currently level is 95, with any exertion it has dropped to 77.  I spoke with the [place] police station, they state [employer]

## 2021-06-30 ENCOUNTER — TELEPHONE (OUTPATIENT)
Dept: FAMILY MEDICINE CLINIC | Facility: CLINIC | Age: 76
End: 2021-06-30

## 2021-06-30 RX ORDER — POTASSIUM CHLORIDE 750 MG/1
TABLET, FILM COATED, EXTENDED RELEASE ORAL
Qty: 180 TABLET | Refills: 0 | Status: SHIPPED | OUTPATIENT
Start: 2021-06-30 | End: 2021-07-01 | Stop reason: ALTCHOICE

## 2021-07-01 ENCOUNTER — TELEPHONE (OUTPATIENT)
Dept: FAMILY MEDICINE CLINIC | Facility: CLINIC | Age: 76
End: 2021-07-01

## 2021-07-01 RX ORDER — POTASSIUM CHLORIDE 750 MG/1
TABLET, FILM COATED, EXTENDED RELEASE ORAL
Qty: 180 TABLET | Refills: 0 | COMMUNITY
Start: 2021-07-01 | End: 2021-09-20

## 2021-07-01 NOTE — TELEPHONE ENCOUNTER
Mandie Serrano at [facility] said that the script they received is for [address] and that is not enteric coated. Advised that the medication does not say KDur on our end . She states she will correct in their system.

## 2021-07-01 NOTE — TELEPHONE ENCOUNTER
Medication Quantity Refills Start End   Potassium Chloride ER 10 MEQ Oral Tab  tablet 0 6/30/2021    Sig:   Take 2 tablets daily (enteric coated)     Route:   (none)     Note to Pharmacy: [address]

## 2021-07-03 ENCOUNTER — OFFICE VISIT (OUTPATIENT)
Dept: FAMILY MEDICINE CLINIC | Facility: CLINIC | Age: 76
End: 2021-07-03
Payer: MEDICARE

## 2021-07-03 VITALS
OXYGEN SATURATION: 95 % | HEART RATE: 103 BPM | TEMPERATURE: 98 F | WEIGHT: 203.5 LBS | SYSTOLIC BLOOD PRESSURE: 120 MMHG | BODY MASS INDEX: 37.45 KG/M2 | HEIGHT: 62 IN | DIASTOLIC BLOOD PRESSURE: 68 MMHG

## 2021-07-03 DIAGNOSIS — J44.1 COPD WITH EXACERBATION (HCC): Primary | ICD-10-CM

## 2021-07-03 DIAGNOSIS — I48.20 ATRIAL FIBRILLATION, CHRONIC (HCC): ICD-10-CM

## 2021-07-03 PROCEDURE — 3074F SYST BP LT 130 MM HG: CPT | Performed by: FAMILY MEDICINE

## 2021-07-03 PROCEDURE — 99214 OFFICE O/P EST MOD 30 MIN: CPT | Performed by: FAMILY MEDICINE

## 2021-07-03 PROCEDURE — 3008F BODY MASS INDEX DOCD: CPT | Performed by: FAMILY MEDICINE

## 2021-07-03 PROCEDURE — 3078F DIAST BP <80 MM HG: CPT | Performed by: FAMILY MEDICINE

## 2021-07-03 NOTE — PROGRESS NOTES
Chaparro Reilly is a 76year old female. Patient presents with: Follow - Up: f/u chf      HPI:   Patient was seen at [facility] on June 29 with exacerbation of COPD. She was put on prednisone. She was advised to start using her nebulizer.   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

## 2021-07-06 ENCOUNTER — TELEPHONE (OUTPATIENT)
Dept: FAMILY MEDICINE CLINIC | Facility: CLINIC | Age: 76
End: 2021-07-06

## 2021-07-06 NOTE — TELEPHONE ENCOUNTER
----- Message from David Marcial sent at 7/6/2021  9:21 AM CDT -----  MICHELLE WOULD LIKE TO SPEAK TO ANAMIKA ABOUT HER INSURANCE.   PLEASE CALL HER

## 2021-07-06 NOTE — TELEPHONE ENCOUNTER
Patient said that she was on Zithromax and Methylprednisolone at [facility] ER on Tuesday and it made her \"privates red and irritated\" and she was \"blotchy\".  She said that she is allergic to Prednisone and she \"made the ER doctor put it on her allergy list\"

## 2021-07-07 ENCOUNTER — TELEPHONE (OUTPATIENT)
Dept: FAMILY MEDICINE CLINIC | Facility: CLINIC | Age: 76
End: 2021-07-07

## 2021-07-07 NOTE — TELEPHONE ENCOUNTER
Patient advised. She wants to know if Dr Tristan Henderson saw her results from [facility] on 7/3/21 when she was in the hospital. She said one of the imaging results on her lung was \"abnormal\". LM for [employer] to CB.  Patient states that her insurance company said they kajal

## 2021-07-07 NOTE — TELEPHONE ENCOUNTER
They can give her propofol for sedation no problem for the EGD. As for the tanks AND concentrator we will have to call, they usually don't give both. As for the CXR ;  1. Vascular congestion without overt edema. 2. COPD.    3. Stable horizontal scarring

## 2021-07-07 NOTE — TELEPHONE ENCOUNTER
Yes, its yeast.  You can use OTC clotrimazole cream externally or diflucan I can order 2 tabs taken 3 days apart.

## 2021-07-08 NOTE — TELEPHONE ENCOUNTER
Per John Archibald at [facility] requires a prior authorization. . She states that the primary needs to complete this since she has an HMO. John Archibald advised patient has a [employer] PPO. She state she should be able to complete the prior authorization.

## 2021-07-08 NOTE — TELEPHONE ENCOUNTER
Patient advised. She would like us to call Herbert Cheatham Monday to see if the prior authorization has been completed.

## 2021-07-12 ENCOUNTER — TELEPHONE (OUTPATIENT)
Dept: FAMILY MEDICINE CLINIC | Facility: CLINIC | Age: 76
End: 2021-07-12

## 2021-07-12 DIAGNOSIS — J44.1 COPD WITH EXACERBATION (HCC): Primary | ICD-10-CM

## 2021-07-12 DIAGNOSIS — J44.9 CHRONIC OBSTRUCTIVE PULMONARY DISEASE, UNSPECIFIED COPD TYPE (HCC): ICD-10-CM

## 2021-07-12 NOTE — TELEPHONE ENCOUNTER
Patient said she needs refills for the portable tanks and she needs the concentrator serviced. She said that it wasn't working properly yesterday. She said her oxygen saturation was in the 70s with oxygen on.  She said that her oxygen saturation is 93% mo

## 2021-07-12 NOTE — TELEPHONE ENCOUNTER
At [facility] advised. She said she has been working on the prior authorization for the oxygen with [employer] and has bee having difficulty. She will call [employer] and she will call the patient about getting the concentrator serviced and getting new tanks.

## 2021-07-12 NOTE — TELEPHONE ENCOUNTER
Patient states her concentrator is not working at all. She said she will need a new concentrator ad more that 5 tanks. LM tee Ward Combe with Miguel Hanson at the [address] answering service. Patient advised. She said that she is very SOB and is scared.  She said the l

## 2021-07-12 NOTE — TELEPHONE ENCOUNTER
Priscilla Lund from [facility] said that she does not know if the patient is triggering the portable cannister she is asking if Dr Chriss Guerra can place an order for oxygen titration so they can have a nurse go out there. Order faxed to [facility].

## 2021-07-19 ENCOUNTER — OFFICE VISIT (OUTPATIENT)
Dept: FAMILY MEDICINE CLINIC | Facility: CLINIC | Age: 76
End: 2021-07-19
Payer: MEDICARE

## 2021-07-19 VITALS
TEMPERATURE: 100 F | BODY MASS INDEX: 36 KG/M2 | WEIGHT: 196 LBS | HEART RATE: 93 BPM | DIASTOLIC BLOOD PRESSURE: 68 MMHG | SYSTOLIC BLOOD PRESSURE: 118 MMHG | OXYGEN SATURATION: 94 % | RESPIRATION RATE: 22 BRPM

## 2021-07-19 DIAGNOSIS — J01.01 ACUTE RECURRENT MAXILLARY SINUSITIS: Primary | ICD-10-CM

## 2021-07-19 PROCEDURE — 3074F SYST BP LT 130 MM HG: CPT | Performed by: INTERNAL MEDICINE

## 2021-07-19 PROCEDURE — 99214 OFFICE O/P EST MOD 30 MIN: CPT | Performed by: INTERNAL MEDICINE

## 2021-07-19 PROCEDURE — 3078F DIAST BP <80 MM HG: CPT | Performed by: INTERNAL MEDICINE

## 2021-07-19 RX ORDER — CIPROFLOXACIN 500 MG/1
500 TABLET, FILM COATED ORAL 2 TIMES DAILY
Qty: 20 TABLET | Refills: 0 | Status: SHIPPED | OUTPATIENT
Start: 2021-07-19 | End: 2021-07-29

## 2021-07-20 NOTE — PROGRESS NOTES
HPI:   Shellie Rosas is a 76year old female who presents for upper respiratory symptoms for  2  weeks. Patient reports congestion, sinus pain, OTC cold meds have not been helping, prior history of bronchitis, prior history of sinusitis, denies fever. Factor XII deficiency disease ([facility] 1999    Cipriano   • Fatigue    • Generalized and unspecified atherosclerosis 11/29/2011   • Gout    • Hematemesis 12/13/2011   • Parathyroid tumor    • Wears glasses    • West Nile Virus infection       Past Surgical H distress  SKIN: no rashes,no suspicious lesions  EYES:PERRLA, EOMI, normal optic disk,conjunctiva are clear  HEENT: atraumatic, normocephalic,ears and throat are clear  NECK: supple,no adenopathy,no bruits  LUNGS: clear to auscultation  CARDIO: RRR without

## 2021-07-21 ENCOUNTER — TELEPHONE (OUTPATIENT)
Dept: FAMILY MEDICINE CLINIC | Facility: CLINIC | Age: 76
End: 2021-07-21

## 2021-07-21 RX ORDER — AMOXICILLIN AND CLAVULANATE POTASSIUM 500; 125 MG/1; MG/1
1 TABLET, FILM COATED ORAL 3 TIMES DAILY
Qty: 30 TABLET | Refills: 0 | Status: SHIPPED | OUTPATIENT
Start: 2021-07-21 | End: 2021-07-31

## 2021-07-21 NOTE — TELEPHONE ENCOUNTER
Patient advised.  Given the number for [facility] [phone] and advised to call us if she wants to go there with the name of the provider so we can place a referral.

## 2021-07-21 NOTE — TELEPHONE ENCOUNTER
Patient said that she can't take the Cipro, she did not take one today. She said she has been very confused and her head hurt \"bad\" last night. She said her eyes even \"got blurry\". She said she called the ER last night and they told her to come in.  She

## 2021-07-21 NOTE — TELEPHONE ENCOUNTER
Jean Owusu called stating she did not take her morning dosage of antibiotic because she got a severe headache from last dosage and she feels it might be due to the meds.   She would like to discuss with you

## 2021-07-26 ENCOUNTER — TELEPHONE (OUTPATIENT)
Dept: FAMILY MEDICINE CLINIC | Facility: CLINIC | Age: 76
End: 2021-07-26

## 2021-07-26 DIAGNOSIS — J01.01 ACUTE RECURRENT MAXILLARY SINUSITIS: Primary | ICD-10-CM

## 2021-07-26 NOTE — TELEPHONE ENCOUNTER
Patient said that she got 12 tanks of oxygen from [address]. She wants to see Dr Shanel De Souza the ENT from [facility] that comes to [place]. Referral faxed. Patient said that she obtained a 1 inch skin tear on a door hook last night.  She cleaned it with H202 and a

## 2021-07-27 ENCOUNTER — TELEPHONE (OUTPATIENT)
Dept: FAMILY MEDICINE CLINIC | Facility: CLINIC | Age: 76
End: 2021-07-27

## 2021-07-27 NOTE — TELEPHONE ENCOUNTER
MICHELLE SPOKE WITH DR DONATO'S OFFICE THIS MORNING AND THEY NEVER RECIEVED THE REFERRAL YESTERDAY.   CAN YOU PLEASE RE-FAX: [phone]

## 2021-08-09 ENCOUNTER — TELEPHONE (OUTPATIENT)
Dept: FAMILY MEDICINE CLINIC | Facility: CLINIC | Age: 76
End: 2021-08-09

## 2021-08-09 NOTE — TELEPHONE ENCOUNTER
Patient said that she has had burning with urination x 1 week and her urine is cloudy. Patient advised she needs to be seen. She said that she has an appointment with the ENT tomorrow in [place].  Advised to go to UC in [place] while she is out there tomorr

## 2021-08-09 NOTE — TELEPHONE ENCOUNTER
Pt called c/o dysuria x 1 week. Pt states she is taking cranberry tablets and drinking cranberry juice. Offered an appointment but pt only wants to talk to Nurse Joel Tirado.      Pt states she was on abx last week for a breathing problem and is still having prob

## 2021-08-10 ENCOUNTER — TELEPHONE (OUTPATIENT)
Dept: FAMILY MEDICINE CLINIC | Facility: CLINIC | Age: 76
End: 2021-08-10

## 2021-08-10 NOTE — TELEPHONE ENCOUNTER
Patient said that she has been drinking cranberry juice but she still has pain with urination. Dr Annette Amin does not have any openings tomorrow.  Appointment scheduled with Dr Gabriela Chase tomorrow at 2pm.

## 2021-08-10 NOTE — TELEPHONE ENCOUNTER
She could not get into the Urgent Care today because it was packed and she was running out of her oxygen. Please call her back.

## 2021-08-11 ENCOUNTER — OFFICE VISIT (OUTPATIENT)
Dept: FAMILY MEDICINE CLINIC | Facility: CLINIC | Age: 76
End: 2021-08-11
Payer: MEDICARE

## 2021-08-11 VITALS
TEMPERATURE: 97 F | HEART RATE: 72 BPM | SYSTOLIC BLOOD PRESSURE: 122 MMHG | HEIGHT: 62 IN | DIASTOLIC BLOOD PRESSURE: 68 MMHG | BODY MASS INDEX: 37.36 KG/M2 | RESPIRATION RATE: 12 BRPM | WEIGHT: 203 LBS

## 2021-08-11 DIAGNOSIS — R30.0 DYSURIA: Primary | ICD-10-CM

## 2021-08-11 LAB
BILIRUBIN: NEGATIVE
GLUCOSE (URINE DIPSTICK): NEGATIVE MG/DL
KETONES (URINE DIPSTICK): NEGATIVE MG/DL
MULTISTIX LOT#: 5077 NUMERIC
NITRITE, URINE: NEGATIVE
PH, URINE: 6 (ref 4.5–8)
PROTEIN (URINE DIPSTICK): NEGATIVE MG/DL
SPECIFIC GRAVITY: 1.02 (ref 1–1.03)
URINE-COLOR: YELLOW
UROBILINOGEN,SEMI-QN: 0.2 MG/DL (ref 0–1.9)

## 2021-08-11 PROCEDURE — 3078F DIAST BP <80 MM HG: CPT | Performed by: FAMILY MEDICINE

## 2021-08-11 PROCEDURE — 87086 URINE CULTURE/COLONY COUNT: CPT | Performed by: FAMILY MEDICINE

## 2021-08-11 PROCEDURE — 3008F BODY MASS INDEX DOCD: CPT | Performed by: FAMILY MEDICINE

## 2021-08-11 PROCEDURE — 3074F SYST BP LT 130 MM HG: CPT | Performed by: FAMILY MEDICINE

## 2021-08-11 PROCEDURE — 87186 SC STD MICRODIL/AGAR DIL: CPT | Performed by: FAMILY MEDICINE

## 2021-08-11 PROCEDURE — 87077 CULTURE AEROBIC IDENTIFY: CPT | Performed by: FAMILY MEDICINE

## 2021-08-11 PROCEDURE — 99213 OFFICE O/P EST LOW 20 MIN: CPT | Performed by: FAMILY MEDICINE

## 2021-08-11 PROCEDURE — 81003 URINALYSIS AUTO W/O SCOPE: CPT | Performed by: FAMILY MEDICINE

## 2021-08-11 RX ORDER — FLUTICASONE FUROATE, UMECLIDINIUM BROMIDE AND VILANTEROL TRIFENATATE 100; 62.5; 25 UG/1; UG/1; UG/1
POWDER RESPIRATORY (INHALATION)
COMMUNITY

## 2021-08-11 RX ORDER — FLUNISOLIDE 0.25 MG/ML
1 SOLUTION NASAL 2 TIMES DAILY
COMMUNITY
Start: 2021-08-10 | End: 2022-01-17 | Stop reason: ALTCHOICE

## 2021-08-11 RX ORDER — NITROFURANTOIN 25; 75 MG/1; MG/1
100 CAPSULE ORAL 2 TIMES DAILY
Qty: 20 CAPSULE | Refills: 0 | Status: SHIPPED | OUTPATIENT
Start: 2021-08-11 | End: 2021-08-21

## 2021-08-11 NOTE — PROGRESS NOTES
aware in office visit   Ant-Infective Medications       nitrofurantoin monohydrate macro 100 MG Oral Cap     Await urine culture results

## 2021-08-11 NOTE — PROGRESS NOTES
HPI/Subjective:   Kami Srinivasan is a 68year old female who presents for UTI (inrm.  5)     Cloudy urine  In the am  For the last 7-10 days  No burning but has severe pain  dysuria with limited flow  No fevers      History/Other:     Current Outpatient 37.13 kg/m²  Estimated body mass index is 37.13 kg/m² as calculated from the following:    Height as of this encounter: 5' 2\" (1.575 m). Weight as of this encounter: 203 lb (92.1 kg).   GENERAL: well developed, well nourished,in no apparent distress  SK

## 2021-08-13 ENCOUNTER — MOBILE ENCOUNTER (OUTPATIENT)
Dept: FAMILY MEDICINE CLINIC | Facility: CLINIC | Age: 76
End: 2021-08-13

## 2021-08-13 RX ORDER — AZITHROMYCIN 500 MG/1
500 TABLET, FILM COATED ORAL DAILY
Qty: 3 TABLET | Refills: 0 | Status: SHIPPED | OUTPATIENT
Start: 2021-08-13 | End: 2021-08-16

## 2021-08-17 ENCOUNTER — MED REC SCAN ONLY (OUTPATIENT)
Dept: FAMILY MEDICINE CLINIC | Facility: CLINIC | Age: 76
End: 2021-08-17

## 2021-08-17 NOTE — TELEPHONE ENCOUNTER
Patient called and said that she has been more sob. She thinks she may be having a reaction to the Prednisone. She C/O her face being flushed and red. She also thinks her tongue may be swollen.  She denies difficulty swallowing and does not think she has a Patient is due for office visit. 30 day refill completed and sig updated to schedule office visit before next refill.

## 2021-08-18 ENCOUNTER — APPOINTMENT (OUTPATIENT)
Dept: CARDIOLOGY | Facility: CLINIC | Age: 76
End: 2021-08-18

## 2021-08-25 ENCOUNTER — TELEPHONE (OUTPATIENT)
Dept: FAMILY MEDICINE CLINIC | Facility: CLINIC | Age: 76
End: 2021-08-25

## 2021-08-25 DIAGNOSIS — J44.1 COPD WITH EXACERBATION (HCC): Primary | ICD-10-CM

## 2021-08-25 NOTE — TELEPHONE ENCOUNTER
Patient said she needs a referral for a home fill to fill up her tank at home with her concentrator.  She said the strap \"slid down her arm\" and \"sliced her arm\" she said it broke an artery in her wrist. She said that she is wheezing \"really bad\" scott

## 2021-08-25 NOTE — TELEPHONE ENCOUNTER
kassie needs referral to [address] [place] [address] for her homefill oxygen concentrator.

## 2021-08-26 ENCOUNTER — OFFICE VISIT (OUTPATIENT)
Dept: FAMILY MEDICINE CLINIC | Facility: CLINIC | Age: 76
End: 2021-08-26
Payer: MEDICARE

## 2021-08-26 VITALS
RESPIRATION RATE: 20 BRPM | DIASTOLIC BLOOD PRESSURE: 80 MMHG | OXYGEN SATURATION: 95 % | HEIGHT: 62 IN | TEMPERATURE: 98 F | BODY MASS INDEX: 36.8 KG/M2 | HEART RATE: 104 BPM | WEIGHT: 200 LBS | SYSTOLIC BLOOD PRESSURE: 128 MMHG

## 2021-08-26 DIAGNOSIS — J44.1 COPD WITH EXACERBATION (HCC): Primary | ICD-10-CM

## 2021-08-26 PROCEDURE — 3074F SYST BP LT 130 MM HG: CPT | Performed by: FAMILY MEDICINE

## 2021-08-26 PROCEDURE — 3008F BODY MASS INDEX DOCD: CPT | Performed by: FAMILY MEDICINE

## 2021-08-26 PROCEDURE — 99214 OFFICE O/P EST MOD 30 MIN: CPT | Performed by: FAMILY MEDICINE

## 2021-08-26 PROCEDURE — 3079F DIAST BP 80-89 MM HG: CPT | Performed by: FAMILY MEDICINE

## 2021-08-26 RX ORDER — AZITHROMYCIN 250 MG/1
TABLET, FILM COATED ORAL
Qty: 6 TABLET | Refills: 0 | Status: SHIPPED | OUTPATIENT
Start: 2021-08-26 | End: 2021-08-31

## 2021-08-26 NOTE — PROGRESS NOTES
HPI/Subjective:   Tabatha Lyn is a 68year old female who presents for Breathing Problem (inrm.  5 )     Has had wheezing   Can here it more when quiet    She states copd and chronic bronchitis    Coughs some with dark yellow    Patient lives in home denies any unusual skin lesions or rashes  RESPIRATORY: see HPI CARDIOVASCULAR: denies chest pain on exertion    NEURO: denies headaches    Objective:   /80 (BP Location: Right arm, Patient Position: Sitting, Cuff Size: adult)   Pulse 104   Temp 97. 8

## 2021-08-27 ENCOUNTER — TELEPHONE (OUTPATIENT)
Dept: FAMILY MEDICINE CLINIC | Facility: CLINIC | Age: 76
End: 2021-08-27

## 2021-08-27 NOTE — TELEPHONE ENCOUNTER
Oxygen drops with any exertion. Patient does not have air in the house. She went down to the basement to get close out of the dryer. She was very winded and her Spo2 was 70s. Advised patient she needs to relax in front of the fan to catch her breath.  She i

## 2021-09-01 ENCOUNTER — TELEPHONE (OUTPATIENT)
Dept: FAMILY MEDICINE CLINIC | Facility: CLINIC | Age: 76
End: 2021-09-01

## 2021-09-01 NOTE — TELEPHONE ENCOUNTER
Rios Ann is calling she refused the home fill yesterday she said that it's to heavy for her please call with any questions

## 2021-09-14 ENCOUNTER — TELEPHONE (OUTPATIENT)
Dept: FAMILY MEDICINE CLINIC | Facility: CLINIC | Age: 76
End: 2021-09-14

## 2021-09-14 NOTE — TELEPHONE ENCOUNTER
Refuses help with household chores. Breathing a little better this afternoon, still labored. Had to go to the grocery store earlier for water and ice. Hasn't tried the neb treatment again as of yet.   Recommend she try the neb again this afternoon and

## 2021-09-14 NOTE — TELEPHONE ENCOUNTER
C/O SOB, wheezing for the past 2-3 days. States she cannot use her nebulizer due to lack of oxygen. Has been at a friend's apartment, there has been covid exposure there so she is reluctant to go there. Has been using her inhaler too often.  Able to go to t

## 2021-09-14 NOTE — TELEPHONE ENCOUNTER
She can be evaluated by the county for help at home due to condition and the fact that she lives alone, they can help with household chores, ect.

## 2021-09-27 ENCOUNTER — OFFICE VISIT (OUTPATIENT)
Dept: FAMILY MEDICINE CLINIC | Facility: CLINIC | Age: 76
End: 2021-09-27
Payer: MEDICARE

## 2021-09-27 ENCOUNTER — HOSPITAL ENCOUNTER (OUTPATIENT)
Dept: GENERAL RADIOLOGY | Age: 76
Discharge: HOME | End: 2021-09-27
Attending: INTERNAL MEDICINE
Payer: MEDICARE

## 2021-09-27 ENCOUNTER — TELEPHONE (OUTPATIENT)
Dept: FAMILY MEDICINE CLINIC | Facility: CLINIC | Age: 76
End: 2021-09-27

## 2021-09-27 VITALS
HEART RATE: 58 BPM | HEIGHT: 62 IN | RESPIRATION RATE: 22 BRPM | DIASTOLIC BLOOD PRESSURE: 78 MMHG | BODY MASS INDEX: 37 KG/M2 | TEMPERATURE: 99 F | OXYGEN SATURATION: 91 % | SYSTOLIC BLOOD PRESSURE: 134 MMHG

## 2021-09-27 DIAGNOSIS — M54.50 ACUTE MIDLINE LOW BACK PAIN WITHOUT SCIATICA: ICD-10-CM

## 2021-09-27 DIAGNOSIS — M25.552 LEFT HIP PAIN: ICD-10-CM

## 2021-09-27 DIAGNOSIS — Z23 NEED FOR VACCINATION: Primary | ICD-10-CM

## 2021-09-27 DIAGNOSIS — M70.62 TROCHANTERIC BURSITIS OF LEFT HIP: ICD-10-CM

## 2021-09-27 PROCEDURE — 3075F SYST BP GE 130 - 139MM HG: CPT | Performed by: INTERNAL MEDICINE

## 2021-09-27 PROCEDURE — 72110 X-RAY EXAM L-2 SPINE 4/>VWS: CPT | Performed by: INTERNAL MEDICINE

## 2021-09-27 PROCEDURE — G0008 ADMIN INFLUENZA VIRUS VAC: HCPCS | Performed by: INTERNAL MEDICINE

## 2021-09-27 PROCEDURE — 90662 IIV NO PRSV INCREASED AG IM: CPT | Performed by: INTERNAL MEDICINE

## 2021-09-27 PROCEDURE — 99214 OFFICE O/P EST MOD 30 MIN: CPT | Performed by: INTERNAL MEDICINE

## 2021-09-27 PROCEDURE — 72220 X-RAY EXAM SACRUM TAILBONE: CPT | Performed by: INTERNAL MEDICINE

## 2021-09-27 PROCEDURE — 3078F DIAST BP <80 MM HG: CPT | Performed by: INTERNAL MEDICINE

## 2021-09-27 PROCEDURE — 73502 X-RAY EXAM HIP UNI 2-3 VIEWS: CPT | Performed by: INTERNAL MEDICINE

## 2021-09-27 PROCEDURE — 96372 THER/PROPH/DIAG INJ SC/IM: CPT | Performed by: INTERNAL MEDICINE

## 2021-09-27 RX ORDER — KETOROLAC TROMETHAMINE 30 MG/ML
30 INJECTION, SOLUTION INTRAMUSCULAR; INTRAVENOUS ONCE
Status: COMPLETED | OUTPATIENT
Start: 2021-09-27 | End: 2021-09-27

## 2021-09-27 RX ORDER — FUROSEMIDE 40 MG/1
TABLET ORAL
Qty: 90 TABLET | Refills: 1 | Status: SHIPPED | OUTPATIENT
Start: 2021-09-27 | End: 2021-10-05

## 2021-09-27 RX ORDER — KETOROLAC TROMETHAMINE 30 MG/ML
30 INJECTION, SOLUTION INTRAMUSCULAR; INTRAVENOUS ONCE
Status: DISCONTINUED | OUTPATIENT
Start: 2021-09-27 | End: 2021-09-27

## 2021-09-27 RX ADMIN — KETOROLAC TROMETHAMINE 30 MG: 30 INJECTION, SOLUTION INTRAMUSCULAR; INTRAVENOUS at 16:48:00

## 2021-09-27 NOTE — PROGRESS NOTES
Rea Razo is a 68year old female. HPI:   Pt has been having pain in the left hip and back x 3 days, cannot stand, cannot walk. Her breathing has been better since the temperature dropped. She has not done anything different.   She sleeps in a rec 01/12/2012   • Esophageal reflux 01/12/2012   • [facility]    Cipriano   • Fatigue    • Generalized and unspecified atherosclerosis 11/29/2011   • Gout    • Hematemesis 12/13/2011   • Parathyroid tumor    • Wears glasses    • Encounter      FLU VACC HIGH DOSE PRSV FREE      Meds & Refills for this Visit:  Requested Prescriptions      No prescriptions requested or ordered in this encounter       Imaging & Consults:  FLU VACC HIGH DOSE PRSV FREE    Follow up as needed.      The pa

## 2021-09-27 NOTE — TELEPHONE ENCOUNTER
Patient said that she has not been able to walk since Saturday unless she uses two canes. She said she can  one spot. She is feeling extreme pressure in her \"crotch\" and lower back pain. She said she cannot lift her left leg.  She said she has nev

## 2021-09-27 NOTE — TELEPHONE ENCOUNTER
Jean Owusu is calling she said that she can hardly walk and she has to use 2 canes to walk please call

## 2021-09-27 NOTE — TELEPHONE ENCOUNTER
LOV  07/19/2021    LAST LAB  04/20/2021    LAST RX  05/26/2021    Next OV  No future appointments.       PROTOCOL     furosemide 40 MG Oral Tab         Sig: Take one tablet daily    Disp:  90 tablet    Refills:  1    Start: 9/25/2021    Class: Normal    Las

## 2021-09-28 ENCOUNTER — TELEPHONE (OUTPATIENT)
Dept: FAMILY MEDICINE CLINIC | Facility: CLINIC | Age: 76
End: 2021-09-28

## 2021-10-05 ENCOUNTER — TELEPHONE (OUTPATIENT)
Dept: FAMILY MEDICINE CLINIC | Facility: CLINIC | Age: 76
End: 2021-10-05

## 2021-10-05 RX ORDER — FUROSEMIDE 40 MG/1
40 TABLET ORAL 3 TIMES DAILY
Qty: 270 TABLET | Refills: 1 | Status: SHIPPED | OUTPATIENT
Start: 2021-10-05 | End: 2022-01-03

## 2021-10-05 RX ORDER — POTASSIUM CHLORIDE 750 MG/1
TABLET, FILM COATED, EXTENDED RELEASE ORAL
Qty: 180 TABLET | Refills: 3 | Status: SHIPPED | OUTPATIENT
Start: 2021-10-05

## 2021-10-05 NOTE — TELEPHONE ENCOUNTER
Patient called back and said that she has been taking 40mg of Lasix three times daily for \"years\". She said she called [employer] and they said that they received a script for 40mg daily. Please confirm dose.

## 2021-10-07 ENCOUNTER — TELEPHONE (OUTPATIENT)
Dept: FAMILY MEDICINE CLINIC | Facility: CLINIC | Age: 76
End: 2021-10-07

## 2021-10-07 NOTE — TELEPHONE ENCOUNTER
Called patient back and she said she couldn't find anyone to take her to the ER. She said she is feeling slightly better.  She said her nose has been \"clogged\" and she has \"white drainage\" She said her temp this afternoon was 100.3 and she just took it

## 2021-10-07 NOTE — TELEPHONE ENCOUNTER
Patient was audibly wheezing and sounded very SOB on the phone. She said that she has her oxygen on at 2L. Her oxygen saturation was 80% after she came up from the basement and then went up to 90%. Patient advised she needs to go to the Er now.  She said sh

## 2021-10-07 NOTE — TELEPHONE ENCOUNTER
Pat Mcmahon is calling she feels like she cant get any air and she is wheezing really bad.   This has been going on since 2 am and is unsure what to do, call sent to Raphael Ruggiero

## 2021-10-09 ENCOUNTER — TELEPHONE (OUTPATIENT)
Dept: FAMILY MEDICINE CLINIC | Facility: CLINIC | Age: 76
End: 2021-10-09

## 2021-10-09 NOTE — TELEPHONE ENCOUNTER
Patient was seen at [facility] ER. She said they gave her a GI cocktail and Lorazepam which helped. She said that she is very tired and \"off balance today\". She said they prescribed Dexamethasone, Hydroxyzine and Carafate.  She said she read the side effects

## 2021-10-09 NOTE — TELEPHONE ENCOUNTER
She did go to the ER, had pill induced esophagitis, was given meds and nebs and improved returned home.

## 2021-10-11 ENCOUNTER — OFFICE VISIT (OUTPATIENT)
Dept: FAMILY MEDICINE CLINIC | Facility: CLINIC | Age: 76
End: 2021-10-11
Payer: MEDICARE

## 2021-10-11 VITALS
HEIGHT: 62 IN | SYSTOLIC BLOOD PRESSURE: 130 MMHG | BODY MASS INDEX: 37.04 KG/M2 | DIASTOLIC BLOOD PRESSURE: 78 MMHG | TEMPERATURE: 99 F | OXYGEN SATURATION: 96 % | RESPIRATION RATE: 24 BRPM | WEIGHT: 201.25 LBS | HEART RATE: 111 BPM

## 2021-10-11 DIAGNOSIS — I50.32 CHRONIC DIASTOLIC CONGESTIVE HEART FAILURE (HCC): ICD-10-CM

## 2021-10-11 DIAGNOSIS — J44.1 CHRONIC OBSTRUCTIVE PULMONARY DISEASE WITH ACUTE EXACERBATION (HCC): Primary | ICD-10-CM

## 2021-10-11 DIAGNOSIS — J44.1 COPD WITH EXACERBATION (HCC): ICD-10-CM

## 2021-10-11 PROCEDURE — 3008F BODY MASS INDEX DOCD: CPT | Performed by: INTERNAL MEDICINE

## 2021-10-11 PROCEDURE — 3078F DIAST BP <80 MM HG: CPT | Performed by: INTERNAL MEDICINE

## 2021-10-11 PROCEDURE — 94640 AIRWAY INHALATION TREATMENT: CPT | Performed by: INTERNAL MEDICINE

## 2021-10-11 PROCEDURE — 3075F SYST BP GE 130 - 139MM HG: CPT | Performed by: INTERNAL MEDICINE

## 2021-10-11 PROCEDURE — 99214 OFFICE O/P EST MOD 30 MIN: CPT | Performed by: INTERNAL MEDICINE

## 2021-10-11 RX ORDER — ALBUTEROL SULFATE 2.5 MG/3ML
2.5 SOLUTION RESPIRATORY (INHALATION) ONCE
Status: COMPLETED | OUTPATIENT
Start: 2021-10-11 | End: 2021-10-11

## 2021-10-11 RX ORDER — BUDESONIDE, GLYCOPYRROLATE, AND FORMOTEROL FUMARATE 160; 9; 4.8 UG/1; UG/1; UG/1
2 AEROSOL, METERED RESPIRATORY (INHALATION) 2 TIMES DAILY
Qty: 3 EACH | Refills: 0 | COMMUNITY
Start: 2021-10-11

## 2021-10-11 RX ORDER — SUCRALFATE 1 G/1
1 TABLET ORAL EVERY 6 HOURS
COMMUNITY
Start: 2021-10-07

## 2021-10-11 RX ORDER — HYDROXYZINE PAMOATE 25 MG/1
1 CAPSULE ORAL 3 TIMES DAILY PRN
COMMUNITY
Start: 2021-10-07 | End: 2021-11-08 | Stop reason: ALTCHOICE

## 2021-10-11 RX ORDER — DEXAMETHASONE 4 MG/1
4 TABLET ORAL 2 TIMES DAILY
COMMUNITY
Start: 2021-10-07 | End: 2021-11-08 | Stop reason: ALTCHOICE

## 2021-10-11 RX ADMIN — ALBUTEROL SULFATE 2.5 MG: 2.5 SOLUTION RESPIRATORY (INHALATION) at 11:08:00

## 2021-10-11 NOTE — PROGRESS NOTES
Joshua Lopez is a 68year old female. HPI:   Pt has been having increased wheezing and SOB. She was seen in the ER and given 1 hour continuous neb and felt better, but still wheezing sent home on medrol, but does not want to take it.   She feels SOB w RESPIMAT)  MCG/ACT Inhalation Aero Soln Inhale 1 puff into the lungs 4 (four) times daily. 3 Inhaler 3   • Pravastatin Sodium 40 MG Oral Tab Take 1 tablet (40 mg total) by mouth nightly.  90 tablet 3   • allopurinol 100 MG Oral Tab Take 100 mg by mout rashes  RESPIRATORY: denies shortness of breath with exertion  CARDIOVASCULAR: denies chest pain on exertion  GI: denies abdominal pain and denies heartburn  NEURO: denies headaches    EXAM:   /78   Pulse 111   Temp 98.7 °F (37.1 °C) (Temporal)   Res

## 2021-10-16 ENCOUNTER — TELEPHONE (OUTPATIENT)
Dept: FAMILY MEDICINE CLINIC | Facility: CLINIC | Age: 76
End: 2021-10-16

## 2021-10-16 NOTE — TELEPHONE ENCOUNTER
Dr. Jarrod Marsh patient has concerns about recent EKG done at [facility] and would like to talk with you regarding results. Advised you would be here on Monday and could review result. Gave her Dr. Darryl Trujillo contact information for GI.

## 2021-10-16 NOTE — TELEPHONE ENCOUNTER
PATIENT NEEDS TO TALK TO SOMEONE ABOUT HER EKG FROM 10/07  SHE WANTS TO KNOW ABOUT THE RESULTS SHE IS VERY CONCERNED , AND WHO REPLACED DR Regino Arellano

## 2021-10-18 NOTE — TELEPHONE ENCOUNTER
Pt concerned about her heart rate, it is 96 currently. I recommended she get her booster shot. She is off steroids and about 2 weeks from her flu shot. Hard to catch her breath.

## 2021-10-21 ENCOUNTER — TELEPHONE (OUTPATIENT)
Dept: FAMILY MEDICINE CLINIC | Facility: CLINIC | Age: 76
End: 2021-10-21

## 2021-10-21 NOTE — TELEPHONE ENCOUNTER
Patient said that her Diltiazem got \"caught in her throat\" this morning. She said that she is able to swallow water and some cooked squash but the pill didn't go down. She was speaking without difficulty. She denies any respiratory distress.  She states t

## 2021-11-01 ENCOUNTER — TELEPHONE (OUTPATIENT)
Dept: FAMILY MEDICINE CLINIC | Facility: CLINIC | Age: 76
End: 2021-11-01

## 2021-11-01 NOTE — TELEPHONE ENCOUNTER
Got her shot yesterday at [facility]. Developed muscle/joint pain, rash all over. She went to [facility]'s after the shot , her O2 sat dropped to the 80s, turned her O2 up to 3. She received the Murtaza Hart booster.  Has so much to do today, too tired, choking on pills a

## 2021-11-01 NOTE — TELEPHONE ENCOUNTER
It will last less than 4 days, sometimes in the arm pain lingers in the armpit. It is your bodies response to the proteins, kind of like how COVID feels, but the real thing is about 10 x more intense.

## 2021-11-02 ENCOUNTER — TELEPHONE (OUTPATIENT)
Dept: FAMILY MEDICINE CLINIC | Facility: CLINIC | Age: 76
End: 2021-11-02

## 2021-11-02 NOTE — TELEPHONE ENCOUNTER
Cannot swallow. Hard to take pills. Needs to have her esophagus stretched. Needs paper work for [employer]. FOOD POISONING last night; ate chicken, cooked carrots and goal potatoes and threw up. Could drink ginger ale. Needs full liquid diet.

## 2021-11-02 NOTE — TELEPHONE ENCOUNTER
She is c/o chest pain under her left breast, sharp, sporadic, -116. Breathing actually unchanged, always SOB. Still doubling her medication for nebulizer. She c/o chest pain yesterday also but she says this is different.  She is wondering if this pain

## 2021-11-03 ENCOUNTER — TELEPHONE (OUTPATIENT)
Dept: FAMILY MEDICINE CLINIC | Facility: CLINIC | Age: 76
End: 2021-11-03

## 2021-11-03 RX ORDER — FLUCONAZOLE 100 MG/1
100 TABLET ORAL DAILY
Qty: 14 TABLET | Refills: 0 | Status: SHIPPED | OUTPATIENT
Start: 2021-11-03 | End: 2021-11-17

## 2021-11-03 NOTE — TELEPHONE ENCOUNTER
Appointment scheduled with Dr. Nicole Boards for tomorrow @ 10 am to register for 10:20 consult. Message left for the patient to call back.

## 2021-11-03 NOTE — TELEPHONE ENCOUNTER
judith can eat syeda noodles, soup, and liquids. She does not want to pay $50 co-pay for the office visit tomorrow. She would rather try diflucan and see if this improves the problem.

## 2021-11-03 NOTE — TELEPHONE ENCOUNTER
MICHELLE WOULD LIKE TO SPEAK TO DR Gina Miller ABOUT HER APPT TOMORROW WITH DR BRADY'S OFFICE.   PLEASE CALL HER

## 2021-11-03 NOTE — TELEPHONE ENCOUNTER
Pt has been having more trouble swallowing. She cannot get her pills down.   I think she needs GI appt and discussion

## 2021-11-03 NOTE — TELEPHONE ENCOUNTER
Advised of appointment tomorrow, was put out because she had just finished talking with Dr. Kaela Costello office. She doesn't understand why she is being sent to a doctor who does procedures only in [address] [place] [address].  Reminded her that Dr. Ladi Saenz asked her about going

## 2021-11-06 ENCOUNTER — TELEPHONE (OUTPATIENT)
Dept: FAMILY MEDICINE CLINIC | Facility: CLINIC | Age: 76
End: 2021-11-06

## 2021-11-06 NOTE — TELEPHONE ENCOUNTER
Patient said that she got her Covid vaccine Sunday and food poisoning Monday. She said she talked to Dr Reinaldo Cyr Monday. She said that Thursday she could not stay awake and she does not remember talking to anyone on the phone.  She said the urine looks lighter

## 2021-11-06 NOTE — TELEPHONE ENCOUNTER
HER URINE WAS BLOOD RED Thursday AND SHE COULD NOT WAKE UP FOR ANYTHING AND NOW SHE SAID THERE IS SOMETHING LIKE A PINCHING UP THERE. HER LOWER BACK IS HURTING.     SHE WANTS DR Lacho Sanchez TO KNOW THIS SHE SAID

## 2021-11-08 ENCOUNTER — OFFICE VISIT (OUTPATIENT)
Dept: FAMILY MEDICINE CLINIC | Facility: CLINIC | Age: 76
End: 2021-11-08
Payer: MEDICARE

## 2021-11-08 VITALS
WEIGHT: 201 LBS | TEMPERATURE: 99 F | HEART RATE: 90 BPM | DIASTOLIC BLOOD PRESSURE: 64 MMHG | RESPIRATION RATE: 22 BRPM | HEIGHT: 62 IN | SYSTOLIC BLOOD PRESSURE: 124 MMHG | OXYGEN SATURATION: 94 % | BODY MASS INDEX: 36.99 KG/M2

## 2021-11-08 DIAGNOSIS — R82.81 PYURIA: Primary | ICD-10-CM

## 2021-11-08 PROCEDURE — 3074F SYST BP LT 130 MM HG: CPT | Performed by: INTERNAL MEDICINE

## 2021-11-08 PROCEDURE — 87186 SC STD MICRODIL/AGAR DIL: CPT | Performed by: INTERNAL MEDICINE

## 2021-11-08 PROCEDURE — 87086 URINE CULTURE/COLONY COUNT: CPT | Performed by: INTERNAL MEDICINE

## 2021-11-08 PROCEDURE — 3008F BODY MASS INDEX DOCD: CPT | Performed by: INTERNAL MEDICINE

## 2021-11-08 PROCEDURE — 87077 CULTURE AEROBIC IDENTIFY: CPT | Performed by: INTERNAL MEDICINE

## 2021-11-08 PROCEDURE — 3078F DIAST BP <80 MM HG: CPT | Performed by: INTERNAL MEDICINE

## 2021-11-08 PROCEDURE — 99214 OFFICE O/P EST MOD 30 MIN: CPT | Performed by: INTERNAL MEDICINE

## 2021-11-08 PROCEDURE — 81003 URINALYSIS AUTO W/O SCOPE: CPT | Performed by: INTERNAL MEDICINE

## 2021-11-08 RX ORDER — CEFDINIR 250 MG/5ML
300 POWDER, FOR SUSPENSION ORAL 2 TIMES DAILY
Qty: 120 ML | Refills: 0 | Status: SHIPPED | OUTPATIENT
Start: 2021-11-08 | End: 2021-11-15

## 2021-11-09 NOTE — PROGRESS NOTES
Mattie Mcnair is a 68year old female. HPI:   Patient presents with symptoms of UTI. Complaining of urinary frequency, urgency, dysuria, suprapubic pain and one episode of hematuria. Denies back pain, fever.   She had a UTI 8/11   URINE CULTURE >100,00 Powder, Breath Activated Inhale into the lungs. • Flunisolide 25 MCG/ACT (0.025%) Nasal Solution 1 spray by Nasal route 2 (two) times daily.      • DILTIAZEM HCL ER BEADS 240 MG Oral Capsule SR 24 Hr TAKE 1 CAPSULE TWICE DAILY 180 capsule 1   • Ipratrop lesions or rashes  RESPIRATORY: no shortness of breath with exertion  CARDIOVASCULAR: denies chest pain on exertion  GI: no nausea or vomiting  NEURO: denies headaches    EXAM:   /64   Pulse 90   Temp 98.8 °F (37.1 °C) (Temporal)   Resp 22   Ht 5' 2\

## 2021-11-10 ENCOUNTER — TELEPHONE (OUTPATIENT)
Dept: FAMILY MEDICINE CLINIC | Facility: CLINIC | Age: 76
End: 2021-11-10

## 2021-11-11 ENCOUNTER — TELEPHONE (OUTPATIENT)
Dept: FAMILY MEDICINE CLINIC | Facility: CLINIC | Age: 76
End: 2021-11-11

## 2021-11-11 NOTE — TELEPHONE ENCOUNTER
Patient states that she was seen at [facility] urgent care in [place] and they took her over tot the ER. She said that the doctor was concerned that she may be having issues with her heart.  She said they did an EKG, chest xray and blood work which all came back n

## 2021-11-11 NOTE — TELEPHONE ENCOUNTER
Patient said she feels like she is \"going to pass out\" and she has a headache. She said that she felt like she \"can't take a breath\". Her oxygen saturation is 93%. She said that she can't find anyone to take her to urgent care.  She said that last night

## 2021-11-12 ENCOUNTER — TELEPHONE (OUTPATIENT)
Dept: FAMILY MEDICINE CLINIC | Facility: CLINIC | Age: 76
End: 2021-11-12

## 2021-11-12 NOTE — TELEPHONE ENCOUNTER
TOOK fluconazole & WAS SEEN IN ER YESTERDAY, TOOK cefdinir TODAY & IS FEELING OUT OF SORTS, WOOZY, DIZZY, HEAD PAIN, NOT SURE IF SHE SHOULD TAKE BENADRYL

## 2021-11-12 NOTE — TELEPHONE ENCOUNTER
Spoke with patient. She states that she is feeling very lightheaded and is experiencing an intermittent headache. Patient states that she feels like she has been drinking but has not.   She is concerned that she is having an allergic reaction to the cefdi

## 2021-11-15 ENCOUNTER — OFFICE VISIT (OUTPATIENT)
Dept: FAMILY MEDICINE CLINIC | Facility: CLINIC | Age: 76
End: 2021-11-15
Payer: MEDICARE

## 2021-11-15 ENCOUNTER — TELEPHONE (OUTPATIENT)
Dept: FAMILY MEDICINE CLINIC | Facility: CLINIC | Age: 76
End: 2021-11-15

## 2021-11-15 VITALS
TEMPERATURE: 99 F | RESPIRATION RATE: 22 BRPM | HEART RATE: 88 BPM | HEIGHT: 62 IN | SYSTOLIC BLOOD PRESSURE: 102 MMHG | OXYGEN SATURATION: 96 % | BODY MASS INDEX: 36.99 KG/M2 | WEIGHT: 201 LBS | DIASTOLIC BLOOD PRESSURE: 64 MMHG

## 2021-11-15 DIAGNOSIS — T78.40XD ALLERGIC REACTION, SUBSEQUENT ENCOUNTER: Primary | ICD-10-CM

## 2021-11-15 PROCEDURE — 3074F SYST BP LT 130 MM HG: CPT | Performed by: INTERNAL MEDICINE

## 2021-11-15 PROCEDURE — 99213 OFFICE O/P EST LOW 20 MIN: CPT | Performed by: INTERNAL MEDICINE

## 2021-11-15 PROCEDURE — 3078F DIAST BP <80 MM HG: CPT | Performed by: INTERNAL MEDICINE

## 2021-11-15 PROCEDURE — 3008F BODY MASS INDEX DOCD: CPT | Performed by: INTERNAL MEDICINE

## 2021-11-15 RX ORDER — AZITHROMYCIN 250 MG/1
250 TABLET, FILM COATED ORAL DAILY
Qty: 3 TABLET | Refills: 0 | Status: SHIPPED | OUTPATIENT
Start: 2021-11-15 | End: 2021-11-18

## 2021-11-15 RX ORDER — IPRATROPIUM/ALBUTEROL SULFATE 20-100 MCG
1 MIST INHALER (GRAM) INHALATION 4 TIMES DAILY
Qty: 1 EACH | Refills: 0 | Status: SHIPPED | OUTPATIENT
Start: 2021-11-15

## 2021-11-15 NOTE — TELEPHONE ENCOUNTER
Patient states that [address] [place] [address] would not send her the Combivent because they do not have \"all the information they need\". Financial information, paperwork from 11/3, medication list, prescription, and insurance information faxed to [address] [place] [address] [phone].

## 2021-11-15 NOTE — PROGRESS NOTES
Chaparro Reilly is a 68year old female. HPI:   Pt has been to the ER due to drug reaction. She took about 5 days of diflucan and 2 doses of cefdinir for a UTI. She is not sure which one it was. I suspect omnicef;   She feels better, but basement floode Arthritis    • Black stools    • Chest pain, unspecified 12/15/2011   • Chronic airway obstruction, not elsewhere classified 11/29/2011   • Chronic atrial fibrillation (HCC)    • Cong[facility]    • Congestive heart failure, unspecified 11/ reaction, subsequent encounter  (primary encounter diagnosis)  Stopped both new meds and kash Marti for citrobacter UTI. No orders of the defined types were placed in this encounter.       Meds & Refills for this Visit:  Requested Prescriptions     Si

## 2021-11-16 ENCOUNTER — TELEPHONE (OUTPATIENT)
Dept: FAMILY MEDICINE CLINIC | Facility: CLINIC | Age: 76
End: 2021-11-16

## 2021-11-16 NOTE — TELEPHONE ENCOUNTER
Patient states she drank a cup of hot coffee but she does not know if the pill \"went down\". She ate some applesauce but had to drink water to \"get it down\". She will just eat some soup tonight.  She said her oxygen saturation keeps dropping when she wal

## 2021-11-16 NOTE — TELEPHONE ENCOUNTER
Patient was taking clearly on the phone, did not appear to be in any distress. Advised if she cannot swallow she needs to go back to the ER. Patient stated she did not want to go to the ER.  She will try to drink some warm coffee and will call us back to le

## 2021-11-16 NOTE — TELEPHONE ENCOUNTER
Patient said that she feels like she has the Azithromycin \"stuck in her throat\" she drank some hot coffee and tea and it has not \"gone down\". She said she went down the stairs into the basement and her oxygen saturation went down to 60%.  She said that

## 2021-11-18 ENCOUNTER — TELEPHONE (OUTPATIENT)
Dept: FAMILY MEDICINE CLINIC | Facility: CLINIC | Age: 76
End: 2021-11-18

## 2021-11-18 NOTE — TELEPHONE ENCOUNTER
Patient advised that Dr Concepcion Person wants her to see GI as soon as possible. Patient given number for Dr Cuauhtemoc Rojas. Patient asked if she can take Chloraseptic lozenges. Patient advised she can take the lozenges.  CATRINA. Dr Beatris Amos RN

## 2021-11-18 NOTE — TELEPHONE ENCOUNTER
Called patient back and phone reception was not good. She was in the ER today and is waiting for a call back for the [facility] doctor in [facility].

## 2021-11-18 NOTE — TELEPHONE ENCOUNTER
Parmjit Araujo from Dr Lakeshia Cruz said that they will work her in the next time he does consults in [place] which will be in 2 weeks. Patient advised the  will call her.

## 2021-11-22 ENCOUNTER — TELEPHONE (OUTPATIENT)
Dept: FAMILY MEDICINE CLINIC | Facility: CLINIC | Age: 76
End: 2021-11-22

## 2021-11-22 NOTE — TELEPHONE ENCOUNTER
Patient advised that we received a fax from [facility] that her patient assistance was approved from January 2022-December 2022. Patient said that she is still having difficulty swallowing her pills.  Patient advised that she can crush all her medications and

## 2021-11-22 NOTE — TELEPHONE ENCOUNTER
Mckinley Falcon is calling to let you know that she has an appt with Dr Eboni Portillo on 12/9/21. [facility] is sending her a rx refill but you faxed paperwork for the full year renewal.  Please call.

## 2021-11-24 ENCOUNTER — TELEPHONE (OUTPATIENT)
Dept: FAMILY MEDICINE CLINIC | Facility: CLINIC | Age: 76
End: 2021-11-24

## 2021-11-24 RX ORDER — AMOXICILLIN AND CLAVULANATE POTASSIUM 500; 125 MG/1; MG/1
1 TABLET, FILM COATED ORAL 2 TIMES DAILY
Qty: 12 TABLET | Refills: 0 | Status: SHIPPED | OUTPATIENT
Start: 2021-11-24 | End: 2021-11-30

## 2021-11-24 NOTE — TELEPHONE ENCOUNTER
Patient said that she feels \"lousy\" and her urine has a \"bad odor to it\". She thinks she has a low grade fever. She has been getting chills.  If Dr Sammie Golden is is going to prescribe something please send to [facility] either liquid or pills she can cr

## 2021-11-24 NOTE — TELEPHONE ENCOUNTER
The only thing she can take that she is not allergic to is AUGMENTIN, but it gives her diarrhea. I can try a low dose and sent to [id number] [facility]. You WILL have to crush it is too big to swallow.

## 2021-11-29 ENCOUNTER — TELEPHONE (OUTPATIENT)
Dept: FAMILY MEDICINE CLINIC | Facility: CLINIC | Age: 76
End: 2021-11-29

## 2021-11-29 NOTE — TELEPHONE ENCOUNTER
Patient said that she had Augmentin around 2001 and she was almost hospitalized because she could \"hardly stand up\". She said that she is in \"bad shape right now\". She said that when she bends her chin down something is sticking her in her throat.  She

## 2021-11-29 NOTE — TELEPHONE ENCOUNTER
She said she is not taking the antibiotic because they made her very sick to her stomach. Please call pt.  To discuss

## 2021-11-29 NOTE — TELEPHONE ENCOUNTER
Patient would like a call back to discuss her throat issues. She wouldn't give any more information because she said the nurse already knows about her issue.   Phone [phone]

## 2021-11-29 NOTE — TELEPHONE ENCOUNTER
Patient said that she took the Augmentin Wed night and it made her sick to her stomach. She said she was throwing up bile and had diarrhea. She said that the last time she took an Augmentin was Thursday night. She said that her head is \"spinning\" now.  Sh

## 2021-12-02 ENCOUNTER — TELEPHONE (OUTPATIENT)
Dept: FAMILY MEDICINE CLINIC | Facility: CLINIC | Age: 76
End: 2021-12-02

## 2021-12-02 NOTE — TELEPHONE ENCOUNTER
Patient said that she used the Flunisolide nasal spray that the ENT prescribed for her because she was so \"clogged\" up. She said since she used it she has been having a \"hard time breathing\" after it went down her throat.  She said that she flushed her

## 2021-12-09 ENCOUNTER — NURSE ONLY (OUTPATIENT)
Dept: FAMILY MEDICINE CLINIC | Facility: CLINIC | Age: 76
End: 2021-12-09
Payer: MEDICARE

## 2021-12-09 DIAGNOSIS — N39.0 URINARY TRACT INFECTION WITHOUT HEMATURIA, SITE UNSPECIFIED: Primary | ICD-10-CM

## 2021-12-09 PROCEDURE — 87086 URINE CULTURE/COLONY COUNT: CPT | Performed by: INTERNAL MEDICINE

## 2021-12-09 PROCEDURE — 81003 URINALYSIS AUTO W/O SCOPE: CPT | Performed by: INTERNAL MEDICINE

## 2021-12-09 PROCEDURE — 87077 CULTURE AEROBIC IDENTIFY: CPT | Performed by: INTERNAL MEDICINE

## 2021-12-09 PROCEDURE — 87186 SC STD MICRODIL/AGAR DIL: CPT | Performed by: INTERNAL MEDICINE

## 2021-12-09 NOTE — PROGRESS NOTES
Patient was in the office for a GI consult. She states she still has dark urine and does not thins that the infection is \"gone\". Urine obtained for U/A dip and culture.  V.O. Dr Jacqueline Sanders RN

## 2021-12-13 ENCOUNTER — TELEPHONE (OUTPATIENT)
Dept: FAMILY MEDICINE CLINIC | Facility: CLINIC | Age: 76
End: 2021-12-13

## 2021-12-13 RX ORDER — AMPICILLIN 500 MG/1
500 CAPSULE ORAL 3 TIMES DAILY
Qty: 9 CAPSULE | Refills: 0 | Status: SHIPPED | OUTPATIENT
Start: 2021-12-13 | End: 2021-12-16

## 2021-12-13 NOTE — TELEPHONE ENCOUNTER
Patient said that she has hives from head to toe. She did just take a warm shower. Patient advised that she should not  the antibiotics right now, Dr Darryl Lyles does not want her to introduce anything else in her body right now.  She said she thinks she h

## 2021-12-13 NOTE — TELEPHONE ENCOUNTER
Shelia Jean is calling about her test results she would like to know what she needs to do, also she ate some egg rolls and the sodium was very high and now she is swollen.   She said that her face is swollen and its affecting her breathing  Call sent to [facility]

## 2021-12-13 NOTE — TELEPHONE ENCOUNTER
She can take Ampicillin, it think the augmentin gives her GI affects which is not a class effect!   When is her esophagram?

## 2021-12-13 NOTE — TELEPHONE ENCOUNTER
Patient states that she ate 2 eggrolls about an hour ago and her face, neck and inside of \"cheeks\" are swollen. She said that her legs are also swollen but she had the swelling yesterday. She is going to take 2 Benadryl right now.  Advised to crush them b

## 2021-12-13 NOTE — TELEPHONE ENCOUNTER
Patient advised. She said that she can try the Ampicillin but she is very \"broke\". She asked Dr Blanca Jacobs to send it into [facility]. She said that she is having an EGD next Monday.  She said she did take the 2 Benadryl and her tongue doesn't feel so swol

## 2021-12-14 ENCOUNTER — TELEPHONE (OUTPATIENT)
Dept: FAMILY MEDICINE CLINIC | Facility: CLINIC | Age: 76
End: 2021-12-14

## 2021-12-14 NOTE — TELEPHONE ENCOUNTER
Patient said that she still feels swollen and her tongue feels swollen and she still has a red rash on her legs. She said that she was able to eat some soup. She said she took Benadryl again this morning.  Patient asked if she should start taking the Ampici

## 2021-12-15 ENCOUNTER — TELEPHONE (OUTPATIENT)
Dept: FAMILY MEDICINE CLINIC | Facility: CLINIC | Age: 76
End: 2021-12-15

## 2021-12-15 NOTE — TELEPHONE ENCOUNTER
Patient said she thinks she has a \"kidney problem\". She said that she is having pain that wraps around from her left flank to her abdomen. Her friend told her she may have a kidney stone. She said that her legs are still very swollen.  She said her skin i

## 2021-12-15 NOTE — TELEPHONE ENCOUNTER
She should weight daily to better assess fluid balance too. Stay on just 3 potassium a day. Please dont have a kidney stone, stay hydrated, 90 percent of kidney stones pass, but it is no fun. Pain, blood in the urine, and urgency. Start antibiotic now.

## 2021-12-17 ENCOUNTER — TELEPHONE (OUTPATIENT)
Dept: FAMILY MEDICINE CLINIC | Facility: CLINIC | Age: 76
End: 2021-12-17

## 2021-12-17 NOTE — TELEPHONE ENCOUNTER
Spoke with patient. She has food stuck in her throat. She is able to speak in complete sentences. Patient notified that she needs to be evaluated in the ER. She verbalized understanding. She was on her way to [facility] at time of conversation.

## 2021-12-17 NOTE — TELEPHONE ENCOUNTER
JUST GOT DONE EATING LUNCH & FEELS LIKE SOMETHING IS STUCK IN THROAT, CAN'T GET IT TO GO DOWN, SUPPOSED TO GO FOR THROAT TEST ON MONDAY, CAN A DR PRESCRIBE SOMETHING TODAY TO HELP RELAX HER THROAT SO SHE DOESN'T HAVE TO GO TO ER?

## 2021-12-20 ENCOUNTER — TELEPHONE (OUTPATIENT)
Dept: FAMILY MEDICINE CLINIC | Facility: CLINIC | Age: 76
End: 2021-12-20

## 2021-12-20 NOTE — TELEPHONE ENCOUNTER
Patient said that the machine was not working. They have not rescheduled yet. Dr Tristan Henderson advised.

## 2021-12-20 NOTE — TELEPHONE ENCOUNTER
FYI-- pt calls wanting to let Dr. Viri Montana know that as she was completing the UGI/Esophagus XR that Dr. Prachi Valero ordered, their machine broke. Pt will have to complete this test another day.

## 2021-12-22 ENCOUNTER — TELEPHONE (OUTPATIENT)
Dept: FAMILY MEDICINE CLINIC | Facility: CLINIC | Age: 76
End: 2021-12-22

## 2021-12-22 NOTE — TELEPHONE ENCOUNTER
She thinks she still has a UTI because her urine is still really dark in the AM/she is going to [facility] tomorrow for to re-do a test for Dr. Edith Crandall.

## 2021-12-22 NOTE — TELEPHONE ENCOUNTER
Called patient back because she was concerned she may still have a UTI, pt stated she finished her antibiotics, but her urine still seems dark, pt states she hasn't been drinking water, this writer advised pt to increase water intake, and call if symptoms

## 2021-12-30 ENCOUNTER — TELEPHONE (OUTPATIENT)
Dept: FAMILY MEDICINE CLINIC | Facility: CLINIC | Age: 76
End: 2021-12-30

## 2021-12-30 NOTE — TELEPHONE ENCOUNTER
REALLY BAD NOSE BLEED TODAY THAT WON'T STOP, HAS BEEN HAVING THEM OFF & ON SINCE SHE GOT COVID NASAL SWAB TEST DONE

## 2021-12-30 NOTE — TELEPHONE ENCOUNTER
Patient states that the bleeding stopped. Patient advised to hold the baby aspirin for 2 days. Can continue to use saline nasal spray.  V.O. Dr Tommy Murrell RN

## 2021-12-30 NOTE — TELEPHONE ENCOUNTER
Patient said that she started with a nose bleed early this morning and it has been \"pouring since then\". She has not been applying pressure. Patient advised to sit down with head tiled slightly back and apply pressure between nares.

## 2022-01-04 ENCOUNTER — TELEPHONE (OUTPATIENT)
Dept: FAMILY MEDICINE CLINIC | Facility: CLINIC | Age: 77
End: 2022-01-04

## 2022-01-04 NOTE — TELEPHONE ENCOUNTER
Patient said that Dr Yareli Unger wanted her to have a CT scan but she said that she cannot afford that test right now. She said that she tried to tell Dr Armaan Murray nurse this but she said that she said \"Dr Digna Larry can take care of this\" and she hung up on her.  D

## 2022-01-15 ENCOUNTER — TELEPHONE (OUTPATIENT)
Dept: FAMILY MEDICINE CLINIC | Facility: CLINIC | Age: 77
End: 2022-01-15

## 2022-01-15 NOTE — TELEPHONE ENCOUNTER
Patient said that she hasn't been able to keep her oxygen saturation up. She said that her oxygen saturation was 75% after she went into the basement without her oxygen on.  She said her nose has been \"plugged\" up and she is having trouble breathing out o

## 2022-01-15 NOTE — TELEPHONE ENCOUNTER
Patient called back crying stating she cannot get her pill down, she was speaking clearly and not in respiratory distress.  Patient advised to drink some warm fluids scheduled with Dr Devang Graf Monday at 1130am.

## 2022-01-15 NOTE — TELEPHONE ENCOUNTER
Patient said that she did get the pill down but she has a lot of thick mucous. Can she take plain Mucinex liquid? She said the pharmacist told her she cannot take this.

## 2022-01-17 ENCOUNTER — OFFICE VISIT (OUTPATIENT)
Dept: FAMILY MEDICINE CLINIC | Facility: CLINIC | Age: 77
End: 2022-01-17
Payer: MEDICARE

## 2022-01-17 ENCOUNTER — TELEPHONE (OUTPATIENT)
Dept: FAMILY MEDICINE CLINIC | Facility: CLINIC | Age: 77
End: 2022-01-17

## 2022-01-17 VITALS
SYSTOLIC BLOOD PRESSURE: 118 MMHG | HEART RATE: 113 BPM | RESPIRATION RATE: 18 BRPM | DIASTOLIC BLOOD PRESSURE: 72 MMHG | TEMPERATURE: 98 F | WEIGHT: 183.13 LBS | BODY MASS INDEX: 33.7 KG/M2 | OXYGEN SATURATION: 95 % | HEIGHT: 62 IN

## 2022-01-17 DIAGNOSIS — D64.9 ANEMIA, UNSPECIFIED TYPE: ICD-10-CM

## 2022-01-17 DIAGNOSIS — E21.2 OTHER HYPERPARATHYROIDISM (HCC): ICD-10-CM

## 2022-01-17 DIAGNOSIS — I70.0 ABDOMINAL AORTIC ATHEROSCLEROSIS (HCC): ICD-10-CM

## 2022-01-17 DIAGNOSIS — I50.9 CHF (NYHA CLASS III, ACC/AHA STAGE C) (HCC): Primary | ICD-10-CM

## 2022-01-17 DIAGNOSIS — J96.01 ACUTE RESPIRATORY FAILURE WITH HYPOXEMIA (HCC): ICD-10-CM

## 2022-01-17 DIAGNOSIS — N18.4 STAGE 4 CHRONIC KIDNEY DISEASE (HCC): ICD-10-CM

## 2022-01-17 DIAGNOSIS — E66.01 SEVERE OBESITY (BMI 35.0-39.9) WITH COMORBIDITY (HCC): ICD-10-CM

## 2022-01-17 DIAGNOSIS — I48.20 ATRIAL FIBRILLATION, CHRONIC (HCC): ICD-10-CM

## 2022-01-17 DIAGNOSIS — D68.2 FACTOR XII DEFICIENCY (HCC): ICD-10-CM

## 2022-01-17 DIAGNOSIS — M46.92 INFLAMMATORY SPONDYLOPATHY OF CERVICAL REGION (HCC): ICD-10-CM

## 2022-01-17 DIAGNOSIS — I25.719 CORONARY ARTERY DISEASE INVOLVING AUTOLOGOUS VEIN CORONARY BYPASS GRAFT WITH ANGINA PECTORIS (HCC): ICD-10-CM

## 2022-01-17 DIAGNOSIS — J43.8 OTHER EMPHYSEMA (HCC): ICD-10-CM

## 2022-01-17 LAB
ALBUMIN SERPL-MCNC: 4.3 G/DL (ref 3.4–5)
ALBUMIN/GLOB SERPL: 1.3 {RATIO} (ref 1–2)
ALP LIVER SERPL-CCNC: 147 U/L
ALT SERPL-CCNC: 30 U/L
ANION GAP SERPL CALC-SCNC: 6 MMOL/L (ref 0–18)
AST SERPL-CCNC: 24 U/L (ref 15–37)
BASOPHILS # BLD AUTO: 0.06 X10(3) UL (ref 0–0.2)
BASOPHILS NFR BLD AUTO: 0.7 %
BILIRUB SERPL-MCNC: 0.6 MG/DL (ref 0.1–2)
BUN BLD-MCNC: 22 MG/DL (ref 7–18)
CALCIUM BLD-MCNC: 9.7 MG/DL (ref 8.5–10.1)
CHLORIDE SERPL-SCNC: 101 MMOL/L (ref 98–112)
CO2 SERPL-SCNC: 33 MMOL/L (ref 21–32)
CREAT BLD-MCNC: 1.08 MG/DL
DEPRECATED HBV CORE AB SER IA-ACNC: 105 NG/ML
EOSINOPHIL # BLD AUTO: 0.13 X10(3) UL (ref 0–0.7)
EOSINOPHIL NFR BLD AUTO: 1.6 %
ERYTHROCYTE [DISTWIDTH] IN BLOOD BY AUTOMATED COUNT: 14.5 %
GLOBULIN PLAS-MCNC: 3.4 G/DL (ref 2.8–4.4)
GLUCOSE BLD-MCNC: 87 MG/DL (ref 70–99)
HCT VFR BLD AUTO: 47.3 %
HGB BLD-MCNC: 15.2 G/DL
IMM GRANULOCYTES # BLD AUTO: 0.04 X10(3) UL (ref 0–1)
IMM GRANULOCYTES NFR BLD: 0.5 %
LYMPHOCYTES # BLD AUTO: 1.18 X10(3) UL (ref 1–4)
LYMPHOCYTES NFR BLD AUTO: 14.6 %
MCH RBC QN AUTO: 29.6 PG (ref 26–34)
MCHC RBC AUTO-ENTMCNC: 32.1 G/DL (ref 31–37)
MCV RBC AUTO: 92.2 FL
MONOCYTES # BLD AUTO: 0.67 X10(3) UL (ref 0.1–1)
MONOCYTES NFR BLD AUTO: 8.3 %
NEUTROPHILS # BLD AUTO: 5.98 X10 (3) UL (ref 1.5–7.7)
NEUTROPHILS # BLD AUTO: 5.98 X10(3) UL (ref 1.5–7.7)
NEUTROPHILS NFR BLD AUTO: 74.3 %
OSMOLALITY SERPL CALC.SUM OF ELEC: 293 MOSM/KG (ref 275–295)
PLATELET # BLD AUTO: 152 10(3)UL (ref 150–450)
POTASSIUM SERPL-SCNC: 3.3 MMOL/L (ref 3.5–5.1)
PROT SERPL-MCNC: 7.7 G/DL (ref 6.4–8.2)
RBC # BLD AUTO: 5.13 X10(6)UL
SODIUM SERPL-SCNC: 140 MMOL/L (ref 136–145)
WBC # BLD AUTO: 8.1 X10(3) UL (ref 4–11)

## 2022-01-17 PROCEDURE — 3008F BODY MASS INDEX DOCD: CPT | Performed by: INTERNAL MEDICINE

## 2022-01-17 PROCEDURE — 80053 COMPREHEN METABOLIC PANEL: CPT | Performed by: INTERNAL MEDICINE

## 2022-01-17 PROCEDURE — 85025 COMPLETE CBC W/AUTO DIFF WBC: CPT | Performed by: INTERNAL MEDICINE

## 2022-01-17 PROCEDURE — 82728 ASSAY OF FERRITIN: CPT | Performed by: INTERNAL MEDICINE

## 2022-01-17 PROCEDURE — 99214 OFFICE O/P EST MOD 30 MIN: CPT | Performed by: INTERNAL MEDICINE

## 2022-01-17 PROCEDURE — 3078F DIAST BP <80 MM HG: CPT | Performed by: INTERNAL MEDICINE

## 2022-01-17 PROCEDURE — 3074F SYST BP LT 130 MM HG: CPT | Performed by: INTERNAL MEDICINE

## 2022-01-17 RX ORDER — HYDROXYZINE HYDROCHLORIDE 10 MG/1
10 TABLET, FILM COATED ORAL 3 TIMES DAILY PRN
Qty: 30 TABLET | Refills: 0 | Status: SHIPPED | OUTPATIENT
Start: 2022-01-17 | End: 2022-02-16

## 2022-01-17 RX ORDER — SERTRALINE HYDROCHLORIDE 25 MG/1
25 TABLET, FILM COATED ORAL DAILY
Qty: 30 TABLET | Refills: 3 | Status: SHIPPED | OUTPATIENT
Start: 2022-01-17 | End: 2022-02-16

## 2022-01-17 RX ORDER — ALPRAZOLAM 0.25 MG/1
0.25 TABLET ORAL EVERY 4 HOURS PRN
Qty: 30 TABLET | Refills: 0 | Status: SHIPPED | OUTPATIENT
Start: 2022-01-17 | End: 2022-01-17

## 2022-01-17 RX ORDER — FUROSEMIDE 40 MG/1
TABLET ORAL
COMMUNITY
Start: 2022-01-06

## 2022-01-17 RX ORDER — ALPRAZOLAM 0.25 MG/1
0.25 TABLET ORAL 3 TIMES DAILY PRN
Qty: 30 TABLET | Refills: 0 | Status: SHIPPED | OUTPATIENT
Start: 2022-01-17 | End: 2022-01-17

## 2022-01-17 NOTE — TELEPHONE ENCOUNTER
You can take diltiazem once a day AM and you can use atarax, it has the same side effects as benadryl.

## 2022-01-17 NOTE — TELEPHONE ENCOUNTER
DR PRESCRIBED ALPRAZolam Lanice Rash), PT DOES NOT WANT TO TAKE CONTROLLED SUBSTANCE, PHARMACIST GAVE HER NAME OF ANOTHER MEDICATION POSSIBILITY, CALL PT ABOUT THIS

## 2022-01-17 NOTE — TELEPHONE ENCOUNTER
Patient said that she does not want to take a controlled substance. She said the pharmacist said that Atarax is not a controlled substance. Can she take this or something else that is not controlled?  Should she continue the Diltiazem since her blood pressu

## 2022-01-18 NOTE — PROGRESS NOTES
Jacqueline Acuna is a 68year old female. HPI:   Pt has been having increased SOB and as a result panic attacks. She has felt this way before when she was very anemic. She has been losing a little weight.   She lives alone an has very few visitors, and a tablet 3      Past Medical History:   Diagnosis Date   • Acute bronchitis 11/29/2011   • Arthritis    • Black stools    • Blood in urine    • Chest pain, unspecified 12/15/2011   • Chronic airway obstruction, not elsewhere classified 11/29/2011   • Chronic clear  NECK: supple,no adenopathy,no bruits  LUNGS: clear to auscultation  CARDIO: RRR without murmur  GI: good BS's,no masses, HSM or tenderness  EXTREMITIES: no cyanosis, clubbing or edema    ASSESSMENT AND PLAN:     Chf (nyha class iii, acc/aha stage c)

## 2022-01-19 ENCOUNTER — TELEPHONE (OUTPATIENT)
Dept: FAMILY MEDICINE CLINIC | Facility: CLINIC | Age: 77
End: 2022-01-19

## 2022-01-24 NOTE — TELEPHONE ENCOUNTER
Please have patient finish her Medrol Dosepak.   Increase fluids  If pain is completely resolved by the end of the week, start allopurinol 300 mg daily and recheck uric acid level in 6-8 weeks 98

## 2022-01-25 ENCOUNTER — TELEPHONE (OUTPATIENT)
Dept: FAMILY MEDICINE CLINIC | Facility: CLINIC | Age: 77
End: 2022-01-25

## 2022-01-25 DIAGNOSIS — E04.1 THYROID NODULE: ICD-10-CM

## 2022-01-25 DIAGNOSIS — R13.19 ESOPHAGEAL DYSPHAGIA: Primary | ICD-10-CM

## 2022-01-25 NOTE — TELEPHONE ENCOUNTER
Patient advised. She said that the compression stockings do not fit her. She asked if an xray would show the metal in her neck.  Patient advised that Dr Jeremy Chapa states that a CT scan of her neck would be the best imaging to show the metal.

## 2022-01-25 NOTE — TELEPHONE ENCOUNTER
Patient called back and said that she is really \"struggling\" to breath. She said that her oxygen saturation is 89% on 2L of oxygen. Patient advised to sit down and take some slow breaths through her nose.  After several minutes patient states that her oxy

## 2022-01-25 NOTE — TELEPHONE ENCOUNTER
Try some benadryl to dry up the sinuses and a new filter for the furnace, if you have one. Elevate feet and COMPRESSION if needed take an additional lasix for 3 days. Make sure she can swallow pills, est full liquid for 2 days.

## 2022-01-25 NOTE — TELEPHONE ENCOUNTER
THROAT FEELS CLOSED UP, HARD TO EAT OR SWALLOW ANYTHING. AFRAID CAR WON'T START TO COME IN TO [facility]ASPATORY CLINIC.  COULD NURSE PLEASE CALL AND DISCUSS-    PLEASE ADVISE-THANK YOU

## 2022-01-25 NOTE — TELEPHONE ENCOUNTER
Patient said she saw the \"clips in her throat\" in the xray she had at the hospital. They were placed when she had carotid artery surgery in 2011. She is convinced that one of the clips is \"sticking her\" in the throat.  She wants to know if something can

## 2022-02-03 ENCOUNTER — TELEPHONE (OUTPATIENT)
Dept: FAMILY MEDICINE CLINIC | Facility: CLINIC | Age: 77
End: 2022-02-03

## 2022-02-03 NOTE — TELEPHONE ENCOUNTER
Patient advised. She said that she forgot to tell us that her heart was \"jumping allover the place\" the other night and it kept her awake, she said it also happened all day yesterday. She said it felt like a bird was \"trying to fly out of her  She said her BP was 116/73 and her HR was 82. She denies drinking caffeine or any changes in medication. She said her pulse was 102 right now. Patient advised that she does not need to be concerned unless her pulse is over 140.  V.O. Dr Blanca Schneider RN
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
## 2022-02-06 RX ORDER — ALLOPURINOL 100 MG/1
100 TABLET ORAL DAILY
Qty: 90 TABLET | Refills: 0 | Status: SHIPPED | OUTPATIENT
Start: 2022-02-06 | End: 2022-05-07

## 2022-02-24 ENCOUNTER — TELEPHONE (OUTPATIENT)
Dept: FAMILY MEDICINE CLINIC | Facility: CLINIC | Age: 77
End: 2022-02-24

## 2022-02-24 RX ORDER — IPRATROPIUM/ALBUTEROL SULFATE 20-100 MCG
1 MIST INHALER (GRAM) INHALATION 4 TIMES DAILY
Qty: 11 EACH | Refills: 0 | Status: SHIPPED | OUTPATIENT
Start: 2022-02-24

## 2022-02-24 NOTE — TELEPHONE ENCOUNTER
Patient advised. She said she is feeling better now after getting out of the house. She states she will call us if not getting better.

## 2022-02-24 NOTE — TELEPHONE ENCOUNTER
Not a typical symptom. I think it is a little deconditioning from sitting in the chair too much this winter. Vertigo?

## 2022-02-24 NOTE — TELEPHONE ENCOUNTER
LOSING BALANCE, FEELS LIKE SHE IS GOING TO FALL BACK WHEN SHE STANDS UP, WANTS TO KNOW IF THIS IS A SYMPTOM OF OMICRON?  CALL PT

## 2022-02-24 NOTE — TELEPHONE ENCOUNTER
Patient said she kept falling back into her chair every time she stood up today. She said that she feels \"off balance\". She said that she left the house and feels \"better\" than she did. She denies having any respiratory symptoms or headache. She said that her blood pressure today was 126/77 and pulse was 77. She has not been taking the Hydroxyzine because they \"scare her\". She wants to know if she has Omicron.

## 2022-02-24 NOTE — TELEPHONE ENCOUNTER
Patient states that [address] [place] [address] never received the order for the Combivent. Order refaxed to [phone].

## 2022-03-10 RX ORDER — FUROSEMIDE 40 MG/1
TABLET ORAL
Qty: 270 TABLET | Refills: 0 | Status: SHIPPED | OUTPATIENT
Start: 2022-03-10

## 2022-03-19 ENCOUNTER — TELEPHONE (OUTPATIENT)
Dept: FAMILY MEDICINE CLINIC | Facility: CLINIC | Age: 77
End: 2022-03-19

## 2022-03-19 NOTE — TELEPHONE ENCOUNTER
Tan Damon is calling she was wondering what was going on with her ipratropium-albuterol (COMBIVENT RESPIMAT)  MCG/ACT Inhalation Aero Soln, she was told that they only got enough for 1 but she knows nickie sent in for more please call on Monday when Olya Nelson is here.

## 2022-03-21 ENCOUNTER — TELEPHONE (OUTPATIENT)
Dept: FAMILY MEDICINE CLINIC | Facility: CLINIC | Age: 77
End: 2022-03-21

## 2022-03-21 NOTE — TELEPHONE ENCOUNTER
[address] [facility]) called -   They did receive the paperwork and script for Combivent which is valid through Dec 22. A refill will be sent today and pt should receive in 7-10 days    Pt notified. Pt states has enough medication for at least another week. Explained per [facility] that a green postcard will be sent with refill - may use that for further refills. Pt verbalized understanding - has received postcard before.

## 2022-03-21 NOTE — TELEPHONE ENCOUNTER
Patient calling about her combivent inhalers, and wanting to know if paperwork was done with [employer] for 1 year of inhalers.

## 2022-04-30 ENCOUNTER — TELEPHONE (OUTPATIENT)
Dept: FAMILY MEDICINE CLINIC | Facility: CLINIC | Age: 77
End: 2022-04-30

## 2022-04-30 NOTE — TELEPHONE ENCOUNTER
Pt heart was racing in the middle of the night. .  She called ER about 3 am & they advised her to come to ER. She couldn't get a ride & refused to call ambulance. Yesterday it felt like it was really hard to take a deep breath. Her oxygen was good. This morning her left arm feels numb & cold.   triage to nurse

## 2022-04-30 NOTE — TELEPHONE ENCOUNTER
Patient states when awoke this AM her left arm is numb, but able to move it. She has at fib, CHF, and continuous oxygen. She sleeps in chair. Had heavy pressure on upper chest all day yesterday   Advised- per Dr Quique Bills needs to go to the ER to be evaluated. Asked her what she thinks Dr Wendy Newton would tell her? Comments, \" I wanted to talk to her about paperwork that needs to be completed. \"  She has appt on the 11th.     Future Appointments   Date Time Provider Scarlett Gallagher   5/12/2022  9:00 AM Irina Yu MD [facility] [place]

## 2022-05-02 ENCOUNTER — TELEPHONE (OUTPATIENT)
Dept: FAMILY MEDICINE CLINIC | Facility: CLINIC | Age: 77
End: 2022-05-02

## 2022-05-02 ENCOUNTER — MED REC SCAN ONLY (OUTPATIENT)
Dept: FAMILY MEDICINE CLINIC | Facility: CLINIC | Age: 77
End: 2022-05-02

## 2022-05-02 NOTE — TELEPHONE ENCOUNTER
Patient wants to make an appointment for her mammogram. Advised the last mammogram was 6/1/21, she will have it done after 6/1/22, order placed. Patient also asking for parking placard to be filled out. Form filled out and mailed to the , copy given to patient. Patient also said that she has had pain radiating down her left arm and shoulder blade. She is also c/o numbness to left fingers. Patient denies any chest pain and said that the SOB she is having is what she usually has. Patient said she went to [facility] ER and had to wait too long so she left. Patient advised she needs to be seen. V.O. Dr Beto Tristan RN She said she has an appointment with Dr Alesia Santos next week. She will keep this unless she needs to be seen sooner.

## 2022-05-05 ENCOUNTER — TELEPHONE (OUTPATIENT)
Dept: FAMILY MEDICINE CLINIC | Facility: CLINIC | Age: 77
End: 2022-05-05

## 2022-05-05 NOTE — TELEPHONE ENCOUNTER
Is this the only food, WM is fibrous, how big were the pieces, does she think anything is stuck. Drink something warm and make sure everything feels normal before eating or taking pills.

## 2022-05-05 NOTE — TELEPHONE ENCOUNTER
ATE SOME PIECES OF WATERMELON, WITHIN 10 MINUTES OF EATING IT SHE STARTED HAVING DIFFICULTY BREATHING, WANTS TO TALK TO NURSE

## 2022-05-05 NOTE — TELEPHONE ENCOUNTER
Was cleaning watermelon and ate a few pieces and started having trouble breathing, lasted about 10 mins. She has been having trouble with swallowing food; seeing Dr Sushant Guzman next week. Told her not to eat any more watermelon until hear back from our office.   Routed to Dr Sushant Guzman

## 2022-05-12 ENCOUNTER — OFFICE VISIT (OUTPATIENT)
Dept: FAMILY MEDICINE CLINIC | Facility: CLINIC | Age: 77
End: 2022-05-12
Payer: MEDICARE

## 2022-05-12 ENCOUNTER — LAB ENCOUNTER (OUTPATIENT)
Dept: LAB | Age: 77
End: 2022-05-12
Attending: INTERNAL MEDICINE
Payer: MEDICARE

## 2022-05-12 VITALS
HEART RATE: 81 BPM | RESPIRATION RATE: 18 BRPM | SYSTOLIC BLOOD PRESSURE: 114 MMHG | BODY MASS INDEX: 32.85 KG/M2 | TEMPERATURE: 97 F | OXYGEN SATURATION: 96 % | DIASTOLIC BLOOD PRESSURE: 76 MMHG | HEIGHT: 62 IN | WEIGHT: 178.5 LBS

## 2022-05-12 DIAGNOSIS — E21.2 OTHER HYPERPARATHYROIDISM (HCC): ICD-10-CM

## 2022-05-12 DIAGNOSIS — J84.10 LUNG GRANULOMA (HCC): ICD-10-CM

## 2022-05-12 DIAGNOSIS — M46.92 INFLAMMATORY SPONDYLOPATHY OF CERVICAL REGION (HCC): ICD-10-CM

## 2022-05-12 DIAGNOSIS — M10.00 ACUTE IDIOPATHIC GOUT, UNSPECIFIED SITE: ICD-10-CM

## 2022-05-12 DIAGNOSIS — N18.4 CKD (CHRONIC KIDNEY DISEASE) STAGE 4, GFR 15-29 ML/MIN (HCC): ICD-10-CM

## 2022-05-12 DIAGNOSIS — D69.6 THROMBOCYTOPENIA (HCC): Chronic | ICD-10-CM

## 2022-05-12 DIAGNOSIS — I48.20 ATRIAL FIBRILLATION, CHRONIC (HCC): ICD-10-CM

## 2022-05-12 DIAGNOSIS — I25.719 CORONARY ARTERY DISEASE INVOLVING AUTOLOGOUS VEIN CORONARY BYPASS GRAFT WITH ANGINA PECTORIS (HCC): ICD-10-CM

## 2022-05-12 DIAGNOSIS — N18.4 STAGE 4 CHRONIC KIDNEY DISEASE (HCC): ICD-10-CM

## 2022-05-12 DIAGNOSIS — I10 PRIMARY HYPERTENSION: ICD-10-CM

## 2022-05-12 DIAGNOSIS — J43.8 OTHER EMPHYSEMA (HCC): ICD-10-CM

## 2022-05-12 DIAGNOSIS — I70.0 ABDOMINAL AORTIC ATHEROSCLEROSIS (HCC): ICD-10-CM

## 2022-05-12 DIAGNOSIS — Z00.00 ENCOUNTER FOR ANNUAL HEALTH EXAMINATION: Primary | ICD-10-CM

## 2022-05-12 DIAGNOSIS — I50.9 CHF (NYHA CLASS III, ACC/AHA STAGE C) (HCC): ICD-10-CM

## 2022-05-12 DIAGNOSIS — M50.30 DEGENERATION OF CERVICAL INTERVERTEBRAL DISC: ICD-10-CM

## 2022-05-12 DIAGNOSIS — E78.1 PURE HYPERTRIGLYCERIDEMIA: ICD-10-CM

## 2022-05-12 DIAGNOSIS — D68.2 FACTOR XII DEFICIENCY (HCC): ICD-10-CM

## 2022-05-12 DIAGNOSIS — R13.19 ESOPHAGEAL DYSPHAGIA: ICD-10-CM

## 2022-05-12 DIAGNOSIS — I27.20 PULMONARY HTN (HCC): ICD-10-CM

## 2022-05-12 PROBLEM — J18.9 COMMUNITY ACQUIRED PNEUMONIA: Status: RESOLVED | Noted: 2019-10-22 | Resolved: 2022-05-12

## 2022-05-12 PROBLEM — Z86.39 HISTORY OF DIABETES MELLITUS: Chronic | Status: RESOLVED | Noted: 2020-06-06 | Resolved: 2022-05-12

## 2022-05-12 PROBLEM — J18.9 COMMUNITY ACQUIRED PNEUMONIA, UNSPECIFIED LATERALITY: Status: RESOLVED | Noted: 2019-10-22 | Resolved: 2022-05-12

## 2022-05-12 PROBLEM — D50.0 IRON DEFICIENCY ANEMIA DUE TO CHRONIC BLOOD LOSS: Status: RESOLVED | Noted: 2020-06-05 | Resolved: 2022-05-12

## 2022-05-12 PROBLEM — R06.02 SOB (SHORTNESS OF BREATH) ON EXERTION: Status: RESOLVED | Noted: 2019-10-16 | Resolved: 2022-05-12

## 2022-05-12 PROBLEM — J96.01 ACUTE RESPIRATORY FAILURE WITH HYPOXEMIA (HCC): Status: RESOLVED | Noted: 2019-12-12 | Resolved: 2022-05-12

## 2022-05-12 PROBLEM — R07.9 CHEST PAIN OF UNCERTAIN ETIOLOGY: Status: RESOLVED | Noted: 2019-10-22 | Resolved: 2022-05-12

## 2022-05-12 PROBLEM — E87.1 HYPONATREMIA: Status: RESOLVED | Noted: 2019-10-22 | Resolved: 2022-05-12

## 2022-05-12 PROBLEM — R79.89 AZOTEMIA: Status: RESOLVED | Noted: 2019-10-22 | Resolved: 2022-05-12

## 2022-05-12 PROBLEM — I95.9 HYPOTENSION, UNSPECIFIED HYPOTENSION TYPE: Status: RESOLVED | Noted: 2019-10-22 | Resolved: 2022-05-12

## 2022-05-12 PROBLEM — R73.03 PREDIABETES: Chronic | Status: RESOLVED | Noted: 2019-12-28 | Resolved: 2022-05-12

## 2022-05-12 PROBLEM — E66.01 SEVERE OBESITY (BMI 35.0-39.9) WITH COMORBIDITY (HCC): Chronic | Status: RESOLVED | Noted: 2019-01-15 | Resolved: 2022-05-12

## 2022-05-12 PROBLEM — Z87.891 FORMER SMOKER: Status: RESOLVED | Noted: 2019-05-21 | Resolved: 2022-05-12

## 2022-05-12 LAB
ALBUMIN SERPL-MCNC: 4.2 G/DL (ref 3.4–5)
ALBUMIN/GLOB SERPL: 1.3 {RATIO} (ref 1–2)
ALP LIVER SERPL-CCNC: 134 U/L
ALT SERPL-CCNC: 19 U/L
ANION GAP SERPL CALC-SCNC: 6 MMOL/L (ref 0–18)
AST SERPL-CCNC: 22 U/L (ref 15–37)
BASOPHILS # BLD AUTO: 0.05 X10(3) UL (ref 0–0.2)
BASOPHILS NFR BLD AUTO: 0.7 %
BILIRUB SERPL-MCNC: 0.6 MG/DL (ref 0.1–2)
BUN BLD-MCNC: 26 MG/DL (ref 7–18)
CALCIUM BLD-MCNC: 9.8 MG/DL (ref 8.5–10.1)
CHLORIDE SERPL-SCNC: 103 MMOL/L (ref 98–112)
CHOLEST SERPL-MCNC: 212 MG/DL (ref ?–200)
CO2 SERPL-SCNC: 30 MMOL/L (ref 21–32)
CREAT BLD-MCNC: 1.02 MG/DL
EOSINOPHIL # BLD AUTO: 0.2 X10(3) UL (ref 0–0.7)
EOSINOPHIL NFR BLD AUTO: 2.8 %
ERYTHROCYTE [DISTWIDTH] IN BLOOD BY AUTOMATED COUNT: 14.4 %
GLOBULIN PLAS-MCNC: 3.2 G/DL (ref 2.8–4.4)
GLUCOSE BLD-MCNC: 79 MG/DL (ref 70–99)
HCT VFR BLD AUTO: 44.2 %
HDLC SERPL-MCNC: 57 MG/DL (ref 40–59)
HGB BLD-MCNC: 13.9 G/DL
IMM GRANULOCYTES # BLD AUTO: 0.02 X10(3) UL (ref 0–1)
IMM GRANULOCYTES NFR BLD: 0.3 %
LDLC SERPL CALC-MCNC: 136 MG/DL (ref ?–100)
LYMPHOCYTES # BLD AUTO: 1.19 X10(3) UL (ref 1–4)
LYMPHOCYTES NFR BLD AUTO: 16.5 %
MCH RBC QN AUTO: 29.5 PG (ref 26–34)
MCHC RBC AUTO-ENTMCNC: 31.4 G/DL (ref 31–37)
MCV RBC AUTO: 93.8 FL
MONOCYTES # BLD AUTO: 0.6 X10(3) UL (ref 0.1–1)
MONOCYTES NFR BLD AUTO: 8.3 %
NEUTROPHILS # BLD AUTO: 5.14 X10 (3) UL (ref 1.5–7.7)
NEUTROPHILS # BLD AUTO: 5.14 X10(3) UL (ref 1.5–7.7)
NEUTROPHILS NFR BLD AUTO: 71.4 %
NONHDLC SERPL-MCNC: 155 MG/DL (ref ?–130)
OSMOLALITY SERPL CALC.SUM OF ELEC: 292 MOSM/KG (ref 275–295)
PLATELET # BLD AUTO: 156 10(3)UL (ref 150–450)
POTASSIUM SERPL-SCNC: 3.6 MMOL/L (ref 3.5–5.1)
PROT SERPL-MCNC: 7.4 G/DL (ref 6.4–8.2)
RBC # BLD AUTO: 4.71 X10(6)UL
SODIUM SERPL-SCNC: 139 MMOL/L (ref 136–145)
TRIGL SERPL-MCNC: 108 MG/DL (ref 30–149)
VLDLC SERPL CALC-MCNC: 20 MG/DL (ref 0–30)
WBC # BLD AUTO: 7.2 X10(3) UL (ref 4–11)

## 2022-05-12 PROCEDURE — G0439 PPPS, SUBSEQ VISIT: HCPCS | Performed by: INTERNAL MEDICINE

## 2022-05-12 PROCEDURE — 96160 PT-FOCUSED HLTH RISK ASSMT: CPT | Performed by: INTERNAL MEDICINE

## 2022-05-12 PROCEDURE — 36415 COLL VENOUS BLD VENIPUNCTURE: CPT

## 2022-05-12 PROCEDURE — 85025 COMPLETE CBC W/AUTO DIFF WBC: CPT

## 2022-05-12 PROCEDURE — 3078F DIAST BP <80 MM HG: CPT | Performed by: INTERNAL MEDICINE

## 2022-05-12 PROCEDURE — 80053 COMPREHEN METABOLIC PANEL: CPT

## 2022-05-12 PROCEDURE — 99397 PER PM REEVAL EST PAT 65+ YR: CPT | Performed by: INTERNAL MEDICINE

## 2022-05-12 PROCEDURE — 80061 LIPID PANEL: CPT

## 2022-05-12 PROCEDURE — 3074F SYST BP LT 130 MM HG: CPT | Performed by: INTERNAL MEDICINE

## 2022-05-12 PROCEDURE — 3008F BODY MASS INDEX DOCD: CPT | Performed by: INTERNAL MEDICINE

## 2022-05-12 RX ORDER — ALLOPURINOL 100 MG/1
100 TABLET ORAL DAILY
Qty: 90 TABLET | Refills: 3 | Status: SHIPPED | OUTPATIENT
Start: 2022-05-12 | End: 2022-08-10

## 2022-05-12 RX ORDER — FUROSEMIDE 40 MG/1
40 TABLET ORAL 3 TIMES DAILY
Qty: 270 TABLET | Refills: 3 | Status: SHIPPED | OUTPATIENT
Start: 2022-05-12 | End: 2022-08-10

## 2022-05-12 RX ORDER — DILTIAZEM HYDROCHLORIDE 240 MG/1
240 CAPSULE, EXTENDED RELEASE ORAL 2 TIMES DAILY
Qty: 180 CAPSULE | Refills: 1 | Status: SHIPPED | OUTPATIENT
Start: 2022-05-12

## 2022-05-17 ENCOUNTER — TELEPHONE (OUTPATIENT)
Dept: FAMILY MEDICINE CLINIC | Facility: CLINIC | Age: 77
End: 2022-05-17

## 2022-05-17 ENCOUNTER — OFFICE VISIT (OUTPATIENT)
Dept: FAMILY MEDICINE CLINIC | Facility: CLINIC | Age: 77
End: 2022-05-17
Payer: MEDICARE

## 2022-05-17 VITALS
DIASTOLIC BLOOD PRESSURE: 46 MMHG | OXYGEN SATURATION: 95 % | WEIGHT: 179.13 LBS | HEIGHT: 62 IN | SYSTOLIC BLOOD PRESSURE: 118 MMHG | BODY MASS INDEX: 32.96 KG/M2 | HEART RATE: 81 BPM | RESPIRATION RATE: 24 BRPM | TEMPERATURE: 98 F

## 2022-05-17 DIAGNOSIS — M54.50 ACUTE LOW BACK PAIN WITHOUT SCIATICA, UNSPECIFIED BACK PAIN LATERALITY: Primary | ICD-10-CM

## 2022-05-17 PROCEDURE — 3008F BODY MASS INDEX DOCD: CPT | Performed by: INTERNAL MEDICINE

## 2022-05-17 PROCEDURE — 96372 THER/PROPH/DIAG INJ SC/IM: CPT | Performed by: INTERNAL MEDICINE

## 2022-05-17 PROCEDURE — 3074F SYST BP LT 130 MM HG: CPT | Performed by: INTERNAL MEDICINE

## 2022-05-17 PROCEDURE — 99214 OFFICE O/P EST MOD 30 MIN: CPT | Performed by: INTERNAL MEDICINE

## 2022-05-17 PROCEDURE — 3078F DIAST BP <80 MM HG: CPT | Performed by: INTERNAL MEDICINE

## 2022-05-17 RX ORDER — KETOROLAC TROMETHAMINE 30 MG/ML
60 INJECTION, SOLUTION INTRAMUSCULAR; INTRAVENOUS ONCE
Status: COMPLETED | OUTPATIENT
Start: 2022-05-17 | End: 2022-05-17

## 2022-05-17 RX ADMIN — KETOROLAC TROMETHAMINE 60 MG: 30 INJECTION, SOLUTION INTRAMUSCULAR; INTRAVENOUS at 16:20:00

## 2022-05-17 NOTE — TELEPHONE ENCOUNTER
Patient said that she was standing up yesterday chopping vegetables and her lower back started \"hurting\" last night. She said that she can barely stand up or sit down and she can't. She has not taken anything. She has Tylenol at home but has not taken anything. Advised to take Tylenol right now, she said she cannot crush it because it is enteric coated. She said that pain is a 10 out of 10 or more. She said that she needs a \"shot\" of something.

## 2022-05-17 NOTE — TELEPHONE ENCOUNTER
In the office we have steroids and Ketoralac. This sounds like spasm, of she has flexeril(cylobenzaprine) or soma (carisoprodoll) at home she may want to try these and they can both be crushed.

## 2022-05-21 ENCOUNTER — TELEPHONE (OUTPATIENT)
Dept: FAMILY MEDICINE CLINIC | Facility: CLINIC | Age: 77
End: 2022-05-21

## 2022-05-21 NOTE — TELEPHONE ENCOUNTER
Patient said that she doesn't know what is going on. She said that she was  on the phone and fell asleep while talking, she has been like this since yesterday. She said that after she got the Toradol shot she had diarrhea and nausea. She had her neighbor do a Covid test on her and it was negative. She said she does not have diarrhea or nausea anymore. She denies abdominal pain. She is drinking fluids. She said that she has been having a \"hard time breathing\" and her oxygen saturation has been around 88%. She said it is now 95%.
Patient said that she has not been taking Benadryl and her HR has not been low it is 79 right now but sometimes it is 100. She said her blood pressure has been \"up and down\". She said she has had a \"weird feeling in her chest\". She denies chest pain or pressure. Patient advised to go to the Er if she has chest pain, nausea, sweating or dizziness. Update us Monday with BP and pulse readings from the weekend.
Rest, is HR low? Nothing in meds would cause fatigue. And OTC? Benadryl?
SHE SAID THAT SHE CAN'T STAY AWAKE AND DOES NOT KNOW WHAT IS GOING ON WITH HER.   SHE WANTS A CALL BACK SOON IF POSSIBLE
No

## 2022-05-26 ENCOUNTER — TELEPHONE (OUTPATIENT)
Dept: FAMILY MEDICINE CLINIC | Facility: CLINIC | Age: 77
End: 2022-05-26

## 2022-05-26 NOTE — TELEPHONE ENCOUNTER
Patient advised. She said she does not have Carafate. She will go to [facility] and get Pepto Bismol. She said that she has had bloody noses from her oxygen. Advised to use Vaseline to the inside of her nose to coat it.  V.O. Dr Kady Haro RN

## 2022-05-26 NOTE — TELEPHONE ENCOUNTER
pepto would be best--- samantha says they have it a [employer] or [employer], carafate would work to, you have been prescribed this in the past if you can find it.

## 2022-05-26 NOTE — TELEPHONE ENCOUNTER
Patient states that she took her powdered Potassium at 10am and it got \"stuck\" in her throat. She said she drank coffee with cream and then water but her throat is still burning. She was crying stating she doesn't know what to do. She has Pepcid tablets at home but no Mylanta. She is afraid this will cause a \"fungus\" like it did last time.

## 2022-05-26 NOTE — TELEPHONE ENCOUNTER
TOOK POWDER POTASSIUM THIS MORNING & MIXED WITH APPLESAUCE @ 10AM, HER THROAT FEELS LIKE IT'S ON FIRE, HARD TIME SWALLOWING, CAN'T GET HER OXYGEN TO GO UP PAST 90

## 2022-06-01 ENCOUNTER — TELEPHONE (OUTPATIENT)
Dept: FAMILY MEDICINE CLINIC | Facility: CLINIC | Age: 77
End: 2022-06-01

## 2022-06-01 NOTE — TELEPHONE ENCOUNTER
No booster before your mammogram AFTER is fine. No need to get Hep A tested unless symptomatic. Jaundice, itching, vomiting.

## 2022-06-01 NOTE — TELEPHONE ENCOUNTER
Pt. Called back asking she is getting her mammogram done on Monday and she is asking if she should still get her booster today. Also, she has concerns because she saw the recall for the strawberries so she is asking if she should get tested for hepatitis?

## 2022-06-01 NOTE — TELEPHONE ENCOUNTER
Patient advised. She asked if she should stick with Garcia Peter or can she get Karina Senate. Patient advised either one.  ANAHI Pruitt RN

## 2022-06-06 ENCOUNTER — HOSPITAL ENCOUNTER (OUTPATIENT)
Dept: MAMMOGRAPHY | Age: 77
Discharge: HOME | End: 2022-06-06
Attending: INTERNAL MEDICINE
Payer: MEDICARE

## 2022-06-06 DIAGNOSIS — Z12.31 SCREENING MAMMOGRAM FOR BREAST CANCER: ICD-10-CM

## 2022-06-06 PROCEDURE — 77067 SCR MAMMO BI INCL CAD: CPT | Performed by: INTERNAL MEDICINE

## 2022-06-09 ENCOUNTER — PATIENT MESSAGE (OUTPATIENT)
Dept: FAMILY MEDICINE CLINIC | Facility: CLINIC | Age: 77
End: 2022-06-09

## 2022-06-09 NOTE — TELEPHONE ENCOUNTER
From: Shobha Figueroa  To:  Yadira Jordan MD  Sent: 6/9/2022 10:57 AM CDT  Subject: Covid 2nd booster    I got booster on 6 /8 /22

## 2022-06-15 ENCOUNTER — TELEPHONE (OUTPATIENT)
Dept: FAMILY MEDICINE CLINIC | Facility: CLINIC | Age: 77
End: 2022-06-15

## 2022-06-15 NOTE — TELEPHONE ENCOUNTER
Spoke with pt. She states she tore most of her big toe nail off on her right foot. Pt states it happened 4:30 AM this morning. Pt doesn't know how this happened. She got the bleeding to stop but now \"clear liquid stuff\" is coming out. Advised pt to go to Urgent Care or ER for evaluation. Pt v/u. She will go to a place that is in [place], [place].

## 2022-07-21 ENCOUNTER — TELEPHONE (OUTPATIENT)
Dept: FAMILY MEDICINE CLINIC | Facility: CLINIC | Age: 77
End: 2022-07-21

## 2022-07-21 NOTE — TELEPHONE ENCOUNTER
TAMI--SHE IS FAXING OVER CORRECTED RECERT FOR OXYGEN, ORIGINALLY SENT OVER FOR CONCENTRATOR & POC, BUT THE CORRECTED VERSION IS FOR CONCENTRATOR & TANK

## 2022-07-21 NOTE — TELEPHONE ENCOUNTER
Patient said that a \"blood vein broke in her hand\" and she has been getting a lot of bruises on her arms even though she didn't injure herself.

## 2022-07-21 NOTE — TELEPHONE ENCOUNTER
Mindy Callahan is calling she would like to know if she should stop taking her baby Asprin, the veins in the tops of her hands are breaking she could just be sitting there and it happens she only sees this happens on her hands, but she does have some more busies.   Please call

## 2022-08-29 ENCOUNTER — TELEPHONE (OUTPATIENT)
Dept: FAMILY MEDICINE CLINIC | Facility: CLINIC | Age: 77
End: 2022-08-29

## 2022-08-29 NOTE — TELEPHONE ENCOUNTER
This is nerve related maybe transient or related to something in the neck, which has been a source of pain the past. Does she have a soft collar?

## 2022-08-29 NOTE — TELEPHONE ENCOUNTER
Patient said that she has no strength in her right arm and she has been \"dropping things\", she said that it feels like her thumb is \"asleep\". She said her right leg is fine. She said she has been having difficulty picking up her oxygen with her right hand. Patient was not slurring words. She said she was fatigued last night. She said her smile is even. She said that Dr Kendy Paul told her to take a baby Aspirin every other day.

## 2022-09-07 ENCOUNTER — OFFICE VISIT (OUTPATIENT)
Dept: FAMILY MEDICINE CLINIC | Facility: CLINIC | Age: 77
End: 2022-09-07
Payer: MEDICARE

## 2022-09-07 VITALS
HEART RATE: 110 BPM | DIASTOLIC BLOOD PRESSURE: 64 MMHG | BODY MASS INDEX: 30 KG/M2 | OXYGEN SATURATION: 96 % | WEIGHT: 166.38 LBS | RESPIRATION RATE: 16 BRPM | SYSTOLIC BLOOD PRESSURE: 118 MMHG | TEMPERATURE: 98 F

## 2022-09-07 DIAGNOSIS — I50.9 CHF (NYHA CLASS III, ACC/AHA STAGE C) (HCC): Primary | ICD-10-CM

## 2022-09-07 DIAGNOSIS — E11.9 DIABETES MELLITUS TYPE 2, DIET-CONTROLLED (HCC): ICD-10-CM

## 2022-09-07 DIAGNOSIS — I70.0 ABDOMINAL AORTIC ATHEROSCLEROSIS (HCC): ICD-10-CM

## 2022-09-07 DIAGNOSIS — I10 PRIMARY HYPERTENSION: ICD-10-CM

## 2022-09-07 PROBLEM — E11.22 TYPE 2 DIABETES MELLITUS WITH DIABETIC CHRONIC KIDNEY DISEASE (HCC): Status: ACTIVE | Noted: 2022-09-07

## 2022-09-07 PROCEDURE — 3074F SYST BP LT 130 MM HG: CPT | Performed by: INTERNAL MEDICINE

## 2022-09-07 PROCEDURE — 3078F DIAST BP <80 MM HG: CPT | Performed by: INTERNAL MEDICINE

## 2022-09-07 PROCEDURE — 99214 OFFICE O/P EST MOD 30 MIN: CPT | Performed by: INTERNAL MEDICINE

## 2022-09-07 RX ORDER — ALLOPURINOL 100 MG/1
100 TABLET ORAL DAILY
COMMUNITY

## 2022-09-07 RX ORDER — ASPIRIN 81 MG/1
81 TABLET ORAL DAILY
COMMUNITY

## 2022-09-08 ENCOUNTER — TELEPHONE (OUTPATIENT)
Dept: FAMILY MEDICINE CLINIC | Facility: CLINIC | Age: 77
End: 2022-09-08

## 2022-09-08 LAB
ABSOLUTE BASOPHILS: 60 CELLS/UL (ref 0–200)
ABSOLUTE EOSINOPHILS: 138 CELLS/UL (ref 15–500)
ABSOLUTE LYMPHOCYTES: 1522 CELLS/UL (ref 850–3900)
ABSOLUTE MONOCYTES: 654 CELLS/UL (ref 200–950)
ABSOLUTE NEUTROPHILS: 6226 CELLS/UL (ref 1500–7800)
ALBUMIN/GLOBULIN RATIO: 1.9 (CALC) (ref 1–2.5)
ALBUMIN: 4.5 G/DL (ref 3.6–5.1)
ALKALINE PHOSPHATASE: 106 U/L (ref 37–153)
ALT: 14 U/L (ref 6–29)
AST: 19 U/L (ref 10–35)
BASOPHILS: 0.7 %
BILIRUBIN, TOTAL: 0.6 MG/DL (ref 0.2–1.2)
BUN/CREATININE RATIO: 25 (CALC) (ref 6–22)
BUN: 29 MG/DL (ref 7–25)
CALCIUM: 9.8 MG/DL (ref 8.6–10.4)
CARBON DIOXIDE: 33 MMOL/L (ref 20–32)
CHLORIDE: 98 MMOL/L (ref 98–110)
CREATININE: 1.17 MG/DL (ref 0.6–1)
EGFR: 48 ML/MIN/1.73M2
EOSINOPHILS: 1.6 %
GLOBULIN: 2.4 G/DL (CALC) (ref 1.9–3.7)
GLUCOSE: 83 MG/DL (ref 65–99)
HEMATOCRIT: 46 % (ref 35–45)
HEMOGLOBIN A1C: 5.3 % OF TOTAL HGB
HEMOGLOBIN: 14.9 G/DL (ref 11.7–15.5)
LYMPHOCYTES: 17.7 %
MCH: 29 PG (ref 27–33)
MCHC: 32.4 G/DL (ref 32–36)
MCV: 89.7 FL (ref 80–100)
MONOCYTES: 7.6 %
MPV: 12.1 FL (ref 7.5–12.5)
NEUTROPHILS: 72.4 %
PLATELET COUNT: 165 THOUSAND/UL (ref 140–400)
POTASSIUM: 3.9 MMOL/L (ref 3.5–5.3)
PROTEIN, TOTAL: 6.9 G/DL (ref 6.1–8.1)
RDW: 14.1 % (ref 11–15)
RED BLOOD CELL COUNT: 5.13 MILLION/UL (ref 3.8–5.1)
SODIUM: 140 MMOL/L (ref 135–146)
WHITE BLOOD CELL COUNT: 8.6 THOUSAND/UL (ref 3.8–10.8)

## 2022-09-08 NOTE — TELEPHONE ENCOUNTER
Zonia de león from [facility] called stating pt. Was told she could not get her labs done in the [place] office and she is stating this is not true and wanted to speak with someone regarding. I reached out to [address] [place] [address] wanted me to get a call back number but Caridad Combs does not have a number that she can receive calls in. Caridad Combs said she will escalate this on the insurance side and inform pt. She is working on this.

## 2022-09-08 NOTE — TELEPHONE ENCOUNTER
Patient asking what her lab results showed. She said that Dr Liliam Romero put diabetes as a diagnosis on her after visit summary and she is very worried.

## 2022-09-08 NOTE — TELEPHONE ENCOUNTER
Pt states there were some stuff on her after visit summary that she didn't like. She wouldn't tell me what it was.

## 2022-09-08 NOTE — TELEPHONE ENCOUNTER
Patient advised. She asked if she still has stage 4 kidney disease, advised it is stage 3A.  V.O. Dr Kina Mabry RN

## 2022-09-12 ENCOUNTER — TELEPHONE (OUTPATIENT)
Dept: FAMILY MEDICINE CLINIC | Facility: CLINIC | Age: 77
End: 2022-09-12

## 2022-09-12 RX ORDER — POTASSIUM CHLORIDE 1.5 G/1.77G
20 POWDER, FOR SOLUTION ORAL 2 TIMES DAILY
Qty: 60 EACH | Refills: 0 | Status: SHIPPED | OUTPATIENT
Start: 2022-09-12 | End: 2022-09-12 | Stop reason: CLARIF

## 2022-09-12 NOTE — TELEPHONE ENCOUNTER
No I only do it once a year to contain costs, 5/2022 it was the highest its been in the last 17 checks since 2011. Has she been taking her pravastatin 40 mg nightly? With weight loss I assume it would be better, but if you can add it on, please do.  I sen the powder packs to [facility] check the PRICE first

## 2022-09-12 NOTE — TELEPHONE ENCOUNTER
Patient advised. She said that she does not need the Potassium refilled, script cancelled at [facility]. Patient advised that Dr Jennifer Calvert said that cramping is likely due to poor circulation and she can order a test. Patient states she does not want to have it done at this time because she does not have the money.  Patient advised that the Lipid panel was added per Nadia Guevara at [facility].

## 2022-09-12 NOTE — TELEPHONE ENCOUNTER
Patient said that the cramps in her legs and feet are \"so bad\" that they wake her up. She said her veins are \"bulging\" in her feet. She said she is taking Potassium 20MEQ powder that she puts in her applesauce but they are samples that her friend gave her from the free clinic and she is almost out. She asked why her cholesterol wasn't done. This was last done 5/12/22.  Do you want to see if it can be added at [facility]?

## 2022-09-14 LAB
ABSOLUTE BASOPHILS: 60 CELLS/UL (ref 0–200)
ABSOLUTE EOSINOPHILS: 138 CELLS/UL (ref 15–500)
ABSOLUTE LYMPHOCYTES: 1522 CELLS/UL (ref 850–3900)
ABSOLUTE MONOCYTES: 654 CELLS/UL (ref 200–950)
ABSOLUTE NEUTROPHILS: 6226 CELLS/UL (ref 1500–7800)
ALBUMIN/GLOBULIN RATIO: 1.9 (CALC) (ref 1–2.5)
ALBUMIN: 4.5 G/DL (ref 3.6–5.1)
ALKALINE PHOSPHATASE: 106 U/L (ref 37–153)
ALT: 14 U/L (ref 6–29)
AST: 19 U/L (ref 10–35)
BASOPHILS: 0.7 %
BILIRUBIN, TOTAL: 0.6 MG/DL (ref 0.2–1.2)
BUN/CREATININE RATIO: 25 (CALC) (ref 6–22)
BUN: 29 MG/DL (ref 7–25)
CALCIUM: 9.8 MG/DL (ref 8.6–10.4)
CARBON DIOXIDE: 33 MMOL/L (ref 20–32)
CHLORIDE: 98 MMOL/L (ref 98–110)
CHOL/HDLC RATIO: 3.3 (CALC)
CHOLESTEROL, TOTAL: 196 MG/DL
CREATININE: 1.17 MG/DL (ref 0.6–1)
EGFR: 48 ML/MIN/1.73M2
EOSINOPHILS: 1.6 %
GLOBULIN: 2.4 G/DL (CALC) (ref 1.9–3.7)
GLUCOSE: 83 MG/DL (ref 65–99)
HDL CHOLESTEROL: 60 MG/DL
HEMATOCRIT: 46 % (ref 35–45)
HEMOGLOBIN A1C: 5.3 % OF TOTAL HGB
HEMOGLOBIN: 14.9 G/DL (ref 11.7–15.5)
LDL-CHOLESTEROL: 110 MG/DL (CALC)
LYMPHOCYTES: 17.7 %
MCH: 29 PG (ref 27–33)
MCHC: 32.4 G/DL (ref 32–36)
MCV: 89.7 FL (ref 80–100)
MONOCYTES: 7.6 %
MPV: 12.1 FL (ref 7.5–12.5)
NEUTROPHILS: 72.4 %
NON-HDL CHOLESTEROL: 136 MG/DL (CALC)
PLATELET COUNT: 165 THOUSAND/UL (ref 140–400)
POTASSIUM: 3.9 MMOL/L (ref 3.5–5.3)
PROTEIN, TOTAL: 6.9 G/DL (ref 6.1–8.1)
RDW: 14.1 % (ref 11–15)
RED BLOOD CELL COUNT: 5.13 MILLION/UL (ref 3.8–5.1)
SODIUM: 140 MMOL/L (ref 135–146)
TRIGLYCERIDES: 146 MG/DL
WHITE BLOOD CELL COUNT: 8.6 THOUSAND/UL (ref 3.8–10.8)

## 2022-09-22 ENCOUNTER — TELEPHONE (OUTPATIENT)
Dept: FAMILY MEDICINE CLINIC | Facility: CLINIC | Age: 77
End: 2022-09-22

## 2022-09-24 ENCOUNTER — TELEPHONE (OUTPATIENT)
Dept: FAMILY MEDICINE CLINIC | Facility: CLINIC | Age: 77
End: 2022-09-24

## 2022-09-24 NOTE — TELEPHONE ENCOUNTER
Fax from [facility] in [place] that pt received flu shot.

## 2022-10-06 ENCOUNTER — TELEPHONE (OUTPATIENT)
Dept: FAMILY MEDICINE CLINIC | Facility: CLINIC | Age: 77
End: 2022-10-06

## 2022-10-06 RX ORDER — TRAMADOL HYDROCHLORIDE 50 MG/1
50 TABLET ORAL EVERY 6 HOURS PRN
Qty: 28 TABLET | Refills: 0 | Status: SHIPPED | OUTPATIENT
Start: 2022-10-06 | End: 2022-10-13

## 2022-10-06 NOTE — TELEPHONE ENCOUNTER
Patient advised. She said she has taken Tramadol in the past and it worked. She would like it sent to [facility].

## 2022-10-06 NOTE — TELEPHONE ENCOUNTER
Look for rash of SHINGLES over the next few days. Has she tried motrin 800 mg or tylenol. There is not much she can take. Tramadol?

## 2022-10-06 NOTE — TELEPHONE ENCOUNTER
Patient said that she was having ear pain when she swallowed. She said now she has pain going up the back of her head, under her ear, up the side of he face and into her eye. She said this pain started Sunday and has been getting worse. She said this is the side of her face where she had reconstructive surgery over 50 years ago at [facility]. She is not taking any pain medication but has been sleeping a lot. She said she is slightly dizzy when she moves her head.

## 2022-10-08 ENCOUNTER — TELEPHONE (OUTPATIENT)
Dept: FAMILY MEDICINE CLINIC | Facility: CLINIC | Age: 77
End: 2022-10-08

## 2022-10-08 NOTE — TELEPHONE ENCOUNTER
Patient said that she has terrible pain in her ear and head. She said she has a blister on her lip and a red spot near her eye. Patient advised to go to the ER. The pharmacy states they received the script but the GALLO was not updated in the system, they are working on it but don't know how long it will take.

## 2022-10-10 ENCOUNTER — TELEPHONE (OUTPATIENT)
Dept: FAMILY MEDICINE CLINIC | Facility: CLINIC | Age: 77
End: 2022-10-10

## 2022-10-10 ENCOUNTER — MOBILE ENCOUNTER (OUTPATIENT)
Dept: FAMILY MEDICINE CLINIC | Facility: CLINIC | Age: 77
End: 2022-10-10

## 2022-10-10 ENCOUNTER — PATIENT MESSAGE (OUTPATIENT)
Dept: FAMILY MEDICINE CLINIC | Facility: CLINIC | Age: 77
End: 2022-10-10

## 2022-10-10 RX ORDER — GABAPENTIN 100 MG/1
100 CAPSULE ORAL NIGHTLY
Qty: 30 CAPSULE | Refills: 0 | Status: SHIPPED | OUTPATIENT
Start: 2022-10-10 | End: 2022-10-15

## 2022-10-10 NOTE — TELEPHONE ENCOUNTER
Patient advised should also see an eye doctor. Given number for Dr Margaret Gallagher from [place]. She said that she needs to get the medication from [facility].

## 2022-10-10 NOTE — TELEPHONE ENCOUNTER
Patient said that she went to the ER and they told her it was \"too late to be treated for Shingles\" and should have been treated sooner. She said that they did prescribe Acyclovir 5x daily. How far should they be spread apart? She said her ear is painful, she has blisters going down her throat, along her hairline and near her eye. She said her vision is occasionally blurry in that eye. She said they told her that she has a chance of going \"deaf and blind\". She said that they did not tell her to see an eye doctor but that she should see her primary care. She is upset because said she was never told to have a Shingles vaccine. She was crying in pain and said Tylenol is not helping. She is taking Benadryl with the Acyclovir because she is nauseated. She wants to know how far apart to take the Acyclovir, what she can take for pain and does she have any restrictions on what she can eat?

## 2022-10-10 NOTE — TELEPHONE ENCOUNTER
PLEASE CALL PATIENT REGARDING PAIN MEDICATION- NEEDS NEW, ALLERGIC TO THE ONE SHE HAS-    PLEASE ADVISE-THANK YOU

## 2022-10-10 NOTE — TELEPHONE ENCOUNTER
Patient advised that the senior services in [place] offers rides to seniors for a suggested donation per Tamia Farias. Patient said that she has talked to them before and thy charge $15 and only let you shop for 1 hour.

## 2022-10-13 ENCOUNTER — TELEPHONE (OUTPATIENT)
Dept: FAMILY MEDICINE CLINIC | Facility: CLINIC | Age: 77
End: 2022-10-13

## 2022-10-13 NOTE — TELEPHONE ENCOUNTER
Patient said that she has Shingles and has been taking an antiviral medication 5x daily prescribed by the ER. She said she has them on her head, her ear and next to and above her eye. She said her vision is blurry. She was advised to go to the eye doctor but she has not called them yet because she cannot drive. She said the pain is better than it was. She said that she felt like she was going to pass out this morning. She said her oxygen saturation is 95%, /76, pulse 102. She said her urine is very dark and foul smelling.

## 2022-10-13 NOTE — TELEPHONE ENCOUNTER
Patient advised, appointment scheduled with Dr Liliam Romero Monday at 1pm. Patient said she is still trying to get into an eye doctor.

## 2022-10-15 ENCOUNTER — TELEPHONE (OUTPATIENT)
Dept: FAMILY MEDICINE CLINIC | Facility: CLINIC | Age: 77
End: 2022-10-15

## 2022-10-15 RX ORDER — GABAPENTIN 100 MG/1
100 CAPSULE ORAL 2 TIMES DAILY
Qty: 60 CAPSULE | Refills: 0 | COMMUNITY
Start: 2022-10-15

## 2022-10-15 NOTE — TELEPHONE ENCOUNTER
Wants more Valtrex, instructed 7 days of treatment is considered complete. She is having a lot of pain; asks if can increase gabapentin. Advised may increase to 100 mg BID; if still no relief may go to 2 caps (200mg) po BID. She had eyes examined yesterday and nothing in cornea.     Future Appointments   Date Time Provider Scarlett Gallagher   10/17/2022  1:00 PM Mariusz Dobbs MD [facility] [place]

## 2022-10-15 NOTE — TELEPHONE ENCOUNTER
Rosa Colon has shingles, she needs acyclovir refilled today. 800 mg's tab and taking 5 pills a day. she will run out of medicine by tomorrow. She wants to know if she can take Gabapentin more than one time a day?  Call pt

## 2022-10-17 ENCOUNTER — OFFICE VISIT (OUTPATIENT)
Dept: FAMILY MEDICINE CLINIC | Facility: CLINIC | Age: 77
End: 2022-10-17
Payer: MEDICARE

## 2022-10-17 VITALS
TEMPERATURE: 99 F | SYSTOLIC BLOOD PRESSURE: 112 MMHG | BODY MASS INDEX: 30 KG/M2 | RESPIRATION RATE: 16 BRPM | OXYGEN SATURATION: 93 % | HEART RATE: 75 BPM | DIASTOLIC BLOOD PRESSURE: 58 MMHG | WEIGHT: 163 LBS

## 2022-10-17 DIAGNOSIS — B02.22 TRIGEMINAL HERPES ZOSTER: Primary | ICD-10-CM

## 2022-10-17 PROCEDURE — 3074F SYST BP LT 130 MM HG: CPT | Performed by: INTERNAL MEDICINE

## 2022-10-17 PROCEDURE — 3078F DIAST BP <80 MM HG: CPT | Performed by: INTERNAL MEDICINE

## 2022-10-17 PROCEDURE — 99214 OFFICE O/P EST MOD 30 MIN: CPT | Performed by: INTERNAL MEDICINE

## 2022-10-17 RX ORDER — ACYCLOVIR 800 MG/1
TABLET ORAL
COMMUNITY
Start: 2022-10-08

## 2022-10-17 RX ORDER — FUROSEMIDE 40 MG/1
TABLET ORAL
COMMUNITY
Start: 2022-09-26

## 2022-10-18 PROBLEM — B02.21 RAMSAY HUNT AURICULAR SYNDROME: Status: ACTIVE | Noted: 2022-10-18

## 2022-10-26 ENCOUNTER — MED REC SCAN ONLY (OUTPATIENT)
Dept: FAMILY MEDICINE CLINIC | Facility: CLINIC | Age: 77
End: 2022-10-26

## 2022-11-05 ENCOUNTER — TELEPHONE (OUTPATIENT)
Dept: FAMILY MEDICINE CLINIC | Facility: CLINIC | Age: 77
End: 2022-11-05

## 2022-11-05 RX ORDER — ACYCLOVIR 800 MG/1
800 TABLET ORAL 3 TIMES DAILY
Qty: 21 TABLET | Refills: 0 | Status: SHIPPED | OUTPATIENT
Start: 2022-11-05 | End: 2022-11-12

## 2022-11-05 NOTE — TELEPHONE ENCOUNTER
Patient said that she has a blister under her bottom lip 9which is wear it started before) and a couple small spots by her nose. She said she has some tingling/pain under her jaw. She said her glands are swollen and she has pain to her face. She said Dr Digna Larry told her she could only take the medication for Shingles once.

## 2022-11-17 ENCOUNTER — TELEPHONE (OUTPATIENT)
Dept: FAMILY MEDICINE CLINIC | Facility: CLINIC | Age: 77
End: 2022-11-17

## 2022-11-17 NOTE — TELEPHONE ENCOUNTER
Patient said the she needs the paperwork filled out for [address] [place] [address] for the patient assistance program. She will bring the paperwork in on 11/28/22.

## 2022-11-30 ENCOUNTER — TELEPHONE (OUTPATIENT)
Dept: FAMILY MEDICINE CLINIC | Facility: CLINIC | Age: 77
End: 2022-11-30

## 2022-11-30 RX ORDER — IPRATROPIUM/ALBUTEROL SULFATE 20-100 MCG
1 MIST INHALER (GRAM) INHALATION 4 TIMES DAILY
Qty: 3 EACH | Refills: 3 | Status: SHIPPED | OUTPATIENT
Start: 2022-11-30

## 2022-11-30 NOTE — TELEPHONE ENCOUNTER
Juan Manuel Huffman is calling she said that you are filling out paperwork for her and she changed her mind and would like the text messages from them please any yes if you have any questions please call her.
Paperwork and financial information faxed to [employer] Patient Assistance Program [phone].
Time-based billing (NON-critical care)

## 2022-12-13 ENCOUNTER — TELEPHONE (OUTPATIENT)
Dept: FAMILY MEDICINE CLINIC | Facility: CLINIC | Age: 77
End: 2022-12-13

## 2022-12-13 RX ORDER — IPRATROPIUM/ALBUTEROL SULFATE 20-100 MCG
1 MIST INHALER (GRAM) INHALATION 4 TIMES DAILY
Qty: 3 EACH | Refills: 4 | Status: SHIPPED | OUTPATIENT
Start: 2022-12-13

## 2022-12-13 NOTE — TELEPHONE ENCOUNTER
Patient wants to make sure that the Combivent order includes enough  Refills for 1 year to [facility]. She said she only has one inhaler left and then she is out until March. She cannot afford to get a prescription from the pharmacy.

## 2022-12-14 NOTE — TELEPHONE ENCOUNTER
Per Christine at [facility] they needed a new prescription for the Combivent because the other prescription was over 3year old and . New script was faxed 22. Per Christine the script was received and they will ship the the inhaler out tomorrow.

## 2022-12-29 ENCOUNTER — TELEPHONE (OUTPATIENT)
Dept: FAMILY MEDICINE CLINIC | Facility: CLINIC | Age: 77
End: 2022-12-29

## 2022-12-29 RX ORDER — PRAVASTATIN SODIUM 40 MG
40 TABLET ORAL NIGHTLY
Qty: 90 TABLET | Refills: 0 | Status: SHIPPED | OUTPATIENT
Start: 2022-12-29

## 2022-12-29 NOTE — TELEPHONE ENCOUNTER
Patient said that she has a sore near her tailbone. She has been putting H202 on it. Patient advised she should have it looked at. She needs her Pravastan refilled. She hadn't been taking it because she was having difficulty swallowing. She said that her cardiologist told her to take it. She needs a refill sent to [facility]  order. Last Ov w/Dr Jeremy Chapa 10/17/22, Last Lipids 9/7/22. Refilled per protocol.

## 2022-12-29 NOTE — TELEPHONE ENCOUNTER
PT NEEDS REFILL ON-  MEDICATION- NOT SURE OF NAME, HAS NOT BEEN TAKING IT FOR AWHILE-  PLEASE CALL PT TO DISCUSS    THANK YOU

## 2023-01-04 ENCOUNTER — TELEPHONE (OUTPATIENT)
Dept: FAMILY MEDICINE CLINIC | Facility: CLINIC | Age: 78
End: 2023-01-04

## 2023-01-04 NOTE — TELEPHONE ENCOUNTER
PAPERWORK COMBIVENT INHALER IS MISSING A SIGNATURE AND THEY DENIED UNTIL THE PW IS COMPLETE WITH ALL SIGNATURE.

## 2023-01-10 NOTE — TELEPHONE ENCOUNTER
Fax sent to [facility] [phone] asking if they need anything further from us to process the order.

## 2023-01-23 ENCOUNTER — TELEPHONE (OUTPATIENT)
Dept: FAMILY MEDICINE CLINIC | Facility: CLINIC | Age: 78
End: 2023-01-23

## 2023-01-23 NOTE — TELEPHONE ENCOUNTER
She is still having issues with the pw for the ipratropium-albuterol (COMBIVENT RESPIMAT)  MCG/ACT Inhalation Aero Soln  Please call her.

## 2023-01-31 ENCOUNTER — TELEPHONE (OUTPATIENT)
Dept: FAMILY MEDICINE CLINIC | Facility: CLINIC | Age: 78
End: 2023-01-31

## 2023-01-31 NOTE — TELEPHONE ENCOUNTER
PT NEEDS TO KNOW IF SHE NEEDS PNEUMONIA VACCINE THIS YEAR? ALSO HOW LONG DOES SHE NEED TO WAIT TO GET SHINGLES VACCINE AFTER HAVING SHINGLES IN November? ALSO NEEDS TO DISCUSS ELEVATED BP- LEGS, FEET AND FACE SWELLING SINCE Sunday. PT NOTICED THIS AFTER BP WENT UP- 141/108.  PLS CALL PT

## 2023-01-31 NOTE — TELEPHONE ENCOUNTER
-Patient had Prevnar 13 on 5/16/17 and Pneumovax 23 on 5/21/18  -When can she get the Shingles vaccine?  -Should she get the latest Covid vaccine? She has had 4 doses. She heard they are causing strokes. Patient said her blood pressure was 141/108, pulse was 100 Sunday and her legs and face were swollen. She said she was upset because her phone wasn't working. She said she took sn extra water pill that night. She said her blood pressure was 112/73 this afternoon and the swelling is better but her ankles are still swollen to the point where if she presses it \"leaves a any\". She said that when she was talking to her son last night she had some pain in her chest and left arm and her arm was numb. She said she does not have any pain today.

## 2023-01-31 NOTE — TELEPHONE ENCOUNTER
No pneumonia vaccine needed and take an extra lasix and potassium when up 3 pounds if you do not have a scale then if fluid to the 1/3 bottom of lower leg then a extra just for the day.

## 2023-02-22 ENCOUNTER — TELEPHONE (OUTPATIENT)
Dept: FAMILY MEDICINE CLINIC | Facility: CLINIC | Age: 78
End: 2023-02-22

## 2023-02-22 NOTE — TELEPHONE ENCOUNTER
Patient said that she started feeling sick 2 nights ago. She said that she took a home Covid test and it was negative. She said she is very congested and she has not been able to breath through her nose. She is taking one Benadryl at a time. She is using her inhalers and is doing her rescue inhaler as needed. She is not taking Tylenol and wants to know if she should. She said her chest hurts and she is \"rattling\". She said her oxygen saturation was \"good\" last night. Her oxygen saturation is 93% right now but she had her oxygen off. She said she doesn't think she can get here.

## 2023-02-22 NOTE — TELEPHONE ENCOUNTER
Take tylenol and call with update in the morning, humidify air to prevent thick secretions and eat LIGHT no dairy.

## 2023-02-22 NOTE — TELEPHONE ENCOUNTER
Patient advised. She said that her eyes are also red but there is no drainage. She will call us tomorrow morning with an update. Dr Dee Thomas advised.

## 2023-02-23 ENCOUNTER — TELEPHONE (OUTPATIENT)
Dept: FAMILY MEDICINE CLINIC | Facility: CLINIC | Age: 78
End: 2023-02-23

## 2023-02-23 NOTE — TELEPHONE ENCOUNTER
PT CALLING WITH UPDATE, YESTERDAY HER TEMP .6, TODAY IT IS 99.4, SHE IS STILL TAKING TYLENOL & BENADRYL

## 2023-03-02 RX ORDER — DILTIAZEM HYDROCHLORIDE 240 MG/1
240 CAPSULE, EXTENDED RELEASE ORAL 2 TIMES DAILY
Qty: 180 CAPSULE | Refills: 0 | Status: SHIPPED | OUTPATIENT
Start: 2023-03-02 | End: 2023-03-02

## 2023-03-02 RX ORDER — DILTIAZEM HYDROCHLORIDE 240 MG/1
240 CAPSULE, EXTENDED RELEASE ORAL 2 TIMES DAILY
Qty: 180 CAPSULE | Refills: 0 | Status: SHIPPED | OUTPATIENT
Start: 2023-03-02

## 2023-03-02 RX ORDER — FUROSEMIDE 40 MG/1
40 TABLET ORAL DAILY
Qty: 90 TABLET | Refills: 3 | Status: SHIPPED | OUTPATIENT
Start: 2023-03-02 | End: 2023-05-31

## 2023-03-02 NOTE — TELEPHONE ENCOUNTER
REFILL FUROSEMIDE TO [facility], ALSO CANCEL DILTIAZEM @ [facility] & SEND TO [facility]

## 2023-04-03 ENCOUNTER — TELEPHONE (OUTPATIENT)
Dept: FAMILY MEDICINE CLINIC | Facility: CLINIC | Age: 78
End: 2023-04-03

## 2023-04-03 NOTE — TELEPHONE ENCOUNTER
Pt has shortness of breath & has been wheezing. Pt has a sore in her nose from using an antibiotic in her nose. She said the antibiotic had petrolatum in it. With her using oxygen she shouldn't use anything with petrolatum in it. She was told that the burn could have went further down her airway.

## 2023-04-03 NOTE — TELEPHONE ENCOUNTER
Patient advised, appointment scheduled with Dr Shameka Yoon tomorrow at 945am. Patient advised to go to the ER if SOB or wheezing worsens.

## 2023-04-03 NOTE — TELEPHONE ENCOUNTER
Patient said that she is congested and SOB with wheezing, she is blowing out \"green stuff\". She said she also has a sore on her nose. They told her at [facility] they have an ointment but she doesn't know if she needs a doctors order. She also wants an oxygen mask. She has been using Vaseline but it causes \"burns\" with the oxygen. She has been using her inhaler more than 4x daily. She said her oxygen saturation is \"good\". It is 91-93% right now but she took her oxygen off. She said that she cannot afford to go to  or ER and she is \"ok\" right now but her nose really \"hurts\". She is able to come to our office f Dr Stevan Dooley will see her.

## 2023-04-04 ENCOUNTER — OFFICE VISIT (OUTPATIENT)
Dept: FAMILY MEDICINE CLINIC | Facility: CLINIC | Age: 78
End: 2023-04-04
Payer: MEDICARE

## 2023-04-04 ENCOUNTER — LABORATORY ENCOUNTER (OUTPATIENT)
Dept: LAB | Age: 78
End: 2023-04-04
Attending: INTERNAL MEDICINE
Payer: MEDICARE

## 2023-04-04 ENCOUNTER — TELEPHONE (OUTPATIENT)
Dept: FAMILY MEDICINE CLINIC | Facility: CLINIC | Age: 78
End: 2023-04-04

## 2023-04-04 VITALS
RESPIRATION RATE: 22 BRPM | TEMPERATURE: 99 F | HEART RATE: 76 BPM | DIASTOLIC BLOOD PRESSURE: 68 MMHG | OXYGEN SATURATION: 94 % | SYSTOLIC BLOOD PRESSURE: 120 MMHG

## 2023-04-04 DIAGNOSIS — I10 PRIMARY HYPERTENSION: ICD-10-CM

## 2023-04-04 DIAGNOSIS — I48.20 ATRIAL FIBRILLATION, CHRONIC (HCC): ICD-10-CM

## 2023-04-04 DIAGNOSIS — J44.1 COPD EXACERBATION (HCC): ICD-10-CM

## 2023-04-04 DIAGNOSIS — E11.9 DIABETES MELLITUS TYPE 2, DIET-CONTROLLED (HCC): ICD-10-CM

## 2023-04-04 DIAGNOSIS — I70.0 ABDOMINAL AORTIC ATHEROSCLEROSIS (HCC): ICD-10-CM

## 2023-04-04 DIAGNOSIS — E11.9 DIABETES MELLITUS TYPE 2, DIET-CONTROLLED (HCC): Primary | ICD-10-CM

## 2023-04-04 DIAGNOSIS — E21.2 OTHER HYPERPARATHYROIDISM (HCC): ICD-10-CM

## 2023-04-04 DIAGNOSIS — I50.9 CHF (NYHA CLASS III, ACC/AHA STAGE C) (HCC): ICD-10-CM

## 2023-04-04 DIAGNOSIS — N18.4 CKD (CHRONIC KIDNEY DISEASE) STAGE 4, GFR 15-29 ML/MIN (HCC): ICD-10-CM

## 2023-04-04 DIAGNOSIS — J43.8 OTHER EMPHYSEMA (HCC): ICD-10-CM

## 2023-04-04 DIAGNOSIS — D68.2 FACTOR XII DEFICIENCY (HCC): ICD-10-CM

## 2023-04-04 DIAGNOSIS — M46.92 INFLAMMATORY SPONDYLOPATHY OF CERVICAL REGION (HCC): ICD-10-CM

## 2023-04-04 DIAGNOSIS — I25.719 CORONARY ARTERY DISEASE INVOLVING AUTOLOGOUS VEIN CORONARY BYPASS GRAFT WITH ANGINA PECTORIS (HCC): ICD-10-CM

## 2023-04-04 LAB
ALBUMIN SERPL-MCNC: 4.3 G/DL (ref 3.4–5)
ALBUMIN/GLOB SERPL: 1.3 {RATIO} (ref 1–2)
ALP LIVER SERPL-CCNC: 128 U/L
ALT SERPL-CCNC: 27 U/L
ANION GAP SERPL CALC-SCNC: 2 MMOL/L (ref 0–18)
AST SERPL-CCNC: 24 U/L (ref 15–37)
BASOPHILS # BLD AUTO: 0.05 X10(3) UL (ref 0–0.2)
BASOPHILS NFR BLD AUTO: 0.6 %
BILIRUB SERPL-MCNC: 0.5 MG/DL (ref 0.1–2)
BUN BLD-MCNC: 24 MG/DL (ref 7–18)
CALCIUM BLD-MCNC: 9.6 MG/DL (ref 8.5–10.1)
CHLORIDE SERPL-SCNC: 103 MMOL/L (ref 98–112)
CHOLEST SERPL-MCNC: 144 MG/DL (ref ?–200)
CO2 SERPL-SCNC: 34 MMOL/L (ref 21–32)
CREAT BLD-MCNC: 1.2 MG/DL
EOSINOPHIL # BLD AUTO: 0.22 X10(3) UL (ref 0–0.7)
EOSINOPHIL NFR BLD AUTO: 2.5 %
ERYTHROCYTE [DISTWIDTH] IN BLOOD BY AUTOMATED COUNT: 15.2 %
EST. AVERAGE GLUCOSE BLD GHB EST-MCNC: 111 MG/DL (ref 68–126)
GFR SERPLBLD BASED ON 1.73 SQ M-ARVRAT: 47 ML/MIN/1.73M2 (ref 60–?)
GLOBULIN PLAS-MCNC: 3.4 G/DL (ref 2.8–4.4)
GLUCOSE BLD-MCNC: 87 MG/DL (ref 70–99)
HBA1C MFR BLD: 5.5 % (ref ?–5.7)
HCT VFR BLD AUTO: 46.9 %
HDLC SERPL-MCNC: 72 MG/DL (ref 40–59)
HGB BLD-MCNC: 14.6 G/DL
IMM GRANULOCYTES # BLD AUTO: 0.03 X10(3) UL (ref 0–1)
IMM GRANULOCYTES NFR BLD: 0.3 %
LDLC SERPL CALC-MCNC: 60 MG/DL (ref ?–100)
LYMPHOCYTES # BLD AUTO: 1.17 X10(3) UL (ref 1–4)
LYMPHOCYTES NFR BLD AUTO: 13.5 %
MCH RBC QN AUTO: 28.1 PG (ref 26–34)
MCHC RBC AUTO-ENTMCNC: 31.1 G/DL (ref 31–37)
MCV RBC AUTO: 90.4 FL
MONOCYTES # BLD AUTO: 0.66 X10(3) UL (ref 0.1–1)
MONOCYTES NFR BLD AUTO: 7.6 %
NEUTROPHILS # BLD AUTO: 6.55 X10 (3) UL (ref 1.5–7.7)
NEUTROPHILS # BLD AUTO: 6.55 X10(3) UL (ref 1.5–7.7)
NEUTROPHILS NFR BLD AUTO: 75.5 %
NONHDLC SERPL-MCNC: 72 MG/DL (ref ?–130)
NT-PROBNP SERPL-MCNC: 465 PG/ML (ref ?–450)
OSMOLALITY SERPL CALC.SUM OF ELEC: 291 MOSM/KG (ref 275–295)
PLATELET # BLD AUTO: 142 10(3)UL (ref 150–450)
POTASSIUM SERPL-SCNC: 4.5 MMOL/L (ref 3.5–5.1)
PROT SERPL-MCNC: 7.7 G/DL (ref 6.4–8.2)
RBC # BLD AUTO: 5.19 X10(6)UL
SODIUM SERPL-SCNC: 139 MMOL/L (ref 136–145)
TRIGL SERPL-MCNC: 56 MG/DL (ref 30–149)
VLDLC SERPL CALC-MCNC: 8 MG/DL (ref 0–30)
WBC # BLD AUTO: 8.7 X10(3) UL (ref 4–11)

## 2023-04-04 PROCEDURE — 80061 LIPID PANEL: CPT

## 2023-04-04 PROCEDURE — 36415 COLL VENOUS BLD VENIPUNCTURE: CPT

## 2023-04-04 PROCEDURE — 80053 COMPREHEN METABOLIC PANEL: CPT

## 2023-04-04 PROCEDURE — 83880 ASSAY OF NATRIURETIC PEPTIDE: CPT

## 2023-04-04 PROCEDURE — 99214 OFFICE O/P EST MOD 30 MIN: CPT | Performed by: INTERNAL MEDICINE

## 2023-04-04 PROCEDURE — 85025 COMPLETE CBC W/AUTO DIFF WBC: CPT

## 2023-04-04 PROCEDURE — 3074F SYST BP LT 130 MM HG: CPT | Performed by: INTERNAL MEDICINE

## 2023-04-04 PROCEDURE — 3078F DIAST BP <80 MM HG: CPT | Performed by: INTERNAL MEDICINE

## 2023-04-04 PROCEDURE — 83036 HEMOGLOBIN GLYCOSYLATED A1C: CPT

## 2023-04-04 NOTE — TELEPHONE ENCOUNTER
Patient advised. Patient also advised that Antwon Shore at [facility] was notified that patient is looking for a smaller oxygen concentrator. She said it was likely a battery operated one and she will look into it.

## 2023-05-03 ENCOUNTER — OFFICE VISIT (OUTPATIENT)
Dept: FAMILY MEDICINE CLINIC | Facility: CLINIC | Age: 78
End: 2023-05-03
Payer: MEDICARE

## 2023-05-03 VITALS
SYSTOLIC BLOOD PRESSURE: 136 MMHG | WEIGHT: 176 LBS | BODY MASS INDEX: 32.39 KG/M2 | TEMPERATURE: 99 F | RESPIRATION RATE: 20 BRPM | OXYGEN SATURATION: 96 % | DIASTOLIC BLOOD PRESSURE: 80 MMHG | HEART RATE: 100 BPM | HEIGHT: 62 IN

## 2023-05-03 DIAGNOSIS — E11.9 DIABETES MELLITUS TYPE 2, DIET-CONTROLLED (HCC): ICD-10-CM

## 2023-05-03 DIAGNOSIS — Z00.00 ENCOUNTER FOR ANNUAL HEALTH EXAMINATION: Primary | ICD-10-CM

## 2023-05-03 DIAGNOSIS — R73.03 PREDIABETES: Chronic | ICD-10-CM

## 2023-05-03 DIAGNOSIS — I48.20 ATRIAL FIBRILLATION, CHRONIC (HCC): ICD-10-CM

## 2023-05-03 DIAGNOSIS — I27.20 PULMONARY HTN (HCC): ICD-10-CM

## 2023-05-03 DIAGNOSIS — D68.2 FACTOR XII DEFICIENCY (HCC): ICD-10-CM

## 2023-05-03 DIAGNOSIS — Z79.01 LONG TERM (CURRENT) USE OF ANTICOAGULANTS: ICD-10-CM

## 2023-05-03 DIAGNOSIS — J42 CHRONIC BRONCHITIS, UNSPECIFIED CHRONIC BRONCHITIS TYPE (HCC): ICD-10-CM

## 2023-05-03 DIAGNOSIS — I10 PRIMARY HYPERTENSION: ICD-10-CM

## 2023-05-03 DIAGNOSIS — D69.6 THROMBOCYTOPENIA (HCC): Chronic | ICD-10-CM

## 2023-05-03 DIAGNOSIS — I70.0 ABDOMINAL AORTIC ATHEROSCLEROSIS (HCC): ICD-10-CM

## 2023-05-03 DIAGNOSIS — I50.22 CHRONIC SYSTOLIC CONGESTIVE HEART FAILURE (HCC): ICD-10-CM

## 2023-05-03 DIAGNOSIS — I25.719 CORONARY ARTERY DISEASE INVOLVING AUTOLOGOUS VEIN CORONARY BYPASS GRAFT WITH ANGINA PECTORIS (HCC): ICD-10-CM

## 2023-05-03 DIAGNOSIS — E78.2 MIXED HYPERLIPIDEMIA: ICD-10-CM

## 2023-05-03 DIAGNOSIS — M50.30 DEGENERATION OF CERVICAL INTERVERTEBRAL DISC: ICD-10-CM

## 2023-05-03 LAB
CREAT UR-SCNC: 46.5 MG/DL
MICROALBUMIN UR-MCNC: 3.16 MG/DL
MICROALBUMIN/CREAT 24H UR-RTO: 68 UG/MG (ref ?–30)

## 2023-05-03 PROCEDURE — 82043 UR ALBUMIN QUANTITATIVE: CPT | Performed by: INTERNAL MEDICINE

## 2023-05-03 PROCEDURE — 82570 ASSAY OF URINE CREATININE: CPT | Performed by: INTERNAL MEDICINE

## 2023-05-03 RX ORDER — FUROSEMIDE 40 MG/1
40 TABLET ORAL 3 TIMES DAILY
Qty: 270 TABLET | Refills: 1 | Status: SHIPPED | OUTPATIENT
Start: 2023-05-03 | End: 2023-05-03

## 2023-05-03 RX ORDER — TIOTROPIUM BROMIDE AND OLODATEROL 3.124; 2.736 UG/1; UG/1
1 SPRAY, METERED RESPIRATORY (INHALATION) DAILY
Qty: 1 EACH | Refills: 0 | COMMUNITY
Start: 2023-05-03

## 2023-05-03 RX ORDER — FUROSEMIDE 40 MG/1
40 TABLET ORAL 3 TIMES DAILY
Qty: 270 TABLET | Refills: 1 | Status: SHIPPED | OUTPATIENT
Start: 2023-05-03

## 2023-05-04 ENCOUNTER — TELEPHONE (OUTPATIENT)
Dept: FAMILY MEDICINE CLINIC | Facility: CLINIC | Age: 78
End: 2023-05-04

## 2023-05-04 NOTE — TELEPHONE ENCOUNTER
furosemide 40 MG Oral Tab  PT. SAID SHE JUST RECEIVED THIS MEDICATION FROM HER MAIL ORDER AND THEY HAVE THE DIRECTIONS 1 DAILY AND SHE TAKES 3 TIMES DAILY. SHE IS GOING TO RUN. SHE IS ASKING FOR IT TO BE SENT IN AGAIN WITH THE CORRECT DIRECTIONS.

## 2023-05-04 NOTE — TELEPHONE ENCOUNTER
This was sent yesterday with directions to take 3x daily. There is also a previous script with directions to take daily. Please advise which is correct dose.

## 2023-05-04 NOTE — TELEPHONE ENCOUNTER
Carey at [facility] states they removed the script for the Furosemide once daily.

## 2023-05-07 PROBLEM — E11.22 TYPE 2 DIABETES MELLITUS WITH DIABETIC CHRONIC KIDNEY DISEASE (HCC): Status: RESOLVED | Noted: 2022-09-07 | Resolved: 2023-05-07

## 2023-05-07 PROBLEM — M50.30 DEGENERATION OF CERVICAL INTERVERTEBRAL DISC: Status: RESOLVED | Noted: 2019-03-30 | Resolved: 2023-05-07

## 2023-05-07 PROBLEM — E11.9 DIABETES MELLITUS, TYPE 2 (HCC): Chronic | Status: RESOLVED | Noted: 2022-09-07 | Resolved: 2023-05-07

## 2023-05-07 PROBLEM — M54.12 CERVICAL RADICULOPATHY: Status: RESOLVED | Noted: 2018-05-21 | Resolved: 2023-05-07

## 2023-05-07 PROBLEM — B02.21 RAMSAY HUNT AURICULAR SYNDROME: Status: RESOLVED | Noted: 2022-10-18 | Resolved: 2023-05-07

## 2023-05-08 ENCOUNTER — TELEPHONE (OUTPATIENT)
Dept: FAMILY MEDICINE CLINIC | Facility: CLINIC | Age: 78
End: 2023-05-08

## 2023-05-08 DIAGNOSIS — Z12.31 ENCOUNTER FOR SCREENING MAMMOGRAM FOR MALIGNANT NEOPLASM OF BREAST: Primary | ICD-10-CM

## 2023-05-08 PROBLEM — M46.92 INFLAMMATORY SPONDYLOPATHY OF CERVICAL REGION (HCC): Status: RESOLVED | Noted: 2019-01-17 | Resolved: 2023-05-08

## 2023-05-08 PROBLEM — E21.2 OTHER HYPERPARATHYROIDISM (HCC): Status: RESOLVED | Noted: 2019-01-17 | Resolved: 2023-05-08

## 2023-05-08 PROBLEM — M46.92 INFLAMMATORY SPONDYLOPATHY OF CERVICAL REGION: Status: RESOLVED | Noted: 2019-01-17 | Resolved: 2023-05-08

## 2023-05-30 ENCOUNTER — TELEPHONE (OUTPATIENT)
Dept: FAMILY MEDICINE CLINIC | Facility: CLINIC | Age: 78
End: 2023-05-30

## 2023-05-30 NOTE — TELEPHONE ENCOUNTER
PT ON 2L OF OXYGEN, NOT ABLE TO GET O2 LEVEL ABOVE 87/88. SHE HAS TURNED IT UP TO 3L AND STILL NOT ABLE TO RAISE O2 LEVEL. PT CURRENTLY SITTING OUTSIDE IN THE SHADE.

## 2023-05-30 NOTE — TELEPHONE ENCOUNTER
Patient said that she switched tanks and her saturation went up to 92%. She said that she was using Riley and she ran out so we gave her a Stiolito. She said she is using this along with the Combivent and Albuterol. She said her throat is very dry for the last couple nights. She asked if this could be due to the Furosemide, she is taking 40mg three times daily.
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
## 2023-06-03 ENCOUNTER — TELEPHONE (OUTPATIENT)
Dept: FAMILY MEDICINE CLINIC | Facility: CLINIC | Age: 78
End: 2023-06-03

## 2023-06-03 NOTE — TELEPHONE ENCOUNTER
Called patient. Appointment scheduled.    Future Appointments   Date Time Provider Scarlett Gallagher   6/5/2023 10:30 AM Jennifer Soria MD [facility]   6/7/2023  1:20 PM PRESTON BENSON RM1 Aleida Maze

## 2023-06-03 NOTE — TELEPHONE ENCOUNTER
Patient said that she has \"green stuff\" coming out of her nose and a \"pain in her brain\" above her ear. She said she called [facility] because she has been having increased SOB. She said they told her that there was \"nothing wrong with her oxygen brendan\" and that she is SOB because she is exerting herself. She said she can come in Monday if she needs to be seen but wants it to be early because it is going to be hot.

## 2023-06-05 ENCOUNTER — OFFICE VISIT (OUTPATIENT)
Dept: FAMILY MEDICINE CLINIC | Facility: CLINIC | Age: 78
End: 2023-06-05
Payer: MEDICARE

## 2023-06-05 VITALS
SYSTOLIC BLOOD PRESSURE: 122 MMHG | DIASTOLIC BLOOD PRESSURE: 66 MMHG | HEART RATE: 103 BPM | RESPIRATION RATE: 20 BRPM | TEMPERATURE: 99 F | OXYGEN SATURATION: 87 %

## 2023-06-05 DIAGNOSIS — J42 CHRONIC BRONCHITIS, UNSPECIFIED CHRONIC BRONCHITIS TYPE (HCC): Primary | ICD-10-CM

## 2023-06-05 DIAGNOSIS — J01.00 SUBACUTE MAXILLARY SINUSITIS: ICD-10-CM

## 2023-06-05 PROCEDURE — 3078F DIAST BP <80 MM HG: CPT | Performed by: INTERNAL MEDICINE

## 2023-06-05 PROCEDURE — 3074F SYST BP LT 130 MM HG: CPT | Performed by: INTERNAL MEDICINE

## 2023-06-05 PROCEDURE — 99214 OFFICE O/P EST MOD 30 MIN: CPT | Performed by: INTERNAL MEDICINE

## 2023-06-05 RX ORDER — DOXYCYCLINE HYCLATE 100 MG
100 TABLET ORAL 2 TIMES DAILY
Qty: 28 TABLET | Refills: 0 | Status: SHIPPED | OUTPATIENT
Start: 2023-06-05 | End: 2023-06-19

## 2023-06-07 ENCOUNTER — HOSPITAL ENCOUNTER (OUTPATIENT)
Dept: MAMMOGRAPHY | Age: 78
Discharge: HOME | End: 2023-06-07
Attending: INTERNAL MEDICINE
Payer: MEDICARE

## 2023-06-07 DIAGNOSIS — Z12.31 ENCOUNTER FOR SCREENING MAMMOGRAM FOR MALIGNANT NEOPLASM OF BREAST: ICD-10-CM

## 2023-06-07 PROCEDURE — 77067 SCR MAMMO BI INCL CAD: CPT | Performed by: INTERNAL MEDICINE

## 2023-06-07 PROCEDURE — 77063 BREAST TOMOSYNTHESIS BI: CPT | Performed by: INTERNAL MEDICINE

## 2023-06-20 ENCOUNTER — TELEPHONE (OUTPATIENT)
Dept: FAMILY MEDICINE CLINIC | Facility: CLINIC | Age: 78
End: 2023-06-20

## 2023-06-20 NOTE — TELEPHONE ENCOUNTER
Spoke with patient - \"felt like my throat was closing\"  Pt able to talk in complete sentences, no coughing noted, no shortness of breath noted with conversation. Pt states 94% on 2L of O2. Instructed with having throat swelling - needs to go to ER for evaluation. Pt refused to go - does not want to pay $  Discussed with Dr. Sebastian Chao. Encourage pt to use inhalers and make appt for 1-2 days. Spoke with patient - \"will see how the weather is if I'll come - if it's too warm I don't want to go outside\"  Encouraged use of inhalers - states \"used up the green one - will get a sample if comes in for appt\"  Pt talked for 10 minutes in complete sentences, no shortness of breath.

## 2023-07-11 RX ORDER — POTASSIUM CHLORIDE 750 MG/1
20 TABLET, FILM COATED, EXTENDED RELEASE ORAL DAILY
Qty: 180 TABLET | Refills: 0 | Status: SHIPPED | OUTPATIENT
Start: 2023-07-11

## 2023-07-11 RX ORDER — POTASSIUM CHLORIDE 1500 MG/1
20 TABLET, EXTENDED RELEASE ORAL DAILY
Qty: 90 TABLET | Refills: 0 | Status: SHIPPED | OUTPATIENT
Start: 2023-07-11 | End: 2023-07-12 | Stop reason: DRUGHIGH

## 2023-07-11 NOTE — TELEPHONE ENCOUNTER
PT WOULD LIKE A REFILL ON-    Potassium Chloride ER 10 MEQ Oral Tab C/Harlan Yeager, [address] [place] [address] [phone], [phone]     ALSO PLEASE CALL-HAVING LEG CRAMPS    Nacho Qureshi

## 2023-07-11 NOTE — TELEPHONE ENCOUNTER
Potassium Chloride ER 20 MEQ Oral Tab CR     Last office visit:  5/3/23    No future appointments.   Last filled:  6/1/21  #90 with 0 refills   Last labs: 4/4/23

## 2023-07-11 NOTE — TELEPHONE ENCOUNTER
Patient said that she woke up with leg cramps last night that woke her up. She said that she has been taking  Potassium. She needs a refill on Potassium Cl 10MEQ ER  to mail order. Is there anything she can take like Magnesium?

## 2023-07-12 RX ORDER — MAGNESIUM OXIDE 400 MG/1
400 TABLET ORAL DAILY
Qty: 90 TABLET | Refills: 0 | Status: SHIPPED | OUTPATIENT
Start: 2023-07-12

## 2023-07-12 NOTE — TELEPHONE ENCOUNTER
Patient advised t take Magnesium Oxide, script sent to [place] [id number].  Pharmacy technician lisette advised to canceled the script for Potassium Chloride 20 MEQ, patient takes 2 of the 10MEQ tabs

## 2023-07-26 RX ORDER — ALLOPURINOL 100 MG/1
100 TABLET ORAL DAILY
Qty: 90 TABLET | Refills: 0 | Status: SHIPPED | OUTPATIENT
Start: 2023-07-26

## 2023-07-26 RX ORDER — PRAVASTATIN SODIUM 40 MG
40 TABLET ORAL NIGHTLY
Qty: 90 TABLET | Refills: 0 | Status: SHIPPED | OUTPATIENT
Start: 2023-07-26

## 2023-07-26 NOTE — TELEPHONE ENCOUNTER
PRAVASTATIN 40 MG Oral Tab     LOV  5-3-23    LAST LAB  4-4-23 Chem Profile  ALT 27                     4-4-23  Lipids    LAST RX  5-14-23  #90  per dispense history    Next OV  No future appointments. PROTOCOL  Cholesterol Medication Protocol Passed07/26/2023 02:22 AM   Protocol Details ALT < 80    ALT resulted within past year    Lipid panel within past 12 months    Appointment within past 12 or next 3 months       ALLOPURINOL 100 MG Oral Tab      LOV  5-3-23    LAST LAB 4-4-23    LAST RX  5-14-23  #90  per dispense history    Next OV  No future appointments.     PROTOCOL None

## 2023-07-28 ENCOUNTER — TELEPHONE (OUTPATIENT)
Dept: FAMILY MEDICINE CLINIC | Facility: CLINIC | Age: 78
End: 2023-07-28

## 2023-07-28 NOTE — TELEPHONE ENCOUNTER
PLEASE CALL PT- HAVING TROUBLE SWALLOWING PILLS/ AND CONCERNED OF HER A-FIB, SHE STATED IT IS A PERMANENT CONDITION. SHE ALSO HAS SWELLING IN LEFT FOOT, TINGLING.     PLEASE CALL- THANK YOU
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
## 2023-07-28 NOTE — TELEPHONE ENCOUNTER
Spoke with patient who was very anxious. She states she is feeling dizzy, her heart was racing all night, her left left leg and left arm are numb and tingling. She is complaining of neck pain and nausea. She states she does have chronic afib. Advised she should go to ER fore evaluation. She refused stating she can't afford it. Advised walk in clinic or urgent care and she states she doesn't have a ride. Advised she needs to be evaluated by someone today with these symptoms. She states she needs to hang up and will call me back. Awaiting call back.

## 2023-07-31 ENCOUNTER — TELEPHONE (OUTPATIENT)
Dept: FAMILY MEDICINE CLINIC | Facility: CLINIC | Age: 78
End: 2023-07-31

## 2023-07-31 NOTE — TELEPHONE ENCOUNTER
Patient said that her heart is \"jumping allover the place\" for the last week. She said she is \"having trouble breathing\". She said her heart rate has been running from 109-117. She said her leg is swollen. She said that it feels like her heart is \"skipping a beat\". She doesn't have and appointment with the cardiologist until the end of August. She said her neighbor wanted to take her to the ER over the weekend but she said she cannot afford to go to the hospital. She said her heart rate right now is 87 and her blood pressure is 120/68. She is taking OTC medication for sinuses her face is \"swollen\" and she has a \"sinus headache\". She said she was taking XClear but it is no longer helping. She cannot come in tomorrow because Kimberly Ortiz is coming to make a delivery. She is having trouble with [employer] and getting her concentrator. Appointment scheduled with Dr Heather Collins at 1045am, advised to go to the ER if symptoms worsen.

## 2023-08-02 ENCOUNTER — OFFICE VISIT (OUTPATIENT)
Dept: FAMILY MEDICINE CLINIC | Facility: CLINIC | Age: 78
End: 2023-08-02
Payer: MEDICARE

## 2023-08-02 ENCOUNTER — TELEPHONE (OUTPATIENT)
Dept: FAMILY MEDICINE CLINIC | Facility: CLINIC | Age: 78
End: 2023-08-02

## 2023-08-02 VITALS
HEART RATE: 91 BPM | RESPIRATION RATE: 24 BRPM | DIASTOLIC BLOOD PRESSURE: 70 MMHG | TEMPERATURE: 98 F | SYSTOLIC BLOOD PRESSURE: 134 MMHG | OXYGEN SATURATION: 94 % | BODY MASS INDEX: 33 KG/M2 | WEIGHT: 178.38 LBS

## 2023-08-02 DIAGNOSIS — J43.2 CENTRILOBULAR EMPHYSEMA (HCC): Primary | ICD-10-CM

## 2023-08-02 PROCEDURE — 1159F MED LIST DOCD IN RCRD: CPT | Performed by: INTERNAL MEDICINE

## 2023-08-02 PROCEDURE — 3078F DIAST BP <80 MM HG: CPT | Performed by: INTERNAL MEDICINE

## 2023-08-02 PROCEDURE — 1160F RVW MEDS BY RX/DR IN RCRD: CPT | Performed by: INTERNAL MEDICINE

## 2023-08-02 PROCEDURE — 3075F SYST BP GE 130 - 139MM HG: CPT | Performed by: INTERNAL MEDICINE

## 2023-08-02 PROCEDURE — 99214 OFFICE O/P EST MOD 30 MIN: CPT | Performed by: INTERNAL MEDICINE

## 2023-08-02 RX ORDER — AMOXICILLIN 500 MG/1
500 CAPSULE ORAL 3 TIMES DAILY
Qty: 30 CAPSULE | Refills: 0 | Status: SHIPPED | OUTPATIENT
Start: 2023-08-02 | End: 2023-08-12

## 2023-08-02 NOTE — TELEPHONE ENCOUNTER
Patient said that she got a Tetnus shot and Shingles shot at [facility] in [place]. She said Dr Dee Thomas told her she is up to date on her Pneumonia vaccines.

## 2023-08-02 NOTE — TELEPHONE ENCOUNTER
Pt wanted to know if she had a tetanus shot & when she had pneumonia vaccine. She is at pharmacy now.

## 2023-08-03 ENCOUNTER — TELEPHONE (OUTPATIENT)
Dept: FAMILY MEDICINE CLINIC | Facility: CLINIC | Age: 78
End: 2023-08-03

## 2023-08-03 NOTE — TELEPHONE ENCOUNTER
Patient said that Dr Mechelle Rosales said that she should take the Amoxicillin 3x daily. She wanted to know if she should get up in the middle of the night to take it. Advised to take in the AM, after lunch and before bed. She said she is having muscle aches from the Shingles shot, advised she can take Tylenol.

## 2023-08-05 ENCOUNTER — TELEPHONE (OUTPATIENT)
Dept: FAMILY MEDICINE CLINIC | Facility: CLINIC | Age: 78
End: 2023-08-05

## 2023-08-05 NOTE — TELEPHONE ENCOUNTER
PC to pt and notified that TG out of the office today and next week. SHAYLA EJ in office today, unaware of how to get this for patients besides buying at a retail store. Will route to TG to advise when she is back in the office. Pt v/u.

## 2023-08-05 NOTE — TELEPHONE ENCOUNTER
About a month ago  can her a can of boost, which is oxygen in a can. She contacted [facility] & they have it but it is $10 a can. She can't afford that. She wanted to know if  can get more of it.

## 2023-08-07 NOTE — TELEPHONE ENCOUNTER
Spoke to patient and she did get a boost O2 from [employer]. She currently has 2 , 5ml bottles. If you have any ideas of discount let her know. She is contacting the company also.

## 2023-08-16 ENCOUNTER — TELEPHONE (OUTPATIENT)
Dept: FAMILY MEDICINE CLINIC | Facility: CLINIC | Age: 78
End: 2023-08-16

## 2023-08-16 NOTE — TELEPHONE ENCOUNTER
PT NEEDS TO DISCUSS ISSUE WITH OXYGEN ORDER. WOULD LIKE A CALL BACK BEFORE ORDER IS PLACED TO [facility].

## 2023-08-16 NOTE — TELEPHONE ENCOUNTER
Patient said that she called [employer] and [employer] is in network.  She said that an order needs to be sent to [employer] for oxygen rental.

## 2023-08-22 ENCOUNTER — TELEPHONE (OUTPATIENT)
Dept: FAMILY MEDICINE CLINIC | Facility: CLINIC | Age: 78
End: 2023-08-22

## 2023-08-22 NOTE — TELEPHONE ENCOUNTER
Pt. Says both legs are swelling and it feels hard around her ankles. She also has the tingling sensation like they are both a sleep. She is saying its kind of scary.

## 2023-08-22 NOTE — TELEPHONE ENCOUNTER
La from [employer] [phone] fax [phone] states she will reach out to patient and get her insurance information. They will contact us for further orders.

## 2023-08-24 ENCOUNTER — TELEPHONE (OUTPATIENT)
Dept: FAMILY MEDICINE CLINIC | Facility: CLINIC | Age: 78
End: 2023-08-24

## 2023-08-24 NOTE — TELEPHONE ENCOUNTER
Patient said that she was approved for oxygen through [employer], they are supposed to fax over the paperwork to us. She said she has an appointment with Dr Kendy Paul in [place] tomorrow, she thinks her A-Fib is \"acting up\". She said her HR has been mostly in the 80s but has been over 100 a couple times. She said her swelling has been going down since taking 4 Lasix for the last few days.

## 2023-08-24 NOTE — TELEPHONE ENCOUNTER
Pt would like to talk to nurse about oxygen, she said that its not urgent and you can call later today.

## 2023-08-26 ENCOUNTER — TELEPHONE (OUTPATIENT)
Dept: FAMILY MEDICINE CLINIC | Facility: CLINIC | Age: 78
End: 2023-08-26

## 2023-08-26 NOTE — TELEPHONE ENCOUNTER
Patient advised. Lab appointment scheduled Monday at 1030am. She said she will bring the orders. Patient said Dr Wily Griffith prescribed Metoprolol ER 50mg to take PRN at night. She asked what to do if she feels her heart racing in the morning.

## 2023-08-26 NOTE — TELEPHONE ENCOUNTER
PT HAS ORDERS FROM DR Shania Haro FOR LABS, [facility], IS THERE ANY LABS DR Mondragon  WANTS HER TO HAVE DONE ALSO?  SHE WILL NEED ORDERS FROM DR Cornelio Thompson PT

## 2023-08-28 ENCOUNTER — LABORATORY ENCOUNTER (OUTPATIENT)
Dept: LAB | Age: 78
End: 2023-08-28
Attending: INTERNAL MEDICINE
Payer: MEDICARE

## 2023-08-28 DIAGNOSIS — E11.9 DIABETES MELLITUS TYPE 2, DIET-CONTROLLED (HCC): ICD-10-CM

## 2023-08-28 DIAGNOSIS — R60.9 EDEMA: ICD-10-CM

## 2023-08-28 DIAGNOSIS — N18.4 CKD (CHRONIC KIDNEY DISEASE) STAGE 4, GFR 15-29 ML/MIN (HCC): ICD-10-CM

## 2023-08-28 DIAGNOSIS — R06.02 SOB (SHORTNESS OF BREATH): Primary | ICD-10-CM

## 2023-08-28 DIAGNOSIS — J43.8 OTHER EMPHYSEMA (HCC): ICD-10-CM

## 2023-08-28 DIAGNOSIS — E78.00 PURE HYPERCHOLESTEROLEMIA: ICD-10-CM

## 2023-08-28 DIAGNOSIS — I50.9 CHF (NYHA CLASS III, ACC/AHA STAGE C) (HCC): ICD-10-CM

## 2023-08-28 DIAGNOSIS — I48.20 ATRIAL FIBRILLATION, CHRONIC (HCC): ICD-10-CM

## 2023-08-28 LAB
ALBUMIN SERPL-MCNC: 4.2 G/DL (ref 3.4–5)
ALBUMIN/GLOB SERPL: 1.2 {RATIO} (ref 1–2)
ALP LIVER SERPL-CCNC: 113 U/L
ALT SERPL-CCNC: 26 U/L
ANION GAP SERPL CALC-SCNC: 3 MMOL/L (ref 0–18)
AST SERPL-CCNC: 16 U/L (ref 15–37)
BASOPHILS # BLD AUTO: 0.04 X10(3) UL (ref 0–0.2)
BASOPHILS NFR BLD AUTO: 0.5 %
BILIRUB SERPL-MCNC: 0.6 MG/DL (ref 0.1–2)
BUN BLD-MCNC: 27 MG/DL (ref 7–18)
CALCIUM BLD-MCNC: 9.5 MG/DL (ref 8.5–10.1)
CHLORIDE SERPL-SCNC: 102 MMOL/L (ref 98–112)
CHOLEST SERPL-MCNC: 153 MG/DL (ref ?–200)
CO2 SERPL-SCNC: 33 MMOL/L (ref 21–32)
CREAT BLD-MCNC: 1.07 MG/DL
EGFRCR SERPLBLD CKD-EPI 2021: 53 ML/MIN/1.73M2 (ref 60–?)
EOSINOPHIL # BLD AUTO: 0.14 X10(3) UL (ref 0–0.7)
EOSINOPHIL NFR BLD AUTO: 1.9 %
ERYTHROCYTE [DISTWIDTH] IN BLOOD BY AUTOMATED COUNT: 15.3 %
FASTING PATIENT LIPID ANSWER: NO
FASTING STATUS PATIENT QL REPORTED: NO
GLOBULIN PLAS-MCNC: 3.4 G/DL (ref 2.8–4.4)
GLUCOSE BLD-MCNC: 97 MG/DL (ref 70–99)
HCT VFR BLD AUTO: 46.8 %
HDLC SERPL-MCNC: 66 MG/DL (ref 40–59)
HGB BLD-MCNC: 14.9 G/DL
IMM GRANULOCYTES # BLD AUTO: 0.04 X10(3) UL (ref 0–1)
IMM GRANULOCYTES NFR BLD: 0.5 %
LDLC SERPL CALC-MCNC: 70 MG/DL (ref ?–100)
LYMPHOCYTES # BLD AUTO: 1.09 X10(3) UL (ref 1–4)
LYMPHOCYTES NFR BLD AUTO: 14.5 %
MCH RBC QN AUTO: 29.4 PG (ref 26–34)
MCHC RBC AUTO-ENTMCNC: 31.8 G/DL (ref 31–37)
MCV RBC AUTO: 92.5 FL
MONOCYTES # BLD AUTO: 0.53 X10(3) UL (ref 0.1–1)
MONOCYTES NFR BLD AUTO: 7 %
NEUTROPHILS # BLD AUTO: 5.68 X10 (3) UL (ref 1.5–7.7)
NEUTROPHILS # BLD AUTO: 5.68 X10(3) UL (ref 1.5–7.7)
NEUTROPHILS NFR BLD AUTO: 75.6 %
NONHDLC SERPL-MCNC: 87 MG/DL (ref ?–130)
NT-PROBNP SERPL-MCNC: 385 PG/ML (ref ?–450)
OSMOLALITY SERPL CALC.SUM OF ELEC: 291 MOSM/KG (ref 275–295)
PLATELET # BLD AUTO: 139 10(3)UL (ref 150–450)
POTASSIUM SERPL-SCNC: 3.6 MMOL/L (ref 3.5–5.1)
PROT SERPL-MCNC: 7.6 G/DL (ref 6.4–8.2)
RBC # BLD AUTO: 5.06 X10(6)UL
SODIUM SERPL-SCNC: 138 MMOL/L (ref 136–145)
TRIGL SERPL-MCNC: 90 MG/DL (ref 30–149)
VLDLC SERPL CALC-MCNC: 14 MG/DL (ref 0–30)
WBC # BLD AUTO: 7.5 X10(3) UL (ref 4–11)

## 2023-08-28 PROCEDURE — 80061 LIPID PANEL: CPT

## 2023-08-28 PROCEDURE — 83880 ASSAY OF NATRIURETIC PEPTIDE: CPT

## 2023-08-28 PROCEDURE — 85025 COMPLETE CBC W/AUTO DIFF WBC: CPT

## 2023-08-28 PROCEDURE — 80053 COMPREHEN METABOLIC PANEL: CPT

## 2023-08-28 PROCEDURE — 36415 COLL VENOUS BLD VENIPUNCTURE: CPT

## 2023-08-30 ENCOUNTER — TELEPHONE (OUTPATIENT)
Dept: FAMILY MEDICINE CLINIC | Facility: CLINIC | Age: 78
End: 2023-08-30

## 2023-08-31 ENCOUNTER — OFFICE VISIT (OUTPATIENT)
Dept: FAMILY MEDICINE CLINIC | Facility: CLINIC | Age: 78
End: 2023-08-31
Payer: MEDICARE

## 2023-08-31 ENCOUNTER — HOSPITAL ENCOUNTER (OUTPATIENT)
Dept: GENERAL RADIOLOGY | Age: 78
Discharge: HOME | End: 2023-08-31
Attending: INTERNAL MEDICINE
Payer: MEDICARE

## 2023-08-31 VITALS
RESPIRATION RATE: 24 BRPM | SYSTOLIC BLOOD PRESSURE: 130 MMHG | OXYGEN SATURATION: 94 % | DIASTOLIC BLOOD PRESSURE: 68 MMHG | TEMPERATURE: 100 F | HEART RATE: 65 BPM

## 2023-08-31 DIAGNOSIS — R05.3 CHRONIC COUGH: ICD-10-CM

## 2023-08-31 DIAGNOSIS — R05.3 CHRONIC COUGH: Primary | ICD-10-CM

## 2023-08-31 PROCEDURE — 3075F SYST BP GE 130 - 139MM HG: CPT | Performed by: INTERNAL MEDICINE

## 2023-08-31 PROCEDURE — 3078F DIAST BP <80 MM HG: CPT | Performed by: INTERNAL MEDICINE

## 2023-08-31 PROCEDURE — 71046 X-RAY EXAM CHEST 2 VIEWS: CPT | Performed by: INTERNAL MEDICINE

## 2023-08-31 PROCEDURE — 99214 OFFICE O/P EST MOD 30 MIN: CPT | Performed by: INTERNAL MEDICINE

## 2023-08-31 PROCEDURE — 1159F MED LIST DOCD IN RCRD: CPT | Performed by: INTERNAL MEDICINE

## 2023-08-31 PROCEDURE — 1160F RVW MEDS BY RX/DR IN RCRD: CPT | Performed by: INTERNAL MEDICINE

## 2023-08-31 RX ORDER — METOPROLOL SUCCINATE 50 MG/1
50 TABLET, EXTENDED RELEASE ORAL DAILY
COMMUNITY
Start: 2023-08-25

## 2023-09-01 NOTE — CONSULTS
[facility]  Report of Consultation    Indira Anderson Patient Status:  Inpatient    1945 MRN [id number]   [facility] 5NW-A Attending Sharyn Reveles MD   Hosp Day # 0 PCP Ivanna Vogt MD     Reason for Consultation:  Davy Sicard bronchitis 11/29/2011   • Chest pain, unspecified 12/15/2011   • Chronic airway obstruction, not elsewhere classified 11/29/2011   • Chronic atrial fibrillation ([facility]    • Congestive heart disease ([facility]    • Congestive heart failure, unspecified 11/29/2011 Rfl: , 12/11/2019 at Unknown time  budesonide 0.5 MG/2ML Inhalation Suspension, Take 2 mL (0.5 mg total) by nebulization 2 (two) times daily. , Disp: 60 ampule, Rfl: 1, 12/11/2019 at Unknown time  predniSONE 20 MG Oral Tab, Take 2 tablets (40 mg total) by m time  Ipratropium-Albuterol (COMBIVENT RESPIMAT)  MCG/ACT Inhalation Aero Soln, Inhale 1 puff into the lungs 4 (four) times daily. , Disp: 3 Inhaler, Rfl: 3, 12/11/2019 at Unknown time  BIOTIN OR, Take 1 tablet by mouth daily.   , Disp: , Rfl: , 12/11/ deformity. Heart: Seems irregular rate controlled murmurs noted distant tones   Abdomen: soft, non-distended, no masses, no guarding, no     Rebound.   No evidence of hepatosplenomegaly or ascites   Extremity: +1 to +2 lower extremity edema with some uppe suspected diastolic dysfunction with repeat echo pending  · GERD  · Chronic kidney disease--creatinine 2.9 at [facility] with plans to repeat now        Plan:  · Check bilateral lower extremity venous Dopplers-May need VQ scan pending echo results  · High dos Improved

## 2023-09-05 ENCOUNTER — TELEPHONE (OUTPATIENT)
Dept: FAMILY MEDICINE CLINIC | Facility: CLINIC | Age: 78
End: 2023-09-05

## 2023-09-05 NOTE — TELEPHONE ENCOUNTER
PLEASE CALL REGARDING HEART MEDICATION-    Nacho Qureshi
Patient advised.
Patient said that she fell asleep and her heart was \"rushing\". She wants to know if she should take the Metoprolol Dr Demi Zafarache prescribed in the morning or the evening and if she can take it with her Diltiazem.
Record your rate with your O2 sat monitor. Take the metoprolol in the evening and it should last 24 hours. You can bear down (valsalva) to stop palpitations or do a carotid massage (just one side) if your rate is high-- instead of taking another metoprolol. I need to to record symptoms what you did to stop them in a note book so I can look for trends.
detailed exam

## 2023-09-19 ENCOUNTER — TELEPHONE (OUTPATIENT)
Dept: FAMILY MEDICINE CLINIC | Facility: CLINIC | Age: 78
End: 2023-09-19

## 2023-09-19 NOTE — TELEPHONE ENCOUNTER
Pt called C/O legs feeling heavy, pain across back, and shortness of breath, difficulty swallowing, this writer asked pt if she had any any pain or numbness in her arms, Pt stated that yesterday she had some numbness in her right arm, with feeling of heaviness in chest. This writer spoke with Dr. Vandana King and Dr. Prince Dasilva that she needed to go to the E.R. Pt  states that she does not want to go to the E.R because she will have a bill that she is unable to afford, again this writer strongly advised  Pt that she needs to be evaluated in the E.R, and Pt verbalized understanding.

## 2023-09-20 ENCOUNTER — TELEPHONE (OUTPATIENT)
Dept: FAMILY MEDICINE CLINIC | Facility: CLINIC | Age: 78
End: 2023-09-20

## 2023-09-20 NOTE — TELEPHONE ENCOUNTER
Called Pt to F/U on yesterday when pt was advised to go to the E.R, Pt did not go, but is feeling better. Pt took her vitals while on the phone with her BP was 118/102 P 102 O2 91. Pt advised to call with any questions, and to the E.R with any worsening symptoms.

## 2023-09-25 ENCOUNTER — TELEPHONE (OUTPATIENT)
Dept: FAMILY MEDICINE CLINIC | Facility: CLINIC | Age: 78
End: 2023-09-25

## 2023-09-25 NOTE — TELEPHONE ENCOUNTER
Phone call from patient. States she took Metoprolol and Pravastatin together. Noticed pain in left arm and neck one hour after taking meds. Arm still hurts but is better. Wants to know if it is ok to take these together.

## 2023-09-27 ENCOUNTER — TELEPHONE (OUTPATIENT)
Dept: FAMILY MEDICINE CLINIC | Facility: CLINIC | Age: 78
End: 2023-09-27

## 2023-09-27 RX ORDER — POTASSIUM CHLORIDE 750 MG/1
20 TABLET, FILM COATED, EXTENDED RELEASE ORAL DAILY
Qty: 180 TABLET | Refills: 0 | Status: SHIPPED | OUTPATIENT
Start: 2023-09-27

## 2023-09-27 NOTE — TELEPHONE ENCOUNTER
OK, try some gentle stretching by moving the ear toward the shoulder then then other side and touch the hand to the opposite shoulder then same both sides.

## 2023-09-27 NOTE — TELEPHONE ENCOUNTER
Patient said she continues to have pain in her left arm and also across the back of her neck and shoulder. She said her wrist also was painful and fingers might be tingly. She said she hasn't had chest pain since last week. Patient states she cannot take the Magnesium because it gets \"stuck in her throat. She does not want this refilled.

## 2023-09-27 NOTE — TELEPHONE ENCOUNTER
Pt. Lebron Newcastle she got a message from the [address], [address] [facility] pharmacy to refill her   magnesium oxide 400 MG Oral Tab   She said she cannot take this so does not want it refilled. Please refill the Potassium Chloride ER 10 MEQ Oral Tab CR   Pt.  Still having chest pains

## 2023-09-27 NOTE — TELEPHONE ENCOUNTER
Patient advised. She said she is due for her second Shingles shot, Flu shot and Covid shot (she's not sure about the Covid shot). Should she do all three and if so what would the timing be.

## 2023-10-02 ENCOUNTER — TELEPHONE (OUTPATIENT)
Dept: FAMILY MEDICINE CLINIC | Facility: CLINIC | Age: 78
End: 2023-10-02

## 2023-10-02 RX ORDER — FUROSEMIDE 40 MG/1
40 TABLET ORAL 3 TIMES DAILY
Qty: 270 TABLET | Refills: 1 | Status: SHIPPED | OUTPATIENT
Start: 2023-10-02

## 2023-10-02 NOTE — TELEPHONE ENCOUNTER
PT AT PHARMACY NOW TO GET HER FLU SHOT, PHARMACY SAID THEY CAN ALSO GIVE HER NEXT SHINGLES SHOT, SHE WANTS TO MAKE SURE THIS IS OK?

## 2023-10-02 NOTE — TELEPHONE ENCOUNTER
Patient advised yes she can get both the Shingles and the Flu shot at the same time.  V.O. Dr Jose David Story RN

## 2023-10-02 NOTE — TELEPHONE ENCOUNTER
FUROSEMIDE 40 MG Oral Tab     Hypertension Medications Protocol Krtoru72/02/2023 10:34 AM   Protocol Details CMP or BMP in past 12 months    Last serum creatinine< 2.0    Appointment in past 6 or next 3 months        Last office visit:  5/3/23    No future appointments.   Last filled:  5/3/23  #270 with 1 refill   Last labs:  8/28/23  Crea:  1.07

## 2023-10-25 NOTE — TELEPHONE ENCOUNTER
Pt called in needs a referral for his therapist. He has been seeing her since April/ May and is now requesting a referral from pcp. Contact information below.     Joy Beckwith  Ph#: [phone]  Fax#: [phone]   She needs portable, walk test. Maybe later this week. It's really hard for her to get out when it's hot like this. So if there is no urgency then next week.

## 2023-11-02 RX ORDER — DILTIAZEM HYDROCHLORIDE 240 MG/1
240 CAPSULE, EXTENDED RELEASE ORAL 2 TIMES DAILY
Qty: 180 CAPSULE | Refills: 0 | Status: SHIPPED | OUTPATIENT
Start: 2023-11-02

## 2023-11-16 ENCOUNTER — TELEPHONE (OUTPATIENT)
Dept: FAMILY MEDICINE CLINIC | Facility: CLINIC | Age: 78
End: 2023-11-16

## 2023-11-16 NOTE — TELEPHONE ENCOUNTER
She needs help filling out some pw for her medication. She is having car problems so she is not sure when she can get the pw here.

## 2023-11-28 ENCOUNTER — TELEPHONE (OUTPATIENT)
Dept: FAMILY MEDICINE CLINIC | Facility: CLINIC | Age: 78
End: 2023-11-28
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
## 2023-12-04 ENCOUNTER — OFFICE VISIT (OUTPATIENT)
Dept: FAMILY MEDICINE CLINIC | Facility: CLINIC | Age: 78
End: 2023-12-04
Payer: MEDICARE

## 2023-12-04 VITALS
BODY MASS INDEX: 33 KG/M2 | TEMPERATURE: 99 F | HEART RATE: 90 BPM | WEIGHT: 179 LBS | DIASTOLIC BLOOD PRESSURE: 64 MMHG | OXYGEN SATURATION: 94 % | RESPIRATION RATE: 22 BRPM | SYSTOLIC BLOOD PRESSURE: 132 MMHG

## 2023-12-04 DIAGNOSIS — N81.6 RECTOCELE: ICD-10-CM

## 2023-12-04 DIAGNOSIS — J43.2 CENTRILOBULAR EMPHYSEMA (HCC): ICD-10-CM

## 2023-12-04 DIAGNOSIS — E11.9 DIABETES MELLITUS TYPE 2, DIET-CONTROLLED (HCC): ICD-10-CM

## 2023-12-04 DIAGNOSIS — E78.2 MIXED HYPERLIPIDEMIA: ICD-10-CM

## 2023-12-04 DIAGNOSIS — I10 PRIMARY HYPERTENSION: ICD-10-CM

## 2023-12-04 DIAGNOSIS — R30.0 DYSURIA: Primary | ICD-10-CM

## 2023-12-04 LAB
APPEARANCE: CLEAR
BILIRUBIN: NEGATIVE
GLUCOSE (URINE DIPSTICK): NEGATIVE MG/DL
KETONES (URINE DIPSTICK): NEGATIVE MG/DL
MULTISTIX LOT#: ABNORMAL NUMERIC
NITRITE, URINE: NEGATIVE
OCCULT BLOOD: NEGATIVE
PH, URINE: 7 (ref 4.5–8)
PROTEIN (URINE DIPSTICK): NEGATIVE MG/DL
SPECIFIC GRAVITY: 1.01 (ref 1–1.03)
URINE-COLOR: YELLOW
UROBILINOGEN,SEMI-QN: 0.2 MG/DL (ref 0–1.9)

## 2023-12-04 PROCEDURE — 87086 URINE CULTURE/COLONY COUNT: CPT | Performed by: INTERNAL MEDICINE

## 2023-12-04 PROCEDURE — 87077 CULTURE AEROBIC IDENTIFY: CPT | Performed by: INTERNAL MEDICINE

## 2023-12-04 PROCEDURE — 87186 SC STD MICRODIL/AGAR DIL: CPT | Performed by: INTERNAL MEDICINE

## 2023-12-04 RX ORDER — POTASSIUM CHLORIDE 750 MG/1
10 TABLET, EXTENDED RELEASE ORAL DAILY
COMMUNITY
Start: 2023-09-28 | End: 2023-12-04

## 2023-12-05 LAB
ABSOLUTE BASOPHILS: 63 CELLS/UL (ref 0–200)
ABSOLUTE EOSINOPHILS: 126 CELLS/UL (ref 15–500)
ABSOLUTE LYMPHOCYTES: 1302 CELLS/UL (ref 850–3900)
ABSOLUTE MONOCYTES: 809 CELLS/UL (ref 200–950)
ABSOLUTE NEUTROPHILS: 8201 CELLS/UL (ref 1500–7800)
ALBUMIN/GLOBULIN RATIO: 1.9 (CALC) (ref 1–2.5)
ALBUMIN: 4.6 G/DL (ref 3.6–5.1)
ALKALINE PHOSPHATASE: 111 U/L (ref 37–153)
ALT: 14 U/L (ref 6–29)
AST: 19 U/L (ref 10–35)
BASOPHILS: 0.6 %
BILIRUBIN, TOTAL: 0.6 MG/DL (ref 0.2–1.2)
BUN/CREATININE RATIO: 29 (CALC) (ref 6–22)
BUN: 34 MG/DL (ref 7–25)
CALCIUM: 9.9 MG/DL (ref 8.6–10.4)
CARBON DIOXIDE: 34 MMOL/L (ref 20–32)
CHLORIDE: 99 MMOL/L (ref 98–110)
CHOL/HDLC RATIO: 2.5 (CALC)
CHOLESTEROL, TOTAL: 161 MG/DL
CREATININE: 1.17 MG/DL (ref 0.6–1)
EGFR: 48 ML/MIN/1.73M2
EOSINOPHILS: 1.2 %
GLOBULIN: 2.4 G/DL (CALC) (ref 1.9–3.7)
GLUCOSE: 86 MG/DL (ref 65–99)
HDL CHOLESTEROL: 64 MG/DL
HEMATOCRIT: 45 % (ref 35–45)
HEMOGLOBIN A1C: 5.3 % OF TOTAL HGB
HEMOGLOBIN: 14.9 G/DL (ref 11.7–15.5)
LDL-CHOLESTEROL: 80 MG/DL (CALC)
LYMPHOCYTES: 12.4 %
MCH: 29.7 PG (ref 27–33)
MCHC: 33.1 G/DL (ref 32–36)
MCV: 89.6 FL (ref 80–100)
MONOCYTES: 7.7 %
MPV: 12.4 FL (ref 7.5–12.5)
NEUTROPHILS: 78.1 %
NON-HDL CHOLESTEROL: 97 MG/DL (CALC)
PLATELET COUNT: 168 THOUSAND/UL (ref 140–400)
POTASSIUM: 4.1 MMOL/L (ref 3.5–5.3)
PROTEIN, TOTAL: 7 G/DL (ref 6.1–8.1)
RDW: 13.3 % (ref 11–15)
RED BLOOD CELL COUNT: 5.02 MILLION/UL (ref 3.8–5.1)
SODIUM: 142 MMOL/L (ref 135–146)
TRIGLYCERIDES: 85 MG/DL
WHITE BLOOD CELL COUNT: 10.5 THOUSAND/UL (ref 3.8–10.8)

## 2023-12-07 ENCOUNTER — TELEPHONE (OUTPATIENT)
Dept: FAMILY MEDICINE CLINIC | Facility: CLINIC | Age: 78
End: 2023-12-07

## 2023-12-07 NOTE — TELEPHONE ENCOUNTER
Prior authorization initiated with [facility] [phone], they will send a fax.

## 2023-12-07 NOTE — TELEPHONE ENCOUNTER
Willy Kelly from pharmacy called about nitrofurantoin monohydrate macro 100 MG Oral Cap. Insurance is rejecting medication. It is being flagged that pt has chronic kidney disease. She wanted to discuss.

## 2023-12-11 RX ORDER — POTASSIUM CHLORIDE 750 MG/1
20 TABLET, EXTENDED RELEASE ORAL DAILY
Qty: 180 TABLET | Refills: 3 | Status: SHIPPED | OUTPATIENT
Start: 2023-12-11

## 2023-12-13 ENCOUNTER — TELEPHONE (OUTPATIENT)
Dept: FAMILY MEDICINE CLINIC | Facility: CLINIC | Age: 78
End: 2023-12-13

## 2023-12-13 ENCOUNTER — OFFICE VISIT (OUTPATIENT)
Dept: FAMILY MEDICINE CLINIC | Facility: CLINIC | Age: 78
End: 2023-12-13
Payer: MEDICARE

## 2023-12-13 VITALS
DIASTOLIC BLOOD PRESSURE: 68 MMHG | HEART RATE: 97 BPM | RESPIRATION RATE: 24 BRPM | TEMPERATURE: 98 F | SYSTOLIC BLOOD PRESSURE: 126 MMHG | OXYGEN SATURATION: 93 %

## 2023-12-13 DIAGNOSIS — R31.0 GROSS HEMATURIA: Primary | ICD-10-CM

## 2023-12-13 LAB
APPEARANCE: CLEAR
BILIRUBIN: NEGATIVE
GLUCOSE (URINE DIPSTICK): NEGATIVE MG/DL
KETONES (URINE DIPSTICK): NEGATIVE MG/DL
LEUKOCYTES: NEGATIVE
MULTISTIX LOT#: ABNORMAL NUMERIC
NITRITE, URINE: NEGATIVE
PH, URINE: 6.5 (ref 4.5–8)
PROTEIN (URINE DIPSTICK): NEGATIVE MG/DL
SPECIFIC GRAVITY: 1.01 (ref 1–1.03)
URINE-COLOR: YELLOW
UROBILINOGEN,SEMI-QN: 0.2 MG/DL (ref 0–1.9)

## 2023-12-13 PROCEDURE — 3074F SYST BP LT 130 MM HG: CPT | Performed by: INTERNAL MEDICINE

## 2023-12-13 PROCEDURE — 3078F DIAST BP <80 MM HG: CPT | Performed by: INTERNAL MEDICINE

## 2023-12-13 PROCEDURE — 99214 OFFICE O/P EST MOD 30 MIN: CPT | Performed by: INTERNAL MEDICINE

## 2023-12-13 PROCEDURE — 1160F RVW MEDS BY RX/DR IN RCRD: CPT | Performed by: INTERNAL MEDICINE

## 2023-12-13 PROCEDURE — 81003 URINALYSIS AUTO W/O SCOPE: CPT | Performed by: INTERNAL MEDICINE

## 2023-12-13 PROCEDURE — 87086 URINE CULTURE/COLONY COUNT: CPT | Performed by: INTERNAL MEDICINE

## 2023-12-13 PROCEDURE — 1159F MED LIST DOCD IN RCRD: CPT | Performed by: INTERNAL MEDICINE

## 2023-12-13 PROCEDURE — 1170F FXNL STATUS ASSESSED: CPT | Performed by: INTERNAL MEDICINE
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
## 2023-12-14 ENCOUNTER — TELEPHONE (OUTPATIENT)
Dept: FAMILY MEDICINE CLINIC | Facility: CLINIC | Age: 78
End: 2023-12-14

## 2023-12-14 DIAGNOSIS — R31.0 GROSS HEMATURIA: Primary | ICD-10-CM

## 2023-12-14 NOTE — TELEPHONE ENCOUNTER
Patient advised. She asked if it could be her parathyroid as she had a tumor on it years ago. Patient advised Dr Louisa De La Torre said this is not what she would be feeling. She said she is concerned about not being able to swallow and asking about seeing another ENT.

## 2023-12-14 NOTE — TELEPHONE ENCOUNTER
Pt. Called to say Thank you to Dr. Wendy Newton for everything she has done for her. She felt bad that she complained about the antibiotic yesterday.

## 2023-12-14 NOTE — TELEPHONE ENCOUNTER
Patient said that she was trying to eat a  and it got \"stuck in her throat\". She said that the last couple days she has not been able to even swallow her pills. She has been \"belching\". She said she stuck her finger down her throat and felt something \"hard\". She said that she checked her oxygen saturation and it was 92%. She said if she takes her oxygen off her saturation \"drops down\". She has her oxygen at 2L. She said she does not feel like her throat is \"swollen\". She said the ENT she saw from [facility] a couple years ago never looked down her throat. She said that the tests she had ordered by Dr Deepali Briggs \"did not show anything\". She said she can feel \"something jabbing her\".

## 2023-12-15 ENCOUNTER — TELEPHONE (OUTPATIENT)
Dept: FAMILY MEDICINE CLINIC | Facility: CLINIC | Age: 78
End: 2023-12-15

## 2023-12-15 NOTE — TELEPHONE ENCOUNTER
Spoke with patient has been doing liquid diet as advised but having trouble taking medications. Defers ER at this time. Reports oxygen level still drops at times. Feels only way to get medications to go down is to put them in applesauce so was going to try a very small amount. Had hematuria this AM but did not see MCM in time states usually only in AM and clears up rest of day. Will send picture as requested if has another occurrence.

## 2023-12-15 NOTE — TELEPHONE ENCOUNTER
Left message for patient to call back on designated voicemail    (See other telephone encounter also)

## 2023-12-16 ENCOUNTER — TELEPHONE (OUTPATIENT)
Dept: FAMILY MEDICINE CLINIC | Facility: CLINIC | Age: 78
End: 2023-12-16

## 2023-12-16 NOTE — TELEPHONE ENCOUNTER
Patient said [facility] but she cannot get in until 12/26/23, but she is on the wait list. Natalee Sanford it is ok to wait until 12/26/23 but if she has pain she needs to be evaluated in the ER V.O. Dr Kina Mabry RN

## 2023-12-16 NOTE — TELEPHONE ENCOUNTER
Patient advised. She will schedule with Megan Holt. Patient states that [address] [place] [address] called her and told her that they did not receive page 2 and her total income.

## 2023-12-16 NOTE — TELEPHONE ENCOUNTER
PT STATES [facility] IN [place] DOES NOT HAVE ANY OPENINGS THIS WEEK FOR CT SCAN. CAN SHE HAVE IT DONE AT [facility] OR [place]?

## 2023-12-20 RX ORDER — PRAVASTATIN SODIUM 40 MG
40 TABLET ORAL NIGHTLY
Qty: 90 TABLET | Refills: 1 | Status: SHIPPED | OUTPATIENT
Start: 2023-12-20

## 2023-12-20 RX ORDER — ALLOPURINOL 100 MG/1
100 TABLET ORAL DAILY
Qty: 90 TABLET | Refills: 3 | Status: SHIPPED | OUTPATIENT
Start: 2023-12-20

## 2023-12-20 NOTE — TELEPHONE ENCOUNTER
Cholesterol Medication Protocol [id number]/20/2023 08:17 AM   Protocol Details ALT < 80    ALT resulted within past year    Lipid panel within past 12 months    Appointment within past 12 or next 3 months     Last refill - Pravastatin - 7/26/23 - #90   Last ALT - 12/5/23 - 14  Last lipid panel - 12/4/23  Last office visit - 12/13/23  Refilled per protocol    Lat refill - Allopurinol - 7/26/23 - #90

## 2023-12-26 ENCOUNTER — TELEPHONE (OUTPATIENT)
Dept: FAMILY MEDICINE CLINIC | Facility: CLINIC | Age: 78
End: 2023-12-26

## 2023-12-26 DIAGNOSIS — R05.1 ACUTE COUGH: Primary | ICD-10-CM

## 2023-12-26 RX ORDER — ONDANSETRON 4 MG/1
4 TABLET, FILM COATED ORAL EVERY 8 HOURS PRN
Qty: 20 TABLET | Refills: 2 | Status: SHIPPED | OUTPATIENT
Start: 2023-12-26

## 2023-12-26 NOTE — TELEPHONE ENCOUNTER
Patient advised. She said that every time she moves she gets \"sick to her stomach\" and feels dizzy. She said that she called the on call doctor on Saturday before she knew she had a fever and Dr Eyal Humphrey told her she should have her blood count checked. She said she never got the last Covid shot. She was around someone who was getting over Covid last Wed. She wants to come in tomorrow to have a swab done to test for Covid or Flu.

## 2023-12-26 NOTE — TELEPHONE ENCOUNTER
She said that her friend that is a nurse is doing a Covid test her right now at her house. Patient said that she still has a rash on her \"private area\" and it hurts when she urinates. Is there anything she can put on it?

## 2023-12-26 NOTE — TELEPHONE ENCOUNTER
PLEASE CALL PT- HAS BEEN SICK ALL WEEKEND NOT SURE IF SHE SHOULD GO TO APPOINTMENT TODAY-    Nacho Qureshi

## 2023-12-26 NOTE — TELEPHONE ENCOUNTER
Patient advised. She said that she rescheduled for next Thursday. She said she is nauseated and dizzy. She said that she is drinking fluids and urinating. Patient asking for Zofran to be sent to [facility].

## 2023-12-26 NOTE — TELEPHONE ENCOUNTER
Go in only if 24 hours fever free, can stay still for 30 minutes and no cough.   Make sure they know you cannot lay entirely flat

## 2023-12-26 NOTE — TELEPHONE ENCOUNTER
Patient said she had a fever of 103.6 Friday and 99 this morning. She said that she had diarrhea this morning. She said that she ate something that was \"iffy\" yesterday. She said she had been having pain to her lower right lower quadrant and left ribs. She said her \"lungs hurt\". She has nasal congestion but her breathing is \"not bad\". She said that her oxygen saturation is \"good\", it is 91 % without her oxygen. She wants to keep her appointment for her CT but doesn't know if she should. Advised she likely should cancel.

## 2023-12-27 NOTE — TELEPHONE ENCOUNTER
The black stolls are from the pepto. No anemia, she can use the paxlovid, but she has to stiop her statin and her vaccination is just as good, she will over this no problem with or without the paxlovid.

## 2023-12-27 NOTE — TELEPHONE ENCOUNTER
Patient advised to monitor stools, notify Dr Louisa De La Torre if anymore black stools. She does not want to take Paxlovid.

## 2023-12-27 NOTE — TELEPHONE ENCOUNTER
Patient states she was positive for Covid yesterday, she asked if she can get a prescription. She started with symptoms Saturday. She said she had several black stools after seeing Dr Boby Walker and she did not take Pepto Bismol. She asked if she could be anemic. She had a CBC 12/4/23.

## 2023-12-27 NOTE — TELEPHONE ENCOUNTER
Vasoline or coconut oil if she has these in her cabinet, so she does not have to go out in the cold.

## 2023-12-28 ENCOUNTER — TELEPHONE (OUTPATIENT)
Dept: FAMILY MEDICINE CLINIC | Facility: CLINIC | Age: 78
End: 2023-12-28

## 2023-12-28 NOTE — TELEPHONE ENCOUNTER
Patient advised she can change the canula after a couple weeks. She said she does not feel well today. She is having leg cramps and \"pain in her lungs, to the center of her back\". She said her oxygen saturation has been around 90-94%. It was just 94% w/o oxygen. She has her oxygen set at 2L. She has been taking Tylenol but is concerned about the pain in her lungs.

## 2023-12-28 NOTE — TELEPHONE ENCOUNTER
Pt has covid. She uses oxygen & is worried about the canula. She has been using alcohol on it but she thinks she is giving covid back to herself.

## 2023-12-30 ENCOUNTER — TELEPHONE (OUTPATIENT)
Dept: FAMILY MEDICINE CLINIC | Facility: CLINIC | Age: 78
End: 2023-12-30

## 2023-12-30 NOTE — TELEPHONE ENCOUNTER
Patient said that her muscles are   \"Hurting so bad\". She took Tylenol last night. She is congested and has back pain to the center of her back. She said her oxygen saturation is 89% without her oxygen on. She using Breztri twice daily and Combivent once today. She said she is also constipated and has not had a BM since she had diarrhea last Friday. She is asking if she can take 25mg of a stimulant laxative.

## 2023-12-30 NOTE — TELEPHONE ENCOUNTER
Patient advised. Also advised to continue Breztri and Combivent daily. Get Covid vaccine in 3 months.  V.O. Dr Jose David Story RN

## 2023-12-31 ENCOUNTER — MOBILE ENCOUNTER (OUTPATIENT)
Dept: FAMILY MEDICINE CLINIC | Facility: CLINIC | Age: 78
End: 2023-12-31

## 2023-12-31 NOTE — PROGRESS NOTES
Patient called covid 19 and she has severe back pain oxygen 88 to 90   Recommend to go to er or call 911  She refuse to call 911 due to financial issue  Recommend I can call 911 she refuse   She will find a friend to take her  Reinforce she need to be seen today and right now in er rekha josue

## 2024-01-02 ENCOUNTER — TELEPHONE (OUTPATIENT)
Dept: FAMILY MEDICINE CLINIC | Facility: CLINIC | Age: 79
End: 2024-01-02

## 2024-01-02 NOTE — TELEPHONE ENCOUNTER
Patient said that she talked to [facility] and they said they did not received the first page.  Patient said that she still has the pain in the middle of her back between her shoulder blades and Tylenol is not helping. She said she called 911. She said she refused to go to the hospital so they refused to \"check her out\". She said that she has sores on her tongue. She said her oxygen saturation is 95% with her O2 on.   She said she she took one laxative but it was not effective. She is going to take another laxative.   She asked if she should keep the appointment for the CT scan on Thursday. She is still running a low grade temp as of last night, it was 100.1 with a temporal thermometer.

## 2024-01-02 NOTE — TELEPHONE ENCOUNTER
Patient said the back pain is so bad she wants to cry. She wants to know if it could be pneumonia. Appointment scheduled with Zara OLIVER tomorrow at 130pm.   She said that she feels like there is something in her throat and like she is whistling. She ate ground beef. She states she doesn't know what to do. She denies having any difficulty swallowing or breathing.

## 2024-01-02 NOTE — TELEPHONE ENCOUNTER
Patient advised. Patient also advised that the Combivent was approved with TORI Martinez/  She said that she is bloated and uncomfortable and the area where she had colon surgery is \"protruding\". She said she had a very small BM so she is going to take another laxative. Advised to call if symptoms do not resolve.

## 2024-01-02 NOTE — TELEPHONE ENCOUNTER
Patient advised that she should keep drinking fluids and avoid solids. Go to Er if any distress. ANAHI Neff/Eda FELIX

## 2024-01-03 ENCOUNTER — TELEPHONE (OUTPATIENT)
Dept: FAMILY MEDICINE CLINIC | Facility: CLINIC | Age: 79
End: 2024-01-03

## 2024-01-03 ENCOUNTER — OFFICE VISIT (OUTPATIENT)
Dept: FAMILY MEDICINE CLINIC | Facility: CLINIC | Age: 79
End: 2024-01-03
Payer: MEDICARE

## 2024-01-03 ENCOUNTER — HOSPITAL ENCOUNTER (OUTPATIENT)
Dept: GENERAL RADIOLOGY | Age: 79
Discharge: HOME | End: 2024-01-03
Payer: MEDICARE

## 2024-01-03 VITALS
RESPIRATION RATE: 26 BRPM | HEART RATE: 106 BPM | TEMPERATURE: 99 F | DIASTOLIC BLOOD PRESSURE: 72 MMHG | OXYGEN SATURATION: 97 % | SYSTOLIC BLOOD PRESSURE: 122 MMHG

## 2024-01-03 DIAGNOSIS — R05.1 ACUTE COUGH: Primary | ICD-10-CM

## 2024-01-03 DIAGNOSIS — R05.1 ACUTE COUGH: ICD-10-CM

## 2024-01-03 PROCEDURE — 99214 OFFICE O/P EST MOD 30 MIN: CPT

## 2024-01-03 PROCEDURE — 1159F MED LIST DOCD IN RCRD: CPT

## 2024-01-03 PROCEDURE — 1160F RVW MEDS BY RX/DR IN RCRD: CPT

## 2024-01-03 PROCEDURE — 71046 X-RAY EXAM CHEST 2 VIEWS: CPT

## 2024-01-03 PROCEDURE — 1170F FXNL STATUS ASSESSED: CPT

## 2024-01-03 PROCEDURE — 3074F SYST BP LT 130 MM HG: CPT

## 2024-01-03 PROCEDURE — 3078F DIAST BP <80 MM HG: CPT

## 2024-01-03 NOTE — TELEPHONE ENCOUNTER
Called patient back regarding back pain, patient was crying, and upset because she states she cannot take it anymore. Patient told that if she is in this much pain she needs to be seen in the E.R, Patient states she does not want to do so, asked pt hat she wants, she does not know, pt was just seen in the office today by Zara OLIVER.  Patient informed that maybe she should think about respite care in nursing facility until she is feeling better, pt states she is unable to leave her house without people breaking in and stealing her money and food.

## 2024-01-03 NOTE — PROGRESS NOTES
HPI:   Edith is a 78 yr. Old female here today female here today with a cough, congestion, and a pain in right back.  Patient had covid positive test 12/26/23.  Patient denies fever, chills, n/v/d, chest tightness. Patient presents today wearing her 02-2L, complaining of fatigue, body aches, right upper back pain, and cough.           Current Outpatient Medications   Medication Sig Dispense Refill    ondansetron (ZOFRAN) 4 mg tablet Take 1 tablet (4 mg total) by mouth every 8 (eight) hours as needed for Nausea. 20 tablet 2    allopurinol 100 MG Oral Tab Take 1 tablet (100 mg total) by mouth daily. 90 tablet 3    pravastatin 40 MG Oral Tab TAKE 1 TABLET NIGHTLY 90 tablet 1    potassium chloride 10 MEQ Oral Tab CR Take 2 tablets (20 mEq total) by mouth daily. 180 tablet 3    dilTIAZem HCl ER Beads 240 MG Oral Capsule SR 24 Hr Take 1 capsule (240 mg total) by mouth 2 (two) times daily. 180 capsule 0    FUROSEMIDE 40 MG Oral Tab TAKE 1 TABLET THREE TIMES DAILY 270 tablet 1    magnesium oxide 400 MG Oral Tab Take 1 tablet (400 mg total) by mouth daily. 90 tablet 0    budeson-glycopyrrol-formoterol 160-9-4.8 MCG/ACT Inhalation Aerosol Inhale 2 puffs into the lungs 2 (two) times daily. 4 each 0    ipratropium-albuterol (COMBIVENT RESPIMAT)  MCG/ACT Inhalation Aero Soln Inhale 1 puff into the lungs 4 (four) times daily. 3 each 4    aspirin 81 MG Oral Tab EC Take 1 tablet (81 mg total) by mouth daily.      Potassium Chloride ER 10 MEQ Oral Tab CR Take 2 tablets daily (enteric coated) 180 tablet 3      Past Medical History:   Diagnosis Date    Acute bronchitis 11/29/2011    Arthritis     Black stools     Blood in urine     Chest pain, unspecified 12/15/2011    Chronic airway obstruction, not elsewhere classified 11/29/2011    Chronic atrial fibrillation (HCC)     Congestive heart disease (HCC)     Congestive heart failure, unspecified 11/29/2011    Constipation     COPD (chronic obstructive pulmonary disease) ([facility])      Coronary atherosclerosis of unspecified type of vessel, native or graft 2011    Edema 2012    Esophageal reflux 2012    Factor XII deficiency disease (HCC) 1999    Erlebrorn    Fatigue     Frequent use of laxatives     Generalized and unspecified atherosclerosis 2011    Gout     Hematemesis 2011    Irregular bowel habits     Leaking of urine     Painful swallowing     Parathyroid tumor     Wears glasses     Weight gain     West Nile Virus infection       Past Surgical History:   Procedure Laterality Date    ANGIOPLASTY (CORONARY)      CABG  2011    Dr.Brian Lala    CAROTID EXAM  2011    r Carotid Enderterectomy / Dr Doherty    [facility]; 2012    COLONOSCOPY      OTHER      14in of colon removed    OTHER CARDIOLOGY      Peripheral Vascular Surgery     SKIN SURGERY      Reconstruction of Left side of face    TONSILLECTOMY      TUBAL LIGATION  1982      Family History   Problem Relation Age of Onset    Heart Disease Father     Heart Disease Mother     Hypertension Mother     Breast Cancer Other         Aunt and Cousin with Hx of Breast Cancer    Cancer Other         Cousin w/ Colon cancer    Breast Cancer Paternal Aunt     Breast Cancer Paternal Aunt     Breast Cancer Paternal Cousin Female     Breast Cancer Paternal Cousin Female     Breast Cancer Paternal Cousin Female       Social History     Socioeconomic History    Marital status: OTHER    Number of children: 2   Occupational History    Occupation: Not Employed    Tobacco Use    Smoking status: Former     Packs/day: 1.00     Years: 50.00     Additional pack years: 0.00     Total pack years: 50.00     Types: Cigarettes     Quit date: 2010     Years since quittin.0    Smokeless tobacco: Never    Tobacco comments:     quit a few years ago   Substance and Sexual Activity    Alcohol use: No     Alcohol/week: 0.0 standard drinks of alcohol    Drug use: No   Other Topics Concern    Caffeine  Concern No     Comment: About 1 drink per day     Exercise No         REVIEW OF SYSTEMS:   Review of Systems   Constitutional:  Positive for chills and fatigue. Negative for fever.   HENT:  Positive for congestion and sinus pressure. Negative for ear discharge, ear pain, sinus pain, sore throat and trouble swallowing.    Eyes: Negative.    Respiratory:  Positive for cough. Negative for chest tightness and shortness of breath.    Cardiovascular:  Negative for chest pain and palpitations.   Gastrointestinal:  Negative for diarrhea, nausea and vomiting.   Neurological:  Negative for dizziness, light-headedness and headaches.       EXAM:   /72 (BP Location: Left arm, Patient Position: Sitting, Cuff Size: adult)   Pulse 106   Temp 98.6 °F (37 °C) (Temporal)   Resp 26   SpO2 97%   Physical Exam  Vitals and nursing note reviewed.   Constitutional:       Appearance: Normal appearance.   HENT:      Head: Atraumatic.      Right Ear: Tympanic membrane, ear canal and external ear normal.      Left Ear: Tympanic membrane, ear canal and external ear normal.   Cardiovascular:      Rate and Rhythm: Normal rate and regular rhythm.      Pulses: Normal pulses.      Heart sounds: Normal heart sounds.   Pulmonary:      Effort: Pulmonary effort is normal.      Breath sounds: Wheezing (right lung fields) and rhonchi (right upper) present. No rales.   Musculoskeletal:      Cervical back: Neck supple.   Lymphadenopathy:      Cervical: No cervical adenopathy.   Skin:     General: Skin is warm and dry.      Capillary Refill: Capillary refill takes less than 2 seconds.   Neurological:      General: No focal deficit present.      Mental Status: She is alert.         ASSESSMENT AND PLAN:   Diagnoses and all orders for this visit:    Acute cough  -     XR CHEST PA + LAT CHEST (CPT=71046); Future     -Discussed chest xray today to rule out pneumonia and will call patient with results.  Recommend continue respiratory inhalers, cool mist  humidifier, push fluids and get plenty of rest.  -Return for worsening, call with questions or problems.  -Patient verbalized understanding and in agreement with treatment plan.    30 minutes were spent with this patient on assessment, education, and discussion of treatment plan    MELODY Best

## 2024-01-04 ENCOUNTER — HOSPITAL ENCOUNTER (OUTPATIENT)
Dept: CT IMAGING | Age: 79
Discharge: HOME | End: 2024-01-04
Attending: INTERNAL MEDICINE
Payer: MEDICARE

## 2024-01-04 ENCOUNTER — TELEPHONE (OUTPATIENT)
Dept: FAMILY MEDICINE CLINIC | Facility: CLINIC | Age: 79
End: 2024-01-04

## 2024-01-04 DIAGNOSIS — R30.0 DYSURIA: Primary | ICD-10-CM

## 2024-01-04 DIAGNOSIS — N81.6 RECTOCELE: ICD-10-CM

## 2024-01-04 DIAGNOSIS — R31.0 GROSS HEMATURIA: ICD-10-CM

## 2024-01-04 PROCEDURE — 74176 CT ABD & PELVIS W/O CONTRAST: CPT | Performed by: INTERNAL MEDICINE

## 2024-01-04 NOTE — TELEPHONE ENCOUNTER
Pt states she received letter in my chart that labs are due.  She said she had them done at [facility].  Is there additional labs that are needed?  She is having CT done this morning at 10 so call back later.

## 2024-01-04 NOTE — TELEPHONE ENCOUNTER
Patient advised.  Wants to know if this would have caused blood in her urine.  Also has an order for an ultrasound of abdominal aorta from 5/7/23 that she never completed.    Do you want her to still do that?

## 2024-01-15 ENCOUNTER — TELEPHONE (OUTPATIENT)
Dept: FAMILY MEDICINE CLINIC | Facility: CLINIC | Age: 79
End: 2024-01-15

## 2024-01-29 ENCOUNTER — TELEPHONE (OUTPATIENT)
Dept: FAMILY MEDICINE CLINIC | Facility: CLINIC | Age: 79
End: 2024-01-29

## 2024-01-29 RX ORDER — DILTIAZEM HYDROCHLORIDE 240 MG/1
240 CAPSULE, EXTENDED RELEASE ORAL 2 TIMES DAILY
Qty: 180 CAPSULE | Refills: 0 | Status: SHIPPED | OUTPATIENT
Start: 2024-01-29

## 2024-01-29 NOTE — TELEPHONE ENCOUNTER
Patient advised to take (2) 40mg tabs in the morning and (2) 40mg tabs in the evening.  Advised we do not have any samples of Breztri. She said she got one from a [facility] who works [facility]. She will call if she needs the script sent into the pharmacy.    She said that when she coughed the other day she had some bright red blood on the kleenex. She does not think it was from her nose. Advised to let us know if it happens again.

## 2024-01-29 NOTE — TELEPHONE ENCOUNTER
If we have samples then yes, can take 60 mg lasix 3 in am 3 in afternoon just fpr a few days.    (4) walks frequently

## 2024-01-29 NOTE — TELEPHONE ENCOUNTER
Pt has swelling in both legs from her knee down to her toes, but left leg is worse. She would also like to discuss her medication.

## 2024-01-29 NOTE — TELEPHONE ENCOUNTER
Patient said her left leg is swollen from her knee to her toe, she said the right leg is swollen by her ankle. She said that they are even swollen in the morning.   She is taking Lasix (3) 40mg tabs daily, she did forget to take one yesterday.   Should she increase her Lasix? She also needs a refill on Diltiazem soon and is looking for a sample of Bresztri.

## 2024-02-08 ENCOUNTER — TELEPHONE (OUTPATIENT)
Dept: FAMILY MEDICINE CLINIC | Facility: CLINIC | Age: 79
End: 2024-02-08

## 2024-02-08 NOTE — TELEPHONE ENCOUNTER
Spoke with patient.  Patient is taking furosemide 40mg four times daily.  Potassium 20 meq daily. Cramping in hands, feet, and legs.  She continues to have pitting edema but has improved.  Patient is asking if she can take another potassium.      Breztri inhaler does help her with her breathing.  She is asking if a prescription could be called in as she was told there were no samples.    Patient saw Dr. Vandana Starr and will be having a cystoscopy.  TOREY

## 2024-02-08 NOTE — TELEPHONE ENCOUNTER
Patient notified.  Patient states that she legs have improved but are still very swollen. She will continue for a few more days on the lasix.    Patient would also like Dr. Neff to know that the inhaler is over $800 per month so she will not be picking the script up.  I discontinued the script on her medication profile.

## 2024-02-08 NOTE — TELEPHONE ENCOUNTER
Just to verify, you would like patient to take 40meq daily for the next two weeks, then decrease back to 20meq?

## 2024-02-08 NOTE — TELEPHONE ENCOUNTER
TOLD HER TO TAKE AN EXTRA DIURETIC, IT'S CAUSING HER TO HAVE CRAMPS IN HANDS & LEGS & FEET, NOT SURE IF SHE SHOULD TAKE AN EXTRA POTASSIUM, SHE ALREADY TAKES 2 DAILY, ALSO HAS SOME OTHER QUESTIONS, CALL PT BACK

## 2024-02-13 ENCOUNTER — TELEPHONE (OUTPATIENT)
Dept: FAMILY MEDICINE CLINIC | Facility: CLINIC | Age: 79
End: 2024-02-13

## 2024-02-13 DIAGNOSIS — R25.2 MUSCLE CRAMPING: Primary | ICD-10-CM

## 2024-02-13 NOTE — TELEPHONE ENCOUNTER
Pt is getting a lot of cramping in her feet & hands.  She has been taking tylenol but it is not helping.

## 2024-02-13 NOTE — TELEPHONE ENCOUNTER
Patient said that her fingers have been cramping so bad she can't even hold a knife. She said the her left leg is extremely swollen and the right leg is slightly swollen. She is taking (3)40mg per day of Lasix and Dr Neff increase it last week to (4) 40mg of Lasix daily.  She said she increased her Potassium to 40mg BID.  She said that she only took 2 Lasix yesterday due to the cramping.  Patient said that she has been getting occasional \"shockers in her heart\" that do not last long. She denies pressure. She has an appointment with Dr Metzger in April.  Patient also said Dr Ross wants to do a cystoscopy and possibly place a stent. She doesn't know if she should have this done.

## 2024-02-14 ENCOUNTER — TELEPHONE (OUTPATIENT)
Dept: FAMILY MEDICINE CLINIC | Facility: CLINIC | Age: 79
End: 2024-02-14

## 2024-02-14 NOTE — TELEPHONE ENCOUNTER
Pt. Calling to let Pippa know she has her [facility] lab appt. Scheduled for Fri Just a FYI   Please make sure the orders are sent over.

## 2024-02-16 ENCOUNTER — TELEPHONE (OUTPATIENT)
Dept: FAMILY MEDICINE CLINIC | Facility: CLINIC | Age: 79
End: 2024-02-16

## 2024-02-16 NOTE — TELEPHONE ENCOUNTER
Someone gave her bevespi.  She wanted to know if it is ok to take.  She can't afford her other medication.

## 2024-02-16 NOTE — TELEPHONE ENCOUNTER
Spoke with patient and she verbalized understanding. Advised this nurse will forward to Dr. Neff as an FYI.

## 2024-02-16 NOTE — TELEPHONE ENCOUNTER
Spoke with patient who states she was given a sample of Budeson-Glycopyrrol-Formoterol by Dr. Neff and it has worked well. She is almost out of it, and states that a nurse at the [facility] gave her a sample of Bevespi. She states he has all the same things in it except Budesonide and wants to know if it is ok to take? Please advise.

## 2024-02-17 LAB
ALBUMIN/GLOBULIN RATIO: 1.9 (CALC) (ref 1–2.5)
ALBUMIN: 4.4 G/DL (ref 3.6–5.1)
ALKALINE PHOSPHATASE: 95 U/L (ref 37–153)
ALT: 15 U/L (ref 6–29)
AST: 21 U/L (ref 10–35)
BILIRUBIN, TOTAL: 0.5 MG/DL (ref 0.2–1.2)
BUN/CREATININE RATIO: 27 (CALC) (ref 6–22)
BUN: 29 MG/DL (ref 7–25)
CALCIUM: 9.4 MG/DL (ref 8.6–10.4)
CARBON DIOXIDE: 33 MMOL/L (ref 20–32)
CHLORIDE: 102 MMOL/L (ref 98–110)
CREATININE: 1.06 MG/DL (ref 0.6–1)
EGFR: 54 ML/MIN/1.73M2
GLOBULIN: 2.3 G/DL (CALC) (ref 1.9–3.7)
GLUCOSE: 87 MG/DL (ref 65–139)
POTASSIUM: 4 MMOL/L (ref 3.5–5.3)
PROTEIN, TOTAL: 6.7 G/DL (ref 6.1–8.1)
SODIUM: 140 MMOL/L (ref 135–146)

## 2024-02-19 ENCOUNTER — TELEPHONE (OUTPATIENT)
Dept: FAMILY MEDICINE CLINIC | Facility: CLINIC | Age: 79
End: 2024-02-19

## 2024-02-19 NOTE — TELEPHONE ENCOUNTER
Lab results and also she was gives some bedespi inhalers that  in 2018 and wants to know if she can use them

## 2024-02-19 NOTE — TELEPHONE ENCOUNTER
Patient said that her leg is completely swollen.  She said she has had chest pain since Friday. She said she had pain across her chest at 3am today. She denies nausea or sweating but her left arm felt \"heavy\". She said she was going to go to the ER but she fell asleep.  She said she feels better today except an ocassional twinge of pain. She has an appointment with cardiology in April. Advised to go to the ER again.  Can she take Bevespi (it is the same thing as Breztri without the Budesonide. Her friend is a nurse and brought her this and Budesonide for her nebulizer but doesn't know if she should take it.    She has some Estradial Cream the  in October.   Patient said she bought some fish oil, can she take this?  She is still taking the extra Potassium (4 daily) and (3)Lasix daily.  Patient states she is coming in to see Dr Tawanda Suero. She thinks that someone is breaking into her house and they noni made a pot of coffee the other day.

## 2024-02-21 ENCOUNTER — OFFICE VISIT (OUTPATIENT)
Dept: FAMILY MEDICINE CLINIC | Facility: CLINIC | Age: 79
End: 2024-02-21
Payer: MEDICARE

## 2024-02-21 VITALS
DIASTOLIC BLOOD PRESSURE: 68 MMHG | HEART RATE: 78 BPM | WEIGHT: 180 LBS | TEMPERATURE: 98 F | OXYGEN SATURATION: 93 % | RESPIRATION RATE: 24 BRPM | BODY MASS INDEX: 33 KG/M2 | SYSTOLIC BLOOD PRESSURE: 122 MMHG

## 2024-02-21 DIAGNOSIS — J42 CHRONIC BRONCHITIS, UNSPECIFIED CHRONIC BRONCHITIS TYPE (HCC): ICD-10-CM

## 2024-02-21 DIAGNOSIS — I25.719 CORONARY ARTERY DISEASE INVOLVING AUTOLOGOUS VEIN CORONARY BYPASS GRAFT WITH ANGINA PECTORIS (HCC): ICD-10-CM

## 2024-02-21 DIAGNOSIS — I50.22 CHRONIC SYSTOLIC CONGESTIVE HEART FAILURE (HCC): Primary | ICD-10-CM

## 2024-02-21 PROCEDURE — 3074F SYST BP LT 130 MM HG: CPT | Performed by: INTERNAL MEDICINE

## 2024-02-21 PROCEDURE — 1160F RVW MEDS BY RX/DR IN RCRD: CPT | Performed by: INTERNAL MEDICINE

## 2024-02-21 PROCEDURE — 3078F DIAST BP <80 MM HG: CPT | Performed by: INTERNAL MEDICINE

## 2024-02-21 PROCEDURE — 1159F MED LIST DOCD IN RCRD: CPT | Performed by: INTERNAL MEDICINE

## 2024-02-21 PROCEDURE — 99213 OFFICE O/P EST LOW 20 MIN: CPT | Performed by: INTERNAL MEDICINE

## 2024-02-21 RX ORDER — METOPROLOL SUCCINATE 50 MG/1
50 TABLET, EXTENDED RELEASE ORAL DAILY
COMMUNITY
Start: 2024-01-26

## 2024-02-21 RX ORDER — MAGNESIUM OXIDE 400 MG/1
400 TABLET ORAL DAILY
Qty: 90 TABLET | Refills: 1 | Status: SHIPPED | OUTPATIENT
Start: 2024-02-21 | End: 2025-02-15

## 2024-02-21 RX ORDER — NITROGLYCERIN 0.3 MG/1
0.3 TABLET SUBLINGUAL EVERY 5 MIN PRN
Qty: 25 TABLET | Refills: 0 | Status: SHIPPED | OUTPATIENT
Start: 2024-02-21 | End: 2024-02-22 | Stop reason: RX

## 2024-02-21 RX ORDER — ESTRADIOL 0.1 MG/G
1 CREAM VAGINAL WEEKLY
COMMUNITY
Start: 2024-02-06

## 2024-02-21 RX ORDER — NITROGLYCERIN 0.3 MG/1
0.3 TABLET SUBLINGUAL EVERY 5 MIN PRN
Qty: 10 TABLET | Refills: 0 | Status: SHIPPED | OUTPATIENT
Start: 2024-02-21 | End: 2024-02-21

## 2024-02-21 RX ORDER — FAMOTIDINE 40 MG/1
40 TABLET, FILM COATED ORAL DAILY
Qty: 90 TABLET | Refills: 0 | Status: SHIPPED | OUTPATIENT
Start: 2024-02-21 | End: 2024-05-21

## 2024-02-21 NOTE — PROGRESS NOTES
Pt Myrna Eubanks is a 78 year old female.  HPI:   Pt here with pain the left leg, She has been taking increased amounts of lasix and potassium due to fluid overload. She has considerable trouble swallowing and defaults to cooked veggies and soups. She has some community support, but has not been back to [employer].  She has a functional vehicle and still drives short distances. SHe has an episode of chest pain one night and considered going to the ER, but did not.  It resolved and has not recurred. Her limited income sometimes prevents her from seeking care.  She had a CMP at [employer] and numbers looked good. SHe will be back next week for a MA wellness.   Current Outpatient Medications   Medication Sig Dispense Refill    estradiol 0.1 MG/GM Vaginal Cream Place 1 g vaginally once a week.      metoprolol succinate ER 50 MG Oral Tablet 24 Hr Take 1 tablet (50 mg total) by mouth daily.      famotidine 40 MG Oral Tab Take 1 tablet (40 mg total) by mouth daily. 90 tablet 0    magnesium oxide 400 MG Oral Tab Take 1 tablet (400 mg total) by mouth daily. 90 tablet 1    dilTIAZem HCl ER Beads 240 MG Oral Capsule SR 24 Hr Take 1 capsule (240 mg total) by mouth 2 (two) times daily. 180 capsule 0    ondansetron (ZOFRAN) 4 mg tablet Take 1 tablet (4 mg total) by mouth every 8 (eight) hours as needed for Nausea. 20 tablet 2    allopurinol 100 MG Oral Tab Take 1 tablet (100 mg total) by mouth daily. 90 tablet 3    pravastatin 40 MG Oral Tab TAKE 1 TABLET NIGHTLY 90 tablet 1    FUROSEMIDE 40 MG Oral Tab TAKE 1 TABLET THREE TIMES DAILY 270 tablet 1    budeson-glycopyrrol-formoterol 160-9-4.8 MCG/ACT Inhalation Aerosol Inhale 2 puffs into the lungs 2 (two) times daily. 4 each 0    ipratropium-albuterol (COMBIVENT RESPIMAT)  MCG/ACT Inhalation Aero Soln Inhale 1 puff into the lungs 4 (four) times daily. 3 each 4    aspirin 81 MG Oral Tab EC Take 1 tablet (81 mg total) by mouth daily.      Potassium Chloride ER 10 MEQ Oral Tab CR  Take 2 tablets daily (enteric coated) 180 tablet 3    nitroglycerin 0.4 MG Sublingual SL Tab Place 1 tablet (0.4 mg total) under the tongue every 5 (five) minutes as needed for Chest pain. 25 tablet 0      Past Medical History:   Diagnosis Date    Acute bronchitis 2011    Arthritis     Black stools     Blood in urine     Chest pain, unspecified 12/15/2011    Chronic airway obstruction, not elsewhere classified 2011    Chronic atrial fibrillation (HCC)     Congestive heart disease (HCC)     Congestive heart failure, unspecified 2011    Constipation     COPD (chronic obstructive pulmonary disease) ([facility])     Coronary atherosclerosis of unspecified type of vessel, native or graft 2011    Edema 2012    Esophageal reflux 2012    Factor XII deficiency disease ([facility]) 1999    Erlebrorn    Fatigue     Frequent use of laxatives     Generalized and unspecified atherosclerosis 2011    Gout     Hematemesis 2011    Irregular bowel habits     Leaking of urine     Painful swallowing     Parathyroid tumor     Wears glasses     Weight gain     West Nile Virus infection (HCC)       Social History:  Social History     Socioeconomic History    Marital status: OTHER    Number of children: 2   Occupational History    Occupation: Not Employed    Tobacco Use    Smoking status: Former     Packs/day: 1.00     Years: 50.00     Additional pack years: 0.00     Total pack years: 50.00     Types: Cigarettes     Quit date: 2010     Years since quittin.1    Smokeless tobacco: Never    Tobacco comments:     quit a few years ago   Substance and Sexual Activity    Alcohol use: No     Alcohol/week: 0.0 standard drinks of alcohol    Drug use: No   Other Topics Concern    Caffeine Concern No     Comment: About 1 drink per day     Exercise No        REVIEW OF SYSTEMS:   GENERAL HEALTH: feels well otherwise  SKIN: denies any unusual skin lesions or rashes  RESPIRATORY: denies shortness of breath with  exertion  CARDIOVASCULAR: denies chest pain on exertion  GI: denies abdominal pain and denies heartburn  NEURO: denies headaches    EXAM:   /68   Pulse 78   Temp 98.2 °F (36.8 °C) (Temporal)   Resp 24   Wt 180 lb (81.6 kg)   SpO2 93%   BMI 32.92 kg/m²   GENERAL: well developed, well nourished,in no apparent distress  SKIN: no rashes,no suspicious lesions  HEENT: atraumatic, normocephalic,ears and throat are clear  NECK: supple,no adenopathy,no bruits  LUNGS: clear to auscultation  CARDIO: RRR without murmur  GI: good BS's,no masses, HSM or tenderness  EXTREMITIES: no cyanosis, clubbing or edema    ASSESSMENT AND PLAN:     Encounter Diagnoses   Name Primary?    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 I encouraged her to be as active as it is safe to be. She has to respond to chest pain.  She was given NITROGLYCERIN and if she has CP she is to take it as a bridge to more care. SHe has experience with using it in the past.      No orders of the defined types were placed in this encounter.      Meds & Refills for this Visit:  Requested Prescriptions     Signed Prescriptions Disp Refills    famotidine 40 MG Oral Tab 90 tablet 0     Sig: Take 1 tablet (40 mg total) by mouth daily.    magnesium oxide 400 MG Oral Tab 90 tablet 1     Sig: Take 1 tablet (400 mg total) by mouth daily.       Imaging & Consults:  None    Follow up as needed.     The patient indicates understanding of these issues and agrees to the plan.

## 2024-02-22 ENCOUNTER — TELEPHONE (OUTPATIENT)
Dept: FAMILY MEDICINE CLINIC | Facility: CLINIC | Age: 79
End: 2024-02-22

## 2024-02-22 RX ORDER — NITROGLYCERIN 0.4 MG/1
0.4 TABLET SUBLINGUAL EVERY 5 MIN PRN
Qty: 25 TABLET | Refills: 0 | Status: SHIPPED | OUTPATIENT
Start: 2024-02-22

## 2024-02-22 NOTE — TELEPHONE ENCOUNTER
Pharmacy called in regards to nitroGLYCERIN (NITROSTAT) 0.3 MG Sublingual SL Tab only come in package of 100 pharmacist is wanting to know if he can switch it to qty. 100 or switch to the 0.4 that comes in qty. 25

## 2024-02-28 ENCOUNTER — OFFICE VISIT (OUTPATIENT)
Dept: FAMILY MEDICINE CLINIC | Facility: CLINIC | Age: 79
End: 2024-02-28
Payer: MEDICARE

## 2024-02-28 VITALS
BODY MASS INDEX: 32.76 KG/M2 | HEART RATE: 78 BPM | OXYGEN SATURATION: 91 % | DIASTOLIC BLOOD PRESSURE: 66 MMHG | SYSTOLIC BLOOD PRESSURE: 118 MMHG | WEIGHT: 178 LBS | RESPIRATION RATE: 22 BRPM | TEMPERATURE: 98 F | HEIGHT: 62 IN

## 2024-02-28 DIAGNOSIS — E66.9 CLASS 1 OBESITY: ICD-10-CM

## 2024-02-28 DIAGNOSIS — D69.6 THROMBOCYTOPENIA (HCC): Chronic | ICD-10-CM

## 2024-02-28 DIAGNOSIS — R09.89 BRUIT: ICD-10-CM

## 2024-02-28 DIAGNOSIS — I27.20 PULMONARY HTN (HCC): ICD-10-CM

## 2024-02-28 DIAGNOSIS — I50.22 CHRONIC SYSTOLIC CONGESTIVE HEART FAILURE (HCC): ICD-10-CM

## 2024-02-28 DIAGNOSIS — N18.4 CKD (CHRONIC KIDNEY DISEASE) STAGE 4, GFR 15-29 ML/MIN (HCC): ICD-10-CM

## 2024-02-28 DIAGNOSIS — Z79.01 LONG TERM (CURRENT) USE OF ANTICOAGULANTS: ICD-10-CM

## 2024-02-28 DIAGNOSIS — Z00.00 ENCOUNTER FOR ANNUAL HEALTH EXAMINATION: Primary | ICD-10-CM

## 2024-02-28 DIAGNOSIS — I48.20 ATRIAL FIBRILLATION, CHRONIC (HCC): ICD-10-CM

## 2024-02-28 DIAGNOSIS — I25.719 CORONARY ARTERY DISEASE INVOLVING AUTOLOGOUS VEIN CORONARY BYPASS GRAFT WITH ANGINA PECTORIS (HCC): ICD-10-CM

## 2024-02-28 DIAGNOSIS — D68.2 FACTOR XII DEFICIENCY (HCC): ICD-10-CM

## 2024-02-28 DIAGNOSIS — E78.00 PURE HYPERCHOLESTEROLEMIA: ICD-10-CM

## 2024-02-28 DIAGNOSIS — J42 CHRONIC BRONCHITIS, UNSPECIFIED CHRONIC BRONCHITIS TYPE (HCC): ICD-10-CM

## 2024-02-28 DIAGNOSIS — I70.0 ABDOMINAL AORTIC ATHEROSCLEROSIS (HCC): ICD-10-CM

## 2024-02-28 DIAGNOSIS — I10 PRIMARY HYPERTENSION: ICD-10-CM

## 2024-02-28 PROCEDURE — 3008F BODY MASS INDEX DOCD: CPT | Performed by: INTERNAL MEDICINE

## 2024-02-28 PROCEDURE — 1125F AMNT PAIN NOTED PAIN PRSNT: CPT | Performed by: INTERNAL MEDICINE

## 2024-02-28 PROCEDURE — 3078F DIAST BP <80 MM HG: CPT | Performed by: INTERNAL MEDICINE

## 2024-02-28 PROCEDURE — 96160 PT-FOCUSED HLTH RISK ASSMT: CPT | Performed by: INTERNAL MEDICINE

## 2024-02-28 PROCEDURE — 3074F SYST BP LT 130 MM HG: CPT | Performed by: INTERNAL MEDICINE

## 2024-02-28 PROCEDURE — 1159F MED LIST DOCD IN RCRD: CPT | Performed by: INTERNAL MEDICINE

## 2024-02-28 PROCEDURE — G0439 PPPS, SUBSEQ VISIT: HCPCS | Performed by: INTERNAL MEDICINE

## 2024-02-28 PROCEDURE — 1170F FXNL STATUS ASSESSED: CPT | Performed by: INTERNAL MEDICINE

## 2024-02-28 PROCEDURE — 1160F RVW MEDS BY RX/DR IN RCRD: CPT | Performed by: INTERNAL MEDICINE

## 2024-02-28 PROCEDURE — 99499 UNLISTED E&M SERVICE: CPT | Performed by: INTERNAL MEDICINE

## 2024-02-28 RX ORDER — PRAVASTATIN SODIUM 40 MG
40 TABLET ORAL NIGHTLY
Qty: 90 TABLET | Refills: 3 | Status: SHIPPED | OUTPATIENT
Start: 2024-02-28 | End: 2025-02-22

## 2024-02-28 RX ORDER — POTASSIUM CHLORIDE 750 MG/1
20 TABLET, FILM COATED, EXTENDED RELEASE ORAL 2 TIMES DAILY
Qty: 360 TABLET | Refills: 0 | Status: SHIPPED | OUTPATIENT
Start: 2024-02-28 | End: 2024-05-28

## 2024-02-28 NOTE — PROGRESS NOTES
.temp  Subjective:   Edith Eubanks is a 78 year old female who presents for a Medicare Subsequent Annual Wellness visit (Pt already had Initial Annual Wellness) and scheduled follow up of multiple significant but stable problems.   Pt is living alone in [place], with little family support.  She expresses some concern today about people entering her home and taking her spices or making coffee.  She has been using O2 full time.  She has a bruit in the right carotid, where she has had surgery a decade ago.     History/Other:   Fall Risk Assessment:   She has been screened for Falls and is low risk.      Cognitive Assessment:   She had a completely normal cognitive assessment - see flowsheet entries     Functional Ability/Status:   Edith Eubanks has some abnormal functions as listed below:  She has Vision problems based on screening of functional status. She has Walking problems based on screening of functional status.       Depression Screening (PHQ-2/PHQ-9): PHQ-2 SCORE: 0  , done 2/28/2024   Last [facility] Suicide Screening on 2/28/2024 was No Risk.     <5 minutes spent screening and counseling for depression    Advanced Directives:   She does NOT have a Living Will. [Do you have a living will?: No]  She does NOT have a Power of  for Health Care. [Do you have a healthcare power of ?: No]  Discussed Advance Care Planning with patient (and family/surrogate if present). Standard forms made available to patient in After Visit Summary.      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 (HCC)    Hyperlipidemia    CKD (chronic kidney disease) stage 4, GFR 15-29 ml/min (HCC)    Atrial fibrillation, chronic (HCC)    Class 1 obesity    Hx of CABG    Long term  (current) use of anticoagulants    Abdominal aortic atherosclerosis (HCC)    Pulmonary HTN (HCC)    Thrombocytopenia (HCC)     Allergies:  She is allergic to cephalosporins, prednisone, tussin cough dm [dextromethorphan-guaifenesin], bactrim [sulfamethoxazole w/trimethoprim], ciprofloxacin, levaquin [levofloxacin], macrobid [nitrofurantoin], norco [hydrocodone-acetaminophen], tramadol, amoxicillin-pot clavulanate, codeine, colchicine, and diflucan [fluconazole].    Current Medications:  Outpatient Medications Marked as Taking for the 2/28/24 encounter (Office Visit) with Mady Neff MD   Medication Sig    fluticasone-umeclidin-vilant 100-62.5-25 MCG/ACT Inhalation Aerosol Powder, Breath Activated Inhale 1 puff into the lungs As Directed.    Potassium Chloride ER 10 MEQ Oral Tab CR Take 2 tablets (20 mEq total) by mouth 2 (two) times daily. Take 2 tablets daily (enteric coated)    pravastatin 40 MG Oral Tab Take 1 tablet (40 mg total) by mouth nightly.    nitroglycerin 0.4 MG Sublingual SL Tab Place 1 tablet (0.4 mg total) under the tongue every 5 (five) minutes as needed for Chest pain.    estradiol 0.1 MG/GM Vaginal Cream Place 1 g vaginally once a week.    metoprolol succinate ER 50 MG Oral Tablet 24 Hr Take 1 tablet (50 mg total) by mouth daily.    magnesium oxide 400 MG Oral Tab Take 1 tablet (400 mg total) by mouth daily.    dilTIAZem HCl ER Beads 240 MG Oral Capsule SR 24 Hr Take 1 capsule (240 mg total) by mouth 2 (two) times daily.    ondansetron (ZOFRAN) 4 mg tablet Take 1 tablet (4 mg total) by mouth every 8 (eight) hours as needed for Nausea.    allopurinol 100 MG Oral Tab Take 1 tablet (100 mg total) by mouth daily.    FUROSEMIDE 40 MG Oral Tab TAKE 1 TABLET THREE TIMES DAILY    budeson-glycopyrrol-formoterol 160-9-4.8 MCG/ACT Inhalation Aerosol Inhale 2 puffs into the lungs 2 (two) times daily.    ipratropium-albuterol (COMBIVENT RESPIMAT)  MCG/ACT Inhalation Aero Soln Inhale 1 puff into the  lungs 4 (four) times daily.    aspirin 81 MG Oral Tab EC Take 1 tablet (81 mg total) by mouth daily.       Medical History:  She  has a past medical history of Acute bronchitis (11/29/2011), Arthritis, Black stools, Blood in urine, Chest pain, unspecified (12/15/2011), Chronic airway obstruction, not elsewhere classified (11/29/2011), Chronic atrial fibrillation ([facility]), Congestive heart disease ([facility]), Congestive heart failure, unspecified (11/29/2011), Constipation, COPD (chronic obstructive pulmonary disease) (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).  Surgical History:  She  has a past surgical history that includes tonsillectomy; tubal ligation (1982); skin surgery; cabg (04/05/2011); carotid exam (03/12/2011); other cardiology; cholecystectomy; other; angioplasty (coronary); and colonoscopy.   Family History:  Her family history includes Breast Cancer in her paternal aunt, paternal aunt, paternal cousin female, paternal cousin female, paternal cousin female, and another family member; Cancer in an other family member; Heart Disease in her father and mother; Hypertension in her mother.  Social History:  She  reports that she quit smoking about 14 years ago. Her smoking use included cigarettes. She has a 50 pack-year smoking history. She has never used smokeless tobacco. She reports that she does not drink alcohol and does not use drugs.    Tobacco:  She smoked tobacco in the past but quit greater than 12 months ago.  Social History    Tobacco Use      Smoking status: Former        Packs/day: 1.00         Years: 50.00        Additional pack years: 0.00        Total pack years: 50.00        Types: Cigarettes        Quit date: 2010        Years since quittin.1      Smokeless tobacco: Never      Tobacco comments: quit a few years ago         CAGE Alcohol Screen:   CAGE screening score of 0 on 2024, showing low risk of alcohol abuse.      Patient Care Team:  Mady Neff MD as PCP - General (Family Practice)  Matt Metzger MD (CARDIOLOGY)  Michelle Tovar PA-C as Consulting Physician (Physician Assistant)  Stepan Zamorano MD (GASTROENTEROLOGY)  Mady Neff MD (Family Medicine)    Review of Systems  GENERAL: feels well otherwise  SKIN: denies any unusual skin lesions  EYES: denies blurred vision or double vision  HEENT: denies nasal congestion, sinus pain or ST  LUNGS: denies shortness of breath with exertion  CARDIOVASCULAR: denies chest pain on exertion  GI: denies abdominal pain, denies heartburn  : denies dysuria, vaginal discharge or itching, no complaint of urinary incontinence   MUSCULOSKELETAL: denies back pain  NEURO: denies headaches  PSYCHE: denies depression or anxiety  HEMATOLOGIC: denies hx of anemia  ENDOCRINE: denies thyroid history  ALL/ASTHMA: denies hx of allergy or asthma    Objective:   Physical Exam  General Appearance:  Alert, cooperative, no distress, appears stated age   Head:  Normocephalic, without obvious abnormality, atraumatic   Eyes:  PERRL, conjunctiva/corneas clear, EOM's intact both eyes   Ears:  Normal TM's and external ear canals, both ears   Nose: Nares normal, septum midline,mucosa normal, no drainage or sinus tenderness   Throat: Lips, mucosa, and tongue normal; teeth and gum   Neck: Supple, symmetrical, trachea midline, no adenopathy;  thyroid: not enlarged, symmetric, no tenderness/mass/nodules; right carotid bruit    Back:   Symmetric, no curvature, ROM normal, no CVA tenderness   Lungs:   Clear to auscultation bilaterally, respirations unlabored    Heart:  Regular rate and rhythm, S1 and S2 normal, no murmur, rub, or gallop   Abdomen:   Soft, non-tender, bowel sounds active all four quadrants,  no masses, no organomegaly   Pelvic: Deferred   Extremities: Extremities normal, atraumatic, no cyanosis or edema   Pulses: 2+ and symmetric   Skin: Skin color, texture, turgor normal, no rashes or lesions   Lymph nodes: Cervical, supraclavicular, and axillary nodes normal   Neurologic: Normal       /66   Pulse 78   Temp 98.4 °F (36.9 °C) (Temporal)   Resp 22   Ht 5' 2\" (1.575 m)   Wt 178 lb (80.7 kg)   SpO2 91%   BMI 32.56 kg/m²  Estimated body mass index is 32.56 kg/m² as calculated from the following:    Height as of this encounter: 5' 2\" (1.575 m).    Weight as of this encounter: 178 lb (80.7 kg).    Medicare Hearing Assessment:   Hearing Screening    Time taken: 2/28/2024 11:56 AM  Entry User: Mady Neff MD  Screening Method: Whisper Test  Whisper Test Result: Pass               Visual Acuity:   Right Eye Visual Acuity: Corrected Right Eye Chart Acuity: 20/40   Left Eye Visual Acuity: Corrected Left Eye Chart Acuity: 20/40   Both Eyes Visual Acuity: Corrected Both Eyes Chart Acuity: 20/40   Able To Tolerate Visual Acuity: Yes        Assessment & Plan:   Edith Eubanks is a 78 year old female who presents for a Medicare Assessment.     1. Encounter for annual health examination (Primary)  2. Abdominal aortic atherosclerosis (HCC)  Overview:  12/17/19 CT abd/pelvis  3. Atrial fibrillation, chronic (HCC)  4. CKD (chronic kidney disease) stage 4, GFR 15-29 ml/min (HCC)  5. Chronic systolic congestive heart failure (HCC)  6. Chronic bronchitis, unspecified chronic bronchitis type (HCC)  7. Coronary artery disease involving autologous vein coronary bypass graft with angina pectoris (HCC)  8. Factor XII deficiency (HCC)  9. Pulmonary HTN (HCC)  Overview:  12/12/19 Echo.    10. Thrombocytopenia (HCC)  11. Pure hypercholesterolemia  12. Bruit  -     US  CAROTID DOPPLER BILAT - DIAG IMG (CPT=93880); Future; Expected date: 02/28/2024  -     Vascular Surgery - In Network  13. Primary hypertension  14. Class 1 obesity  Overview:  Last Assessment & Plan:   Follow-up with PCP for weight management.  15. Long term (current) use of anticoagulants  Overview:  xarelto  Other orders  -     Potassium Chloride ER; Take 2 tablets (20 mEq total) by mouth 2 (two) times daily. Take 2 tablets daily (enteric coated)  Dispense: 360 tablet; Refill: 0  -     Pravastatin Sodium; Take 1 tablet (40 mg total) by mouth nightly.  Dispense: 90 tablet; Refill: 3  1. Encounter for annual health examination  done    2. Abdominal aortic atherosclerosis (HCC)  Ultrasound ordered last year, pt on a fixed income and cannot offord screening right now.    3. Atrial fibrillation, chronic (HCC)  Antiplatelet therapy and rate controlled.    4. CKD (chronic kidney disease) stage 4, GFR 15-29 ml/min ([facility])  GRF 54 currently, fluxuated with volume status    5. Chronic systolic congestive heart failure (HCC)  Well compensated, CCM    6. Chronic bronchitis, unspecified chronic bronchitis type (HCC)  COPD; quit smoking 2011,     7. Coronary artery disease involving autologous vein coronary bypass graft with angina pectoris ([facility])  S/p CABG, medically managed now; sees Dr Metzger annually. nitroglycerin for chest pain before calling 911    8. Factor XII deficiency (HCC)  Antiplatlet therapy    9. Pulmonary HTN (HCC)  O2 and calcium channel blocker, CCM    10. Thrombocytopenia ([facility])  8/2023 under survallence    11. Pure hypercholesterolemia  Well controlled CCM.    12. Bruit  Needs carotid dopplers and referral to [facility] CAROTID DOPPLER BILAT - DIAG IMG (CPT=93880); Future  - Vascular Surgery - In Network    13. Primary hypertension  Well controlled, CCM    14. Class 1 obesity  Limited exercise capacity, eats well and does a lot of her own cooking     15. Long term (current) use of anticoagulants  See  above      The patient indicates understanding of these issues and agrees to the plan.  Reinforced healthy diet, lifestyle, and exercise.      No follow-ups on file.     Mady Neff MD, 2/28/2024     Supplementary Documentation:   General Health:  In the past six months, have you lost more than 10 pounds without trying?: 2 - No  Has your appetite been poor?: No  Type of Diet: Low Salt  How does the patient maintain a good energy level?: Other  How would you describe your daily physical activity?: Light  How would you describe your current health state?: Poor  How do you maintain positive mental well-being?: Puzzles  On a scale of 0 to 10, with 0 being no pain and 10 being severe pain, what is your pain level?: 1 - (Mild)  In the past six months, have you experienced urine leakage?: 1-Yes  At any time do you feel concerned for the safety/well-being of yourself and/or your children, in your home or elsewhere?: No  Have you had any immunizations at another office such as Influenza, Hepatitis B, Tetanus, or Pneumococcal?: No         Edith Eubanks's SCREENING SCHEDULE   Tests on this list are recommended by your physician but may not be covered, or covered at this frequency, by your insurer.   Please check with your insurance carrier before scheduling to verify coverage.   PREVENTATIVE SERVICES FREQUENCY &  COVERAGE DETAILS LAST COMPLETION DATE   Diabetes Screening    Fasting Blood Sugar /  Glucose    One screening every 12 months if never tested or if previously tested but not diagnosed with pre-diabetes   One screening every 6 months if diagnosed with pre-diabetes Lab Results   Component Value Date    GLU 87 02/16/2024        Cardiovascular Disease Screening    Lipid Panel  Cholesterol  Lipoprotein (HDL)  Triglycerides Covered every 5 years for all Medicare beneficiaries without apparent signs or symptoms of cardiovascular disease Lab Results   Component Value Date    CHOLEST 161 12/04/2023    HDL 64 12/04/2023     LDL 80 12/04/2023    TRIG 85 12/04/2023         Electrocardiogram (EKG)   Covered if needed at Welcome to Medicare, and non-screening if indicated for medical reasons 10/22/2019      Ultrasound Screening for Abdominal Aortic Aneurysm (AAA) Covered once in a lifetime for one of the following risk factors    Men who are 65-75 years old and have ever smoked    Anyone with a family history -     Colorectal Cancer Screening  Covered for ages 50-85; only need ONE of the following:    Colonoscopy   Covered every 10 years    Covered every 2 years if patient is at high risk or previous colonoscopy was abnormal 02/12/2016    No recommendations at this time    Flexible Sigmoidoscopy   Covered every 4 years -    Fecal Occult Blood Test Covered annually -   Bone Density Screening    Bone density screening    Covered every 2 years after age 65 if diagnosed with risk of osteoporosis or estrogen deficiency.    Covered yearly for long-term glucocorticoid medication use (Steroids) Last Dexa Scan:    XR DEXA BONE DENSITOMETRY (CPT=77080) 02/01/2016      No recommendations at this time   Pap and Pelvic    Pap   Covered every 2 years for women at normal risk; Annually if at high risk -  No recommendations at this time    Chlamydia Annually if high risk -  No recommendations at this time   Screening Mammogram    Mammogram     Recommend annually for all female patients aged 40 and older    One baseline mammogram covered for patients aged 35-39 06/07/2023    No recommendations at this time    Immunizations    Influenza Covered once per flu season  Please get every year 10/02/2023  No recommendations at this time    Pneumococcal Each vaccine ([id number] & [id number]) covered once after 65 Prevnar 13: 05/16/2017    [id number]: 05/21/2018     No recommendations at this time    Hepatitis B One screening covered for patients with certain risk factors   -  No recommendations at this time    Tetanus Toxoid Not covered by Medicare Part B  unless medically necessary (cut with metal); may be covered with your pharmacy prescription benefits -    Tetanus, Diptheria and Pertusis TD and TDaP Not covered by Medicare Part B -  No recommendations at this time    Zoster Not covered by Medicare Part B; may be covered with your pharmacy  prescription benefits -  No recommendations at this time     Diabetes      Hemoglobin A1C Annually; if last result is elevated, may be asked to retest more frequently.    Medicare covers every 3 months Lab Results   Component Value Date     04/04/2023    A1C 5.3 12/04/2023       No recommendations at this time    Creat/alb ratio Annually Lab Results   Component Value Date    MICROALBCREA 68.0 (H) 05/03/2023       LDL Annually Lab Results   Component Value Date    LDL 80 12/04/2023       Dilated Eye Exam Annually Last Diabetic Eye Exam:  No data recorded  No data recorded       Annual Monitoring of Persistent Medications (ACE/ARB, digoxin diuretics, anticonvulsants)    Potassium Annually Lab Results   Component Value Date    K 4.0 02/16/2024         Creatinine   Annually Lab Results   Component Value Date    CREATSERUM 1.06 (H) 02/16/2024         BUN Annually Lab Results   Component Value Date    BUN 29 (H) 02/16/2024       Drug Serum Conc Annually No results found for: \"DIGOXIN\", \"DIG\", \"VALP\"           Chronic Obstructive Pulmonary Disease (COPD)    Spirometry Annually Spirometry date: 10/23/2019

## 2024-02-28 NOTE — PATIENT INSTRUCTIONS
Edith Eubanks's SCREENING SCHEDULE   Tests on this list are recommended by your physician but may not be covered, or covered at this frequency, by your insurer.   Please check with your insurance carrier before scheduling to verify coverage.   PREVENTATIVE SERVICES FREQUENCY &  COVERAGE DETAILS LAST COMPLETION DATE   Diabetes Screening    Fasting Blood Sugar /  Glucose    One screening every 12 months if never tested or if previously tested but not diagnosed with pre-diabetes   One screening every 6 months if diagnosed with pre-diabetes Lab Results   Component Value Date    GLU 87 02/16/2024        Cardiovascular Disease Screening    Lipid Panel  Cholesterol  Lipoprotein (HDL)  Triglycerides Covered every 5 years for all Medicare beneficiaries without apparent signs or symptoms of cardiovascular disease Lab Results   Component Value Date    CHOLEST 161 12/04/2023    HDL 64 12/04/2023    LDL 80 12/04/2023    TRIG 85 12/04/2023         Electrocardiogram (EKG)   Covered if needed at Welcome to Medicare, and non-screening if indicated for medical reasons 10/22/2019      Ultrasound Screening for Abdominal Aortic Aneurysm (AAA) Covered once in a lifetime for one of the following risk factors   • Men who are 65-75 years old and have ever smoked   • Anyone with a family history -     Colorectal Cancer Screening  Covered for ages 50-85; only need ONE of the following:    Colonoscopy   Covered every 10 years    Covered every 2 years if patient is at high risk or previous colonoscopy was abnormal 02/12/2016    No recommendations at this time    Flexible Sigmoidoscopy   Covered every 4 years -    Fecal Occult Blood Test Covered annually -   Bone Density Screening    Bone density screening    Covered every 2 years after age 65 if diagnosed with risk of osteoporosis or estrogen deficiency.    Covered yearly for long-term glucocorticoid medication use (Steroids) Last Dexa Scan:    XR DEXA BONE DENSITOMETRY (CPT=77080)  02/01/2016      No recommendations at this time   Pap and Pelvic    Pap   Covered every 2 years for women at normal risk; Annually if at high risk -  No recommendations at this time    Chlamydia Annually if high risk -  No recommendations at this time   Screening Mammogram    Mammogram     Recommend annually for all female patients aged 40 and older    One baseline mammogram covered for patients aged 35-39 06/07/2023    No recommendations at this time    Immunizations    Influenza Covered once per flu season  Please get every year 10/02/2023  No recommendations at this time    Pneumococcal Each vaccine ([id number] & [id number]) covered once after 65 Prevnar 13: 05/16/2017    Synmnpobv59: 05/21/2018     No recommendations at this time    Hepatitis B One screening covered for patients with certain risk factors   -  No recommendations at this time    Tetanus Toxoid Not covered by Medicare Part B unless medically necessary (cut with metal); may be covered with your pharmacy prescription benefits -    Tetanus, Diptheria and Pertusis TD and TDaP Not covered by Medicare Part B -  No recommendations at this time    Zoster Not covered by Medicare Part B; may be covered with your pharmacy  prescription benefits -  No recommendations at this time     Diabetes      Hemoglobin A1C Annually; if last result is elevated, may be asked to retest more frequently.    Medicare covers every 3 months Lab Results   Component Value Date     04/04/2023    A1C 5.3 12/04/2023       No recommendations at this time    Creat/alb ratio Annually Lab Results   Component Value Date    MICROALBCREA 68.0 (H) 05/03/2023       LDL Annually Lab Results   Component Value Date    LDL 80 12/04/2023       Dilated Eye Exam Annually [unfilled]     Annual Monitoring of Persistent Medications (ACE/ARB, digoxin diuretics, anticonvulsants)    Potassium Annually Lab Results   Component Value Date    K 4.0 02/16/2024         Creatinine   Annually Lab  Results   Component Value Date    CREATSERUM 1.06 (H) 02/16/2024         BUN Annually Lab Results   Component Value Date    BUN 29 (H) 02/16/2024       Drug Serum Conc Annually No results found for: \"DIGOXIN\", \"DIG\", \"VALP\"         Chronic Obstructive Pulmonary Disease (COPD)    Spirometry Annually Spirometry date: 10/23/2019

## 2024-02-29 ENCOUNTER — TELEPHONE (OUTPATIENT)
Dept: FAMILY MEDICINE CLINIC | Facility: CLINIC | Age: 79
End: 2024-02-29

## 2024-03-05 ENCOUNTER — TELEPHONE (OUTPATIENT)
Dept: FAMILY MEDICINE CLINIC | Facility: CLINIC | Age: 79
End: 2024-03-05

## 2024-03-05 RX ORDER — DOXYCYCLINE HYCLATE 100 MG
100 TABLET ORAL 2 TIMES DAILY
Qty: 28 TABLET | Refills: 0 | Status: SHIPPED | OUTPATIENT
Start: 2024-03-05 | End: 2024-03-09 | Stop reason: ALTCHOICE

## 2024-03-05 NOTE — TELEPHONE ENCOUNTER
Patient advised that Dr Johns has an opening at 320pm. She said she doesn't think she can get here by 320pm. She said that her friend will drive her to the [facility].

## 2024-03-05 NOTE — TELEPHONE ENCOUNTER
Patient advised. She said she thinks she has cellulitis and her son told her to get checked out. She said the the skin is so tight that she cannot get her shoe on or walk on it. She thinks she needs a shot of an antibiotic. She doesn't know if she will even be able to walk in here.

## 2024-03-05 NOTE — TELEPHONE ENCOUNTER
Take picture, treat for cellulitis. Lots of allergies, Start with doxycycline to [facility]

## 2024-03-05 NOTE — TELEPHONE ENCOUNTER
Patient said that her leg and foot is swollen and red. She said that it is painful when she walks. She said that the blisters appeared Sunday and they are larger and full of fluid.   She said a couple of them popped and the fluid was clear. There is one that looks like it hs \"pus\" in it.

## 2024-03-05 NOTE — TELEPHONE ENCOUNTER
ANAMIKA FROM [facility] PT INFORMED THEM SHE WAS ALLERGIC TO Doxycycline Hyclate 100 MG Oral Tab. HAD TONGUE SWELLING LAST TIME SHE TOOK THIS MEDICATION.

## 2024-03-07 ENCOUNTER — TELEPHONE (OUTPATIENT)
Dept: FAMILY MEDICINE CLINIC | Facility: CLINIC | Age: 79
End: 2024-03-07

## 2024-03-07 NOTE — TELEPHONE ENCOUNTER
Patient said that [facility] and had an ultrasound and blood work. She was given a prescription for Cephalexin. She said the pharmacy told her she had a reaction in the past to Cephalexin. She said that she told them to give it to her because her leg is so bad. She said her lower leg is 1 1/2 times as big as the other leg and very red.  She said that she took one dose last night (she opened the capsule) her hands and face are swollen this morning. She wants to know if she should take another one and if she can open the capsule. She said she was burping it up al night. She denies having hives.   Can Dr Neff look on Care Everywhere at her labs. She said her D-Dimer was 75

## 2024-03-07 NOTE — TELEPHONE ENCOUNTER
Your labs are fine, you can open keflex, if the capsule will not go down. and it always causes those gross burps.     Take it and if hives or itchy throat STOP>

## 2024-03-07 NOTE — TELEPHONE ENCOUNTER
FACE SWOLLEN, LEG SWOLLEN, HAS BLISTERS, WAS PRESCRIBED CEPHALEXIN @ [facility] IN [place], SHE HAD ULTRASOUND & SOME OTHER TESTING DONE, NOT SURE IF SHE SHOULD KEEP TAKING IT? CALL PT BACK

## 2024-03-08 ENCOUNTER — HOSPITAL ENCOUNTER (OUTPATIENT)
Dept: ULTRASOUND IMAGING | Age: 79
Discharge: HOME | End: 2024-03-08
Attending: INTERNAL MEDICINE
Payer: MEDICARE

## 2024-03-08 DIAGNOSIS — R09.89 BRUIT: ICD-10-CM

## 2024-03-08 PROCEDURE — 93880 EXTRACRANIAL BILAT STUDY: CPT | Performed by: INTERNAL MEDICINE

## 2024-03-09 ENCOUNTER — OFFICE VISIT (OUTPATIENT)
Dept: FAMILY MEDICINE CLINIC | Facility: CLINIC | Age: 79
End: 2024-03-09
Payer: MEDICARE

## 2024-03-09 VITALS
RESPIRATION RATE: 22 BRPM | WEIGHT: 180 LBS | OXYGEN SATURATION: 93 % | BODY MASS INDEX: 33 KG/M2 | TEMPERATURE: 98 F | DIASTOLIC BLOOD PRESSURE: 64 MMHG | SYSTOLIC BLOOD PRESSURE: 118 MMHG | HEART RATE: 84 BPM

## 2024-03-09 DIAGNOSIS — M79.672 LEFT FOOT PAIN: Primary | ICD-10-CM

## 2024-03-09 DIAGNOSIS — L03.116 CELLULITIS OF LEFT LEG: ICD-10-CM

## 2024-03-09 RX ORDER — KETOROLAC TROMETHAMINE 30 MG/ML
60 INJECTION, SOLUTION INTRAMUSCULAR; INTRAVENOUS ONCE
Status: COMPLETED | OUTPATIENT
Start: 2024-03-09 | End: 2024-03-09

## 2024-03-09 RX ORDER — CEPHALEXIN 500 MG/1
500 CAPSULE ORAL 4 TIMES DAILY
COMMUNITY
Start: 2024-03-05 | End: 2024-03-15

## 2024-03-09 RX ORDER — POTASSIUM CHLORIDE 750 MG/1
10 TABLET, EXTENDED RELEASE ORAL DAILY
COMMUNITY
Start: 2024-02-29 | End: 2024-03-09

## 2024-03-09 RX ADMIN — KETOROLAC TROMETHAMINE 60 MG: 30 INJECTION, SOLUTION INTRAMUSCULAR; INTRAVENOUS at 10:34:00

## 2024-03-09 NOTE — PROGRESS NOTES
Jamel Eubanks is a 78 year old female.  HPI:   Pt has been having pain and swelling in left leg. She was seen at  and placed on Keflex after a negative doppler.   Current Outpatient Medications   Medication Sig Dispense Refill    cephalexin 500 MG Oral Cap Take 1 capsule (500 mg total) by mouth 4 (four) times daily.      Potassium Chloride ER 10 MEQ Oral Tab CR Take 2 tablets (20 mEq total) by mouth 2 (two) times daily. Take 2 tablets daily (enteric coated) 360 tablet 0    pravastatin 40 MG Oral Tab Take 1 tablet (40 mg total) by mouth nightly. 90 tablet 3    estradiol 0.1 MG/GM Vaginal Cream Place 1 g vaginally once a week.      metoprolol succinate ER 50 MG Oral Tablet 24 Hr Take 1 tablet (50 mg total) by mouth daily.      magnesium oxide 400 MG Oral Tab Take 1 tablet (400 mg total) by mouth daily. 90 tablet 1    dilTIAZem HCl ER Beads 240 MG Oral Capsule SR 24 Hr Take 1 capsule (240 mg total) by mouth 2 (two) times daily. 180 capsule 0    ondansetron (ZOFRAN) 4 mg tablet Take 1 tablet (4 mg total) by mouth every 8 (eight) hours as needed for Nausea. 20 tablet 2    allopurinol 100 MG Oral Tab Take 1 tablet (100 mg total) by mouth daily. 90 tablet 3    FUROSEMIDE 40 MG Oral Tab TAKE 1 TABLET THREE TIMES DAILY 270 tablet 1    budeson-glycopyrrol-formoterol 160-9-4.8 MCG/ACT Inhalation Aerosol Inhale 2 puffs into the lungs 2 (two) times daily. 4 each 0    ipratropium-albuterol (COMBIVENT RESPIMAT)  MCG/ACT Inhalation Aero Soln Inhale 1 puff into the lungs 4 (four) times daily. 3 each 4    aspirin 81 MG Oral Tab EC Take 1 tablet (81 mg total) by mouth daily.      nitroglycerin 0.4 MG Sublingual SL Tab Place 1 tablet (0.4 mg total) under the tongue every 5 (five) minutes as needed for Chest pain. 25 tablet 0      Past Medical History:   Diagnosis Date    Acute bronchitis 11/29/2011    Arthritis     Black stools     Blood in urine     Chest pain, unspecified 12/15/2011    Chronic airway obstruction, not  elsewhere classified 2011    Chronic atrial fibrillation (HCC)     Congestive heart disease (HCC)     Congestive heart failure, unspecified 2011    Constipation     COPD (chronic obstructive pulmonary disease) (HCC)     Coronary atherosclerosis of unspecified type of vessel, native or graft 2011    Edema 2012    Esophageal reflux 2012    Factor XII deficiency disease (HCC) 1999    Erlebrorn    Fatigue     Frequent use of laxatives     Generalized and unspecified atherosclerosis 2011    Gout     Hematemesis 2011    Irregular bowel habits     Leaking of urine     Painful swallowing     Parathyroid tumor     Wears glasses     Weight gain     West Nile Virus infection (HCC)       Social History:  Social History     Socioeconomic History    Marital status: OTHER    Number of children: 2   Occupational History    Occupation: Not Employed    Tobacco Use    Smoking status: Former     Packs/day: 1.00     Years: 50.00     Additional pack years: 0.00     Total pack years: 50.00     Types: Cigarettes     Quit date: 2010     Years since quittin.1    Smokeless tobacco: Never    Tobacco comments:     quit a few years ago   Substance and Sexual Activity    Alcohol use: No     Alcohol/week: 0.0 standard drinks of alcohol    Drug use: No   Other Topics Concern    Caffeine Concern No     Comment: About 1 drink per day     Exercise No        REVIEW OF SYSTEMS:   GENERAL HEALTH: feels well otherwise  SKIN: denies any unusual skin lesions or rashes  RESPIRATORY: denies shortness of breath with exertion  CARDIOVASCULAR: denies chest pain on exertion  GI: denies abdominal pain and denies heartburn  NEURO: denies headaches    EXAM:   /64   Pulse 84   Temp 97.8 °F (36.6 °C) (Temporal)   Resp 22   Wt 180 lb (81.6 kg)   SpO2 93%   BMI 32.92 kg/m²   GENERAL: well developed, well nourished,in no apparent distress  SKIN: no rashes,no suspicious lesions  HEENT: atraumatic,  normocephalic,ears and throat are clear  NECK: supple,no adenopathy,no bruits  LUNGS: clear to auscultation  CARDIO: RRR without murmur  GI: good BS's,no masses, HSM or tenderness  EXTREMITIES: no cyanosis, clubbing or edema    ASSESSMENT AND PLAN:     Encounter Diagnoses   Name Primary?    Left foot pain Yes    Cellulitis of left leg    Pt has been taking keflex.  Toradol in house and continue keflex, wrap daily.     No orders of the defined types were placed in this encounter.      Meds & Refills for this Visit:  Requested Prescriptions      No prescriptions requested or ordered in this encounter       Imaging & Consults:  None    Follow up in as needed.     The patient indicates understanding of these issues and agrees to the plan.

## 2024-03-11 ENCOUNTER — TELEPHONE (OUTPATIENT)
Dept: FAMILY MEDICINE CLINIC | Facility: CLINIC | Age: 79
End: 2024-03-11

## 2024-03-11 RX ORDER — CLINDAMYCIN HYDROCHLORIDE 300 MG/1
300 CAPSULE ORAL 3 TIMES DAILY
Qty: 30 CAPSULE | Refills: 0 | Status: SHIPPED | OUTPATIENT
Start: 2024-03-11 | End: 2024-03-21

## 2024-03-11 NOTE — TELEPHONE ENCOUNTER
Patient said that she only took three doses of the antibiotic yesterday. She has taken a total of about 14 pills. She did not take one today. She said that she almost went to the Er yesterday. She said she could hardly breath or swallow. She took a Benadryl and called the doctor on call who told her to take another Benadryl. She said her legs were so swollen she could hardly pull her pants up. She said her left legg looks very red.

## 2024-03-11 NOTE — TELEPHONE ENCOUNTER
BAD REACTION TO ABX, COULD NOT BREATHE. TOOK BENADRYL- 40 MINS AFTER TAKING SHE WAS ABLE TO BREATH AGAIN. FACE, LEG AND ARM SWELLING

## 2024-03-11 NOTE — TELEPHONE ENCOUNTER
Patient advised, appointment scheduled with Dr Tawanda Suero at 1130am.   She said she is still having a lot of pain and when she called the on call Nader told her to take ES Tylenol every 6 hours. Is there something else she an take for pain?

## 2024-03-11 NOTE — TELEPHONE ENCOUNTER
Virtual Telephone Check-In    Edith Eubanks verbally {consents to/declines (Can be done by front staff):7159} a Virtual/Telephone Check-In visit on 03/11/24.  Patient has been referred to the [facility] website at [email or web address] to review the yearly Consent to Treat document.    Patient understands and accepts financial responsibility for any deductible, co-insurance and/or co-pays associated with this service.    Duration of the service: *** minutes      Summary of topics discussed:       {Assessment and Plan Smarlist and follow up recs (Optional):8506}      Mady Neff MD

## 2024-03-12 ENCOUNTER — PATIENT MESSAGE (OUTPATIENT)
Dept: FAMILY MEDICINE CLINIC | Facility: CLINIC | Age: 79
End: 2024-03-12

## 2024-03-12 ENCOUNTER — TELEPHONE (OUTPATIENT)
Dept: FAMILY MEDICINE CLINIC | Facility: CLINIC | Age: 79
End: 2024-03-12

## 2024-03-12 NOTE — TELEPHONE ENCOUNTER
PT GETTING BLISTERS ALL OVER HER LEGS, LEG SWOLLEN SO MUCH SHE CAN NOT BEND KNEE, NOT SURE WHAT TO DO, CALL HER BACK

## 2024-03-12 NOTE — TELEPHONE ENCOUNTER
Patient advised that Dr Neff wants her to go to [facility].  She said that she cannot go to the ER. She said that someone is breaking into her house and she called the police but can't afford to pay a . She said they are supposed to come and replace her oxygen. She said that she only took one dose of antibiotics this morning because she had diarrhea yesterday.   She is going to try to take a picture and send through [employer].

## 2024-03-12 NOTE — TELEPHONE ENCOUNTER
The photo is not enough for me to tell if this is getting worse, I think the angle would make it look smaller.  I suggest she see me tomorrow.    yes

## 2024-03-13 ENCOUNTER — OFFICE VISIT (OUTPATIENT)
Dept: FAMILY MEDICINE CLINIC | Facility: CLINIC | Age: 79
End: 2024-03-13
Payer: MEDICARE

## 2024-03-13 VITALS
TEMPERATURE: 99 F | HEART RATE: 86 BPM | DIASTOLIC BLOOD PRESSURE: 58 MMHG | RESPIRATION RATE: 20 BRPM | OXYGEN SATURATION: 94 % | SYSTOLIC BLOOD PRESSURE: 118 MMHG

## 2024-03-13 DIAGNOSIS — I48.20 ATRIAL FIBRILLATION, CHRONIC (HCC): ICD-10-CM

## 2024-03-13 DIAGNOSIS — I50.22 CHRONIC SYSTOLIC CONGESTIVE HEART FAILURE (HCC): ICD-10-CM

## 2024-03-13 DIAGNOSIS — L03.116 CELLULITIS OF LEFT LEG: Primary | ICD-10-CM

## 2024-03-13 DIAGNOSIS — N18.4 CKD (CHRONIC KIDNEY DISEASE) STAGE 4, GFR 15-29 ML/MIN (HCC): ICD-10-CM

## 2024-03-13 PROCEDURE — 99214 OFFICE O/P EST MOD 30 MIN: CPT | Performed by: INTERNAL MEDICINE

## 2024-03-13 PROCEDURE — 3078F DIAST BP <80 MM HG: CPT | Performed by: INTERNAL MEDICINE

## 2024-03-13 PROCEDURE — 3074F SYST BP LT 130 MM HG: CPT | Performed by: INTERNAL MEDICINE

## 2024-03-13 PROCEDURE — 1159F MED LIST DOCD IN RCRD: CPT | Performed by: INTERNAL MEDICINE

## 2024-03-13 PROCEDURE — G2211 COMPLEX E/M VISIT ADD ON: HCPCS | Performed by: INTERNAL MEDICINE

## 2024-03-13 PROCEDURE — 1160F RVW MEDS BY RX/DR IN RCRD: CPT | Performed by: INTERNAL MEDICINE

## 2024-03-14 ENCOUNTER — TELEPHONE (OUTPATIENT)
Dept: FAMILY MEDICINE CLINIC | Facility: CLINIC | Age: 79
End: 2024-03-14

## 2024-03-14 NOTE — TELEPHONE ENCOUNTER
Patient advised, continue taking Benadryl as needed. If she develops hives, difficulty breathing or throat swelling stop antibiotic and go to the ER.

## 2024-03-14 NOTE — TELEPHONE ENCOUNTER
Patient said that her face started swelling after she took the antibiotic this morning. She said she also thinks she is getting a rash. She took a Benadryl because she knows she needs to \"get through\" this medication. She does not think her throat is swollen. She said that her private parts are sore when she wipes.

## 2024-03-15 PROBLEM — L03.116 CELLULITIS OF LEFT LOWER EXTREMITY: Status: ACTIVE | Noted: 2024-03-15

## 2024-03-15 NOTE — PROGRESS NOTES
Edith Eubanks is a 78 year old female.  HPI:   Left leg celluitis.  She is having a hard time with the antibiotics due to side effects.  Was switched from Keflex to Clinda TID.  She is doing her own wound care and the leg is still swollen and weeping. No fever, no change in SOB and she is trying to elevate. She has a large water blister in the lower 1/3 of the lower leg that is still weeping serosanguinous fluid. She has a hx of arteriosclerosis, with CAD and carotid disease.   Current Outpatient Medications   Medication Sig Dispense Refill    clindamycin 300 MG Oral Cap Take 1 capsule (300 mg total) by mouth 3 (three) times daily for 10 days. 30 capsule 0    cephalexin 500 MG Oral Cap Take 1 capsule (500 mg total) by mouth 4 (four) times daily.      Potassium Chloride ER 10 MEQ Oral Tab CR Take 2 tablets (20 mEq total) by mouth 2 (two) times daily. Take 2 tablets daily (enteric coated) 360 tablet 0    pravastatin 40 MG Oral Tab Take 1 tablet (40 mg total) by mouth nightly. 90 tablet 3    nitroglycerin 0.4 MG Sublingual SL Tab Place 1 tablet (0.4 mg total) under the tongue every 5 (five) minutes as needed for Chest pain. 25 tablet 0    estradiol 0.1 MG/GM Vaginal Cream Place 1 g vaginally once a week.      metoprolol succinate ER 50 MG Oral Tablet 24 Hr Take 1 tablet (50 mg total) by mouth daily.      magnesium oxide 400 MG Oral Tab Take 1 tablet (400 mg total) by mouth daily. 90 tablet 1    dilTIAZem HCl ER Beads 240 MG Oral Capsule SR 24 Hr Take 1 capsule (240 mg total) by mouth 2 (two) times daily. 180 capsule 0    ondansetron (ZOFRAN) 4 mg tablet Take 1 tablet (4 mg total) by mouth every 8 (eight) hours as needed for Nausea. 20 tablet 2    allopurinol 100 MG Oral Tab Take 1 tablet (100 mg total) by mouth daily. 90 tablet 3    FUROSEMIDE 40 MG Oral Tab TAKE 1 TABLET THREE TIMES DAILY 270 tablet 1    budeson-glycopyrrol-formoterol 160-9-4.8 MCG/ACT Inhalation Aerosol Inhale 2 puffs into the lungs 2 (two) times  daily. 4 each 0    ipratropium-albuterol (COMBIVENT RESPIMAT)  MCG/ACT Inhalation Aero Soln Inhale 1 puff into the lungs 4 (four) times daily. 3 each 4    aspirin 81 MG Oral Tab EC Take 1 tablet (81 mg total) by mouth daily.        Past Medical History:   Diagnosis Date    Acute bronchitis 2011    Arthritis     Black stools     Blood in urine     Chest pain, unspecified 12/15/2011    Chronic airway obstruction, not elsewhere classified 2011    Chronic atrial fibrillation (HCC)     Congestive heart disease (HCC)     Congestive heart failure, unspecified 2011    Constipation     COPD (chronic obstructive pulmonary disease) ([facility])     Coronary atherosclerosis of unspecified type of vessel, native or graft 2011    Edema 2012    Esophageal reflux 2012    Factor XII deficiency disease ([facility]) 1999    Erlebrorn    Fatigue     Frequent use of laxatives     Generalized and unspecified atherosclerosis 2011    Gout     Hematemesis 2011    Irregular bowel habits     Leaking of urine     Painful swallowing     Parathyroid tumor     Wears glasses     Weight gain     West Nile Virus infection ([facility])       Social History:  Social History     Socioeconomic History    Marital status: OTHER    Number of children: 2   Occupational History    Occupation: Not Employed    Tobacco Use    Smoking status: Former     Packs/day: 1.00     Years: 50.00     Additional pack years: 0.00     Total pack years: 50.00     Types: Cigarettes     Quit date: 2010     Years since quittin.2    Smokeless tobacco: Never    Tobacco comments:     quit a few years ago   Substance and Sexual Activity    Alcohol use: No     Alcohol/week: 0.0 standard drinks of alcohol    Drug use: No   Other Topics Concern    Caffeine Concern No     Comment: About 1 drink per day     Exercise No        REVIEW OF SYSTEMS:   GENERAL HEALTH: feels well otherwise  SKIN: denies any unusual skin lesions or rashes  RESPIRATORY:  denies shortness of breath with exertion  CARDIOVASCULAR: denies chest pain on exertion  GI: denies abdominal pain and denies heartburn  NEURO: denies headaches    EXAM:   /58   Pulse 86   Temp 98.5 °F (36.9 °C)   Resp 20   SpO2 94%   GENERAL: well developed, well nourished,in no apparent distress  SKIN: no rashes,no suspicious lesions  HEENT: atraumatic, normocephalic,ears and throat are clear  NECK: supple,no adenopathy,no bruits  LUNGS: clear to auscultation  CARDIO: RRR without murmur  GI: good BS's,no masses, HSM or tenderness  EXTREMITIES: left lower leg. Weeping red, hot, swollen, improved edema and warmth    ASSESSMENT AND PLAN:     Encounter Diagnosis   Name Primary?    Cellulitis of left leg Yes   High risk PAD, if not resolved in 2 weeks may need CTA lower legs to see if there is blood flow issues in the left leg.     No orders of the defined types were placed in this encounter.      Meds & Refills for this Visit:  Requested Prescriptions      No prescriptions requested or ordered in this encounter       Imaging & Consults:  None    Follow up as needed.     The patient indicates understanding of these issues and agrees to the plan.

## 2024-03-18 ENCOUNTER — PATIENT MESSAGE (OUTPATIENT)
Dept: FAMILY MEDICINE CLINIC | Facility: CLINIC | Age: 79
End: 2024-03-18

## 2024-03-18 ENCOUNTER — TELEPHONE (OUTPATIENT)
Dept: FAMILY MEDICINE CLINIC | Facility: CLINIC | Age: 79
End: 2024-03-18

## 2024-03-18 NOTE — TELEPHONE ENCOUNTER
From: Edith Eubanks  To: Mady Neff  Sent: 3/18/2024 9:16 AM CDT  Subject: Leg hurts    This is this mornings picture

## 2024-03-18 NOTE — TELEPHONE ENCOUNTER
PT CALLING TO INFORM SHE HAS SENT PICTURES VIA MY CHART ABOUT  POSSIBLE INFECTION ON HER LEG. ASKING FOR A CALL BACK ASAP as SHE IS IN A LOT OF PAIN.

## 2024-03-18 NOTE — TELEPHONE ENCOUNTER
Patient said that she has been \"fighting for air\" this morning after taking the antibiotic. She said she has her oxygen on and her oxygen saturation is 96%. She said she has taken 19 pills and she took the previous antibiotics. Patient states that she just took a Benadryl. She states she is very anxious. Advised to rest and let Benadryl take effect. If she is still struggling to breath she needs to go to the ER.   See [employer] message with images of leg and drainage.

## 2024-03-18 NOTE — TELEPHONE ENCOUNTER
From: Edith Eubanks  To: Mady Neff  Sent: 3/18/2024 9:08 AM CDT  Subject: Leg    Is this yellow an infection and is it all right to use this on my leg

## 2024-03-19 ENCOUNTER — TELEPHONE (OUTPATIENT)
Dept: FAMILY MEDICINE CLINIC | Facility: CLINIC | Age: 79
End: 2024-03-19

## 2024-03-19 NOTE — TELEPHONE ENCOUNTER
Patient called and sounded SOB. She said that she can't take anymore of the antibiotics, she said she is \"gasping for breath\". Patient said that her oxygen saturation is 93% on 2 Liters of oxygen. She took one dose of the antibiotics today and has been taking Benadryl every time she takes the antibiotics.   Patient advised that she should go to the ER if she is in respiratory distress. She states that she cannot walk to her car and she is not going to call an ambulance. OV changed from Thursday to tomorrow at 1250pm. Patient advised to hold antibiotics until she is seen in the office tomorrow.

## 2024-03-20 ENCOUNTER — OFFICE VISIT (OUTPATIENT)
Dept: FAMILY MEDICINE CLINIC | Facility: CLINIC | Age: 79
End: 2024-03-20
Payer: MEDICARE

## 2024-03-20 VITALS
DIASTOLIC BLOOD PRESSURE: 64 MMHG | SYSTOLIC BLOOD PRESSURE: 110 MMHG | TEMPERATURE: 98 F | OXYGEN SATURATION: 89 % | RESPIRATION RATE: 22 BRPM | HEART RATE: 106 BPM

## 2024-03-20 DIAGNOSIS — R60.0 PERIPHERAL EDEMA: ICD-10-CM

## 2024-03-20 DIAGNOSIS — L03.116 CELLULITIS OF LEFT LEG: Primary | ICD-10-CM

## 2024-03-20 DIAGNOSIS — J43.2 CENTRILOBULAR EMPHYSEMA (HCC): ICD-10-CM

## 2024-03-20 DIAGNOSIS — I50.22 CHRONIC SYSTOLIC CONGESTIVE HEART FAILURE (HCC): ICD-10-CM

## 2024-03-20 PROCEDURE — 3078F DIAST BP <80 MM HG: CPT | Performed by: INTERNAL MEDICINE

## 2024-03-20 PROCEDURE — 1170F FXNL STATUS ASSESSED: CPT | Performed by: INTERNAL MEDICINE

## 2024-03-20 PROCEDURE — 1159F MED LIST DOCD IN RCRD: CPT | Performed by: INTERNAL MEDICINE

## 2024-03-20 PROCEDURE — 99214 OFFICE O/P EST MOD 30 MIN: CPT | Performed by: INTERNAL MEDICINE

## 2024-03-20 PROCEDURE — 3074F SYST BP LT 130 MM HG: CPT | Performed by: INTERNAL MEDICINE

## 2024-03-20 PROCEDURE — 1160F RVW MEDS BY RX/DR IN RCRD: CPT | Performed by: INTERNAL MEDICINE

## 2024-03-20 PROCEDURE — G2211 COMPLEX E/M VISIT ADD ON: HCPCS | Performed by: INTERNAL MEDICINE

## 2024-03-20 RX ORDER — BUDESONIDE, GLYCOPYRROLATE, AND FORMOTEROL FUMARATE 160; 9; 4.8 UG/1; UG/1; UG/1
2 AEROSOL, METERED RESPIRATORY (INHALATION) 2 TIMES DAILY
Qty: 2 EACH | Refills: 0 | COMMUNITY
Start: 2024-03-20

## 2024-03-22 NOTE — PROGRESS NOTES
Edith Eubanks is a 78 year old female.  HPI:   Pt is having increased diff swallowing with antibiotic.  She gets SOB and panicy and has 3 days left. Her leg looks better, somedays it will swell, but drainage is down and redness improved. No fever, chills, or sweats.  She has been in telephone contact with us everyday.   Current Outpatient Medications   Medication Sig Dispense Refill    budeson-glycopyrrol-formoterol (BREZTRI AEROSPHERE) 160-9-4.8 MCG/ACT Inhalation Aerosol Inhale 2 puffs into the lungs 2 (two) times daily. 2 each 0    Potassium Chloride ER 10 MEQ Oral Tab CR Take 2 tablets (20 mEq total) by mouth 2 (two) times daily. Take 2 tablets daily (enteric coated) 360 tablet 0    pravastatin 40 MG Oral Tab Take 1 tablet (40 mg total) by mouth nightly. 90 tablet 3    nitroglycerin 0.4 MG Sublingual SL Tab Place 1 tablet (0.4 mg total) under the tongue every 5 (five) minutes as needed for Chest pain. 25 tablet 0    estradiol 0.1 MG/GM Vaginal Cream Place 1 g vaginally once a week.      metoprolol succinate ER 50 MG Oral Tablet 24 Hr Take 1 tablet (50 mg total) by mouth daily.      magnesium oxide 400 MG Oral Tab Take 1 tablet (400 mg total) by mouth daily. 90 tablet 1    dilTIAZem HCl ER Beads 240 MG Oral Capsule SR 24 Hr Take 1 capsule (240 mg total) by mouth 2 (two) times daily. 180 capsule 0    ondansetron (ZOFRAN) 4 mg tablet Take 1 tablet (4 mg total) by mouth every 8 (eight) hours as needed for Nausea. 20 tablet 2    allopurinol 100 MG Oral Tab Take 1 tablet (100 mg total) by mouth daily. 90 tablet 3    FUROSEMIDE 40 MG Oral Tab TAKE 1 TABLET THREE TIMES DAILY 270 tablet 1    budeson-glycopyrrol-formoterol 160-9-4.8 MCG/ACT Inhalation Aerosol Inhale 2 puffs into the lungs 2 (two) times daily. 4 each 0    ipratropium-albuterol (COMBIVENT RESPIMAT)  MCG/ACT Inhalation Aero Soln Inhale 1 puff into the lungs 4 (four) times daily. 3 each 4    aspirin 81 MG Oral Tab EC Take 1 tablet (81 mg total) by  mouth daily.        Past Medical History:   Diagnosis Date    Acute bronchitis 2011    Arthritis     Black stools     Blood in urine     Chest pain, unspecified 12/15/2011    Chronic airway obstruction, not elsewhere classified 2011    Chronic atrial fibrillation (HCC)     Congestive heart disease (HCC)     Congestive heart failure, unspecified 2011    Constipation     COPD (chronic obstructive pulmonary disease) ([facility])     Coronary atherosclerosis of unspecified type of vessel, native or graft 2011    Edema 2012    Esophageal reflux 2012    Factor XII deficiency disease (HCC) 1999    Erlebrorn    Fatigue     Frequent use of laxatives     Generalized and unspecified atherosclerosis 2011    Gout     Hematemesis 2011    Irregular bowel habits     Leaking of urine     Painful swallowing     Parathyroid tumor     Wears glasses     Weight gain     West Nile Virus infection (HCC)       Social History:  Social History     Socioeconomic History    Marital status: OTHER    Number of children: 2   Occupational History    Occupation: Not Employed    Tobacco Use    Smoking status: Former     Packs/day: 1.00     Years: 50.00     Additional pack years: 0.00     Total pack years: 50.00     Types: Cigarettes     Quit date: 2010     Years since quittin.2    Smokeless tobacco: Never    Tobacco comments:     quit a few years ago   Substance and Sexual Activity    Alcohol use: No     Alcohol/week: 0.0 standard drinks of alcohol    Drug use: No   Other Topics Concern    Caffeine Concern No     Comment: About 1 drink per day     Exercise No        REVIEW OF SYSTEMS:   GENERAL HEALTH: feels well otherwise  SKIN: denies any unusual skin lesions or rashes  RESPIRATORY: denies shortness of breath with exertion  CARDIOVASCULAR: denies chest pain on exertion  GI: denies abdominal pain and denies heartburn  NEURO: denies headaches    EXAM:   /64   Pulse 106   Temp 97.9 °F (36.6 °C)  (Temporal)   Resp 22   SpO2 (!) 89%   GENERAL: well developed, well nourished,in no apparent distress  SKIN: no rashes,no suspicious lesions  HEENT: atraumatic, normocephalic,ears and throat are clear  NECK: supple,no adenopathy,no bruits  LUNGS: clear to auscultation  CARDIO: RRR without murmur  GI: good BS's,no masses, HSM or tenderness  EXTREMITIES: no cyanosis, clubbing or edema    ASSESSMENT AND PLAN:     Encounter Diagnoses   Name Primary?    Cellulitis of left leg Yes    Chronic systolic congestive heart failure (HCC)     Centrilobular emphysema (HCC)     Peripheral edema    Can discontinue Clinda; needs continued compression and use of diuretics, She will update up by phone and my chart due to short course for cellulitis.     No orders of the defined types were placed in this encounter.      Meds & Refills for this Visit:  Requested Prescriptions     Signed Prescriptions Disp Refills    budeson-glycopyrrol-formoterol (BREZTRI AEROSPHERE) 160-9-4.8 MCG/ACT Inhalation Aerosol 2 each 0     Sig: Inhale 2 puffs into the lungs 2 (two) times daily.       Imaging & Consults:  None    Follow up as needed.    The patient indicates understanding of these issues and agrees to the plan.

## 2024-03-23 ENCOUNTER — PATIENT MESSAGE (OUTPATIENT)
Dept: FAMILY MEDICINE CLINIC | Facility: CLINIC | Age: 79
End: 2024-03-23

## 2024-03-25 ENCOUNTER — TELEPHONE (OUTPATIENT)
Dept: FAMILY MEDICINE CLINIC | Facility: CLINIC | Age: 79
End: 2024-03-25

## 2024-03-25 ENCOUNTER — PATIENT MESSAGE (OUTPATIENT)
Dept: FAMILY MEDICINE CLINIC | Facility: CLINIC | Age: 79
End: 2024-03-25

## 2024-03-25 DIAGNOSIS — S81.802D WOUND OF LEFT LOWER EXTREMITY, SUBSEQUENT ENCOUNTER: Primary | ICD-10-CM

## 2024-03-25 NOTE — TELEPHONE ENCOUNTER
From: Edith Eubanks  To: Mady Neff  Sent: 3/25/2024 12:44 PM CDT  Subject: More of leg     Would not let me send 2 pictures

## 2024-03-25 NOTE — TELEPHONE ENCOUNTER
Patient advised she can shower with the bandage off. Advised that she can shower with the bandage off and pat dry. She said it is still draining yellow drainage. She will send a picture through my chart.

## 2024-03-25 NOTE — TELEPHONE ENCOUNTER
From: Edith Eubanks  To: Mady Neff  Sent: 3/25/2024 12:42 PM CDT  Subject: Leg     It is swelled to my knee, it's hot ,and it pits to my knee

## 2024-03-25 NOTE — TELEPHONE ENCOUNTER
From: Edith Eubanks  To: Mady Neff  Sent: 3/23/2024 1:09 PM CDT  Subject: Leg     Dr . My leg is sore to my knee today ,and my foot hurts and is red , don't know what to do , up all night and fell asleep while you were open today

## 2024-03-26 ENCOUNTER — TELEPHONE (OUTPATIENT)
Dept: FAMILY MEDICINE CLINIC | Facility: CLINIC | Age: 79
End: 2024-03-26

## 2024-03-26 NOTE — TELEPHONE ENCOUNTER
PT SAID SHE RECENTLY HAD ULTRASOUND DONE @ [facility], DOES SHE STILL NEED TO DO THE CT SCAN LEGS? CALL PT BACK

## 2024-03-26 NOTE — TELEPHONE ENCOUNTER
Patient advised. She will check with her insurance to see if it is covered. If it is covered she would like it sent to [facility].

## 2024-03-27 ENCOUNTER — OFFICE VISIT (OUTPATIENT)
Dept: FAMILY MEDICINE CLINIC | Facility: CLINIC | Age: 79
End: 2024-03-27
Payer: MEDICARE

## 2024-03-27 VITALS
HEIGHT: 62 IN | HEART RATE: 78 BPM | TEMPERATURE: 98 F | SYSTOLIC BLOOD PRESSURE: 102 MMHG | BODY MASS INDEX: 32.5 KG/M2 | OXYGEN SATURATION: 93 % | WEIGHT: 176.63 LBS | RESPIRATION RATE: 20 BRPM | DIASTOLIC BLOOD PRESSURE: 54 MMHG

## 2024-03-27 DIAGNOSIS — N18.4 CKD (CHRONIC KIDNEY DISEASE) STAGE 4, GFR 15-29 ML/MIN (HCC): ICD-10-CM

## 2024-03-27 DIAGNOSIS — S81.802D WOUND OF LEFT LOWER EXTREMITY, SUBSEQUENT ENCOUNTER: ICD-10-CM

## 2024-03-27 DIAGNOSIS — L03.116 CELLULITIS OF LEFT LEG: Primary | ICD-10-CM

## 2024-03-27 PROBLEM — N39.0 RECURRENT UTI: Status: ACTIVE | Noted: 2024-02-06

## 2024-03-27 PROBLEM — R60.1 GENERALIZED EDEMA: Status: ACTIVE | Noted: 2020-02-19

## 2024-03-27 PROBLEM — R30.0 DYSURIA: Status: ACTIVE | Noted: 2024-02-06

## 2024-03-27 PROCEDURE — G2211 COMPLEX E/M VISIT ADD ON: HCPCS | Performed by: INTERNAL MEDICINE

## 2024-03-27 PROCEDURE — 99214 OFFICE O/P EST MOD 30 MIN: CPT | Performed by: INTERNAL MEDICINE

## 2024-03-27 PROCEDURE — 1160F RVW MEDS BY RX/DR IN RCRD: CPT | Performed by: INTERNAL MEDICINE

## 2024-03-27 PROCEDURE — 3008F BODY MASS INDEX DOCD: CPT | Performed by: INTERNAL MEDICINE

## 2024-03-27 PROCEDURE — 1159F MED LIST DOCD IN RCRD: CPT | Performed by: INTERNAL MEDICINE

## 2024-03-27 PROCEDURE — 3074F SYST BP LT 130 MM HG: CPT | Performed by: INTERNAL MEDICINE

## 2024-03-27 PROCEDURE — 3078F DIAST BP <80 MM HG: CPT | Performed by: INTERNAL MEDICINE

## 2024-03-27 NOTE — PROGRESS NOTES
Edith Eubanks is a 78 year old female.  HPI:   Pt has been very concerned about her left leg, Draining much less, she still has a swollen calf, She is using 40 mg lasix TID. No fevers, tongue still burning from the antibiotic. She has no fever or chills.  Her breathing has been a little worse.  She has no plans for the weekend.   Current Outpatient Medications   Medication Sig Dispense Refill    budeson-glycopyrrol-formoterol (BREZTRI AEROSPHERE) 160-9-4.8 MCG/ACT Inhalation Aerosol Inhale 2 puffs into the lungs 2 (two) times daily. 2 each 0    Potassium Chloride ER 10 MEQ Oral Tab CR Take 2 tablets (20 mEq total) by mouth 2 (two) times daily. Take 2 tablets daily (enteric coated) 360 tablet 0    pravastatin 40 MG Oral Tab Take 1 tablet (40 mg total) by mouth nightly. 90 tablet 3    nitroglycerin 0.4 MG Sublingual SL Tab Place 1 tablet (0.4 mg total) under the tongue every 5 (five) minutes as needed for Chest pain. 25 tablet 0    metoprolol succinate ER 50 MG Oral Tablet 24 Hr Take 1 tablet (50 mg total) by mouth daily.      magnesium oxide 400 MG Oral Tab Take 1 tablet (400 mg total) by mouth daily. 90 tablet 1    dilTIAZem HCl ER Beads 240 MG Oral Capsule SR 24 Hr Take 1 capsule (240 mg total) by mouth 2 (two) times daily. 180 capsule 0    ondansetron (ZOFRAN) 4 mg tablet Take 1 tablet (4 mg total) by mouth every 8 (eight) hours as needed for Nausea. 20 tablet 2    allopurinol 100 MG Oral Tab Take 1 tablet (100 mg total) by mouth daily. 90 tablet 3    FUROSEMIDE 40 MG Oral Tab TAKE 1 TABLET THREE TIMES DAILY 270 tablet 1    ipratropium-albuterol (COMBIVENT RESPIMAT)  MCG/ACT Inhalation Aero Soln Inhale 1 puff into the lungs 4 (four) times daily. 3 each 4    aspirin 81 MG Oral Tab EC Take 1 tablet (81 mg total) by mouth daily.      estradiol 0.1 MG/GM Vaginal Cream Place 1 g vaginally once a week. (Patient not taking: Reported on 3/27/2024)        Past Medical History:   Diagnosis Date    Acute bronchitis  2011    Arthritis     Black stools     Blood in urine     Chest pain, unspecified 12/15/2011    Chronic airway obstruction, not elsewhere classified 2011    Chronic atrial fibrillation (HCC)     Congestive heart disease (HCC)     Congestive heart failure, unspecified 2011    Constipation     COPD (chronic obstructive pulmonary disease) (HCC)     Coronary atherosclerosis of unspecified type of vessel, native or graft 2011    Edema 2012    Esophageal reflux 2012    Factor XII deficiency disease (HCC) 1999    Erlebrorn    Fatigue     Frequent use of laxatives     Generalized and unspecified atherosclerosis 2011    Gout     Hematemesis 2011    Irregular bowel habits     Leaking of urine     Painful swallowing     Parathyroid tumor     Wears glasses     Weight gain     West Nile Virus infection (HCC)       Social History:  Social History     Socioeconomic History    Marital status: OTHER    Number of children: 2   Occupational History    Occupation: Not Employed    Tobacco Use    Smoking status: Former     Packs/day: 1.00     Years: 50.00     Additional pack years: 0.00     Total pack years: 50.00     Types: Cigarettes     Quit date: 2010     Years since quittin.2    Smokeless tobacco: Never    Tobacco comments:     quit a few years ago   Substance and Sexual Activity    Alcohol use: No     Alcohol/week: 0.0 standard drinks of alcohol    Drug use: No   Other Topics Concern    Caffeine Concern No     Comment: About 1 drink per day     Exercise No        REVIEW OF SYSTEMS:   GENERAL HEALTH: feels well otherwise  SKIN: denies any unusual skin lesions or rashes  RESPIRATORY: denies shortness of breath with exertion  CARDIOVASCULAR: denies chest pain on exertion  GI: denies abdominal pain and denies heartburn  NEURO: denies headaches    EXAM:   /54 (BP Location: Left arm, Patient Position: Sitting)   Pulse 78   Temp 98 °F (36.7 °C)   Resp 20   Ht 5' 2\" (1.575  m)   Wt 176 lb 9.6 oz (80.1 kg)   SpO2 93%   BMI 32.30 kg/m²   GENERAL: well developed, well nourished,in no apparent distress  SKIN: no rashes,no suspicious lesions  HEENT: atraumatic, normocephalic,ears and throat are clear  NECK: supple,no adenopathy,no bruits  LUNGS: clear to auscultation  CARDIO: RRR without murmur  GI: good BS's,no masses, HSM or tenderness  EXTREMITIES: no cyanosis, clubbing or edema    ASSESSMENT AND PLAN:     Encounter Diagnoses   Name Primary?    Cellulitis of left leg Yes    Wound of left lower extremity, subsequent encounter    Cellulitis resolving, I recommended a CTA for blood flow to the legs, she has had a doppler study and has known CAD.  Redressed wound and she is to use 4 lasix a day.     No orders of the defined types were placed in this encounter.      Meds & Refills for this Visit:  Requested Prescriptions      No prescriptions requested or ordered in this encounter       Imaging & Consults:  None    Follow up as needed.     The patient indicates understanding of these issues and agrees to the plan.

## 2024-04-22 RX ORDER — POTASSIUM CHLORIDE 750 MG/1
20 TABLET, FILM COATED, EXTENDED RELEASE ORAL 2 TIMES DAILY
Qty: 360 TABLET | Refills: 0 | Status: SHIPPED | OUTPATIENT
Start: 2024-04-22 | End: 2024-07-21

## 2024-04-22 NOTE — TELEPHONE ENCOUNTER
REFILL 02/28/24 #360 sent to [facility] in [place]. Did not  from there, would like pharmacy changed to [facility] pharmacy.     She is out of medication.

## 2024-04-29 ENCOUNTER — PATIENT MESSAGE (OUTPATIENT)
Dept: FAMILY MEDICINE CLINIC | Facility: CLINIC | Age: 79
End: 2024-04-29

## 2024-04-29 NOTE — TELEPHONE ENCOUNTER
Patient said her left leg is very swollen and her right leg is \"on fire\".  She said that she has been without power for two days. She said that she has been using her portable tanks and she feels like she can't get enough oxygen. She said her oxygen saturation has been in the 80s on the potable tanks and it is now in the lower 90s.  She said her power just came back on.

## 2024-04-29 NOTE — TELEPHONE ENCOUNTER
From: Edith Eubanks  To: Mady Neff  Sent: 4/29/2024 3:41 PM CDT  Subject: Both legs     Both legs swelling lots of pain right leg burning feeling let swelled bad

## 2024-05-01 ENCOUNTER — OFFICE VISIT (OUTPATIENT)
Dept: FAMILY MEDICINE CLINIC | Facility: CLINIC | Age: 79
End: 2024-05-01
Payer: MEDICARE

## 2024-05-01 VITALS
RESPIRATION RATE: 24 BRPM | DIASTOLIC BLOOD PRESSURE: 56 MMHG | HEART RATE: 75 BPM | WEIGHT: 180.38 LBS | SYSTOLIC BLOOD PRESSURE: 98 MMHG | OXYGEN SATURATION: 93 % | BODY MASS INDEX: 33 KG/M2 | TEMPERATURE: 99 F

## 2024-05-01 DIAGNOSIS — L03.116 CELLULITIS OF LEFT LEG: Primary | ICD-10-CM

## 2024-05-01 PROCEDURE — 1170F FXNL STATUS ASSESSED: CPT | Performed by: INTERNAL MEDICINE

## 2024-05-01 PROCEDURE — 1160F RVW MEDS BY RX/DR IN RCRD: CPT | Performed by: INTERNAL MEDICINE

## 2024-05-01 PROCEDURE — 3074F SYST BP LT 130 MM HG: CPT | Performed by: INTERNAL MEDICINE

## 2024-05-01 PROCEDURE — 3078F DIAST BP <80 MM HG: CPT | Performed by: INTERNAL MEDICINE

## 2024-05-01 PROCEDURE — 99214 OFFICE O/P EST MOD 30 MIN: CPT | Performed by: INTERNAL MEDICINE

## 2024-05-01 PROCEDURE — 1159F MED LIST DOCD IN RCRD: CPT | Performed by: INTERNAL MEDICINE

## 2024-05-01 RX ORDER — POTASSIUM CHLORIDE 750 MG/1
10 TABLET, EXTENDED RELEASE ORAL DAILY
COMMUNITY
Start: 2024-04-25 | End: 2024-05-01

## 2024-05-01 RX ORDER — BUDESONIDE, GLYCOPYRROLATE, AND FORMOTEROL FUMARATE 160; 9; 4.8 UG/1; UG/1; UG/1
2 AEROSOL, METERED RESPIRATORY (INHALATION) 2 TIMES DAILY
Qty: 1 EACH | Refills: 0 | COMMUNITY
Start: 2024-05-01

## 2024-05-03 NOTE — PROGRESS NOTES
Edith Eubanks is a 78 year old female.  HPI:   Pt had a cellulitis on the left anterior leg that took a long time to heal.  She has shed the scab and now has new skin and still swollen. She is taking Lasix 3 x a day. Her foot feels a bit numb still.  She spoke with cardiology and he did not want her to get a CTA of the legs.     Current Outpatient Medications   Medication Sig Dispense Refill    budeson-glycopyrrol-formoterol (BREZTRI AEROSPHERE) 160-9-4.8 MCG/ACT Inhalation Aerosol Inhale 2 puffs into the lungs 2 (two) times daily. 1 each 0    Potassium Chloride ER 10 MEQ Oral Tab CR Take 2 tablets (20 mEq total) by mouth 2 (two) times daily. Take 2 tablets daily (enteric coated) 360 tablet 0    pravastatin 40 MG Oral Tab Take 1 tablet (40 mg total) by mouth nightly. 90 tablet 3    nitroglycerin 0.4 MG Sublingual SL Tab Place 1 tablet (0.4 mg total) under the tongue every 5 (five) minutes as needed for Chest pain. 25 tablet 0    metoprolol succinate ER 50 MG Oral Tablet 24 Hr Take 1 tablet (50 mg total) by mouth daily.      magnesium oxide 400 MG Oral Tab Take 1 tablet (400 mg total) by mouth daily. 90 tablet 1    dilTIAZem HCl ER Beads 240 MG Oral Capsule SR 24 Hr Take 1 capsule (240 mg total) by mouth 2 (two) times daily. 180 capsule 0    ondansetron (ZOFRAN) 4 mg tablet Take 1 tablet (4 mg total) by mouth every 8 (eight) hours as needed for Nausea. 20 tablet 2    allopurinol 100 MG Oral Tab Take 1 tablet (100 mg total) by mouth daily. 90 tablet 3    FUROSEMIDE 40 MG Oral Tab TAKE 1 TABLET THREE TIMES DAILY 270 tablet 1    ipratropium-albuterol (COMBIVENT RESPIMAT)  MCG/ACT Inhalation Aero Soln Inhale 1 puff into the lungs 4 (four) times daily. 3 each 4    aspirin 81 MG Oral Tab EC Take 1 tablet (81 mg total) by mouth daily.      estradiol 0.1 MG/GM Vaginal Cream Place 1 g vaginally once a week. (Patient not taking: Reported on 3/27/2024)        Past Medical History:    Acute bronchitis    Arthritis     Black stools    Blood in urine    Chest pain, unspecified    Chronic airway obstruction, not elsewhere classified    Chronic atrial fibrillation (HCC)    Congestive heart disease (HCC)    Congestive heart failure, unspecified    Constipation    COPD (chronic obstructive pulmonary disease) (HCC)    Coronary atherosclerosis of unspecified type of vessel, native or graft    Edema    Esophageal reflux    Factor XII deficiency disease (HCC)    Erlebrorn    Fatigue    Frequent use of laxatives    Generalized and unspecified atherosclerosis    Gout    Hematemesis    Irregular bowel habits    Leaking of urine    Painful swallowing    Parathyroid tumor    Wears glasses    Weight gain    West Nile Virus infection (HCC)      Social History:  Social History     Socioeconomic History    Marital status: OTHER    Number of children: 2   Occupational History    Occupation: Not Employed    Tobacco Use    Smoking status: Former     Current packs/day: 0.00     Average packs/day: 1 pack/day for 50.0 years (50.0 ttl pk-yrs)     Types: Cigarettes     Start date: 1960     Quit date: 2010     Years since quittin.3    Smokeless tobacco: Never    Tobacco comments:     quit a few years ago   Substance and Sexual Activity    Alcohol use: No     Alcohol/week: 0.0 standard drinks of alcohol    Drug use: No   Other Topics Concern    Caffeine Concern No     Comment: About 1 drink per day     Exercise No     Social Determinants of Health     Physical Activity: Inactive (2021)    [facility]    Exercise Vital Sign     Days of Exercise per Week: 0 days     Minutes of Exercise per Session: 0 min    [facility]    Social Connections    [facility]    Housing Stability        REVIEW OF SYSTEMS:   GENERAL HEALTH: feels well otherwise  SKIN: denies any unusual  skin lesions or rashes  RESPIRATORY: denies shortness of breath with exertion  CARDIOVASCULAR: denies chest pain on exertion  GI: denies abdominal pain and denies heartburn  NEURO: denies headaches    EXAM:   BP 98/56   Pulse 75   Temp 98.5 °F (36.9 °C) (Temporal)   Resp 24   Wt 180 lb 6 oz (81.8 kg)   SpO2 93%   BMI 32.99 kg/m²   GENERAL: well developed, well nourished,in no apparent distress  SKIN: no rashes,no suspicious lesions  HEENT: atraumatic, normocephalic,ears and throat are clear  NECK: supple,no adenopathy,no bruits  LUNGS: clear to auscultation  CARDIO: RRR without murmur  GI: good BS's,no masses, HSM or tenderness  EXTREMITIES: no cyanosis, clubbing or edema    ASSESSMENT AND PLAN:     Encounter Diagnosis   Name Primary?    Cellulitis of left leg Yes   Resolved infection, she still has swelling in the left foot. She is only on ASA 81 mg and moves about the house for activity. Her breathing so for this season has been ok, but she really struggles in the humidity.     No orders of the defined types were placed in this encounter.      Meds & Refills for this Visit:  Requested Prescriptions     Signed Prescriptions Disp Refills    budeson-glycopyrrol-formoterol (Nanochip) 160-9-4.8 MCG/ACT Inhalation Aerosol 1 each 0     Sig: Inhale 2 puffs into the lungs 2 (two) times daily.       Imaging & Consults:  None    Follow up as needed.     The patient indicates understanding of these issues and agrees to the plan.

## 2024-05-06 ENCOUNTER — TELEPHONE (OUTPATIENT)
Dept: FAMILY MEDICINE CLINIC | Facility: CLINIC | Age: 79
End: 2024-05-06

## 2024-05-06 NOTE — TELEPHONE ENCOUNTER
Spoke with patient - does not want to make an appointment at this time.  Will continue dressing area - if symptoms worsen or more blisters appear will contact office

## 2024-05-06 NOTE — TELEPHONE ENCOUNTER
Pt called in - follow up on cellulitis of legs  Saw Dr. Neff on 5/1/24 for cellulitis  Per pt - has 2 more blisters, one is draining \"yellowish\" fluid. Redness is not worse from last visit.  States is doing the dressing changes.    Per Dr. Neff - can schedule follow up appointment    Called pt back - message left on cell #[phone]  To call back and schedule for this week.

## 2024-05-07 ENCOUNTER — TELEPHONE (OUTPATIENT)
Dept: FAMILY MEDICINE CLINIC | Facility: CLINIC | Age: 79
End: 2024-05-07

## 2024-05-07 ENCOUNTER — PATIENT MESSAGE (OUTPATIENT)
Dept: FAMILY MEDICINE CLINIC | Facility: CLINIC | Age: 79
End: 2024-05-07

## 2024-05-07 DIAGNOSIS — Z12.31 ENCOUNTER FOR SCREENING MAMMOGRAM FOR BREAST CANCER: Primary | ICD-10-CM

## 2024-05-07 NOTE — TELEPHONE ENCOUNTER
Patient is asking for her mammogram order to be put in so she can schedule the mammogram in [place]. Send back to the front to schedule please.

## 2024-05-07 NOTE — TELEPHONE ENCOUNTER
Patient made her toe bleed yesterday.  She wanted to soak it in epsom salt.  She wanted to know if that was ok.

## 2024-05-07 NOTE — TELEPHONE ENCOUNTER
Patient said that she was cleaning under her toenail yesterday and it started bleeding. She said it is read. Patient advised ok to soak the toe in Epsom Salt.V.O. Dr Neff/Shasha FELIX  Patient said that she is getting more blisters on her leg and they are leaking yellow fluid.   She said that her Grand daughter told her to use Hibicleanse. She will send a picture of her toe and leg.   She said that she has two spots near her temple that hurt like crazy. She said yesterday it went away but came back today when she used her inhaler.

## 2024-05-08 ENCOUNTER — TELEPHONE (OUTPATIENT)
Dept: FAMILY MEDICINE CLINIC | Facility: CLINIC | Age: 79
End: 2024-05-08

## 2024-05-08 NOTE — TELEPHONE ENCOUNTER
From: Edith Eubanks  To: Mady Neff  Sent: 5/7/2024 5:15 PM CDT  Subject: New blisters left leg     2 spots on leg 1 leaking near old wound   I small higher up on leg

## 2024-05-08 NOTE — TELEPHONE ENCOUNTER
From: Edith Eubanks  To: Mady Neff  Sent: 5/7/2024 5:19 PM CDT  Subject: Sore toe     Toe is reason I want to soak foot started bleeding mon. When trying to clean under nail

## 2024-05-08 NOTE — TELEPHONE ENCOUNTER
I would suggest you keep it covered and protected so not to introduce any bacteria or debri into the wound that is going to take weeks to heal.  May also use bacitracin or vasoline to keep it clean.

## 2024-05-08 NOTE — TELEPHONE ENCOUNTER
Has Dr Neff seen her message about the blisters on her legs?  She said that Dr Neff responded to her about the toe issue but not the leg issue

## 2024-05-08 NOTE — TELEPHONE ENCOUNTER
I think the leg looks ok; the skin is dry and cracking and the fluid will leak out of any possible surface moisturize with Cerave or Eucerin.

## 2024-05-11 ENCOUNTER — TELEPHONE (OUTPATIENT)
Dept: FAMILY MEDICINE CLINIC | Facility: CLINIC | Age: 79
End: 2024-05-11

## 2024-05-11 NOTE — TELEPHONE ENCOUNTER
Pt notes she can't swallow her meds with applesauce. Food and liquid is getting caught in her throat, unable to get medication down. Also c/o possible infection in her toe, believes she may have had fever last night, is doing foot soaks. Advised to be seen in the ER d/t inability to swallow and take meds and fever with poss infection. Pt states she won't go to ER bc of $120 copay. Repeated to pt importance of being evaluated if sx continue, pt aware of risk of not going to ER and appt made for Monday.   Future Appointments   Date Time Provider Department Center   5/13/2024  2:15 PM Mady Neff MD [facility] [place]   6/10/2024 10:20 AM [facility] [place]

## 2024-05-13 ENCOUNTER — OFFICE VISIT (OUTPATIENT)
Dept: FAMILY MEDICINE CLINIC | Facility: CLINIC | Age: 79
End: 2024-05-13

## 2024-05-13 VITALS
WEIGHT: 179.5 LBS | BODY MASS INDEX: 33 KG/M2 | DIASTOLIC BLOOD PRESSURE: 64 MMHG | HEART RATE: 76 BPM | SYSTOLIC BLOOD PRESSURE: 108 MMHG | RESPIRATION RATE: 22 BRPM | TEMPERATURE: 98 F | OXYGEN SATURATION: 94 %

## 2024-05-13 DIAGNOSIS — J43.2 CENTRILOBULAR EMPHYSEMA (HCC): Primary | ICD-10-CM

## 2024-05-13 PROCEDURE — 3074F SYST BP LT 130 MM HG: CPT | Performed by: INTERNAL MEDICINE

## 2024-05-13 PROCEDURE — 1160F RVW MEDS BY RX/DR IN RCRD: CPT | Performed by: INTERNAL MEDICINE

## 2024-05-13 PROCEDURE — 99214 OFFICE O/P EST MOD 30 MIN: CPT | Performed by: INTERNAL MEDICINE

## 2024-05-13 PROCEDURE — 1170F FXNL STATUS ASSESSED: CPT | Performed by: INTERNAL MEDICINE

## 2024-05-13 PROCEDURE — 3078F DIAST BP <80 MM HG: CPT | Performed by: INTERNAL MEDICINE

## 2024-05-13 PROCEDURE — 1159F MED LIST DOCD IN RCRD: CPT | Performed by: INTERNAL MEDICINE

## 2024-05-13 RX ORDER — DILTIAZEM HYDROCHLORIDE 240 MG/1
240 CAPSULE, EXTENDED RELEASE ORAL 2 TIMES DAILY
Qty: 180 CAPSULE | Refills: 3 | Status: SHIPPED | OUTPATIENT
Start: 2024-05-13

## 2024-05-13 RX ORDER — BUDESONIDE, GLYCOPYRROLATE, AND FORMOTEROL FUMARATE 160; 9; 4.8 UG/1; UG/1; UG/1
2 AEROSOL, METERED RESPIRATORY (INHALATION) 2 TIMES DAILY
Qty: 1 EACH | Refills: 0 | COMMUNITY
Start: 2024-05-13

## 2024-05-13 NOTE — PROGRESS NOTES
Edith Eubanks is a 78 year old female.  HPI:   Pt has been having more SOB and some swelling in the legs.  Headache is better always in the left temple, sometimes there, sometimes not.  Pt has been feeling less confident recently.   Current Outpatient Medications   Medication Sig Dispense Refill    dilTIAZem HCl ER Beads 240 MG Oral Capsule SR 24 Hr Take 1 capsule (240 mg total) by mouth 2 (two) times daily. 180 capsule 3    budeson-glycopyrrol-formoterol (BREZTRI AEROSPHERE) 160-9-4.8 MCG/ACT Inhalation Aerosol Inhale 2 puffs into the lungs 2 (two) times daily. 1 each 0    Potassium Chloride ER 10 MEQ Oral Tab CR Take 2 tablets (20 mEq total) by mouth 2 (two) times daily. Take 2 tablets daily (enteric coated) 360 tablet 0    pravastatin 40 MG Oral Tab Take 1 tablet (40 mg total) by mouth nightly. 90 tablet 3    nitroglycerin 0.4 MG Sublingual SL Tab Place 1 tablet (0.4 mg total) under the tongue every 5 (five) minutes as needed for Chest pain. 25 tablet 0    metoprolol succinate ER 50 MG Oral Tablet 24 Hr Take 1 tablet (50 mg total) by mouth daily.      magnesium oxide 400 MG Oral Tab Take 1 tablet (400 mg total) by mouth daily. 90 tablet 1    allopurinol 100 MG Oral Tab Take 1 tablet (100 mg total) by mouth daily. 90 tablet 3    FUROSEMIDE 40 MG Oral Tab TAKE 1 TABLET THREE TIMES DAILY 270 tablet 1    ipratropium-albuterol (COMBIVENT RESPIMAT)  MCG/ACT Inhalation Aero Soln Inhale 1 puff into the lungs 4 (four) times daily. 3 each 4    aspirin 81 MG Oral Tab EC Take 1 tablet (81 mg total) by mouth daily.      estradiol 0.1 MG/GM Vaginal Cream Place 1 g vaginally once a week. (Patient not taking: Reported on 3/27/2024)      ondansetron (ZOFRAN) 4 mg tablet Take 1 tablet (4 mg total) by mouth every 8 (eight) hours as needed for Nausea. 20 tablet 2      Past Medical History:    Acute bronchitis    Arthritis    Black stools    Blood in urine    Chest pain, unspecified    Chronic airway obstruction, not  elsewhere classified    Chronic atrial fibrillation (HCC)    Congestive heart disease (HCC)    Congestive heart failure, unspecified    Constipation    COPD (chronic obstructive pulmonary disease) (HCC)    Coronary atherosclerosis of unspecified type of vessel, native or graft    Edema    Esophageal reflux    Factor XII deficiency disease (HCC)    Erlebrorn    Fatigue    Frequent use of laxatives    Generalized and unspecified atherosclerosis    Gout    Hematemesis    Irregular bowel habits    Leaking of urine    Painful swallowing    Parathyroid tumor    Wears glasses    Weight gain    West Nile Virus infection (HCC)      Social History:  Social History     Socioeconomic History    Marital status: OTHER    Number of children: 2   Occupational History    Occupation: Not Employed    Tobacco Use    Smoking status: Former     Current packs/day: 0.00     Average packs/day: 1 pack/day for 50.0 years (50.0 ttl pk-yrs)     Types: Cigarettes     Start date: 1960     Quit date: 2010     Years since quittin.3    Smokeless tobacco: Never    Tobacco comments:     quit a few years ago   Substance and Sexual Activity    Alcohol use: No     Alcohol/week: 0.0 standard drinks of alcohol    Drug use: No   Other Topics Concern    Caffeine Concern No     Comment: About 1 drink per day     Exercise No     Social Determinants of Health     Physical Activity: Inactive (2021)    [facility]    Exercise Vital Sign     Days of Exercise per Week: 0 days     Minutes of Exercise per Session: 0 min    Received from [facility], [facility]    Social Connections    Received from [facility], [facility]    Housing Stability        REVIEW OF SYSTEMS:   GENERAL HEALTH: feels well otherwise  SKIN: denies any unusual skin lesions or rashes  RESPIRATORY: denies shortness of breath with exertion  CARDIOVASCULAR:  denies chest pain on exertion  GI: denies abdominal pain and denies heartburn  NEURO: denies headaches    EXAM:   /64   Pulse 76   Temp 98 °F (36.7 °C) (Temporal)   Resp 22   Wt 179 lb 8 oz (81.4 kg)   SpO2 94%   BMI 32.83 kg/m²   GENERAL: well developed, well nourished,in no apparent distress  SKIN: no rashes,no suspicious lesions  HEENT: atraumatic, normocephalic,ears and throat are clear  NECK: supple,no adenopathy,no bruits  LUNGS: clear to auscultation  CARDIO: RRR without murmur  GI: good BS's,no masses, HSM or tenderness  EXTREMITIES: no cyanosis, clubbing or edema    ASSESSMENT AND PLAN:     Encounter Diagnosis   Name Primary?    Centrilobular emphysema (HCC) Yes   Jayant ledesma, make plans for humidity this summer.      No orders of the defined types were placed in this encounter.      Meds & Refills for this Visit:  Requested Prescriptions     Signed Prescriptions Disp Refills    dilTIAZem HCl ER Beads 240 MG Oral Capsule SR 24 Hr 180 capsule 3     Sig: Take 1 capsule (240 mg total) by mouth 2 (two) times daily.    budeson-glycopyrrol-formoterol ([employer]Pl[employer]TRI AEROSPHERE) 160-9-4.8 MCG/ACT Inhalation Aerosol 1 each 0     Sig: Inhale 2 puffs into the lungs 2 (two) times daily.       Imaging & Consults:  None    Follow up as needed.    The patient indicates understanding of these issues and agrees to the plan.

## 2024-05-20 ENCOUNTER — TELEPHONE (OUTPATIENT)
Dept: FAMILY MEDICINE CLINIC | Facility: CLINIC | Age: 79
End: 2024-05-20

## 2024-05-20 NOTE — TELEPHONE ENCOUNTER
Patient said that the swelling in her legs is down. She said she still can't oint her toes on her left foot. She said that she has been having trouble with her breathing, she has been short of breath.   Can she decrease the Furosemide to three tablets daily?

## 2024-05-20 NOTE — TELEPHONE ENCOUNTER
Pt is calling stating she is taking furosemide 40mg  1 tablet 4x's daily and now pt is stating her mouth is very dry and hard to swallow      Please advise @  [phone]

## 2024-05-23 ENCOUNTER — TELEPHONE (OUTPATIENT)
Dept: FAMILY MEDICINE CLINIC | Facility: CLINIC | Age: 79
End: 2024-05-23

## 2024-05-23 ENCOUNTER — PATIENT MESSAGE (OUTPATIENT)
Dept: FAMILY MEDICINE CLINIC | Facility: CLINIC | Age: 79
End: 2024-05-23

## 2024-05-23 NOTE — TELEPHONE ENCOUNTER
Patient said that the skin tear is about the size of a dime from her oxygen tank fell on her leg earlier today. She said it tore through the bandage and pants. She has some Triple Antibiotic ointment and an antibacterial gauze pad. She will send a picture through [employer].

## 2024-05-23 NOTE — TELEPHONE ENCOUNTER
From: Edith Eubanks  To: Mady Neff  Sent: 5/23/2024 2:49 PM CDT  Subject: Left leg     Oxygen tank fell and hit leg , broke the skin , what do I do

## 2024-05-23 NOTE — TELEPHONE ENCOUNTER
Edith had her oxygen fall on her bad leg with the cellulitus, and it tore the skin off and is bleeding. Edith is very concerned and would like to speak to nickie about care

## 2024-06-01 ENCOUNTER — TELEPHONE (OUTPATIENT)
Dept: FAMILY MEDICINE CLINIC | Facility: CLINIC | Age: 79
End: 2024-06-01

## 2024-06-01 NOTE — TELEPHONE ENCOUNTER
Spoke with patient - 2 days ago gardening, vacuuming.  Now, having a difficult time lifting arms up.    Encouraged to go to ER for evaluation but patient refused do to financial reason.    Discussed with Dr. Neff -   Appointment made for Monday  Future Appointments   Date Time Provider Department Center   6/3/2024 10:30 AM Mady Neff MD [facility]   6/10/2024 10:20 AM [facility] [address]

## 2024-06-05 ENCOUNTER — TELEPHONE (OUTPATIENT)
Dept: FAMILY MEDICINE CLINIC | Facility: CLINIC | Age: 79
End: 2024-06-05

## 2024-06-05 ENCOUNTER — OFFICE VISIT (OUTPATIENT)
Dept: FAMILY MEDICINE CLINIC | Facility: CLINIC | Age: 79
End: 2024-06-05
Payer: MEDICARE

## 2024-06-05 VITALS
RESPIRATION RATE: 22 BRPM | OXYGEN SATURATION: 93 % | SYSTOLIC BLOOD PRESSURE: 112 MMHG | BODY MASS INDEX: 33 KG/M2 | TEMPERATURE: 98 F | WEIGHT: 178 LBS | HEART RATE: 83 BPM | DIASTOLIC BLOOD PRESSURE: 58 MMHG

## 2024-06-05 DIAGNOSIS — I27.20 PULMONARY HTN (HCC): ICD-10-CM

## 2024-06-05 DIAGNOSIS — I25.719 CORONARY ARTERY DISEASE INVOLVING AUTOLOGOUS VEIN CORONARY BYPASS GRAFT WITH ANGINA PECTORIS (HCC): ICD-10-CM

## 2024-06-05 DIAGNOSIS — J43.2 CENTRILOBULAR EMPHYSEMA (HCC): Primary | ICD-10-CM

## 2024-06-05 PROCEDURE — 3078F DIAST BP <80 MM HG: CPT | Performed by: INTERNAL MEDICINE

## 2024-06-05 PROCEDURE — 1160F RVW MEDS BY RX/DR IN RCRD: CPT | Performed by: INTERNAL MEDICINE

## 2024-06-05 PROCEDURE — 1159F MED LIST DOCD IN RCRD: CPT | Performed by: INTERNAL MEDICINE

## 2024-06-05 PROCEDURE — 99214 OFFICE O/P EST MOD 30 MIN: CPT | Performed by: INTERNAL MEDICINE

## 2024-06-05 PROCEDURE — 1170F FXNL STATUS ASSESSED: CPT | Performed by: INTERNAL MEDICINE

## 2024-06-05 PROCEDURE — 3074F SYST BP LT 130 MM HG: CPT | Performed by: INTERNAL MEDICINE

## 2024-06-05 RX ORDER — BUDESONIDE, GLYCOPYRROLATE, AND FORMOTEROL FUMARATE 160; 9; 4.8 UG/1; UG/1; UG/1
2 AEROSOL, METERED RESPIRATORY (INHALATION) 2 TIMES DAILY
Qty: 2 EACH | Refills: 0 | COMMUNITY
Start: 2024-06-05

## 2024-06-05 NOTE — TELEPHONE ENCOUNTER
Patient said she is feeling weird today. She said that she has not been feeling well since Saturday after she planted flowers. She said she was nauseated and couldn't lift her arms. She said she is feeling dizzy today, her balance if off. She said she can't keep her oxygen above 80%. She said she has her oxygen on 2 liters right now. She turns the oxygen up to 3 liters.   Patient advised to go to the ER. She said that she will not go to the ER. She has been drinking fluids. She said that she is sitting outside right now and feels better. She said she will keep her appointment with Dr Neff at 11 today. Patient advised not to drive if she is feeling lightheaded.

## 2024-06-09 NOTE — PROGRESS NOTES
Edith Eubanks is a 78 year old female.  HPI:   Pt has chronic heart and lung diease. SHe had some low sats one night.  Increasing in O2 helped, she exerts herself more in the summer outside.   Current Outpatient Medications   Medication Sig Dispense Refill    budeson-glycopyrrol-formoterol (BREZTRI AEROSPHERE) 160-9-4.8 MCG/ACT Inhalation Aerosol Inhale 2 puffs into the lungs 2 (two) times daily. 2 each 0    dilTIAZem HCl ER Beads 240 MG Oral Capsule SR 24 Hr Take 1 capsule (240 mg total) by mouth 2 (two) times daily. 180 capsule 3    Potassium Chloride ER 10 MEQ Oral Tab CR Take 2 tablets (20 mEq total) by mouth 2 (two) times daily. Take 2 tablets daily (enteric coated) 360 tablet 0    pravastatin 40 MG Oral Tab Take 1 tablet (40 mg total) by mouth nightly. 90 tablet 3    nitroglycerin 0.4 MG Sublingual SL Tab Place 1 tablet (0.4 mg total) under the tongue every 5 (five) minutes as needed for Chest pain. 25 tablet 0    estradiol 0.1 MG/GM Vaginal Cream Place 1 g vaginally once a week. (Patient not taking: Reported on 3/27/2024)      metoprolol succinate ER 50 MG Oral Tablet 24 Hr Take 1 tablet (50 mg total) by mouth daily.      magnesium oxide 400 MG Oral Tab Take 1 tablet (400 mg total) by mouth daily. 90 tablet 1    ondansetron (ZOFRAN) 4 mg tablet Take 1 tablet (4 mg total) by mouth every 8 (eight) hours as needed for Nausea. 20 tablet 2    allopurinol 100 MG Oral Tab Take 1 tablet (100 mg total) by mouth daily. 90 tablet 3    FUROSEMIDE 40 MG Oral Tab TAKE 1 TABLET THREE TIMES DAILY 270 tablet 1    ipratropium-albuterol (COMBIVENT RESPIMAT)  MCG/ACT Inhalation Aero Soln Inhale 1 puff into the lungs 4 (four) times daily. 3 each 4    aspirin 81 MG Oral Tab EC Take 1 tablet (81 mg total) by mouth daily.        Past Medical History:    Acute bronchitis    Arthritis    Black stools    Blood in urine    Chest pain, unspecified    Chronic airway obstruction, not elsewhere classified    Chronic atrial  fibrillation (HCC)    Congestive heart disease (HCC)    Congestive heart failure, unspecified    Constipation    COPD (chronic obstructive pulmonary disease) (HCC)    Coronary atherosclerosis of unspecified type of vessel, native or graft    Edema    Esophageal reflux    Factor XII deficiency disease (HCC)    Erlebrorn    Fatigue    Frequent use of laxatives    Generalized and unspecified atherosclerosis    Gout    Hematemesis    Irregular bowel habits    Leaking of urine    Painful swallowing    Parathyroid tumor    Wears glasses    Weight gain    West Nile Virus infection (HCC)      Social History:  Social History     Socioeconomic History    Marital status: OTHER    Number of children: 2   Occupational History    Occupation: Not Employed    Tobacco Use    Smoking status: Former     Current packs/day: 0.00     Average packs/day: 1 pack/day for 50.0 years (50.0 ttl pk-yrs)     Types: Cigarettes     Start date: 1960     Quit date: 2010     Years since quittin.4    Smokeless tobacco: Never    Tobacco comments:     quit a few years ago   Substance and Sexual Activity    Alcohol use: No     Alcohol/week: 0.0 standard drinks of alcohol    Drug use: No   Other Topics Concern    Caffeine Concern No     Comment: About 1 drink per day     Exercise No     Social Determinants of Health     Physical Activity: Inactive (2021)    Received from [facility]    Exercise Vital Sign     Days of Exercise per Week: 0 days     Minutes of Exercise per Session: 0 min    Received from [facility], [facility]    Social Connections    Received from [facility], [facility]    Housing Stability        REVIEW OF SYSTEMS:   GENERAL HEALTH: feels well otherwise  SKIN: denies any unusual skin lesions or rashes  RESPIRATORY: denies shortness of breath with exertion  CARDIOVASCULAR: denies chest pain on exertion  GI:  denies abdominal pain and denies heartburn  NEURO: denies headaches    EXAM:   /58   Pulse 83   Temp 98 °F (36.7 °C) (Temporal)   Resp 22   Wt 178 lb (80.7 kg)   SpO2 93%   BMI 32.56 kg/m²   GENERAL: well developed, well nourished,in no apparent distress  SKIN: no rashes,no suspicious lesions  HEENT: atraumatic, normocephalic,ears and throat are clear  NECK: supple,no adenopathy,no bruits  LUNGS: clear to auscultation  CARDIO: RRR without murmur  GI: good BS's,no masses, HSM or tenderness  EXTREMITIES: no cyanosis, clubbing or edema    ASSESSMENT AND PLAN:     Encounter Diagnoses   Name Primary?    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 Turn on air conditioning unit at least during the day!    No orders of the defined types were placed in this encounter.      Meds & Refills for this Visit:  Requested Prescriptions     Signed Prescriptions Disp Refills    budeson-glycopyrrol-formoterol ([employer] Street[employer]TRI AEROSPHERE) 160-9-4.8 MCG/ACT Inhalation Aerosol 2 each 0     Sig: Inhale 2 puffs into the lungs 2 (two) times daily.       Imaging & Consults:  None    Follow up as needed.     The patient indicates understanding of these issues and agrees to the plan.

## 2024-06-10 ENCOUNTER — HOSPITAL ENCOUNTER (OUTPATIENT)
Dept: MAMMOGRAPHY | Age: 79
Discharge: HOME | End: 2024-06-10
Attending: INTERNAL MEDICINE
Payer: MEDICARE

## 2024-06-10 DIAGNOSIS — Z12.31 ENCOUNTER FOR SCREENING MAMMOGRAM FOR BREAST CANCER: ICD-10-CM

## 2024-06-10 PROCEDURE — 77067 SCR MAMMO BI INCL CAD: CPT | Performed by: INTERNAL MEDICINE

## 2024-06-10 PROCEDURE — 77063 BREAST TOMOSYNTHESIS BI: CPT | Performed by: INTERNAL MEDICINE

## 2024-06-12 ENCOUNTER — TELEPHONE (OUTPATIENT)
Dept: FAMILY MEDICINE CLINIC | Facility: CLINIC | Age: 79
End: 2024-06-12

## 2024-06-12 NOTE — TELEPHONE ENCOUNTER
She said her tongue felt weird and she looked in the mirror and its bloody. I offered appointment tomorrow but she said with the hot weather she is not sure she wants to go out.

## 2024-06-12 NOTE — TELEPHONE ENCOUNTER
Patient said she is not feeling well. She said that the top of her tongue is bleeding and she did not bite it. She said that she was out in the heat going to appointments. She said she is drinking fluids but she is still choking.   Appointment scheduled with Dr Neff tomorrow at 1130am. She's not sure is she will be able to make it here.

## 2024-06-13 ENCOUNTER — OFFICE VISIT (OUTPATIENT)
Dept: FAMILY MEDICINE CLINIC | Facility: CLINIC | Age: 79
End: 2024-06-13
Payer: MEDICARE

## 2024-06-13 ENCOUNTER — TELEPHONE (OUTPATIENT)
Dept: FAMILY MEDICINE CLINIC | Facility: CLINIC | Age: 79
End: 2024-06-13

## 2024-06-13 VITALS
RESPIRATION RATE: 20 BRPM | HEART RATE: 88 BPM | TEMPERATURE: 98 F | DIASTOLIC BLOOD PRESSURE: 56 MMHG | OXYGEN SATURATION: 93 % | SYSTOLIC BLOOD PRESSURE: 118 MMHG

## 2024-06-13 DIAGNOSIS — I25.719 CORONARY ARTERY DISEASE INVOLVING AUTOLOGOUS VEIN CORONARY BYPASS GRAFT WITH ANGINA PECTORIS (HCC): Primary | ICD-10-CM

## 2024-06-13 DIAGNOSIS — I27.20 PULMONARY HTN (HCC): ICD-10-CM

## 2024-06-13 DIAGNOSIS — R60.0 PERIPHERAL EDEMA: ICD-10-CM

## 2024-06-13 DIAGNOSIS — R13.19 ESOPHAGEAL DYSPHAGIA: ICD-10-CM

## 2024-06-13 PROCEDURE — G2211 COMPLEX E/M VISIT ADD ON: HCPCS | Performed by: INTERNAL MEDICINE

## 2024-06-13 PROCEDURE — 1160F RVW MEDS BY RX/DR IN RCRD: CPT | Performed by: INTERNAL MEDICINE

## 2024-06-13 PROCEDURE — 99214 OFFICE O/P EST MOD 30 MIN: CPT | Performed by: INTERNAL MEDICINE

## 2024-06-13 PROCEDURE — 3078F DIAST BP <80 MM HG: CPT | Performed by: INTERNAL MEDICINE

## 2024-06-13 PROCEDURE — 1159F MED LIST DOCD IN RCRD: CPT | Performed by: INTERNAL MEDICINE

## 2024-06-13 PROCEDURE — 1170F FXNL STATUS ASSESSED: CPT | Performed by: INTERNAL MEDICINE

## 2024-06-13 PROCEDURE — 3074F SYST BP LT 130 MM HG: CPT | Performed by: INTERNAL MEDICINE

## 2024-06-13 NOTE — TELEPHONE ENCOUNTER
I think this is stress and anxiety, she would do better in assisted living.   
Not feeling well.   
Patient is wanting to move out of her current apartment.   
Patient said that she feels very weird and almost lightheaded. She said that the right side of her head hurts and her neck hurts. She denies having any weakness to her extremities. She said that her head feels better now that she is talking to me.   She said her BP is 110/79 and her pulse is 93. She is drinking a lot of ice water.   She said that her friend told her to gargle with salt water for her throat issues. Patient advised ok to gargle with salt water.  
97

## 2024-06-13 NOTE — PROGRESS NOTES
Edith Eubanks is a 78 year old female.  HPI:   Pt had blood on her tongue this week.  She has swelling in the left leg, still wearing compression. She has had no fever or chills.  She lives alone with support of family, who calls and friends, who help her sometimes with physical tasks.  She has had a steady decline in function physical and mental.  I have suggested many times that she move to assisted living or at least get on a wait list, but she has a lot of reasons she does not want to do this yet.  She shops and cooks for herself and her diet is very limited due to issues with swallowing.   Current Outpatient Medications   Medication Sig Dispense Refill    budeson-glycopyrrol-formoterol (BREZTRI AEROSPHERE) 160-9-4.8 MCG/ACT Inhalation Aerosol Inhale 2 puffs into the lungs 2 (two) times daily. 2 each 0    dilTIAZem HCl ER Beads 240 MG Oral Capsule SR 24 Hr Take 1 capsule (240 mg total) by mouth 2 (two) times daily. 180 capsule 3    pravastatin 40 MG Oral Tab Take 1 tablet (40 mg total) by mouth nightly. 90 tablet 3    metoprolol succinate ER 50 MG Oral Tablet 24 Hr Take 1 tablet (50 mg total) by mouth daily.      magnesium oxide 400 MG Oral Tab Take 1 tablet (400 mg total) by mouth daily. 90 tablet 1    ondansetron (ZOFRAN) 4 mg tablet Take 1 tablet (4 mg total) by mouth every 8 (eight) hours as needed for Nausea. 20 tablet 2    allopurinol 100 MG Oral Tab Take 1 tablet (100 mg total) by mouth daily. 90 tablet 3    FUROSEMIDE 40 MG Oral Tab TAKE 1 TABLET THREE TIMES DAILY 270 tablet 1    ipratropium-albuterol (COMBIVENT RESPIMAT)  MCG/ACT Inhalation Aero Soln Inhale 1 puff into the lungs 4 (four) times daily. 3 each 4    aspirin 81 MG Oral Tab EC Take 1 tablet (81 mg total) by mouth daily.      Potassium Chloride ER 10 MEQ Oral Tab CR Take 2 tablets (20 mEq total) by mouth 2 (two) times daily. Take 2 tablets daily (enteric coated) 360 tablet 0    nitroglycerin 0.4 MG Sublingual SL Tab Place 1 tablet  (0.4 mg total) under the tongue every 5 (five) minutes as needed for Chest pain. 25 tablet 0    estradiol 0.1 MG/GM Vaginal Cream Place 1 g vaginally once a week. (Patient not taking: Reported on 3/27/2024)        Past Medical History:    Acute bronchitis    Arthritis    Black stools    Blood in urine    Chest pain, unspecified    Chronic airway obstruction, not elsewhere classified    Chronic atrial fibrillation (HCC)    Congestive heart disease (HCC)    Congestive heart failure, unspecified    Constipation    COPD (chronic obstructive pulmonary disease) (HCC)    Coronary atherosclerosis of unspecified type of vessel, native or graft    Edema    Esophageal reflux    Factor XII deficiency disease (HCC)    Erlebrorn    Fatigue    Frequent use of laxatives    Generalized and unspecified atherosclerosis    Gout    Hematemesis    Irregular bowel habits    Leaking of urine    Painful swallowing    Parathyroid tumor    Wears glasses    Weight gain    West Nile Virus infection (HCC)      Social History:  Social History     Socioeconomic History    Marital status: OTHER    Number of children: 2   Occupational History    Occupation: Not Employed    Tobacco Use    Smoking status: Former     Current packs/day: 0.00     Average packs/day: 1 pack/day for 50.0 years (50.0 ttl pk-yrs)     Types: Cigarettes     Start date: 1960     Quit date: 2010     Years since quittin.4    Smokeless tobacco: Never    Tobacco comments:     quit a few years ago   Substance and Sexual Activity    Alcohol use: No     Alcohol/week: 0.0 standard drinks of alcohol    Drug use: No   Other Topics Concern    Caffeine Concern No     Comment: About 1 drink per day     Exercise No     Social Determinants of Health     Physical Activity: Inactive (2021)    Received from [facility], [facility]    Exercise Vital Sign     Days of Exercise per Week: 0 days     Minutes of Exercise per Session: 0 min    Received from [facility], [facility]    Social Connections    Received from [facility], [facility]    Housing Stability        REVIEW OF SYSTEMS:   GENERAL HEALTH: feels well otherwise  SKIN: denies any unusual skin lesions or rashes  RESPIRATORY: denies shortness of breath with exertion  CARDIOVASCULAR: denies chest pain on exertion  GI: denies abdominal pain and denies heartburn  NEURO: denies headaches    EXAM:   /56   Pulse 88   Temp 97.9 °F (36.6 °C) (Temporal)   Resp 20   SpO2 93%   GENERAL: well developed, well nourished,in no apparent distress  SKIN: no rashes,no suspicious lesions  HEENT: atraumatic, normocephalic,ears and throat are clear  NECK: supple,no adenopathy,no bruits  LUNGS: clear to auscultation  CARDIO: RRR without murmur  GI: good BS's,no masses, HSM or tenderness  EXTREMITIES: no cyanosis, clubbing or edema    ASSESSMENT AND PLAN:     Encounter Diagnoses   Name Primary?    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 She is very social would do better in a community where she could still have independence to come and go as she wishes.      No orders of the defined types were placed in this encounter.      Meds & Refills for this Visit:  Requested Prescriptions      No prescriptions requested or ordered in this encounter       Imaging & Consults:  None    Follow up as needed.    The patient indicates understanding of these issues and agrees to the plan.

## 2024-06-15 ENCOUNTER — TELEPHONE (OUTPATIENT)
Dept: FAMILY MEDICINE CLINIC | Facility: CLINIC | Age: 79
End: 2024-06-15

## 2024-06-15 DIAGNOSIS — S91.209A AVULSION OF TOENAIL, INITIAL ENCOUNTER: Primary | ICD-10-CM

## 2024-06-15 NOTE — TELEPHONE ENCOUNTER
No doxy; you dont treat sinuses on the first day of symptoms, you dont need an antibiotic at all.

## 2024-06-15 NOTE — TELEPHONE ENCOUNTER
Patient said she went to the clinic in [place] yesterday for the headache. She said that she was prescribed Doxycycline. She said the pharmacist called her and told her she is allergic to it so she did not pick it up. She said that her nose is plugged and she has green drainage. She said her blood pressure was 82/42 at the clinic. She said the side of her head feels strange.  Can she take the Doxycycline?    She said her toe is draining pink drainage and it is lose. She saw a Dr Arias in [place] 2 years ago.

## 2024-06-15 NOTE — TELEPHONE ENCOUNTER
If she gets a toenail removed will that affect the cellulitis she has and also she has a question about medication that was ordered for her by the ER  yesterday for a sinus headache

## 2024-06-18 ENCOUNTER — TELEPHONE (OUTPATIENT)
Dept: FAMILY MEDICINE CLINIC | Facility: CLINIC | Age: 79
End: 2024-06-18

## 2024-06-18 NOTE — TELEPHONE ENCOUNTER
Patient advised. She said she is supposed to follow up with the podiatrist on 6/26/24.   Appointment scheduled with Dr Neff Thursday morning at 930am.

## 2024-06-18 NOTE — TELEPHONE ENCOUNTER
Patient called and stated that her leg is very swollen that she can't really bend her leg. She also said that she isn't using the restroom like she normally does when she is on her medication. She is also having a hard time wrapping her toe after her toe nail removal because she can't bend her leg. Patient stated its worse today than it was last Thursday.

## 2024-06-18 NOTE — TELEPHONE ENCOUNTER
Patient said she has been taking two 40mg Furosemide in the AM and two in the PM. She said said her left leg and foot are very swollen. She said she has been drinking fluids but is not urinating as much.She said she had her toenail removed yesterday and it was infected underneath. She said that the podiatrist put her on Azythromycin.  She is washing her toe with antibacterial soap and applying triple antibiotic ointment. She has a temp of 99.4 this morning.

## 2024-06-19 ENCOUNTER — TELEPHONE (OUTPATIENT)
Dept: FAMILY MEDICINE CLINIC | Facility: CLINIC | Age: 79
End: 2024-06-19

## 2024-06-19 NOTE — TELEPHONE ENCOUNTER
Patient called me from he air conditioned car. She is about to go into [place] to get ice. She said she is having difficulty breathing today, has been running errands in the heat. Had to turn her O2 up to #L. She said she coughed up something hard this afternoon but does not think it was the Zithromax she got stuck in her throat this morning. She denies having hives and wants to know if she should take another Zithromax tomorrow since she choked on it this morning.

## 2024-06-19 NOTE — TELEPHONE ENCOUNTER
Patient had the ambulance there this morning because something was caught in throat and she couldn't breathe, patient stated that paramedic stayed with her until it dissolved. Patient also stated she can't breathe and is unsure if its the antibiotic that has caused it, she also stated that her face is swollen and doesn't know if the antibiotic is the cause of that.

## 2024-06-20 ENCOUNTER — OFFICE VISIT (OUTPATIENT)
Dept: FAMILY MEDICINE CLINIC | Facility: CLINIC | Age: 79
End: 2024-06-20

## 2024-06-20 VITALS
DIASTOLIC BLOOD PRESSURE: 58 MMHG | OXYGEN SATURATION: 92 % | TEMPERATURE: 98 F | HEART RATE: 95 BPM | RESPIRATION RATE: 24 BRPM | SYSTOLIC BLOOD PRESSURE: 102 MMHG

## 2024-06-20 DIAGNOSIS — S91.209D AVULSION OF TOENAIL, SUBSEQUENT ENCOUNTER: ICD-10-CM

## 2024-06-20 DIAGNOSIS — D69.2 SENILE PURPURA (HCC): Primary | ICD-10-CM

## 2024-06-20 PROCEDURE — 3078F DIAST BP <80 MM HG: CPT | Performed by: INTERNAL MEDICINE

## 2024-06-20 PROCEDURE — 99214 OFFICE O/P EST MOD 30 MIN: CPT | Performed by: INTERNAL MEDICINE

## 2024-06-20 PROCEDURE — 1160F RVW MEDS BY RX/DR IN RCRD: CPT | Performed by: INTERNAL MEDICINE

## 2024-06-20 PROCEDURE — G2211 COMPLEX E/M VISIT ADD ON: HCPCS | Performed by: INTERNAL MEDICINE

## 2024-06-20 PROCEDURE — 3074F SYST BP LT 130 MM HG: CPT | Performed by: INTERNAL MEDICINE

## 2024-06-20 PROCEDURE — 1170F FXNL STATUS ASSESSED: CPT | Performed by: INTERNAL MEDICINE

## 2024-06-20 PROCEDURE — 1159F MED LIST DOCD IN RCRD: CPT | Performed by: INTERNAL MEDICINE

## 2024-06-20 RX ORDER — AZITHROMYCIN 200 MG/5ML
500 POWDER, FOR SUSPENSION ORAL DAILY
Qty: 39 ML | Refills: 0 | Status: SHIPPED | OUTPATIENT
Start: 2024-06-20 | End: 2024-06-23

## 2024-06-20 NOTE — PROGRESS NOTES
Edith Eubanks is a 78 year old female.  HPI:   Pt had a toe nail removed last week and put on Zithromax.  She has a choking episode and called the ambulance to come and give her the [place], She did not need it and they told her it will likely dissolve.  SHe did cough up a chunk a few days later. This is a common occurrence. She has no fever or chills. Her legs are baseline swollen without weeping.   Current Outpatient Medications   Medication Sig Dispense Refill    azithromycin (ZITHROMAX) 200 MG/5ML Oral Recon Susp Take 13 mL (520 mg total) by mouth daily for 3 days. 39 mL 0    budeson-glycopyrrol-formoterol (BREZTRI AEROSPHERE) 160-9-4.8 MCG/ACT Inhalation Aerosol Inhale 2 puffs into the lungs 2 (two) times daily. 2 each 0    dilTIAZem HCl ER Beads 240 MG Oral Capsule SR 24 Hr Take 1 capsule (240 mg total) by mouth 2 (two) times daily. 180 capsule 3    Potassium Chloride ER 10 MEQ Oral Tab CR Take 2 tablets (20 mEq total) by mouth 2 (two) times daily. Take 2 tablets daily (enteric coated) 360 tablet 0    pravastatin 40 MG Oral Tab Take 1 tablet (40 mg total) by mouth nightly. 90 tablet 3    nitroglycerin 0.4 MG Sublingual SL Tab Place 1 tablet (0.4 mg total) under the tongue every 5 (five) minutes as needed for Chest pain. 25 tablet 0    estradiol 0.1 MG/GM Vaginal Cream Place 1 g vaginally once a week. (Patient not taking: Reported on 3/27/2024)      metoprolol succinate ER 50 MG Oral Tablet 24 Hr Take 1 tablet (50 mg total) by mouth daily.      magnesium oxide 400 MG Oral Tab Take 1 tablet (400 mg total) by mouth daily. 90 tablet 1    ondansetron (ZOFRAN) 4 mg tablet Take 1 tablet (4 mg total) by mouth every 8 (eight) hours as needed for Nausea. 20 tablet 2    allopurinol 100 MG Oral Tab Take 1 tablet (100 mg total) by mouth daily. 90 tablet 3    FUROSEMIDE 40 MG Oral Tab TAKE 1 TABLET THREE TIMES DAILY 270 tablet 1    ipratropium-albuterol (COMBIVENT RESPIMAT)  MCG/ACT Inhalation Aero Soln Inhale 1  puff into the lungs 4 (four) times daily. 3 each 4    aspirin 81 MG Oral Tab EC Take 1 tablet (81 mg total) by mouth daily.        Past Medical History:    Acute bronchitis    Arthritis    Black stools    Blood in urine    Chest pain, unspecified    Chronic airway obstruction, not elsewhere classified    Chronic atrial fibrillation (HCC)    Congestive heart disease (HCC)    Congestive heart failure, unspecified    Constipation    COPD (chronic obstructive pulmonary disease) (HCC)    Coronary atherosclerosis of unspecified type of vessel, native or graft    Edema    Esophageal reflux    Factor XII deficiency disease (HCC)    Erlebrorn    Fatigue    Frequent use of laxatives    Generalized and unspecified atherosclerosis    Gout    Hematemesis    Irregular bowel habits    Leaking of urine    Painful swallowing    Parathyroid tumor    Wears glasses    Weight gain    West Nile Virus infection (HCC)      Social History:  Social History     Socioeconomic History    Marital status: OTHER    Number of children: 2   Occupational History    Occupation: Not Employed    Tobacco Use    Smoking status: Former     Current packs/day: 0.00     Average packs/day: 1 pack/day for 50.0 years (50.0 ttl pk-yrs)     Types: Cigarettes     Start date: 1960     Quit date: 2010     Years since quittin.4    Smokeless tobacco: Never    Tobacco comments:     quit a few years ago   Substance and Sexual Activity    Alcohol use: No     Alcohol/week: 0.0 standard drinks of alcohol    Drug use: No   Other Topics Concern    Caffeine Concern No     Comment: About 1 drink per day     Exercise No     Social Determinants of Health     Physical Activity: Inactive (2021)    Received from [facility]    Exercise Vital Sign     Days of Exercise per Week: 0 days     Minutes of Exercise per Session: 0 min    Received from [facility], [facility]    Social Connections     Received from [facility], [facility]    Housing Stability        REVIEW OF SYSTEMS:   GENERAL HEALTH: feels well otherwise  SKIN: denies any unusual skin lesions or rashes  RESPIRATORY: denies shortness of breath with exertion  CARDIOVASCULAR: denies chest pain on exertion  GI: denies abdominal pain and denies heartburn  NEURO: denies headaches    EXAM:   /58   Pulse 95   Temp 98.2 °F (36.8 °C) (Temporal)   Resp 24   SpO2 92%   GENERAL: well developed, well nourished,in no apparent distress  SKIN: no rashes,no suspicious lesions  HEENT: atraumatic, normocephalic,ears and throat are clear  NECK: supple,no adenopathy,no bruits  LUNGS: clear to auscultation  CARDIO: RRR without murmur  GI: good BS's,no masses, HSM or tenderness  EXTREMITIES: no cyanosis, clubbing or edema    ASSESSMENT AND PLAN:   .  Encounter Diagnoses   Name Primary?    Senile purpura (HCC) Yes    Avulsion of toenail, subsequent encounter    Continue zitrhromax liquid sent to the pharmacy. Bruising due to blood thinner.     No orders of the defined types were placed in this encounter.      Meds & Refills for this Visit:  Requested Prescriptions     Signed Prescriptions Disp Refills    azithromycin (ZITHROMAX) 200 MG/5ML Oral Recon Susp 39 mL 0     Sig: Take 13 mL (520 mg total) by mouth daily for 3 days.       Imaging & Consults:  None    Follow up as needed.     The patient indicates understanding of these issues and agrees to the plan.

## 2024-06-24 ENCOUNTER — PATIENT MESSAGE (OUTPATIENT)
Dept: FAMILY MEDICINE CLINIC | Facility: CLINIC | Age: 79
End: 2024-06-24

## 2024-06-24 ENCOUNTER — TELEPHONE (OUTPATIENT)
Dept: FAMILY MEDICINE CLINIC | Facility: CLINIC | Age: 79
End: 2024-06-24

## 2024-06-24 NOTE — TELEPHONE ENCOUNTER
From: Edith Eubanks  To: Mady Neff  Sent: 6/24/2024 12:51 PM CDT  Subject: Swelling in both legs     Left leg is leaking on the side and right leg is swelled ,worried about cellulitis

## 2024-06-24 NOTE — TELEPHONE ENCOUNTER
THINKS SHE HAS CELLULITIS IN BOTH LEGS AGAIN, SWELLING, LEFT LEG LEAKING, TOES ARE SORE, CALL PATIENT BACK, PATIENT HAS BEEN USING LATEX WRAPPINGS ON LEGS

## 2024-06-24 NOTE — TELEPHONE ENCOUNTER
From: Edith Eubanks  To: Mady Neff  Sent: 6/24/2024 12:55 PM CDT  Subject: Swelling in right leg     Right leg swelled and re

## 2024-06-24 NOTE — TELEPHONE ENCOUNTER
Patient said both her legs are swollen and the left one is huge. She said that left leg is leaking but she can't see her leg because it is on the side. She said she can't even get in the shower. She said,that she has one dose of Zithromax left and her toe hurts. She really wants Dr Neff to look at her leg today.   She also got bit by a bug over the weekend.  She said that she skipped two doses of the antibiotic. Should she take the last dose of antibiotics.

## 2024-06-25 ENCOUNTER — OFFICE VISIT (OUTPATIENT)
Dept: FAMILY MEDICINE CLINIC | Facility: CLINIC | Age: 79
End: 2024-06-25

## 2024-06-25 VITALS
OXYGEN SATURATION: 94 % | BODY MASS INDEX: 33 KG/M2 | SYSTOLIC BLOOD PRESSURE: 102 MMHG | RESPIRATION RATE: 24 BRPM | HEART RATE: 91 BPM | DIASTOLIC BLOOD PRESSURE: 60 MMHG | TEMPERATURE: 99 F | WEIGHT: 180 LBS

## 2024-06-25 DIAGNOSIS — R60.0 PERIPHERAL EDEMA: Primary | ICD-10-CM

## 2024-06-25 DIAGNOSIS — L03.116 CELLULITIS OF LEFT LEG: ICD-10-CM

## 2024-06-25 PROCEDURE — 3074F SYST BP LT 130 MM HG: CPT | Performed by: INTERNAL MEDICINE

## 2024-06-25 PROCEDURE — 1160F RVW MEDS BY RX/DR IN RCRD: CPT | Performed by: INTERNAL MEDICINE

## 2024-06-25 PROCEDURE — G2211 COMPLEX E/M VISIT ADD ON: HCPCS | Performed by: INTERNAL MEDICINE

## 2024-06-25 PROCEDURE — 1159F MED LIST DOCD IN RCRD: CPT | Performed by: INTERNAL MEDICINE

## 2024-06-25 PROCEDURE — 1170F FXNL STATUS ASSESSED: CPT | Performed by: INTERNAL MEDICINE

## 2024-06-25 PROCEDURE — 99214 OFFICE O/P EST MOD 30 MIN: CPT | Performed by: INTERNAL MEDICINE

## 2024-06-25 PROCEDURE — 3078F DIAST BP <80 MM HG: CPT | Performed by: INTERNAL MEDICINE

## 2024-06-25 RX ORDER — METOLAZONE 5 MG/1
5 TABLET ORAL DAILY
Qty: 30 TABLET | Refills: 0 | Status: SHIPPED | OUTPATIENT
Start: 2024-06-25 | End: 2024-07-25

## 2024-06-26 ENCOUNTER — TELEPHONE (OUTPATIENT)
Dept: FAMILY MEDICINE CLINIC | Facility: CLINIC | Age: 79
End: 2024-06-26

## 2024-06-26 NOTE — TELEPHONE ENCOUNTER
Patient stated that she took FUROSEMIDE 40 MG Oral Tab at 6:00am and she then took the metOLazone 5 MG Oral Tab and FUROSEMIDE 40 MG Oral Tabat at 11:00am and hasn't been able to use the bathroom after taking both medications. She said that she's feeling nauseous and has a headache.

## 2024-06-26 NOTE — TELEPHONE ENCOUNTER
FYI-Patient said that she was urinating this morning but then did not urinate until just recently after she took her pills. She said she had a headache. She wonders if she is dehydrated even though she is drinking fluids. She states her urine was clear.  She said she missed two doses of Furosemide yesterday. She said she missed one dose today because her mouth was very dry. She said she cannot take 4 Furosemide today, she will take 3 today. She said the swelling in her leg and feet is improved.   She said the podiatrist told her that her toe looks good but she should continue to apply Neosporin and keep it covered.

## 2024-06-26 NOTE — PROGRESS NOTES
Edith Eubanks is a 78 year old female.  HPI:   Pt has been having increased pain in the left leg, She had a toe nail removed and sees podiatry tomorrow in follow up. She has chronic peripheral edema L>R.    Current Outpatient Medications   Medication Sig Dispense Refill    metOLazone 5 MG Oral Tab Take 1 tablet (5 mg total) by mouth daily. 30 tablet 0    budeson-glycopyrrol-formoterol (BREZTRI AEROSPHERE) 160-9-4.8 MCG/ACT Inhalation Aerosol Inhale 2 puffs into the lungs 2 (two) times daily. 2 each 0    dilTIAZem HCl ER Beads 240 MG Oral Capsule SR 24 Hr Take 1 capsule (240 mg total) by mouth 2 (two) times daily. 180 capsule 3    Potassium Chloride ER 10 MEQ Oral Tab CR Take 2 tablets (20 mEq total) by mouth 2 (two) times daily. Take 2 tablets daily (enteric coated) 360 tablet 0    pravastatin 40 MG Oral Tab Take 1 tablet (40 mg total) by mouth nightly. 90 tablet 3    nitroglycerin 0.4 MG Sublingual SL Tab Place 1 tablet (0.4 mg total) under the tongue every 5 (five) minutes as needed for Chest pain. 25 tablet 0    metoprolol succinate ER 50 MG Oral Tablet 24 Hr Take 1 tablet (50 mg total) by mouth daily.      magnesium oxide 400 MG Oral Tab Take 1 tablet (400 mg total) by mouth daily. 90 tablet 1    ondansetron (ZOFRAN) 4 mg tablet Take 1 tablet (4 mg total) by mouth every 8 (eight) hours as needed for Nausea. 20 tablet 2    allopurinol 100 MG Oral Tab Take 1 tablet (100 mg total) by mouth daily. 90 tablet 3    FUROSEMIDE 40 MG Oral Tab TAKE 1 TABLET THREE TIMES DAILY 270 tablet 1    ipratropium-albuterol (COMBIVENT RESPIMAT)  MCG/ACT Inhalation Aero Soln Inhale 1 puff into the lungs 4 (four) times daily. 3 each 4    aspirin 81 MG Oral Tab EC Take 1 tablet (81 mg total) by mouth daily.      estradiol 0.1 MG/GM Vaginal Cream Place 1 g vaginally once a week. (Patient not taking: Reported on 3/27/2024)        Past Medical History:    Acute bronchitis    Arthritis    Black stools    Blood in urine    Chest  pain, unspecified    Chronic airway obstruction, not elsewhere classified    Chronic atrial fibrillation (HCC)    Congestive heart disease (HCC)    Congestive heart failure, unspecified    Constipation    COPD (chronic obstructive pulmonary disease) (HCC)    Coronary atherosclerosis of unspecified type of vessel, native or graft    Edema    Esophageal reflux    Factor XII deficiency disease (HCC)    Erlebrorn    Fatigue    Frequent use of laxatives    Generalized and unspecified atherosclerosis    Gout    Hematemesis    Irregular bowel habits    Leaking of urine    Painful swallowing    Parathyroid tumor    Wears glasses    Weight gain    West Nile Virus infection (HCC)      Social History:  Social History     Socioeconomic History    Marital status: OTHER    Number of children: 2   Occupational History    Occupation: Not Employed    Tobacco Use    Smoking status: Former     Current packs/day: 0.00     Average packs/day: 1 pack/day for 50.0 years (50.0 ttl pk-yrs)     Types: Cigarettes     Start date: 1960     Quit date: 2010     Years since quittin.4    Smokeless tobacco: Never    Tobacco comments:     quit a few years ago   Substance and Sexual Activity    Alcohol use: No     Alcohol/week: 0.0 standard drinks of alcohol    Drug use: No   Other Topics Concern    Caffeine Concern No     Comment: About 1 drink per day     Exercise No     Social Determinants of Health     Physical Activity: Inactive (2021)    [facility]    Exercise Vital Sign     Days of Exercise per Week: 0 days     Minutes of Exercise per Session: 0 min    Received from [facility], [facility]    Social Connections    Received from [facility], [facility]    Housing Stability        REVIEW OF SYSTEMS:   GENERAL HEALTH: feels well otherwise  SKIN: denies any unusual skin lesions or rashes  RESPIRATORY:  denies shortness of breath with exertion  CARDIOVASCULAR: denies chest pain on exertion  GI: denies abdominal pain and denies heartburn  NEURO: denies headaches    EXAM:   /60   Pulse 91   Temp 98.9 °F (37.2 °C) (Temporal)   Resp 24   Wt 180 lb (81.6 kg)   SpO2 94%   BMI 32.92 kg/m²   GENERAL: well developed, well nourished,in no apparent distress  SKIN: no rashes,no suspicious lesions  HEENT: atraumatic, normocephalic,ears and throat are clear  NECK: supple,no adenopathy,no bruits  LUNGS: clear to auscultation  CARDIO: RRR without murmur  GI: good BS's,no masses, HSM or tenderness  EXTREMITIES: no cyanosis, clubbing or edema    ASSESSMENT AND PLAN:     Encounter Diagnoses   Name Primary?    Peripheral edema Yes    Cellulitis of left leg    Boost diuretic potential with metolazone. She needs compression and close follow up. Hold on antibiotic for now due to resistance and difficulty swallowing.     No orders of the defined types were placed in this encounter.      Meds & Refills for this Visit:  Requested Prescriptions     Signed Prescriptions Disp Refills    metOLazone 5 MG Oral Tab 30 tablet 0     Sig: Take 1 tablet (5 mg total) by mouth daily.       Imaging & Consults:  None    Follow up as needed.     The patient indicates understanding of these issues and agrees to the plan.

## 2024-07-10 ENCOUNTER — TELEPHONE (OUTPATIENT)
Dept: FAMILY MEDICINE CLINIC | Facility: CLINIC | Age: 79
End: 2024-07-10

## 2024-07-10 NOTE — TELEPHONE ENCOUNTER
PATIENT CALLING- STATES HER LEG IS VERY SCALY AND DRY, WANTS TO KNOW IF SHE CAN APPLY COCONUT OIL ON IT?

## 2024-07-11 ENCOUNTER — TELEPHONE (OUTPATIENT)
Dept: FAMILY MEDICINE CLINIC | Facility: CLINIC | Age: 79
End: 2024-07-11

## 2024-07-11 NOTE — TELEPHONE ENCOUNTER
Patient said that she has been taking Furosemide 40mg three times daily, she decreased it from four times daily when she started the Metolazone. She said she was also supposed to be taking Metolazone once daily but she has only taken them 5 times.    She said the swelling has gotten better but she does have some pitting edema to the top of her leg.   She said she had a bad headache last night. She is drinking water and will add another Furosemide if needed.

## 2024-07-17 ENCOUNTER — TELEPHONE (OUTPATIENT)
Dept: FAMILY MEDICINE CLINIC | Facility: CLINIC | Age: 79
End: 2024-07-17

## 2024-07-17 ENCOUNTER — OFFICE VISIT (OUTPATIENT)
Dept: FAMILY MEDICINE CLINIC | Facility: CLINIC | Age: 79
End: 2024-07-17
Payer: MEDICARE

## 2024-07-17 VITALS
DIASTOLIC BLOOD PRESSURE: 66 MMHG | WEIGHT: 170.38 LBS | TEMPERATURE: 98 F | RESPIRATION RATE: 24 BRPM | OXYGEN SATURATION: 92 % | SYSTOLIC BLOOD PRESSURE: 108 MMHG | HEART RATE: 88 BPM | BODY MASS INDEX: 31 KG/M2

## 2024-07-17 DIAGNOSIS — N18.4 CKD (CHRONIC KIDNEY DISEASE) STAGE 4, GFR 15-29 ML/MIN (HCC): ICD-10-CM

## 2024-07-17 DIAGNOSIS — J43.2 CENTRILOBULAR EMPHYSEMA (HCC): Primary | ICD-10-CM

## 2024-07-17 DIAGNOSIS — I27.20 PULMONARY HTN (HCC): ICD-10-CM

## 2024-07-17 PROCEDURE — 3078F DIAST BP <80 MM HG: CPT | Performed by: INTERNAL MEDICINE

## 2024-07-17 PROCEDURE — 1160F RVW MEDS BY RX/DR IN RCRD: CPT | Performed by: INTERNAL MEDICINE

## 2024-07-17 PROCEDURE — 1159F MED LIST DOCD IN RCRD: CPT | Performed by: INTERNAL MEDICINE

## 2024-07-17 PROCEDURE — 3074F SYST BP LT 130 MM HG: CPT | Performed by: INTERNAL MEDICINE

## 2024-07-17 PROCEDURE — 94640 AIRWAY INHALATION TREATMENT: CPT | Performed by: INTERNAL MEDICINE

## 2024-07-17 PROCEDURE — 99214 OFFICE O/P EST MOD 30 MIN: CPT | Performed by: INTERNAL MEDICINE

## 2024-07-17 RX ORDER — ALBUTEROL SULFATE 2.5 MG/3ML
2.5 SOLUTION RESPIRATORY (INHALATION) ONCE
Status: COMPLETED | OUTPATIENT
Start: 2024-07-17 | End: 2024-07-17

## 2024-07-17 RX ADMIN — ALBUTEROL SULFATE 2.5 MG: 2.5 SOLUTION RESPIRATORY (INHALATION) at 15:12:00
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
## 2024-07-17 NOTE — TELEPHONE ENCOUNTER
Patient called with complaints of being unable to breathe, last noc at 9:50 pm BP was 80/37 right now it is 110/70 P 75 O288, legs swollen
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
## 2024-07-23 ENCOUNTER — TELEPHONE (OUTPATIENT)
Dept: FAMILY MEDICINE CLINIC | Facility: CLINIC | Age: 79
End: 2024-07-23

## 2024-07-23 ENCOUNTER — PATIENT MESSAGE (OUTPATIENT)
Dept: FAMILY MEDICINE CLINIC | Facility: CLINIC | Age: 79
End: 2024-07-23

## 2024-07-23 NOTE — TELEPHONE ENCOUNTER
From: Edith Eubanks  To: Mady Neff  Sent: 7/23/2024 12:20 PM CDT  Subject: Swelling and red pin prices on leg    Are the red pin prices caused from keeping it wrapped , and it getting hot

## 2024-07-23 NOTE — TELEPHONE ENCOUNTER
Patient said her skin is very dry around her ankle on the leg with the cellulitis so she used Coconut oil. She said her leg swelled and her hands are swollen where she touched the coconut oil. She said there are also little red pin prick marks on her leg where she has it wrapped.  She said she was on her feet all day yesterday. She said she is also short of breath and her O2 saturation dropped to the lower 80s when she took a shower. She said when she is in her house she feels very fatigued and her neighbor sunny the same thing when he is in her house. She said her oxygen went up to 93% on her oxygen.  She said that she is having panic attacks and is afraid of dying alone.  Should she take a Metolazone?

## 2024-07-24 ENCOUNTER — TELEPHONE (OUTPATIENT)
Dept: FAMILY MEDICINE CLINIC | Facility: CLINIC | Age: 79
End: 2024-07-24

## 2024-07-24 ENCOUNTER — OFFICE VISIT (OUTPATIENT)
Dept: FAMILY MEDICINE CLINIC | Facility: CLINIC | Age: 79
End: 2024-07-24
Payer: MEDICARE

## 2024-07-24 VITALS
DIASTOLIC BLOOD PRESSURE: 64 MMHG | SYSTOLIC BLOOD PRESSURE: 108 MMHG | TEMPERATURE: 98 F | OXYGEN SATURATION: 96 % | HEART RATE: 80 BPM | RESPIRATION RATE: 24 BRPM

## 2024-07-24 DIAGNOSIS — I25.719 CORONARY ARTERY DISEASE INVOLVING AUTOLOGOUS VEIN CORONARY BYPASS GRAFT WITH ANGINA PECTORIS (HCC): ICD-10-CM

## 2024-07-24 DIAGNOSIS — R60.0 PERIPHERAL EDEMA: Primary | ICD-10-CM

## 2024-07-24 DIAGNOSIS — J43.2 CENTRILOBULAR EMPHYSEMA (HCC): ICD-10-CM

## 2024-07-24 DIAGNOSIS — I27.20 PULMONARY HTN (HCC): ICD-10-CM

## 2024-07-24 PROCEDURE — 99214 OFFICE O/P EST MOD 30 MIN: CPT | Performed by: INTERNAL MEDICINE

## 2024-07-24 PROCEDURE — 3078F DIAST BP <80 MM HG: CPT | Performed by: INTERNAL MEDICINE

## 2024-07-24 PROCEDURE — 3074F SYST BP LT 130 MM HG: CPT | Performed by: INTERNAL MEDICINE

## 2024-07-24 PROCEDURE — G2211 COMPLEX E/M VISIT ADD ON: HCPCS | Performed by: INTERNAL MEDICINE

## 2024-07-24 PROCEDURE — 1170F FXNL STATUS ASSESSED: CPT | Performed by: INTERNAL MEDICINE

## 2024-07-24 RX ORDER — ALBUTEROL SULFATE AND BUDESONIDE 90; 80 UG/1; UG/1
2 AEROSOL, METERED RESPIRATORY (INHALATION) 2 TIMES DAILY
Qty: 1 G | Refills: 0 | COMMUNITY
Start: 2024-07-24

## 2024-07-24 RX ORDER — FLUTICASONE PROPIONATE 50 MCG
2 SPRAY, SUSPENSION (ML) NASAL DAILY
Qty: 1 EACH | Refills: 0 | Status: SHIPPED | OUTPATIENT
Start: 2024-07-24 | End: 2024-08-23

## 2024-07-24 NOTE — TELEPHONE ENCOUNTER
Patient was given an Airspura inhaler today. She asked if she should continue the Combivent inhaler

## 2024-07-25 ENCOUNTER — TELEPHONE (OUTPATIENT)
Dept: FAMILY MEDICINE CLINIC | Facility: CLINIC | Age: 79
End: 2024-07-25

## 2024-07-25 RX ORDER — FUROSEMIDE 40 MG/1
40 TABLET ORAL 3 TIMES DAILY
Qty: 270 TABLET | Refills: 3 | Status: SHIPPED | OUTPATIENT
Start: 2024-07-25

## 2024-07-25 NOTE — TELEPHONE ENCOUNTER
Patient picked up some medicine yesterday and she said she cannot take it. She woke up this morning and was very off balance, said she is feeling better now but wants to speak with the nurse about this medicine.

## 2024-07-25 NOTE — TELEPHONE ENCOUNTER
Hypertension Medications Protocol [id number]/25/2024 11:26 AM   Protocol Details CMP or BMP in past 12 months    Last BP reading less than 140/90    In person appointment or virtual visit in the past 12 mos or appointment in next 3 mos    EGFRCR or GFRNAA > 50     Last visit 7/24/25  No future appointments.  Last refill 10/02/2023

## 2024-07-25 NOTE — PROGRESS NOTES
Edith Eubanks is a 78 year old female.  HPI:   Pt has been having increased swelling in the legs.  It has been raining and she has a wet basement and when this happens it is very difficult for her to breath due to the mold spores.  She has lived in this home for 20 years and reluctant to leave. She has no central air.  She keeps her left lower leg wrapped to prevent oozing and uses lasix 3 x a day.   Current Outpatient Medications   Medication Sig Dispense Refill    fluticasone propionate 50 MCG/ACT Nasal Suspension 2 sprays by Each Nare route daily. 1 each 0    Albuterol-Budesonide (AIRSUPRA) 90-80 MCG/ACT Inhalation Aerosol Inhale 2 Inhalations into the lungs in the morning and 2 Inhalations before bedtime. 1 g 0    metOLazone 5 MG Oral Tab Take 1 tablet (5 mg total) by mouth daily. 30 tablet 0    budeson-glycopyrrol-formoterol (BREZTRI AEROSPHERE) 160-9-4.8 MCG/ACT Inhalation Aerosol Inhale 2 puffs into the lungs 2 (two) times daily. 2 each 0    dilTIAZem HCl ER Beads 240 MG Oral Capsule SR 24 Hr Take 1 capsule (240 mg total) by mouth 2 (two) times daily. 180 capsule 3    pravastatin 40 MG Oral Tab Take 1 tablet (40 mg total) by mouth nightly. 90 tablet 3    nitroglycerin 0.4 MG Sublingual SL Tab Place 1 tablet (0.4 mg total) under the tongue every 5 (five) minutes as needed for Chest pain. 25 tablet 0    estradiol 0.1 MG/GM Vaginal Cream Place 1 g vaginally once a week. (Patient not taking: Reported on 3/27/2024)      metoprolol succinate ER 50 MG Oral Tablet 24 Hr Take 1 tablet (50 mg total) by mouth daily.      magnesium oxide 400 MG Oral Tab Take 1 tablet (400 mg total) by mouth daily. 90 tablet 1    ondansetron (ZOFRAN) 4 mg tablet Take 1 tablet (4 mg total) by mouth every 8 (eight) hours as needed for Nausea. 20 tablet 2    allopurinol 100 MG Oral Tab Take 1 tablet (100 mg total) by mouth daily. 90 tablet 3    FUROSEMIDE 40 MG Oral Tab TAKE 1 TABLET THREE TIMES DAILY 270 tablet 1     ipratropium-albuterol (COMBIVENT RESPIMAT)  MCG/ACT Inhalation Aero Soln Inhale 1 puff into the lungs 4 (four) times daily. 3 each 4    aspirin 81 MG Oral Tab EC Take 1 tablet (81 mg total) by mouth daily.        Past Medical History:    Acute bronchitis    Arthritis    Black stools    Blood in urine    Chest pain, unspecified    Chronic airway obstruction, not elsewhere classified    Chronic atrial fibrillation (HCC)    Congestive heart disease (HCC)    Congestive heart failure, unspecified    Constipation    COPD (chronic obstructive pulmonary disease) ([facility])    Coronary atherosclerosis of unspecified type of vessel, native or graft    Edema    Esophageal reflux    Factor XII deficiency disease ([facility])    Erlebrorn    Fatigue    Frequent use of laxatives    Generalized and unspecified atherosclerosis    Gout    Hematemesis    Irregular bowel habits    Leaking of urine    Painful swallowing    Parathyroid tumor    Wears glasses    Weight gain    West Nile Virus infection ([facility])      Social History:  Social History     Socioeconomic History    Marital status: OTHER    Number of children: 2   Occupational History    Occupation: Not Employed    Tobacco Use    Smoking status: Former     Current packs/day: 0.00     Average packs/day: 1 pack/day for 50.0 years (50.0 ttl pk-yrs)     Types: Cigarettes     Start date: 1960     Quit date: 2010     Years since quittin.5    Smokeless tobacco: Never    Tobacco comments:     quit a few years ago   Substance and Sexual Activity    Alcohol use: No     Alcohol/week: 0.0 standard drinks of alcohol    Drug use: No   Other Topics Concern    Caffeine Concern No     Comment: About 1 drink per day     Exercise No     Social Determinants of Health     Physical Activity: Inactive (2021)    Received [facility], [facility]    Exercise Vital Sign     Days of Exercise per Week: 0 days     Minutes of Exercise per Session: 0 min    Received from  [facility], [facility]    Social Connections    Received from [facility], [facility]    Housing Stability        REVIEW OF SYSTEMS:   GENERAL HEALTH: feels well otherwise  SKIN: denies any unusual skin lesions or rashes  RESPIRATORY: denies shortness of breath with exertion  CARDIOVASCULAR: denies chest pain on exertion  GI: denies abdominal pain and denies heartburn  NEURO: denies headaches    EXAM:   /64   Pulse 80   Temp 98 °F (36.7 °C) (Temporal)   Resp 24   SpO2 96%   GENERAL: well developed, well nourished,in no apparent distress  SKIN: no rashes,no suspicious lesions  HEENT: atraumatic, normocephalic,ears and throat are clear  NECK: supple,no adenopathy,no bruits  LUNGS: clear to auscultation  CARDIO: RRR without murmur  GI: good BS's,no masses, HSM or tenderness  EXTREMITIES: no cyanosis, clubbing or edema    ASSESSMENT AND PLAN:     Encounter Diagnoses   Name Primary?    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 No infection, trouble with mold spores in her home and may use nose spray or albuterol.  I encouraged her to look for more comfortable housing, but she has lived there 20 yers and is reluctant to move.   No orders of the defined types were placed in this encounter.      Meds & Refills for this Visit:  Requested Prescriptions     Signed Prescriptions Disp Refills    fluticasone propionate 50 MCG/ACT Nasal Suspension 1 each 0     Si sprays by Each Nare route daily.    Albuterol-Budesonide (AIRSUPRA) 90-80 MCG/ACT Inhalation Aerosol 1 g 0     Sig: Inhale 2 Inhalations into the lungs in the morning and 2 Inhalations before bedtime.       Imaging & Consults:  None    Follow up as needed.     The patient indicates understanding of  these issues and agrees to the plan.

## 2024-07-25 NOTE — TELEPHONE ENCOUNTER
Patient said she called the on call doctor this morning because she was \"falling over\" and lost her balance. She said she can smell mold on her apartment.  She said she feels lousy.   She said that the ENT  prescribed Flonase for her in the past and she had a problem with it so she is not going to take it.

## 2024-08-03 ENCOUNTER — TELEPHONE (OUTPATIENT)
Dept: FAMILY MEDICINE CLINIC | Facility: CLINIC | Age: 79
End: 2024-08-03

## 2024-08-03 NOTE — TELEPHONE ENCOUNTER
Having trouble swallowing.  She uses her combivent; won't use nose spray because had problem in the past with it, but doesn't remember exactly what it was.  Her nose is very dry from having a fan blow on her all the time.  She can't even get water down.  Suggested needs to go to the ER.  She can't go now because waiting for  to call her.  If worsens can call 911.  Expresses understanding.

## 2024-08-08 ENCOUNTER — TELEPHONE (OUTPATIENT)
Dept: FAMILY MEDICINE CLINIC | Facility: CLINIC | Age: 79
End: 2024-08-08

## 2024-08-08 ENCOUNTER — OFFICE VISIT (OUTPATIENT)
Dept: FAMILY MEDICINE CLINIC | Facility: CLINIC | Age: 79
End: 2024-08-08
Payer: MEDICARE

## 2024-08-08 VITALS
BODY MASS INDEX: 32 KG/M2 | WEIGHT: 173 LBS | TEMPERATURE: 98 F | OXYGEN SATURATION: 93 % | RESPIRATION RATE: 20 BRPM | HEART RATE: 61 BPM | SYSTOLIC BLOOD PRESSURE: 100 MMHG | DIASTOLIC BLOOD PRESSURE: 50 MMHG

## 2024-08-08 DIAGNOSIS — L03.818 CELLULITIS OF OTHER SPECIFIED SITE: Primary | ICD-10-CM

## 2024-08-08 PROCEDURE — 1160F RVW MEDS BY RX/DR IN RCRD: CPT

## 2024-08-08 PROCEDURE — 1170F FXNL STATUS ASSESSED: CPT

## 2024-08-08 PROCEDURE — 1125F AMNT PAIN NOTED PAIN PRSNT: CPT

## 2024-08-08 PROCEDURE — 3078F DIAST BP <80 MM HG: CPT

## 2024-08-08 PROCEDURE — 99214 OFFICE O/P EST MOD 30 MIN: CPT

## 2024-08-08 PROCEDURE — 1159F MED LIST DOCD IN RCRD: CPT

## 2024-08-08 PROCEDURE — 3074F SYST BP LT 130 MM HG: CPT

## 2024-08-08 RX ORDER — CLINDAMYCIN PALMITATE HYDROCHLORIDE 75 MG/5ML
300 SOLUTION ORAL 3 TIMES DAILY
Qty: 600 ML | Refills: 0 | Status: SHIPPED | OUTPATIENT
Start: 2024-08-08 | End: 2024-08-08

## 2024-08-08 RX ORDER — CLINDAMYCIN HYDROCHLORIDE 300 MG/1
300 CAPSULE ORAL 3 TIMES DAILY
Qty: 30 CAPSULE | Refills: 0 | Status: SHIPPED | OUTPATIENT
Start: 2024-08-08 | End: 2024-08-18

## 2024-08-08 RX ORDER — POTASSIUM CHLORIDE 750 MG/1
20 TABLET, EXTENDED RELEASE ORAL DAILY
Qty: 60 TABLET | Refills: 0 | Status: SHIPPED | OUTPATIENT
Start: 2024-08-08

## 2024-08-08 NOTE — PROGRESS NOTES
Edith Eubanks is a 79 year old female.  HPI:     Patient in office for bilateral lower extremity edema that got worse 2 days ago. She has redness and swelling to her left lower leg that is warm to touch.     Current Outpatient Medications   Medication Sig Dispense Refill    potassium chloride 10 MEQ Oral Tab CR Take 2 tablets (20 mEq total) by mouth daily. 60 tablet 0    FUROSEMIDE 40 MG Oral Tab TAKE 1 TABLET THREE TIMES DAILY 270 tablet 3    fluticasone propionate 50 MCG/ACT Nasal Suspension 2 sprays by Each Nare route daily. 1 each 0    Albuterol-Budesonide (AIRSUPRA) 90-80 MCG/ACT Inhalation Aerosol Inhale 2 Inhalations into the lungs in the morning and 2 Inhalations before bedtime. 1 g 0    budeson-glycopyrrol-formoterol (BREZTRI AEROSPHERE) 160-9-4.8 MCG/ACT Inhalation Aerosol Inhale 2 puffs into the lungs 2 (two) times daily. 2 each 0    dilTIAZem HCl ER Beads 240 MG Oral Capsule SR 24 Hr Take 1 capsule (240 mg total) by mouth 2 (two) times daily. 180 capsule 3    pravastatin 40 MG Oral Tab Take 1 tablet (40 mg total) by mouth nightly. 90 tablet 3    nitroglycerin 0.4 MG Sublingual SL Tab Place 1 tablet (0.4 mg total) under the tongue every 5 (five) minutes as needed for Chest pain. 25 tablet 0    estradiol 0.1 MG/GM Vaginal Cream Place 1 g vaginally once a week. (Patient not taking: Reported on 3/27/2024)      metoprolol succinate ER 50 MG Oral Tablet 24 Hr Take 1 tablet (50 mg total) by mouth daily.      magnesium oxide 400 MG Oral Tab Take 1 tablet (400 mg total) by mouth daily. 90 tablet 1    ondansetron (ZOFRAN) 4 mg tablet Take 1 tablet (4 mg total) by mouth every 8 (eight) hours as needed for Nausea. 20 tablet 2    allopurinol 100 MG Oral Tab Take 1 tablet (100 mg total) by mouth daily. 90 tablet 3    ipratropium-albuterol (COMBIVENT RESPIMAT)  MCG/ACT Inhalation Aero Soln Inhale 1 puff into the lungs 4 (four) times daily. 3 each 4    aspirin 81 MG Oral Tab EC Take 1 tablet (81 mg total) by  mouth daily.        Past Medical History:    Acute bronchitis    Arthritis    Black stools    Blood in urine    Chest pain, unspecified    Chronic airway obstruction, not elsewhere classified    Chronic atrial fibrillation (HCC)    Congestive heart disease (HCC)    Congestive heart failure, unspecified    Constipation    COPD (chronic obstructive pulmonary disease) (HCC)    Coronary atherosclerosis of unspecified type of vessel, native or graft    Edema    Esophageal reflux    Factor XII deficiency disease (HCC)    Erlebrorn    Fatigue    Frequent use of laxatives    Generalized and unspecified atherosclerosis    Gout    Hematemesis    Irregular bowel habits    Leaking of urine    Painful swallowing    Parathyroid tumor    Wears glasses    Weight gain    West Nile Virus infection (HCC)      Social History:  Social History     Socioeconomic History    Marital status: OTHER    Number of children: 2   Occupational History    Occupation: Not Employed    Tobacco Use    Smoking status: Former     Current packs/day: 0.00     Average packs/day: 1 pack/day for 50.0 years (50.0 ttl pk-yrs)     Types: Cigarettes     Start date: 1960     Quit date: 2010     Years since quittin.6    Smokeless tobacco: Never    Tobacco comments:     quit a few years ago   Substance and Sexual Activity    Alcohol use: No     Alcohol/week: 0.0 standard drinks of alcohol    Drug use: No   Other Topics Concern    Caffeine Concern No     Comment: About 1 drink per day     Exercise No     Social Determinants of Health     Physical Activity: Inactive (2021)    [facility]    Exercise Vital Sign     Days of Exercise per Week: 0 days     Minutes of Exercise per Session: 0 min    [facility] from [facility], [facility]    Social Connections    Received from [facility], [facility]    Housing Stability           REVIEW OF SYSTEMS:     Review of Systems   Constitutional: Negative.    HENT: Negative.     Eyes: Negative.    Respiratory: Negative.     Cardiovascular: Negative.    Gastrointestinal: Negative.    Endocrine: Negative.    Genitourinary: Negative.    Musculoskeletal: Negative.    Skin:  Positive for color change (see hpi).   Allergic/Immunologic: Negative.    Neurological: Negative.    Hematological: Negative.    Psychiatric/Behavioral: Negative.         EXAM:   /50 (BP Location: Right arm, Patient Position: Sitting, Cuff Size: adult)   Pulse 61   Temp 98.1 °F (36.7 °C) (Temporal)   Resp 20   Wt 173 lb (78.5 kg)   SpO2 93%   BMI 31.64 kg/m²     Physical Exam  Constitutional:       Appearance: Normal appearance.   HENT:      Head: Normocephalic and atraumatic.      Nose: Nose normal.      Mouth/Throat:      Mouth: Mucous membranes are moist.      Pharynx: Oropharynx is clear.   Eyes:      Extraocular Movements: Extraocular movements intact.      Conjunctiva/sclera: Conjunctivae normal.      Pupils: Pupils are equal, round, and reactive to light.   Cardiovascular:      Rate and Rhythm: Normal rate and regular rhythm.      Pulses: Normal pulses.      Heart sounds: Normal heart sounds.   Pulmonary:      Effort: Pulmonary effort is normal.      Breath sounds: Normal breath sounds.   Musculoskeletal:         General: Normal range of motion.      Cervical back: Normal range of motion.   Skin:     General: Skin is warm and dry.      Capillary Refill: Capillary refill takes less than 2 seconds.      Findings: Erythema (to right lower leg with warmth) present.   Neurological:      Mental Status: She is alert and oriented to person, place, and time.   Psychiatric:         Mood and Affect: Mood normal.         Behavior: Behavior normal.          ASSESSMENT AND PLAN:     1. Cellulitis of other specified site  Clindamycin sent to pharmacy and instructions provided.  Patient assessed with primary care hysician   Mady Neff.  - clindamycin 300 MG Oral Cap; Take 1 capsule (300 mg total) by mouth 3 (three) times daily for 10 days.  Dispense: 30 capsule; Refill: 0    The patient indicates understanding of these issues and agrees to the plan.      Rubi Barreto, APRN  8/8/2024

## 2024-08-08 NOTE — TELEPHONE ENCOUNTER
Patient called and on the 3rd and 6th patient only took 3 FUROSEMIDE 40 MG Oral Tab, the 2nd, 4th, 5th and 7th patient took 4 FUROSEMIDE 40 MG Oral Tab and stated that she can't get the swelling in left foot and both legs to go down. Patient took 1 FUROSEMIDE 40 MG Oral Tab this morning at 6:30am.  Patient also stated that she can't stay awake if she's in the house.

## 2024-08-08 NOTE — TELEPHONE ENCOUNTER
Patient said that both of her legs are very swollen. She said her left leg is very swollen and she has pitting edema. She said she has had to take 4 Furosemide and is going to take a Metolazone. She has an appointment with Rubi OLIVER this afternoon but she wants to see Dr Neff. Patient advised Dr Neff can come in the room and see her.   She said she keeps falling asleep and thinks she needs her apartment inspected. She does have a carbon monoxide detector.    She spilled her mail order Potassium allover the floor. Can she get a short supply of Potassium to [facility] in [place].  She takes two of the Potassium 10MEQ daily.

## 2024-08-08 NOTE — TELEPHONE ENCOUNTER
Patient called back and said that she is having difficulty swallowing and breathing due to the mucous in her throat. She said that she is able to swallow water. She states her oxygen saturation is 92% and she is on 2 L of oxygen.   When asked if I can call 911 for her and she said no. She was talking without difficulty.   She states she has not been taking Mucinex because the pharmacist refuses to give it to her due to her high blood pressure. She has not been using the nasal spray Dr Neff prescribed because she is afraid. She will bring the nose spray with her.

## 2024-08-10 ENCOUNTER — TELEPHONE (OUTPATIENT)
Dept: FAMILY MEDICINE CLINIC | Facility: CLINIC | Age: 79
End: 2024-08-10

## 2024-08-10 RX ORDER — CEPHALEXIN 500 MG/1
500 CAPSULE ORAL 3 TIMES DAILY
Qty: 30 CAPSULE | Refills: 0 | OUTPATIENT
Start: 2024-08-10 | End: 2024-08-20

## 2024-08-10 RX ORDER — CEPHALEXIN 500 MG/1
500 CAPSULE ORAL 3 TIMES DAILY
Qty: 30 CAPSULE | Refills: 0 | Status: SHIPPED | OUTPATIENT
Start: 2024-08-10 | End: 2024-08-20

## 2024-08-10 NOTE — TELEPHONE ENCOUNTER
None of those vaccines cause blood clots, 2 clinda a day if fine, if she wants to take keflex I can send it in for her.

## 2024-08-10 NOTE — TELEPHONE ENCOUNTER
Patient said that when she previously had cellulitis the doctor at [facility] prescribed Cephalexin. She has 26 of them there. She said when she took 4 daily it made her nauseated. She asked if she can take 2 of the Cephalexin daily. Patient advised we have Cephalosporins list ed on her allergy list as having hives.   She said that the Clindamycin made her mouth and throat burn in the past. She said she took one and she felt a burning feeling up into her nose and she was extremely sick to her stomach. She said she did open the capsule. She wants this added as an allergy!  Patient said that she she keeps getting notifications that she is due for a Pneumonia, RSV, and Influenza. She states she is hesitant to get any of these because she read they cause blood clots.

## 2024-08-10 NOTE — TELEPHONE ENCOUNTER
Patient reports that she can not take the meds dr almonte prescribed due to know being able to swallow and the medication burning her throat and mouth, she was given a medication at [facility] that did not have these affects and would like to talk to nickie to go over this

## 2024-08-10 NOTE — TELEPHONE ENCOUNTER
Patient advised. She said the pharmacist will not give her the Cephalexin but she does have some at home. She said she did not have hives the last time she took it. She will try to take one this weekend. Patient advised to stop it and notify us if she has any adverse reactions. She will update us Monday V.O. Dr Neff

## 2024-08-13 ENCOUNTER — TELEPHONE (OUTPATIENT)
Dept: FAMILY MEDICINE CLINIC | Facility: CLINIC | Age: 79
End: 2024-08-13

## 2024-08-13 ENCOUNTER — PATIENT MESSAGE (OUTPATIENT)
Dept: FAMILY MEDICINE CLINIC | Facility: CLINIC | Age: 79
End: 2024-08-13

## 2024-08-13 NOTE — TELEPHONE ENCOUNTER
From: Edith Eubanks  To: Mady Neff  Sent: 8/13/2024 3:04 PM CDT  Subject: Swelling and redness in left leg    Wanting to know what to do ,not as hot as last visit swelling same I think , foot swelled bad also

## 2024-08-13 NOTE — TELEPHONE ENCOUNTER
Patient said that she is not going to take the Cephalexin because she remembers that her mouth and face swelled up when she took it last time. She said that the redness in her leg is better but it is still very swollen and slightly warm. She will try to take a picture and send it through [employer] this afternoon.    · Hold home medications at present  · Psychiatry consult in AM

## 2024-08-15 ENCOUNTER — PATIENT MESSAGE (OUTPATIENT)
Dept: FAMILY MEDICINE CLINIC | Facility: CLINIC | Age: 79
End: 2024-08-15

## 2024-08-15 ENCOUNTER — TELEPHONE (OUTPATIENT)
Dept: FAMILY MEDICINE CLINIC | Facility: CLINIC | Age: 79
End: 2024-08-15

## 2024-08-15 NOTE — TELEPHONE ENCOUNTER
Patient said she can't get a shoe on her left leg. She said it is extremely swollen and red. She said it feels warmer to the touch. She said her temperature was 99.9 yesterday.   She said that the first time she had cellulitis she was placed on Cephalexin and developed a rash and swollen throat after 14 doses.

## 2024-08-15 NOTE — TELEPHONE ENCOUNTER
From: Edith Eubanks  To: Mady Neff  Sent: 8/15/2024 5:07 PM CDT  Subject: Foot and leg     Leg swelled feels tight foot also

## 2024-08-15 NOTE — TELEPHONE ENCOUNTER
PATIENT CALLING- STATES LEG AND FOOT IS VERY SWOLLEN, FEELS LIKE IT'S GOING TO EXPLODE. RUNNING A FEVER. DOS NOT FEEL GOOD. THINKS SHE NEEDS TO SEE DR. PINO BUT WOULD LIKE TO SPEAK TO NURSE FIRST.

## 2024-08-24 ENCOUNTER — PATIENT MESSAGE (OUTPATIENT)
Dept: FAMILY MEDICINE CLINIC | Facility: CLINIC | Age: 79
End: 2024-08-24

## 2024-08-24 ENCOUNTER — TELEPHONE (OUTPATIENT)
Dept: FAMILY MEDICINE CLINIC | Facility: CLINIC | Age: 79
End: 2024-08-24

## 2024-08-24 NOTE — TELEPHONE ENCOUNTER
From: Edith Eubanks  To: Mady Neff  Sent: 8/24/2024 11:06 AM CDT  Subject: Bad leg swelled , good leg swelled     Left leg hot sore to touch pitting , good leg swelled and pitting

## 2024-08-24 NOTE — TELEPHONE ENCOUNTER
From: Edith Eubanks  To: Mady Neff  Sent: 8/24/2024 11:36 AM CDT  Subject: Moisturizer for leg     What can I use to moisturize leg , I have amino and coconut oil

## 2024-08-24 NOTE — TELEPHONE ENCOUNTER
Patient said both legs are swollen and has pitting edema.  She said that she has had back pain this last week. She is taking Tylenol but it doesn't help much. She is not urinating as much as usual but she denies dysuria. She said her stools had been black this last week but now they are back to almost normal color.

## 2024-08-27 ENCOUNTER — TELEPHONE (OUTPATIENT)
Dept: FAMILY MEDICINE CLINIC | Facility: CLINIC | Age: 79
End: 2024-08-27

## 2024-08-27 ENCOUNTER — LABORATORY ENCOUNTER (OUTPATIENT)
Dept: LAB | Age: 79
End: 2024-08-27
Attending: INTERNAL MEDICINE
Payer: MEDICARE

## 2024-08-27 ENCOUNTER — OFFICE VISIT (OUTPATIENT)
Dept: FAMILY MEDICINE CLINIC | Facility: CLINIC | Age: 79
End: 2024-08-27
Payer: MEDICARE

## 2024-08-27 VITALS
RESPIRATION RATE: 24 BRPM | TEMPERATURE: 99 F | OXYGEN SATURATION: 92 % | HEART RATE: 102 BPM | BODY MASS INDEX: 30 KG/M2 | WEIGHT: 166.38 LBS | DIASTOLIC BLOOD PRESSURE: 74 MMHG | SYSTOLIC BLOOD PRESSURE: 118 MMHG

## 2024-08-27 DIAGNOSIS — I50.20 SYSTOLIC HEART FAILURE, UNSPECIFIED HF CHRONICITY (HCC): ICD-10-CM

## 2024-08-27 DIAGNOSIS — R60.0 PERIPHERAL EDEMA: ICD-10-CM

## 2024-08-27 DIAGNOSIS — R60.0 PERIPHERAL EDEMA: Primary | ICD-10-CM

## 2024-08-27 DIAGNOSIS — R25.2 MUSCLE CRAMPING: ICD-10-CM

## 2024-08-27 DIAGNOSIS — K92.1 MELENA: ICD-10-CM

## 2024-08-27 LAB
ALBUMIN SERPL-MCNC: 4.8 G/DL (ref 3.2–4.8)
ALBUMIN/GLOB SERPL: 1.8 {RATIO} (ref 1–2)
ALP LIVER SERPL-CCNC: 108 U/L
ALT SERPL-CCNC: 12 U/L
ANION GAP SERPL CALC-SCNC: 9 MMOL/L (ref 0–18)
AST SERPL-CCNC: 19 U/L (ref ?–34)
BASOPHILS # BLD AUTO: 0.02 X10(3) UL (ref 0–0.2)
BASOPHILS NFR BLD AUTO: 0.3 %
BILIRUB SERPL-MCNC: 0.6 MG/DL (ref 0.2–1.1)
BUN BLD-MCNC: 37 MG/DL (ref 9–23)
CALCIUM BLD-MCNC: 9.9 MG/DL (ref 8.7–10.4)
CEA SERPL-MCNC: 3.3 NG/ML (ref ?–5)
CHLORIDE SERPL-SCNC: 91 MMOL/L (ref 98–112)
CO2 SERPL-SCNC: 35 MMOL/L (ref 21–32)
CREAT BLD-MCNC: 1.36 MG/DL
EGFRCR SERPLBLD CKD-EPI 2021: 40 ML/MIN/1.73M2 (ref 60–?)
EOSINOPHIL # BLD AUTO: 0.24 X10(3) UL (ref 0–0.7)
EOSINOPHIL NFR BLD AUTO: 3 %
ERYTHROCYTE [DISTWIDTH] IN BLOOD BY AUTOMATED COUNT: 16.8 %
EST. AVERAGE GLUCOSE BLD GHB EST-MCNC: 114 MG/DL (ref 68–126)
GLOBULIN PLAS-MCNC: 2.6 G/DL (ref 2–3.5)
GLUCOSE BLD-MCNC: 96 MG/DL (ref 70–99)
HBA1C MFR BLD: 5.6 % (ref ?–5.7)
HCT VFR BLD AUTO: 36.2 %
HGB BLD-MCNC: 11.5 G/DL
IMM GRANULOCYTES # BLD AUTO: 0.05 X10(3) UL (ref 0–1)
IMM GRANULOCYTES NFR BLD: 0.6 %
LYMPHOCYTES # BLD AUTO: 1.12 X10(3) UL (ref 1–4)
LYMPHOCYTES NFR BLD AUTO: 14 %
MCH RBC QN AUTO: 25.8 PG (ref 26–34)
MCHC RBC AUTO-ENTMCNC: 31.8 G/DL (ref 31–37)
MCV RBC AUTO: 81.2 FL
MONOCYTES # BLD AUTO: 0.8 X10(3) UL (ref 0.1–1)
MONOCYTES NFR BLD AUTO: 10 %
NEUTROPHILS # BLD AUTO: 5.77 X10 (3) UL (ref 1.5–7.7)
NEUTROPHILS # BLD AUTO: 5.77 X10(3) UL (ref 1.5–7.7)
NEUTROPHILS NFR BLD AUTO: 72.1 %
NT-PROBNP SERPL-MCNC: 671 PG/ML (ref ?–450)
OSMOLALITY SERPL CALC.SUM OF ELEC: 289 MOSM/KG (ref 275–295)
PLATELET # BLD AUTO: 194 10(3)UL (ref 150–450)
POTASSIUM SERPL-SCNC: 3.1 MMOL/L (ref 3.5–5.1)
PROT SERPL-MCNC: 7.4 G/DL (ref 5.7–8.2)
RBC # BLD AUTO: 4.46 X10(6)UL
SODIUM SERPL-SCNC: 135 MMOL/L (ref 136–145)
WBC # BLD AUTO: 8 X10(3) UL (ref 4–11)

## 2024-08-27 PROCEDURE — 83880 ASSAY OF NATRIURETIC PEPTIDE: CPT

## 2024-08-27 PROCEDURE — 1170F FXNL STATUS ASSESSED: CPT | Performed by: INTERNAL MEDICINE

## 2024-08-27 PROCEDURE — 82378 CARCINOEMBRYONIC ANTIGEN: CPT

## 2024-08-27 PROCEDURE — G2211 COMPLEX E/M VISIT ADD ON: HCPCS | Performed by: INTERNAL MEDICINE

## 2024-08-27 PROCEDURE — 83036 HEMOGLOBIN GLYCOSYLATED A1C: CPT

## 2024-08-27 PROCEDURE — 85025 COMPLETE CBC W/AUTO DIFF WBC: CPT

## 2024-08-27 PROCEDURE — 1159F MED LIST DOCD IN RCRD: CPT | Performed by: INTERNAL MEDICINE

## 2024-08-27 PROCEDURE — 1160F RVW MEDS BY RX/DR IN RCRD: CPT | Performed by: INTERNAL MEDICINE

## 2024-08-27 PROCEDURE — 3078F DIAST BP <80 MM HG: CPT | Performed by: INTERNAL MEDICINE

## 2024-08-27 PROCEDURE — 99214 OFFICE O/P EST MOD 30 MIN: CPT | Performed by: INTERNAL MEDICINE

## 2024-08-27 PROCEDURE — 80053 COMPREHEN METABOLIC PANEL: CPT

## 2024-08-27 PROCEDURE — 36415 COLL VENOUS BLD VENIPUNCTURE: CPT

## 2024-08-27 PROCEDURE — 3074F SYST BP LT 130 MM HG: CPT | Performed by: INTERNAL MEDICINE

## 2024-08-27 NOTE — TELEPHONE ENCOUNTER
Patient said that she is coughing and her head is spinning. She said that the back of her head hurts. She feels sick to her stomach. She said that she has been drinking sparkling water. She said she told Dr Neff about this this morning and she didn't tell her what to do. She said she can't use the nasal spray because it dries out her nose. She said her oxygen saturation is 84% and she is on 2 Liters of oxygen and her heart rate is 99.  She said she doesn't know if she is sick from the bug bites she has.   She said she feels like she is going to fall over. She said she cannot afford to go to the ER.

## 2024-08-27 NOTE — PROGRESS NOTES
Edith Eubanks is a 79 year old female.  HPI:   Pt has been having more swelling in the feet, left  greater than right.  She is taking more diuretic than usual, 4 lasix a day and metalozone. She has a dry hacking cough. She has no fever, but feels tired and run down.  The weather has been hot and humid, this is the worst weather for her COPD.    Current Outpatient Medications   Medication Sig Dispense Refill    potassium chloride 10 MEQ Oral Tab CR Take 2 tablets (20 mEq total) by mouth daily. 60 tablet 0    FUROSEMIDE 40 MG Oral Tab TAKE 1 TABLET THREE TIMES DAILY 270 tablet 3    Albuterol-Budesonide (AIRSUPRA) 90-80 MCG/ACT Inhalation Aerosol Inhale 2 Inhalations into the lungs in the morning and 2 Inhalations before bedtime. 1 g 0    budeson-glycopyrrol-formoterol (BREZTRI AEROSPHERE) 160-9-4.8 MCG/ACT Inhalation Aerosol Inhale 2 puffs into the lungs 2 (two) times daily. 2 each 0    dilTIAZem HCl ER Beads 240 MG Oral Capsule SR 24 Hr Take 1 capsule (240 mg total) by mouth 2 (two) times daily. 180 capsule 3    pravastatin 40 MG Oral Tab Take 1 tablet (40 mg total) by mouth nightly. 90 tablet 3    nitroglycerin 0.4 MG Sublingual SL Tab Place 1 tablet (0.4 mg total) under the tongue every 5 (five) minutes as needed for Chest pain. 25 tablet 0    metoprolol succinate ER 50 MG Oral Tablet 24 Hr Take 1 tablet (50 mg total) by mouth daily.      magnesium oxide 400 MG Oral Tab Take 1 tablet (400 mg total) by mouth daily. 90 tablet 1    ondansetron (ZOFRAN) 4 mg tablet Take 1 tablet (4 mg total) by mouth every 8 (eight) hours as needed for Nausea. 20 tablet 2    allopurinol 100 MG Oral Tab Take 1 tablet (100 mg total) by mouth daily. 90 tablet 3    ipratropium-albuterol (COMBIVENT RESPIMAT)  MCG/ACT Inhalation Aero Soln Inhale 1 puff into the lungs 4 (four) times daily. 3 each 4    aspirin 81 MG Oral Tab EC Take 1 tablet (81 mg total) by mouth daily.        Past Medical History:    Acute bronchitis    Arthritis     Black stools    Blood in urine    Chest pain, unspecified    Chronic airway obstruction, not elsewhere classified    Chronic atrial fibrillation (HCC)    Congestive heart disease (HCC)    Congestive heart failure, unspecified    Constipation    COPD (chronic obstructive pulmonary disease) (HCC)    Coronary atherosclerosis of unspecified type of vessel, native or graft    Edema    Esophageal reflux    Factor XII deficiency disease (HCC)    Erlebrorn    Fatigue    Frequent use of laxatives    Generalized and unspecified atherosclerosis    Gout    Hematemesis    Irregular bowel habits    Leaking of urine    Painful swallowing    Parathyroid tumor    Wears glasses    Weight gain    West Nile Virus infection (HCC)      Social History:  Social History     Socioeconomic History    Marital status: OTHER    Number of children: 2   Occupational History    Occupation: Not Employed    Tobacco Use    Smoking status: Former     Current packs/day: 0.00     Average packs/day: 1 pack/day for 50.0 years (50.0 ttl pk-yrs)     Types: Cigarettes     Start date: 1960     Quit date: 2010     Years since quittin.6    Smokeless tobacco: Never    Tobacco comments:     quit a few years ago   Substance and Sexual Activity    Alcohol use: No     Alcohol/week: 0.0 standard drinks of alcohol    Drug use: No   Other Topics Concern    Caffeine Concern No     Comment: About 1 drink per day     Exercise No     Social Determinants of Health     Physical Activity: Inactive (2021)    [facility]    Exercise Vital Sign     Days of Exercise per Week: 0 days     Minutes of Exercise per Session: 0 min    Received from [facility], [facility]    Social Connections    Received from [facility], [facility]    Housing Stability        REVIEW OF SYSTEMS:   GENERAL HEALTH: feels well otherwise  SKIN: denies any  unusual skin lesions or rashes  RESPIRATORY: denies shortness of breath with exertion  CARDIOVASCULAR: denies chest pain on exertion  GI: denies abdominal pain and denies heartburn  NEURO: denies headaches    EXAM:   /74   Pulse 102   Temp 98.5 °F (36.9 °C) (Temporal)   Resp 24   Wt 166 lb 6 oz (75.5 kg)   SpO2 92%   BMI 30.43 kg/m²   GENERAL: well developed, well nourished,in no apparent distress  SKIN: no rashes,no suspicious lesions  HEENT: atraumatic, normocephalic,ears and throat are clear  NECK: supple,no adenopathy,no bruits  LUNGS: clear to auscultation  CARDIO: RRR without murmur  GI: good BS's,no masses, HSM or tenderness  EXTREMITIES: no cyanosis, clubbing or edema  Bilateral barefoot skin diabetic exam is normal, visualized feet and the appearance is normal.  Bilateral monofilament/sensation of both feet is normal.  Pulsation pedal pulse exam of both lower legs/feet is normal as well.        ASSESSMENT AND PLAN:     Encounter Diagnoses   Name Primary?    Peripheral edema Yes    Melena     Systolic heart failure, unspecified HF chronicity (HCC)    Check labs she is losing weight and a little on the dry side.  She has significant problem swallowing and she has not had an EGD since 2021 and she had a few fungal organism NO masses. I will start diflucan for her swallowing pain and she is to use her nebulizer saline or combivent, Her legs look ok.  Labs done today.     Orders Placed This Encounter   Procedures    Comp Metabolic Panel (14) [E]    Hemoglobin A1C [E]    CEA [E]    CBC W Differential W Platelet [E]    Pro Beta Natriuretic Peptide [E]       Meds & Refills for this Visit:  Requested Prescriptions     Signed Prescriptions Disp Refills    budeson-glycopyrrol-formoterol (BREZTRI AEROSPHERE) 160-9-4.8 MCG/ACT Inhalation Aerosol 1 each 0     Sig: Inhale 2 puffs into the lungs 2 (two) times daily.       Imaging & Consults:  None    Follow up as needed.     The patient indicates understanding  of these issues and agrees to the plan.

## 2024-08-27 NOTE — TELEPHONE ENCOUNTER
Patient advised. She said her medication for the nebulizer is from 2019. She said she doesn't think she can afford the medication for the nebulizer.   She asked what she can put on her bug bites. She said Hydrocortisone burns.

## 2024-08-27 NOTE — TELEPHONE ENCOUNTER
Secretagogues like cough drops, crushed ice or lemon drops will help miostuise the airway, she could use her nebulizer combivent or humidified air to loosen up the airways

## 2024-08-28 ENCOUNTER — TELEPHONE (OUTPATIENT)
Dept: FAMILY MEDICINE CLINIC | Facility: CLINIC | Age: 79
End: 2024-08-28

## 2024-08-28 RX ORDER — FLUCONAZOLE 100 MG/1
100 TABLET ORAL DAILY
Qty: 10 TABLET | Refills: 0 | Status: SHIPPED | OUTPATIENT
Start: 2024-08-28 | End: 2024-09-07

## 2024-08-28 RX ORDER — BUDESONIDE, GLYCOPYRROLATE, AND FORMOTEROL FUMARATE 160; 9; 4.8 UG/1; UG/1; UG/1
2 AEROSOL, METERED RESPIRATORY (INHALATION) 2 TIMES DAILY
Qty: 1 EACH | Refills: 0 | COMMUNITY
Start: 2024-08-28

## 2024-08-28 NOTE — TELEPHONE ENCOUNTER
Samara at [facility] states that they only have the Diflucan ready for her.

## 2024-08-28 NOTE — TELEPHONE ENCOUNTER
Baking soda paste; mix 1 tablespoon BS with enough water to make a past, leave on 10 minutes and then wash off

## 2024-08-28 NOTE — TELEPHONE ENCOUNTER
Patient called back and said that she just fell back into her chair. She asked if this is from decreased Potassium. Patient advised it is likely from dehydration, she should drink more fluids. V.O. Dr Neff/Eda FELIX  Patient said that the pharmacy told her they have a nasal spray ready and not Diflucan.

## 2024-08-28 NOTE — TELEPHONE ENCOUNTER
[facility] [address] [place] [address] - [address] [place] [address] DRIVE [phone], [phone]   Wanting to know what anti-fungal cream is for before filling, zach fernando please call her to explain so she can tell the pharmacy.

## 2024-08-29 ENCOUNTER — TELEPHONE (OUTPATIENT)
Dept: FAMILY MEDICINE CLINIC | Facility: CLINIC | Age: 79
End: 2024-08-29

## 2024-08-29 NOTE — TELEPHONE ENCOUNTER
Patient said she is trying to get fluids down but she is having difficulty getting it down. She is also having difficulty having a BM. She states that her stool is no longer black.She said she feels lousy and if it gets worse she will call her neighbor who is a  if she is feeling worse.

## 2024-08-29 NOTE — TELEPHONE ENCOUNTER
I think there is something going on; we can needs imaging and scoping again.   I will send her a my chart.

## 2024-08-29 NOTE — TELEPHONE ENCOUNTER
Patient states she feels extremely dizzy, complaining of extreme difficulty swallowing and pain to the back of her head. She is going to try to find a ride to the ER. She refused to call 911 or to have this RN call 911.

## 2024-08-31 ENCOUNTER — PATIENT MESSAGE (OUTPATIENT)
Dept: FAMILY MEDICINE CLINIC | Facility: CLINIC | Age: 79
End: 2024-08-31

## 2024-09-03 NOTE — TELEPHONE ENCOUNTER
From: Edith Eubanks  To: Mady Neff  Sent: 8/31/2024 9:14 AM CDT  Subject: Swelling down     Wow , I have ankles

## 2024-09-04 ENCOUNTER — TELEPHONE (OUTPATIENT)
Dept: FAMILY MEDICINE CLINIC | Facility: CLINIC | Age: 79
End: 2024-09-04

## 2024-09-04 NOTE — TELEPHONE ENCOUNTER
Patient said that she shut the car door on her leg yesterday. She said it is painful and red now. Appointment scheduled with Dr Johns tomorrow at 10am.

## 2024-09-04 NOTE — TELEPHONE ENCOUNTER
Patient calling stating her car door scrapped the right side of leg on Monday - she woke up today with pain - site is red and she thinks it might be infected.     She is aware PCP not in office today - would like nurse to call her back

## 2024-09-05 ENCOUNTER — OFFICE VISIT (OUTPATIENT)
Dept: FAMILY MEDICINE CLINIC | Facility: CLINIC | Age: 79
End: 2024-09-05
Payer: MEDICARE

## 2024-09-05 VITALS
BODY MASS INDEX: 31 KG/M2 | WEIGHT: 169 LBS | SYSTOLIC BLOOD PRESSURE: 110 MMHG | RESPIRATION RATE: 12 BRPM | HEART RATE: 91 BPM | DIASTOLIC BLOOD PRESSURE: 60 MMHG | OXYGEN SATURATION: 90 % | TEMPERATURE: 98 F

## 2024-09-05 DIAGNOSIS — S80.811A INFECTED ABRASION OF RIGHT LOWER EXTREMITY, INITIAL ENCOUNTER: Primary | ICD-10-CM

## 2024-09-05 DIAGNOSIS — L08.9 INFECTED ABRASION OF RIGHT LOWER EXTREMITY, INITIAL ENCOUNTER: Primary | ICD-10-CM

## 2024-09-05 PROCEDURE — 3078F DIAST BP <80 MM HG: CPT | Performed by: FAMILY MEDICINE

## 2024-09-05 PROCEDURE — 1159F MED LIST DOCD IN RCRD: CPT | Performed by: FAMILY MEDICINE

## 2024-09-05 PROCEDURE — 3074F SYST BP LT 130 MM HG: CPT | Performed by: FAMILY MEDICINE

## 2024-09-05 PROCEDURE — 1170F FXNL STATUS ASSESSED: CPT | Performed by: FAMILY MEDICINE

## 2024-09-05 PROCEDURE — 1160F RVW MEDS BY RX/DR IN RCRD: CPT | Performed by: FAMILY MEDICINE

## 2024-09-05 PROCEDURE — G2211 COMPLEX E/M VISIT ADD ON: HCPCS | Performed by: FAMILY MEDICINE

## 2024-09-05 PROCEDURE — 99213 OFFICE O/P EST LOW 20 MIN: CPT | Performed by: FAMILY MEDICINE

## 2024-09-05 PROCEDURE — 1126F AMNT PAIN NOTED NONE PRSNT: CPT | Performed by: FAMILY MEDICINE

## 2024-09-05 RX ORDER — AZITHROMYCIN 250 MG/1
TABLET, FILM COATED ORAL
Qty: 6 TABLET | Refills: 0 | Status: SHIPPED | OUTPATIENT
Start: 2024-09-05 | End: 2024-09-09

## 2024-09-07 ENCOUNTER — TELEPHONE (OUTPATIENT)
Dept: FAMILY MEDICINE CLINIC | Facility: CLINIC | Age: 79
End: 2024-09-07

## 2024-09-07 RX ORDER — BUDESONIDE, GLYCOPYRROLATE, AND FORMOTEROL FUMARATE 160; 9; 4.8 UG/1; UG/1; UG/1
2 AEROSOL, METERED RESPIRATORY (INHALATION) 2 TIMES DAILY
Qty: 1 EACH | Refills: 0 | COMMUNITY
Start: 2024-09-07

## 2024-09-07 NOTE — TELEPHONE ENCOUNTER
Patient wanted to know if she took a benedryl would that interfere with azithromycin.    Advise  is out of office.  She wanted someone to address today.

## 2024-09-07 NOTE — TELEPHONE ENCOUNTER
Spoke with patient who states she is currently on an antibiotic for an infection in her leg. She states she was having a hard time breathing last night and this morning, and wanted to know if she could take Benadryl. Advised that would not be best as it may decrease her breathing and make her sleep. She was agreeable. Patient does have an inhaler and is using that PRN, which she states is helpful. She was not in any acute distress at the time of call. She states she used to have a nebulizer medication, but threw it out as it was 7 years old. She states she can't afford any nebulizer medication at this time. Advised to continue with her inhaler PRN and if her breathing worsens, then she needs to be evaluated in the ER. Patient did understand this. Advised this nurse will route the message to Dr. Neff to review on Monday if there are any other nebulizer medications she knows of that would be more cost effective. Patient verbalized understanding.

## 2024-09-07 NOTE — TELEPHONE ENCOUNTER
Patient called back in a panic stating that she couldn't breath. She was having a hard time coughing up mucous. When I informed her about calling 911, she started to panic more and starting yelling not to call. This nurse was able to calm patient down and her breathing leveled out. She pulled out a new box of Breztri that Dr. Neff prescribed her and was able to take 2 puffs. Advised to use 2 puffs twice a day. She is concerned about it running out, so I did inform her I will place another sample for her at [place]'s desk per patient request. I did inform patient if she can't catch her breath, then she does need to call 911. She verbalized understanding.

## 2024-09-09 RX ORDER — ALBUTEROL SULFATE 0.83 MG/ML
1.25 SOLUTION RESPIRATORY (INHALATION) EVERY 6 HOURS PRN
Qty: 50 EACH | Refills: 0 | Status: SHIPPED | OUTPATIENT
Start: 2024-09-09 | End: 2024-12-08

## 2024-09-09 RX ORDER — ALBUTEROL SULFATE 0.83 MG/ML
2.5 SOLUTION RESPIRATORY (INHALATION) EVERY 4 HOURS PRN
Qty: 50 EACH | Refills: 0 | Status: SHIPPED | OUTPATIENT
Start: 2024-09-09 | End: 2024-12-08

## 2024-09-09 NOTE — TELEPHONE ENCOUNTER
She needs the solution that goes in her nebulizer.    [facility] in [place]     she is there now and wants to called in soon

## 2024-09-09 NOTE — TELEPHONE ENCOUNTER
Patient said she saw Dr Johns last Thursday because she scraped her leg. She said that she was placed on an antibiotic. She said said she went to [facility] ER today for her breathing and her leg. She said they did blood work, an EKG, a CT scan and an US of both legs. She does have fluid around her heart or lungs.  She said that they gave her a Xanax because she was anxious. She said she is walking sideways. She did not drive herself.  She said that she was given an antibiotic through the IV and Dr Johns had placed her on oral Zithromax.   Can Dr Neff review the results from the ER today?   Follow up scheduled with Dr Neff Thursday 9/12/24 at 2pm.

## 2024-09-09 NOTE — PROGRESS NOTES
Subjective:   Edith Eubanks is a 79 year old female who presents for Laceration (Right leg ~ pt states she bummed it with her car door /Left leg ~ pt states she was removing dry skin  now has open laceration )     Patient here for evaluation on the leg  Open area  Fears cellulitis    Had a scab she pulled off of this  Rinsed with peroxide several times  Mild drainage from area  tea colored    History/Other:   has a current medication list which includes the following prescription(s): azithromycin, potassium chloride, furosemide, diltiazem hcl er beads, pravastatin, metoprolol succinate er, magnesium oxide, ondansetron, allopurinol, combivent respimat, aspirin, breztri aerosphere, airsupra, and nitroglycerin.     is allergic to cephalosporins, prednisone, sulfa antibiotics, tussin cough dm [dextromethorphan-guaifenesin], bactrim [sulfamethoxazole w/trimethoprim], ciprofloxacin, levaquin [levofloxacin], macrobid [nitrofurantoin], norco [hydrocodone-acetaminophen], tramadol, amoxicillin-pot clavulanate, codeine, colchicine, and diflucan [fluconazole].     Review of Systems:  GENERAL HEALTH: feels well no complaints  No fever    SKIN: see HPI  EXTREM see HPI     Objective:   /60 (BP Location: Left arm, Patient Position: Sitting, Cuff Size: large)   Pulse 91   Temp 97.7 °F (36.5 °C) (Temporal)   Resp 12   Wt 169 lb (76.7 kg)   SpO2 90%   BMI 30.91 kg/m²  Estimated body mass index is 30.91 kg/m² as calculated from the following:    Height as of 3/27/24: 5' 2\" (1.575 m).    Weight as of this encounter: 169 lb (76.7 kg).  GENERAL: well developed, well nourished,in no apparent distress  SKIN:   Open area quarter size lower leg right  no purulence shallow wound  viable edges  red streak only minimally warm      Assessment & Plan:   1. Infected abrasion of right lower extremity, initial encounter (Primary)  Comments:  simple soap and water  no more caustics as peroxide or alcohol  Orders:  -     Azithromycin;  Take 2 tablets (500 mg total) by mouth daily for 1 day, THEN 1 tablet (250 mg total) daily for 4 days.  Dispense: 6 tablet; Refill: 0          Kirk Johns MD, 9/8/2024, 8:13 PM

## 2024-09-10 ENCOUNTER — TELEPHONE (OUTPATIENT)
Dept: FAMILY MEDICINE CLINIC | Facility: CLINIC | Age: 79
End: 2024-09-10

## 2024-09-10 ENCOUNTER — PATIENT OUTREACH (OUTPATIENT)
Dept: CASE MANAGEMENT | Age: 79
End: 2024-09-10

## 2024-09-10 DIAGNOSIS — Z02.9 ENCOUNTERS FOR UNSPECIFIED ADMINISTRATIVE PURPOSE: Primary | ICD-10-CM

## 2024-09-10 PROCEDURE — 1111F DSCHRG MED/CURRENT MED MERGE: CPT

## 2024-09-10 RX ORDER — GINSENG 100 MG
CAPSULE ORAL 2 TIMES DAILY
COMMUNITY
Start: 2024-09-09

## 2024-09-10 NOTE — TELEPHONE ENCOUNTER
Per Samia at [facility] she will send a message that the patient needs a refill.

## 2024-09-10 NOTE — PROGRESS NOTES
Transitions of Care Navigation  Discharge Date: 24 from [facility]  Contact Date: 9/10/2024    Transitions of Care Assessment:  KENNY Initial Assessment    General:  Assessment completed with: Patient  Patient Subjective: NCM spoke with patient states she is feeling better. Patient denies any shortness of breath or chest pain. Pt reports using 2 liters of oxygen, 3 liters when bringing groceries into the house. Patient denies any fevers, chills, nausea, vomiting, or any other symptoms. Patient had cellulitis which caused severe dry skin, patient is applying bacitracin. Patient indicates hit her leg with the car door, had a scrape from that.  Chief Complaint: COPD  Verify patient name and  with patient/ caregiver: Yes    Hospital Stay/Discharge:  Tell me what you understand of why you were in the hospital or emergency department: I was having shortness of breath.  Prior to leaving the hospital were your Discharge Instructions reviewed with you?: Yes  Did you receive a copy of your written Discharge Instructions?: Yes  What questions do you have about your Discharge Instructions?: Patient denies any questions at this time.  Do you feel better or worse since you left the hospital or emergency department?: Better    Follow - Up Appointment:  Do you have a follow-up appointment?: Yes  Date: 24  Physician: Dr. Neff-PCP  Are there any barriers to getting to your follow-up appointment?: No    Home Health/DME:  Prior to leaving the hospital was Home Health (HH) arranged for you?: N/A  Are HH needs identified by staff during the assessment?: No     Prior to leaving the hospital or emergency department was Durable Medical Equipment (DME), medical supplies, or infusions arranged for you?: N/A  Are DME/medical supply/infusions needs identified by staff during this assessment?: No     Medications/Diet:  Did any of your medications change, during or after your hospital stay or ED visit?: Yes  Do you have your new or  updated medications?: Yes  Do you understand what your medications are for and possible side effects?: Yes  Are there any reasons that keep you from taking your medication as prescribed?: No  Any concerns about medication refills?: No    Were you given a different diet per your Discharge Instructions?: No  Reason: N/A     Questions/Concerns:  Do you have any questions or concerns that have not been discussed?: No   KENNY Follow-up Assessment    General:  Assessment completed with: Patient  Patient Subjective: NCM spoke with patient states she is feeling better. Patient denies any shortness of breath or chest pain. Pt reports using 2 liters of oxygen, 3 liters when bringing groceries into the house. Patient denies any fevers, chills, nausea, vomiting, or any other symptoms. Patient had cellulitis which caused severe dry skin, patient is applying bacitracin. Patient indicates hit her leg with the car door, had a scrape from that.  Chief Complaint: COPD  Community Resources: Other (None)    Nursing Interventions:  All discharge instructions reviewed with the patient. Reviewed when to call MD vs when to call 911 or go the ED. Educated patient on the importance of taking all meds as prescribed as well as close f/u with PCP/specialists. Pt verbalized understanding and will contact the office with any further questions or concerns. Patient denies fevers, chills, nausea, vomiting, shortness of breath, chest pain, or any other symptoms at this time.  NCM confirmed appointment.  NCM provided contact information for any further questions/concerns. Patient verbalized understanding and agreeable.       Medications:Reviewed medication list.  Medications are up to date.  Current Outpatient Medications   Medication Sig Dispense Refill    bacitracin 500 UNIT/GM External Ointment Apply topically 2 (two) times daily.      albuterol (2.5 MG/3ML) 0.083% Inhalation Nebu Soln Take 3 mL (2.5 mg total) by nebulization every 4 (four) hours as  needed for Wheezing. 50 each 0    albuterol (2.5 MG/3ML) 0.083% Inhalation Nebu Soln Take 1.5 mL (1.25 mg total) by nebulization every 6 (six) hours as needed for Wheezing. 50 each 0    budeson-glycopyrrol-formoterol (BREZTRI AEROSPHERE) 160-9-4.8 MCG/ACT Inhalation Aerosol Inhale 2 puffs into the lungs 2 (two) times daily. 1 each 0    potassium chloride 10 MEQ Oral Tab CR Take 2 tablets (20 mEq total) by mouth daily. 60 tablet 0    FUROSEMIDE 40 MG Oral Tab TAKE 1 TABLET THREE TIMES DAILY 270 tablet 3    Albuterol-Budesonide (AIRSUPRA) 90-80 MCG/ACT Inhalation Aerosol Inhale 2 Inhalations into the lungs in the morning and 2 Inhalations before bedtime. 1 g 0    dilTIAZem HCl ER Beads 240 MG Oral Capsule SR 24 Hr Take 1 capsule (240 mg total) by mouth 2 (two) times daily. 180 capsule 3    pravastatin 40 MG Oral Tab Take 1 tablet (40 mg total) by mouth nightly. 90 tablet 3    nitroglycerin 0.4 MG Sublingual SL Tab Place 1 tablet (0.4 mg total) under the tongue every 5 (five) minutes as needed for Chest pain. 25 tablet 0    metoprolol succinate ER 50 MG Oral Tablet 24 Hr Take 1 tablet (50 mg total) by mouth daily.      magnesium oxide 400 MG Oral Tab Take 1 tablet (400 mg total) by mouth daily. 90 tablet 1    ondansetron (ZOFRAN) 4 mg tablet Take 1 tablet (4 mg total) by mouth every 8 (eight) hours as needed for Nausea. 20 tablet 2    allopurinol 100 MG Oral Tab Take 1 tablet (100 mg total) by mouth daily. 90 tablet 3    ipratropium-albuterol (COMBIVENT RESPIMAT)  MCG/ACT Inhalation Aero Soln Inhale 1 puff into the lungs 4 (four) times daily. 3 each 4    aspirin 81 MG Oral Tab EC Take 1 tablet (81 mg total) by mouth daily.         Diagnosis specifics:  Have you participated in a pulmonary rehab program? no   Would you like more information? no  We reviewed your medications/inhalers, how often are you using your inhalers? As prescribed   Are you currently on oxygen? yes   How many liters? 2 Liters, 3 liters when  bringing groceries into the house   Any questions about oxygen use? no  Are you familiar with the signs and symptoms or worsening COPD? Yes  Who do you call with worsening COPD symptoms? Pulmonary or PCP  Do you know when to call with COPD symptoms? Yes  Do you have any of the following potential risk factors for COPD in your home environment?   Primary or secondary tobacco smoke? no   Occupational dusts and chemicals organic or inorganic? no   Indoor air pollution from heating and cooking with poor ventilation? no   Mold? yes   Pets? no   Extreme heat? no   Other? Room deodorants, [facility] fires       Follow-up Appointments:  Your appointments       Date & Time Appointment Department (Center)    Sep 12, 2024 2:00 PM T Hospital Follow Up with Mady Neff MD [facility] ([facility])              [facility]  [address] [facility] [id number] [phone]            [facility]  Was TCC Ordered: No      Primary Care Provider (If no TCC appointment)  Does patient already have a PCP appointment scheduled? Yes  Nurse Care Manager Confirmed PCP office ER Follow-up appointment with patient       Specialist  Does the patient have any other follow-up appointment(s) need to be scheduled? No       [x]  Patient verbally agrees to additional follow-up calls from Nurse Care Manager    Book By Date: 9/16/24

## 2024-09-10 NOTE — TELEPHONE ENCOUNTER
Patient is having issues filling ipratropium-albuterol (COMBIVENT RESPIMAT)  MCG/ACT Inhalation Aero Soln.  She is having phone issues.   Please call to discuss.

## 2024-09-11 ENCOUNTER — PATIENT MESSAGE (OUTPATIENT)
Dept: FAMILY MEDICINE CLINIC | Facility: CLINIC | Age: 79
End: 2024-09-11

## 2024-09-11 ENCOUNTER — TELEPHONE (OUTPATIENT)
Dept: FAMILY MEDICINE CLINIC | Facility: CLINIC | Age: 79
End: 2024-09-11

## 2024-09-11 NOTE — TELEPHONE ENCOUNTER
Patient states she is also having dysuria and urinary hesitancy. Advised we can check her urine tomorrow.

## 2024-09-11 NOTE — TELEPHONE ENCOUNTER
From: Edith Eubanks  To: Mady Neff  Sent: 9/11/2024 10:03 AM CDT  Subject: Urinary tract    I start to go , it stops, start again it stops I guess I need a test tomorrow, is it all right to take my cranberry with D - [place] before I come in ?

## 2024-09-11 NOTE — TELEPHONE ENCOUNTER
She is very shaky and he eyes are blurry and said that she needs to see Dr Neff before tomorrow afternoon.    She thinks it could be from the medication she was just put on

## 2024-09-11 NOTE — TELEPHONE ENCOUNTER
Patient states she feels worse than she did when she was in the hospital.   She said that she is coughing up mucous. She said that she has some chest pain from coughing. She hopes it isn't her heart. She wants Dr Neff to go over the EKG results from the ER.Patient advised if she has further chest pain she should go to the ER. She states she will not go back to the ER.  She said she did the Albuterol Nebulizer once and she had the shakes since then. Patient advised to come in at 830am tomorrow. If she is not improving tonight go back to the ER.  Advised not to drive if she is feeling shaky.   She will try to come at 830am but wants us to leave the 2pm appointment in case she can't make it that early.

## 2024-09-12 ENCOUNTER — TELEPHONE (OUTPATIENT)
Dept: FAMILY MEDICINE CLINIC | Facility: CLINIC | Age: 79
End: 2024-09-12

## 2024-09-12 ENCOUNTER — OFFICE VISIT (OUTPATIENT)
Dept: FAMILY MEDICINE CLINIC | Facility: CLINIC | Age: 79
End: 2024-09-12
Payer: MEDICARE

## 2024-09-12 VITALS
RESPIRATION RATE: 24 BRPM | DIASTOLIC BLOOD PRESSURE: 70 MMHG | OXYGEN SATURATION: 95 % | HEART RATE: 81 BPM | TEMPERATURE: 99 F | SYSTOLIC BLOOD PRESSURE: 120 MMHG | WEIGHT: 172 LBS | BODY MASS INDEX: 31 KG/M2

## 2024-09-12 DIAGNOSIS — I48.20 ATRIAL FIBRILLATION, CHRONIC (HCC): ICD-10-CM

## 2024-09-12 DIAGNOSIS — J43.2 CENTRILOBULAR EMPHYSEMA (HCC): Primary | ICD-10-CM

## 2024-09-12 DIAGNOSIS — I50.20 SYSTOLIC HEART FAILURE, UNSPECIFIED HF CHRONICITY (HCC): ICD-10-CM

## 2024-09-12 DIAGNOSIS — R30.0 DYSURIA: ICD-10-CM

## 2024-09-12 DIAGNOSIS — N18.4 CKD (CHRONIC KIDNEY DISEASE) STAGE 4, GFR 15-29 ML/MIN (HCC): ICD-10-CM

## 2024-09-12 LAB
APPEARANCE: CLEAR
BILIRUBIN: NEGATIVE
GLUCOSE (URINE DIPSTICK): NEGATIVE MG/DL
KETONES (URINE DIPSTICK): NEGATIVE MG/DL
LEUKOCYTES: NEGATIVE
MULTISTIX LOT#: NORMAL NUMERIC
NITRITE, URINE: NEGATIVE
OCCULT BLOOD: NEGATIVE
PH, URINE: 7.5 (ref 4.5–8)
PROTEIN (URINE DIPSTICK): NEGATIVE MG/DL
SPECIFIC GRAVITY: 1.02 (ref 1–1.03)
URINE-COLOR: YELLOW
UROBILINOGEN,SEMI-QN: 0.2 MG/DL (ref 0–1.9)

## 2024-09-12 PROCEDURE — 3078F DIAST BP <80 MM HG: CPT | Performed by: INTERNAL MEDICINE

## 2024-09-12 PROCEDURE — 3074F SYST BP LT 130 MM HG: CPT | Performed by: INTERNAL MEDICINE

## 2024-09-12 PROCEDURE — 1160F RVW MEDS BY RX/DR IN RCRD: CPT | Performed by: INTERNAL MEDICINE

## 2024-09-12 PROCEDURE — 81003 URINALYSIS AUTO W/O SCOPE: CPT | Performed by: INTERNAL MEDICINE

## 2024-09-12 PROCEDURE — 1170F FXNL STATUS ASSESSED: CPT | Performed by: INTERNAL MEDICINE

## 2024-09-12 PROCEDURE — 1159F MED LIST DOCD IN RCRD: CPT | Performed by: INTERNAL MEDICINE

## 2024-09-12 PROCEDURE — 99214 OFFICE O/P EST MOD 30 MIN: CPT | Performed by: INTERNAL MEDICINE

## 2024-09-12 NOTE — TELEPHONE ENCOUNTER
She said that she can't hardly lift her legs and that her head is about to \"bust\"   she is asking for a call back soon

## 2024-09-12 NOTE — TELEPHONE ENCOUNTER
From: Edith Eubanks  To: Mady Neff  Sent: 9/11/2024 2:58 PM CDT  Subject: Hospital follow up     Don't jnow if I can wait till 2pm to see you Thursday feeling really bad since they gave me Xanax at hospital , have shakes blured, vision just feeling bad all over

## 2024-09-12 NOTE — PROGRESS NOTES
Edith Eubanks is a 79 year old female.  HPI:   Pt has been having issues breathing and chest pain with cough.  She has COPD and oxygen dependent; she chokes on food.  This is not new. It is getting hard for her to live alone.  She is still shopping and cooking and bathing, but marginally; she cannot go up and down the basement stairs anymore and Samia, a friend,  does her laundry.  She has been to the [place] (group home)  to look, but she did not talk to anybody there.  We have been telling her for years that she should not be living alone.   Evaluation in the ER was not conclusive we discussed the findings. No heart attack (troponin was <0.010), no PE, no DVT.  They gave her Xanax which she absolutely will not take again,    Current Outpatient Medications   Medication Sig Dispense Refill    ipratropium 0.02 % Inhalation Solution Take 2.5 mL (500 mcg total) by nebulization 4 (four) times daily. 50 each 0    bacitracin 500 UNIT/GM External Ointment Apply topically 2 (two) times daily.      albuterol (2.5 MG/3ML) 0.083% Inhalation Nebu Soln Take 3 mL (2.5 mg total) by nebulization every 4 (four) hours as needed for Wheezing. 50 each 0    albuterol (2.5 MG/3ML) 0.083% Inhalation Nebu Soln Take 1.5 mL (1.25 mg total) by nebulization every 6 (six) hours as needed for Wheezing. 50 each 0    budeson-glycopyrrol-formoterol (BREZTRI AEROSPHERE) 160-9-4.8 MCG/ACT Inhalation Aerosol Inhale 2 puffs into the lungs 2 (two) times daily. 1 each 0    potassium chloride 10 MEQ Oral Tab CR Take 2 tablets (20 mEq total) by mouth daily. 60 tablet 0    FUROSEMIDE 40 MG Oral Tab TAKE 1 TABLET THREE TIMES DAILY 270 tablet 3    Albuterol-Budesonide (AIRSUPRA) 90-80 MCG/ACT Inhalation Aerosol Inhale 2 Inhalations into the lungs in the morning and 2 Inhalations before bedtime. 1 g 0    dilTIAZem HCl ER Beads 240 MG Oral Capsule SR 24 Hr Take 1 capsule (240 mg total) by mouth 2 (two) times daily. 180 capsule 3    pravastatin 40 MG Oral  Tab Take 1 tablet (40 mg total) by mouth nightly. 90 tablet 3    nitroglycerin 0.4 MG Sublingual SL Tab Place 1 tablet (0.4 mg total) under the tongue every 5 (five) minutes as needed for Chest pain. 25 tablet 0    metoprolol succinate ER 50 MG Oral Tablet 24 Hr Take 1 tablet (50 mg total) by mouth daily.      magnesium oxide 400 MG Oral Tab Take 1 tablet (400 mg total) by mouth daily. 90 tablet 1    ondansetron (ZOFRAN) 4 mg tablet Take 1 tablet (4 mg total) by mouth every 8 (eight) hours as needed for Nausea. 20 tablet 2    allopurinol 100 MG Oral Tab Take 1 tablet (100 mg total) by mouth daily. 90 tablet 3    ipratropium-albuterol (COMBIVENT RESPIMAT)  MCG/ACT Inhalation Aero Soln Inhale 1 puff into the lungs 4 (four) times daily. 3 each 4    aspirin 81 MG Oral Tab EC Take 1 tablet (81 mg total) by mouth daily.        Past Medical History:    Acute bronchitis    Arthritis    Black stools    Blood in urine    Chest pain, unspecified    Chronic airway obstruction, not elsewhere classified    Chronic atrial fibrillation (HCC)    Congestive heart disease (HCC)    Congestive heart failure, unspecified    Constipation    COPD (chronic obstructive pulmonary disease) (HCC)    Coronary atherosclerosis of unspecified type of vessel, native or graft    Edema    Esophageal reflux    Factor XII deficiency disease (HCC)    Erlebrorn    Fatigue    Frequent use of laxatives    Generalized and unspecified atherosclerosis    Gout    Hematemesis    Irregular bowel habits    Leaking of urine    Painful swallowing    Parathyroid tumor    Wears glasses    Weight gain    West Nile Virus infection (HCC)      Social History:  Social History     Socioeconomic History    Marital status: OTHER    Number of children: 2   Occupational History    Occupation: Not Employed    Tobacco Use    Smoking status: Former     Current packs/day: 0.00     Average packs/day: 1 pack/day for 50.0 years (50.0 ttl pk-yrs)     Types: Cigarettes     Start  date: 1960     Quit date: 2010     Years since quittin.7    Smokeless tobacco: Never    Tobacco comments:     quit a few years ago   Substance and Sexual Activity    Alcohol use: No     Alcohol/week: 0.0 standard drinks of alcohol    Drug use: No   Other Topics Concern    Caffeine Concern No     Comment: About 1 drink per day     Exercise No     Social Determinants of Health     Financial Resource Strain: Low Risk  (9/10/2024)    Financial Resource Strain     Difficulty of Paying Living Expenses: Not hard at all   Transportation Needs: No Transportation Needs (9/10/2024)    Transportation Needs     Lack of Transportation: No   Physical Activity: Inactive (2021)    [facility]    Exercise Vital Sign     Days of Exercise per Week: 0 days     Minutes of Exercise per Session: 0 min    [facility]    Social Connections    [facility]    Housing Stability        REVIEW OF SYSTEMS:   GENERAL HEALTH: feels well otherwise  SKIN: denies any unusual skin lesions or rashes  RESPIRATORY: denies shortness of breath with exertion  CARDIOVASCULAR: denies chest pain on exertion  GI: denies abdominal pain and denies heartburn  NEURO: denies headaches    EXAM:   /70   Pulse 81   Temp 99 °F (37.2 °C) (Tympanic)   Resp 24   Wt 172 lb (78 kg)   SpO2 95%   BMI 31.46 kg/m²   GENERAL: well developed, well nourished,in no apparent distress  SKIN: no rashes,no suspicious lesions  HEENT: atraumatic, normocephalic,ears and throat are clear  NECK: supple,no adenopathy,no bruits  LUNGS: clear to auscultation  CARDIO: RRR without murmur  GI: good BS's,no masses, HSM or tenderness  EXTREMITIES: no cyanosis, clubbing or edema    ASSESSMENT AND PLAN:     Encounter Diagnoses   Name Primary?    CKD (chronic kidney disease) stage 4, GFR 15-29  ml/min (HCC)     Systolic heart failure, unspecified HF chronicity (HCC)     Atrial fibrillation, chronic (HCC)     Centrilobular emphysema (HCC) Yes   Pt has been in the ER, she is back to baseline, but really needs more support in her daily living. She is concerned financially about this change, has always been independent and particular about her environment and self care.  I continue to encourage her to look for a more comfortable, less isolated environment to live in.     No orders of the defined types were placed in this encounter.      Meds & Refills for this Visit:  Requested Prescriptions     Signed Prescriptions Disp Refills    ipratropium 0.02 % Inhalation Solution 50 each 0     Sig: Take 2.5 mL (500 mcg total) by nebulization 4 (four) times daily.       Imaging & Consults:  None    Follow up as needed.     The patient indicates understanding of these issues and agrees to the plan.

## 2024-09-12 NOTE — TELEPHONE ENCOUNTER
Patient said that her legs were very heavy and her head was hurting. She said she took her diuretic and did not drink enough water.   She has been at home in the air conditioning drinking fluids and she feels better.

## 2024-09-18 ENCOUNTER — PATIENT OUTREACH (OUTPATIENT)
Dept: CASE MANAGEMENT | Age: 79
End: 2024-09-18

## 2024-10-28 ENCOUNTER — TELEPHONE (OUTPATIENT)
Dept: FAMILY MEDICINE CLINIC | Facility: CLINIC | Age: 79
End: 2024-10-28

## 2024-10-31 RX ORDER — IPRATROPIUM BROMIDE AND ALBUTEROL 20; 100 UG/1; UG/1
1 SPRAY, METERED RESPIRATORY (INHALATION) 4 TIMES DAILY
Qty: 3 EACH | Refills: 3 | Status: SHIPPED | OUTPATIENT
Start: 2024-10-31

## 2024-11-04 RX ORDER — BUDESONIDE, GLYCOPYRROLATE, AND FORMOTEROL FUMARATE 160; 9; 4.8 UG/1; UG/1; UG/1
2 AEROSOL, METERED RESPIRATORY (INHALATION) 2 TIMES DAILY
Qty: 1 EACH | Refills: 0 | COMMUNITY
Start: 2024-11-04

## 2024-11-04 NOTE — TELEPHONE ENCOUNTER
Patient advised paperwork has been faxed to [facility] [phone].  Sample of Meet ready to be picked up.

## 2024-11-20 ENCOUNTER — TELEPHONE (OUTPATIENT)
Dept: FAMILY MEDICINE CLINIC | Facility: CLINIC | Age: 79
End: 2024-11-20

## 2024-11-20 NOTE — TELEPHONE ENCOUNTER
Patient advised that the automated system at [facility] states that the application for the Combivent inhaler medication assistance  has been approved  through 12/31/25.

## 2024-11-27 ENCOUNTER — OFFICE VISIT (OUTPATIENT)
Dept: FAMILY MEDICINE CLINIC | Facility: CLINIC | Age: 79
End: 2024-11-27
Payer: MEDICARE

## 2024-11-27 ENCOUNTER — TELEPHONE (OUTPATIENT)
Dept: FAMILY MEDICINE CLINIC | Facility: CLINIC | Age: 79
End: 2024-11-27

## 2024-11-27 VITALS
SYSTOLIC BLOOD PRESSURE: 112 MMHG | OXYGEN SATURATION: 96 % | BODY MASS INDEX: 31 KG/M2 | WEIGHT: 167.25 LBS | TEMPERATURE: 98 F | HEART RATE: 102 BPM | RESPIRATION RATE: 24 BRPM | DIASTOLIC BLOOD PRESSURE: 72 MMHG

## 2024-11-27 DIAGNOSIS — J41.1 MUCOPURULENT CHRONIC BRONCHITIS (HCC): Primary | ICD-10-CM

## 2024-11-27 DIAGNOSIS — R13.12 OROPHARYNGEAL DYSPHAGIA: ICD-10-CM

## 2024-11-27 PROCEDURE — 1159F MED LIST DOCD IN RCRD: CPT | Performed by: INTERNAL MEDICINE

## 2024-11-27 PROCEDURE — 1160F RVW MEDS BY RX/DR IN RCRD: CPT | Performed by: INTERNAL MEDICINE

## 2024-11-27 PROCEDURE — 3078F DIAST BP <80 MM HG: CPT | Performed by: INTERNAL MEDICINE

## 2024-11-27 PROCEDURE — G2211 COMPLEX E/M VISIT ADD ON: HCPCS | Performed by: INTERNAL MEDICINE

## 2024-11-27 PROCEDURE — 3074F SYST BP LT 130 MM HG: CPT | Performed by: INTERNAL MEDICINE

## 2024-11-27 PROCEDURE — 99214 OFFICE O/P EST MOD 30 MIN: CPT | Performed by: INTERNAL MEDICINE

## 2024-11-27 RX ORDER — AZITHROMYCIN 250 MG/1
TABLET, FILM COATED ORAL
Qty: 6 TABLET | Refills: 0 | Status: SHIPPED | OUTPATIENT
Start: 2024-11-27 | End: 2024-12-02

## 2024-11-27 NOTE — PROGRESS NOTES
Edith Eubanks is a 79 year old female.  HPI:   Pt has been having cough and SOB.  Sputum productive and foamy and pink with blood sometimes.  She may have a sore on her tongue.  Coughed for 3 hours this morning trying to get it up. Constipated. She has a hx of CHF and COPD.   Current Outpatient Medications   Medication Sig Dispense Refill    budeson-glycopyrrol-formoterol (BREZTRI AEROSPHERE) 160-9-4.8 MCG/ACT Inhalation Aerosol Inhale 2 puffs into the lungs 2 (two) times daily. 1 each 0    ipratropium-albuterol (COMBIVENT RESPIMAT)  MCG/ACT Inhalation Aero Soln Inhale 1 puff into the lungs 4 (four) times daily. 3 each 3    bacitracin 500 UNIT/GM External Ointment Apply topically 2 (two) times daily.      albuterol (2.5 MG/3ML) 0.083% Inhalation Nebu Soln Take 3 mL (2.5 mg total) by nebulization every 4 (four) hours as needed for Wheezing. 50 each 0    albuterol (2.5 MG/3ML) 0.083% Inhalation Nebu Soln Take 1.5 mL (1.25 mg total) by nebulization every 6 (six) hours as needed for Wheezing. 50 each 0    potassium chloride 10 MEQ Oral Tab CR Take 2 tablets (20 mEq total) by mouth daily. 60 tablet 0    FUROSEMIDE 40 MG Oral Tab TAKE 1 TABLET THREE TIMES DAILY 270 tablet 3    Albuterol-Budesonide (AIRSUPRA) 90-80 MCG/ACT Inhalation Aerosol Inhale 2 Inhalations into the lungs in the morning and 2 Inhalations before bedtime. 1 g 0    dilTIAZem HCl ER Beads 240 MG Oral Capsule SR 24 Hr Take 1 capsule (240 mg total) by mouth 2 (two) times daily. 180 capsule 3    pravastatin 40 MG Oral Tab Take 1 tablet (40 mg total) by mouth nightly. 90 tablet 3    nitroglycerin 0.4 MG Sublingual SL Tab Place 1 tablet (0.4 mg total) under the tongue every 5 (five) minutes as needed for Chest pain. 25 tablet 0    metoprolol succinate ER 50 MG Oral Tablet 24 Hr Take 1 tablet (50 mg total) by mouth daily.      magnesium oxide 400 MG Oral Tab Take 1 tablet (400 mg total) by mouth daily. 90 tablet 1    ondansetron (ZOFRAN) 4 mg tablet  Take 1 tablet (4 mg total) by mouth every 8 (eight) hours as needed for Nausea. 20 tablet 2    allopurinol 100 MG Oral Tab Take 1 tablet (100 mg total) by mouth daily. 90 tablet 3    aspirin 81 MG Oral Tab EC Take 1 tablet (81 mg total) by mouth daily.        Past Medical History:    Acute bronchitis    Arthritis    Black stools    Blood in urine    Chest pain, unspecified    Chronic airway obstruction, not elsewhere classified    Chronic atrial fibrillation (HCC)    Congestive heart disease (HCC)    Congestive heart failure, unspecified    Constipation    COPD (chronic obstructive pulmonary disease) (HCC)    Coronary atherosclerosis of unspecified type of vessel, native or graft    Edema    Esophageal reflux    Factor XII deficiency disease (HCC)    Erlebrorn    Fatigue    Frequent use of laxatives    Generalized and unspecified atherosclerosis    Gout    Hematemesis    Irregular bowel habits    Leaking of urine    Painful swallowing    Parathyroid tumor    Wears glasses    Weight gain    West Nile Virus infection (HCC)      Social History:  Social History     Socioeconomic History    Marital status: OTHER    Number of children: 2   Occupational History    Occupation: Not Employed    Tobacco Use    Smoking status: Former     Current packs/day: 0.00     Average packs/day: 1 pack/day for 50.0 years (50.0 ttl pk-yrs)     Types: Cigarettes     Start date: 1960     Quit date: 2010     Years since quittin.9    Smokeless tobacco: Never    Tobacco comments:     quit a few years ago   Substance and Sexual Activity    Alcohol use: No     Alcohol/week: 0.0 standard drinks of alcohol    Drug use: No   Other Topics Concern    Caffeine Concern No     Comment: About 1 drink per day     Exercise No     Social Drivers of Health     Financial Resource Strain: Low Risk  (9/10/2024)    Financial Resource Strain     Difficulty of Paying Living Expenses: Not hard at all   Transportation Needs: No Transportation Needs  (9/10/2024)    Transportation Needs     Lack of Transportation: No   Physical Activity: Inactive (2/17/2021)    Received from [facility]    Exercise Vital Sign     Days of Exercise per Week: 0 days     Minutes of Exercise per Session: 0 min    [facility]    Social Connections    [facility]    Housing Stability        REVIEW OF SYSTEMS:   GENERAL HEALTH: feels well otherwise  SKIN: denies any unusual skin lesions or rashes  RESPIRATORY: denies shortness of breath with exertion  CARDIOVASCULAR: denies chest pain on exertion  GI: denies abdominal pain and denies heartburn  NEURO: denies headaches    EXAM:   /72   Pulse 102   Temp 98.2 °F (36.8 °C) (Temporal)   Resp 24   Wt 167 lb 4 oz (75.9 kg)   SpO2 96%   BMI 30.59 kg/m²   GENERAL: well developed, well nourished,in no apparent distress  SKIN: no rashes,no suspicious lesions  HEENT: atraumatic, normocephalic,ears and throat are clear  NECK: supple,no adenopathy,no bruits  LUNGS: wheezing globally on auscultation  CARDIO: RRR without murmur  GI: good BS's,no masses, HSM or tenderness  EXTREMITIES: no cyanosis, clubbing or edema    ASSESSMENT AND PLAN:     Encounter Diagnoses   Name Primary?    Mucopurulent chronic bronchitis (HCC) Yes    Oropharyngeal dysphagia    Antibiotic and needs to see pulmonology for bronchoscopy and GI for dysphagia.     No orders of the defined types were placed in this encounter.      Meds & Refills for this Visit:  Requested Prescriptions     Signed Prescriptions Disp Refills    azithromycin (ZITHROMAX Z-XANDER) 250 MG Oral Tab 6 tablet 0     Sig: Take 2 tablets (500 mg total) by mouth daily for 1 day, THEN 1 tablet (250 mg total) daily for 4 days.       Imaging & Consults:  PULMONARY - EXTERNAL  GASTRO - EXTERNAL    Follow up as needed.     The patient indicates  understanding of these issues and agrees to the plan.

## 2024-11-27 NOTE — TELEPHONE ENCOUNTER
Sick for couple days, coughing up both older and new blood. Does not want to go to hospital and will not stay

## 2024-11-27 NOTE — PROGRESS NOTES
Patient states she has been coughing and there has been some dark and bright blood in the mucous. She states she also has a sore on her tongue possibly from the Breztrie. She has not been using the Breztrie because of that and has had increased shortness of breath. She said her oxygen saturation is at 93% right now. Her blood pressure was 94/59 then 119/82. Appointment scheduled at 945am this morning, patient states she will try to make it. Patient advised if she cannot make it this morning she needs to go to urgent care.

## 2024-11-27 NOTE — TELEPHONE ENCOUNTER
Patient states she has been coughing and there has been some dark and bright blood in the mucous. She states she also has a sore on her tongue possibly from the Breztrie. She has not been using the Breztrie because of that and has had increased shortness of breath. She said her oxygen saturation is at 93% right now. Her blood pressure was 94/59 then 119/82. Appointment scheduled at 945am this morning, patient states she will try to make it. Patient advised if she cannot make it this morning she needs to go to urgent care. Patient was seen in the office today.

## 2024-11-30 ENCOUNTER — TELEPHONE (OUTPATIENT)
Dept: FAMILY MEDICINE CLINIC | Facility: CLINIC | Age: 79
End: 2024-11-30

## 2024-11-30 NOTE — TELEPHONE ENCOUNTER
Patient said her blood pressure now is 121/76 and her heart rate was 91. She said that she is feeling better but her heart was racing this morning and woke her up. She said her heart rate was 100 this morning. She said she has been using the Combivent and Brestri. She said she does have a productive cough.   Patient said that Dr Dinh advised her to go to the ER. She denies chest pain but states that her oxygen saturation drops down into the 70s without her oxygen and goes up into the low 90s with her oxygen on 2 Liters. She said she doesn't want to go to the ER. She denies having chest pain or increased shortness of breath unless she exerts herself.   Patient advised to go to the ER if there is increased shortness of breath, fever or continued increased heart rate. Update us Monday. ANAHI OLIVER/Eda FELIX

## 2024-11-30 NOTE — TELEPHONE ENCOUNTER
PATIENT SPOKE WITH ON CALL  TODAY @ 6:30AM, HER BLOOD PRESSURE WAS ELEVATED 138/99, DR ADVISED SHE GO TO HOSPITAL FOR EKG, LABS, CHEST XRAY, PATIENT WANTS TO KNOW IF DR PINO ORDERS THESE WILL IT COST HER LESS? CALL PATIENT BACK

## 2024-12-02 ENCOUNTER — TELEPHONE (OUTPATIENT)
Dept: FAMILY MEDICINE CLINIC | Facility: CLINIC | Age: 79
End: 2024-12-02

## 2024-12-02 RX ORDER — AZITHROMYCIN 250 MG/1
250 TABLET, FILM COATED ORAL
Qty: 12 TABLET | Refills: 0 | Status: SHIPPED | OUTPATIENT
Start: 2024-12-03 | End: 2025-01-02

## 2024-12-02 NOTE — TELEPHONE ENCOUNTER
Patient said after she called her her blood pressure went up to 152/114 after she called her. She said she was anxious because she had to pay bills. She said she just checked it again and it was 110/72. She said when she got up to go to the bathroom her oxygen saturation dropped down to the 70s without her oxygen on and she feels short of breath. She said that her pulse oximeter is 10 years old.   She said that she is having pain under her ribs on the left side. She said that she has been coughing a lot. She took her last Zithromax today and is asking if Dr Neff can give her some more.

## 2024-12-02 NOTE — TELEPHONE ENCOUNTER
Patient was advised to call back to discuss blood pressure.  This morning it was 116/78.  She is having issues with her oxygen level.  It was 95 but she took her oxygen off & it dropped to 72 but now it is back to 92.  Right now her blood pressure is 132/71.

## 2025-01-15 ENCOUNTER — TELEPHONE (OUTPATIENT)
Dept: FAMILY MEDICINE CLINIC | Facility: CLINIC | Age: 80
End: 2025-01-15

## 2025-01-15 NOTE — TELEPHONE ENCOUNTER
Patient said that her friend came over Sunday and had a runny nose and now she has a runny nose. Patient advised she can take Benadryl. CATRINA. Dr Neff/Eda FELIX

## 2025-01-31 ENCOUNTER — TELEPHONE (OUTPATIENT)
Dept: FAMILY MEDICINE CLINIC | Facility: CLINIC | Age: 80
End: 2025-01-31

## 2025-01-31 ENCOUNTER — OFFICE VISIT (OUTPATIENT)
Dept: FAMILY MEDICINE CLINIC | Facility: CLINIC | Age: 80
End: 2025-01-31
Payer: MEDICARE

## 2025-01-31 VITALS
SYSTOLIC BLOOD PRESSURE: 110 MMHG | WEIGHT: 170 LBS | BODY MASS INDEX: 31.28 KG/M2 | OXYGEN SATURATION: 98 % | HEIGHT: 62 IN | TEMPERATURE: 99 F | HEART RATE: 75 BPM | RESPIRATION RATE: 19 BRPM | DIASTOLIC BLOOD PRESSURE: 65 MMHG

## 2025-01-31 DIAGNOSIS — R04.0 EPISTAXIS: ICD-10-CM

## 2025-01-31 DIAGNOSIS — R05.1 ACUTE COUGH: Primary | ICD-10-CM

## 2025-01-31 PROCEDURE — 3078F DIAST BP <80 MM HG: CPT

## 2025-01-31 PROCEDURE — 3074F SYST BP LT 130 MM HG: CPT

## 2025-01-31 PROCEDURE — 3008F BODY MASS INDEX DOCD: CPT

## 2025-01-31 PROCEDURE — 99214 OFFICE O/P EST MOD 30 MIN: CPT

## 2025-01-31 PROCEDURE — 87637 SARSCOV2&INF A&B&RSV AMP PRB: CPT

## 2025-01-31 PROCEDURE — 1159F MED LIST DOCD IN RCRD: CPT

## 2025-01-31 NOTE — TELEPHONE ENCOUNTER
Appointment scheduled for today:  .  Future Appointments   Date Time Provider Department Center   1/31/2025  2:20 PM Rubi Barreto APRN [facility]   5/12/2025 11:00 AM Mady Neff MD [facility]       Patient states she will schedule with Dr Neff for next week if not able to come today.

## 2025-01-31 NOTE — PROGRESS NOTES
Edith Eubanks is a 79 year old female.  HPI:     Patient in office for nasal congestion, nose bleeds, chest congestion, productive cough, and ear pressure/congestion that started yesterday.      She reports her had a Nose bleed that started 2 days ago that started at 1 am and lasted till 1 pm.  She has been coughing up blood clots.  She also reports a headache.  She has been using nasal saline and urszula bees to her nares.  Denies fever.        Current Outpatient Medications   Medication Sig Dispense Refill    budeson-glycopyrrol-formoterol (BREZTRI AEROSPHERE) 160-9-4.8 MCG/ACT Inhalation Aerosol Inhale 2 puffs into the lungs 2 (two) times daily. 1 each 0    ipratropium-albuterol (COMBIVENT RESPIMAT)  MCG/ACT Inhalation Aero Soln Inhale 1 puff into the lungs 4 (four) times daily. 3 each 3    bacitracin 500 UNIT/GM External Ointment Apply topically 2 (two) times daily.      potassium chloride 10 MEQ Oral Tab CR Take 2 tablets (20 mEq total) by mouth daily. 60 tablet 0    FUROSEMIDE 40 MG Oral Tab TAKE 1 TABLET THREE TIMES DAILY 270 tablet 3    dilTIAZem HCl ER Beads 240 MG Oral Capsule SR 24 Hr Take 1 capsule (240 mg total) by mouth 2 (two) times daily. 180 capsule 3    pravastatin 40 MG Oral Tab Take 1 tablet (40 mg total) by mouth nightly. 90 tablet 3    nitroglycerin 0.4 MG Sublingual SL Tab Place 1 tablet (0.4 mg total) under the tongue every 5 (five) minutes as needed for Chest pain. 25 tablet 0    metoprolol succinate ER 50 MG Oral Tablet 24 Hr Take 1 tablet (50 mg total) by mouth daily.      magnesium oxide 400 MG Oral Tab Take 1 tablet (400 mg total) by mouth daily. 90 tablet 1    ondansetron (ZOFRAN) 4 mg tablet Take 1 tablet (4 mg total) by mouth every 8 (eight) hours as needed for Nausea. 20 tablet 2    allopurinol 100 MG Oral Tab Take 1 tablet (100 mg total) by mouth daily. 90 tablet 3    aspirin 81 MG Oral Tab EC Take 1 tablet (81 mg total) by mouth daily.        Past Medical History:    Acute  bronchitis    Arthritis    Black stools    Blood in urine    Chest pain, unspecified    Chronic airway obstruction, not elsewhere classified    Chronic atrial fibrillation (HCC)    Congestive heart disease (HCC)    Congestive heart failure, unspecified    Constipation    COPD (chronic obstructive pulmonary disease) (HCC)    Coronary atherosclerosis of unspecified type of vessel, native or graft    Edema    Esophageal reflux    Factor XII deficiency disease (HCC)    Erlebrorn    Fatigue    Frequent use of laxatives    Generalized and unspecified atherosclerosis    Gout    Hematemesis    Irregular bowel habits    Leaking of urine    Painful swallowing    Parathyroid tumor    Wears glasses    Weight gain    West Nile Virus infection (HCC)      Social History:  Social History     Socioeconomic History    Marital status: OTHER    Number of children: 2   Occupational History    Occupation: Not Employed    Tobacco Use    Smoking status: Former     Current packs/day: 0.00     Average packs/day: 1 pack/day for 50.0 years (50.0 ttl pk-yrs)     Types: Cigarettes     Start date: 1/1/1960     Quit date: 1/1/2010     Years since quitting: 15.0    Smokeless tobacco: Never    Tobacco comments:     quit a few years ago   Substance and Sexual Activity    Alcohol use: No     Alcohol/week: 0.0 standard drinks of alcohol    Drug use: No   Other Topics Concern    Caffeine Concern No     Comment: About 1 drink per day     Exercise No     Social Drivers of Health     Financial Resource Strain: Low Risk  (9/10/2024)    Financial Resource Strain     Difficulty of Paying Living Expenses: Not hard at all   Transportation Needs: No Transportation Needs (9/10/2024)    Transportation Needs     Lack of Transportation: No   Physical Activity: Inactive (2/17/2021)    [facility] from [facility], [facility]    Exercise Vital Sign     Days of Exercise per Week: 0 days     Minutes of Exercise per Session: 0 min    Received from  [facility], [facility]    Social Connections    Received from [facility], [facility]    Housing Stability          REVIEW OF SYSTEMS:     Review of Systems   Constitutional:  Negative for activity change, appetite change and fatigue.   HENT:  Positive for congestion, ear pain (pressure) and nosebleeds. Negative for hearing loss and sore throat.    Eyes:  Negative for discharge and redness.   Respiratory:  Positive for cough and chest tightness.    Cardiovascular:  Negative for palpitations.   Gastrointestinal:  Negative for abdominal distention and abdominal pain.   Endocrine: Negative for cold intolerance and heat intolerance.   Genitourinary:  Negative for difficulty urinating and urgency.   Musculoskeletal:  Negative for arthralgias and back pain.   Skin:  Negative for color change.   Allergic/Immunologic: Negative for environmental allergies.   Neurological:  Negative for dizziness, syncope and headaches.   Hematological:  Negative for adenopathy. Does not bruise/bleed easily.   Psychiatric/Behavioral:  Negative for agitation, behavioral problems and confusion.        EXAM:   /65   Pulse 75   Temp 98.8 °F (37.1 °C) (Temporal)   Resp 19   Ht 5' 2\" (1.575 m)   Wt 170 lb (77.1 kg)   SpO2 98%   BMI 31.09 kg/m²     Physical Exam  Constitutional:       Appearance: Normal appearance.   HENT:      Head: Normocephalic and atraumatic.      Right Ear: Tympanic membrane normal.      Left Ear: Tympanic membrane normal.      Nose: Congestion and rhinorrhea present.      Mouth/Throat:      Mouth: Mucous membranes are moist.      Pharynx: Oropharynx is clear.   Eyes:      Extraocular Movements: Extraocular movements intact.      Conjunctiva/sclera: Conjunctivae normal.      Pupils: Pupils are equal, round, and reactive to light.   Cardiovascular:      Rate and Rhythm: Normal rate and regular rhythm.      Pulses: Normal pulses.       Heart sounds: Normal heart sounds.   Pulmonary:      Effort: Pulmonary effort is normal.      Breath sounds: Normal breath sounds.   Musculoskeletal:         General: Normal range of motion.   Lymphadenopathy:      Cervical: No cervical adenopathy.   Skin:     General: Skin is warm and dry.      Capillary Refill: Capillary refill takes less than 2 seconds.   Neurological:      Mental Status: She is alert and oriented to person, place, and time.   Psychiatric:         Mood and Affect: Mood normal.         Behavior: Behavior normal.          ASSESSMENT AND PLAN:     1. Acute cough  Viral swab sent and patient will be contacted with results.   - SARS-CoV-2/Flu A and B/RSV by PCR ([facility]) [E]; Future  - SARS-CoV-2/Flu A and B/RSV by PCR ([facility]) [E]      2. Epistaxis  Nose clip provided.  Spoke about using oxygen to oral cavity if struggling with nasal blockage.  (She does not like to use face mask).  Recommended continuing with nasal saline, rakan's bees and using a humidifier.      The patient indicates understanding of these issues and agrees to the plan.      Rubi Barreto, APRN  1/31/2025

## 2025-01-31 NOTE — TELEPHONE ENCOUNTER
Patient is having some pain in her chest and having issues with her oxygen. She has been having a non-stop bloody nose.

## 2025-01-31 NOTE — TELEPHONE ENCOUNTER
Per patient two days ago at 1 am developed a blood nose last into the next day and resolved late in day. Coughing up blood clots afterwards for a period of time. Has had two more bloody noses, feels due to oxygen use. Does have humidifier running. Not having a bloody nose at time of call.  No audible respiratory distress, patient able to complete sentences.    Patient having trouble with oxygen due to clots in nose. Patient advised should go to ER for evaluation of recurring bloody nose. Also advised to have someone take her. Offered to help call ambulance for patient but deferred. Patient states will call a friend to take her to the [place] ER.     BP)130/77  Pulse 86   O2 Sat 90 %    Off center near Left breast pain that started yesterday. Not constant. Feels may be due to congested. Having productive cough and also complains of ears feeling plugged.

## 2025-02-01 ENCOUNTER — TELEPHONE (OUTPATIENT)
Dept: FAMILY MEDICINE CLINIC | Facility: CLINIC | Age: 80
End: 2025-02-01

## 2025-02-01 LAB
FLUAV + FLUBV RNA SPEC NAA+PROBE: NEGATIVE
FLUAV + FLUBV RNA SPEC NAA+PROBE: NEGATIVE
RSV RNA SPEC NAA+PROBE: NEGATIVE
SARS-COV-2 RNA RESP QL NAA+PROBE: NOT DETECTED

## 2025-02-01 NOTE — TELEPHONE ENCOUNTER
Spoke with pt as triage call. Saw Rubi yesterday. Was instructed to take Zyrtec 10mg at night, when got home read not to take Zyrtec with kidney issues. Pt has stage 4 kidney disease. Last CMP 9/9/24, Creat 1.14. Pt has chronic productive cough, bloody noses, congested, pt on O2 2L nasal cannula. SOB noted with talking. O2 sat 92% and /62 at time of call. Pt states normal O2 sat is around 92%. Pt taking inhalers as prescribed. Discussed at length medications, safety precautions on O2 and red flag symptoms that warrant evaluation in the ER. Pt states she will not go to the ER due to cost. Overall, pt asking if she should take the Zyrtec with her kidney disease as she was instructed. Will route to provider in office to advise.

## 2025-02-01 NOTE — TELEPHONE ENCOUNTER
Would recommend against Zyrtec.  Humidifier, vaporizer.  Ayr nasal gel to nares bilaterally.  Normal saline nose spray as needed congestion.  As below.

## 2025-02-11 ENCOUNTER — TELEPHONE (OUTPATIENT)
Dept: FAMILY MEDICINE CLINIC | Facility: CLINIC | Age: 80
End: 2025-02-11

## 2025-02-11 RX ORDER — DOXYCYCLINE HYCLATE 100 MG
100 TABLET ORAL 2 TIMES DAILY
Qty: 20 TABLET | Refills: 0 | Status: SHIPPED | OUTPATIENT
Start: 2025-02-11 | End: 2025-02-11 | Stop reason: ALTCHOICE

## 2025-02-11 RX ORDER — AZITHROMYCIN 250 MG/1
TABLET, FILM COATED ORAL
Qty: 6 TABLET | Refills: 0 | Status: SHIPPED | OUTPATIENT
Start: 2025-02-11 | End: 2025-02-16

## 2025-02-11 NOTE — TELEPHONE ENCOUNTER
Head feels likke it is going to blow off-ears hurt, headache.  She states she needs help.  She is not well.    Spoke with Sunita, she will take the call.

## 2025-02-11 NOTE — TELEPHONE ENCOUNTER
[facility] calling regarding script for doxycycline that was sent to pharmacy.  Pharmacy has an allergy listed for this antibiotic as patient experienced tongue swelling.  Dr. Neff notified.  Script for zpak sent to pharmacy.  Patient notified.  She verbalized understanding.

## 2025-02-11 NOTE — TELEPHONE ENCOUNTER
Spoke with patient.  Patient has a head pressure, on top of head as well as ear pain/pressure.  She has been dealing with these symptoms for about 2 weeks.  She was evaluated by Rubi on 1/31.  Her symptoms continue to worsen.  Patient believes that she has a sinus infection.  She is taking nothing over the counter.  Can only take tylenol due to CKD.  No openings in office.  Offered appointment tomorrow but patient will not go out tomorrow due to projected snow.  Patient is asking for recommendations or if Dr. Neff would prescribe an abx for her.    BP 98/78   SPO2 92%  HR 90

## 2025-02-13 ENCOUNTER — TELEPHONE (OUTPATIENT)
Dept: FAMILY MEDICINE CLINIC | Facility: CLINIC | Age: 80
End: 2025-02-13

## 2025-02-13 NOTE — TELEPHONE ENCOUNTER
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 She has also been complaining of a bad headache for 1 week. Kayley would like her to go to the ER to be evaluated for a stroke. Patient asked if Dr Neff can order outpatient testing. Patient and Kayley advised Dr Neff agrees and would like patient to be evaluated in the ER. Patient agrees to go and will ask her friend to take her there.

## 2025-02-13 NOTE — TELEPHONE ENCOUNTER
Patient said her eyes went black today after looking outside so she is at the eye doctor now.   She asked if she can take another 1/2 of an ES Tylenol now, advised yes.

## 2025-02-13 NOTE — TELEPHONE ENCOUNTER
Patient states she is coughing up yellow mucous and has a headache that is a 10 out of 10. She said the pain is relieved with 1/2 of an ES Tylenol.   Patient also states her face looks swollen. She said she is short of breath, her oxygen is on at 2L. Patient advised to sit down and take some deep breaths. She said she needed to go because her son is on the other line.

## 2025-02-17 ENCOUNTER — LABORATORY ENCOUNTER (OUTPATIENT)
Dept: LAB | Age: 80
End: 2025-02-17
Attending: INTERNAL MEDICINE
Payer: MEDICARE

## 2025-02-17 ENCOUNTER — OFFICE VISIT (OUTPATIENT)
Dept: FAMILY MEDICINE CLINIC | Facility: CLINIC | Age: 80
End: 2025-02-17
Payer: MEDICARE

## 2025-02-17 VITALS
SYSTOLIC BLOOD PRESSURE: 108 MMHG | BODY MASS INDEX: 31 KG/M2 | TEMPERATURE: 98 F | OXYGEN SATURATION: 93 % | RESPIRATION RATE: 24 BRPM | DIASTOLIC BLOOD PRESSURE: 64 MMHG | HEART RATE: 104 BPM | WEIGHT: 168.13 LBS

## 2025-02-17 DIAGNOSIS — E78.00 HYPERCHOLESTEREMIA: ICD-10-CM

## 2025-02-17 DIAGNOSIS — E78.00 HYPERCHOLESTEREMIA: Primary | ICD-10-CM

## 2025-02-17 DIAGNOSIS — I70.90 ARTERIOSCLEROSIS: Primary | ICD-10-CM

## 2025-02-17 DIAGNOSIS — R30.0 DYSURIA: ICD-10-CM

## 2025-02-17 LAB
APPEARANCE: CLEAR
BILIRUBIN: NEGATIVE
CHOLEST SERPL-MCNC: 149 MG/DL (ref ?–200)
FASTING PATIENT LIPID ANSWER: NO
GLUCOSE (URINE DIPSTICK): NEGATIVE MG/DL
HDLC SERPL-MCNC: 60 MG/DL (ref 40–59)
KETONES (URINE DIPSTICK): NEGATIVE MG/DL
LDLC SERPL CALC-MCNC: 74 MG/DL (ref ?–100)
LEUKOCYTES: NEGATIVE
MULTISTIX LOT#: ABNORMAL NUMERIC
NITRITE, URINE: NEGATIVE
NONHDLC SERPL-MCNC: 89 MG/DL (ref ?–130)
PH, URINE: 7.5 (ref 4.5–8)
PROTEIN (URINE DIPSTICK): NEGATIVE MG/DL
SPECIFIC GRAVITY: 1.02 (ref 1–1.03)
TRIGL SERPL-MCNC: 81 MG/DL (ref 30–149)
URINE-COLOR: YELLOW
UROBILINOGEN,SEMI-QN: 0.2 MG/DL (ref 0–1.9)
VLDLC SERPL CALC-MCNC: 12 MG/DL (ref 0–30)

## 2025-02-17 PROCEDURE — 36415 COLL VENOUS BLD VENIPUNCTURE: CPT

## 2025-02-17 PROCEDURE — 80061 LIPID PANEL: CPT

## 2025-02-17 RX ORDER — ALLOPURINOL 100 MG/1
100 TABLET ORAL DAILY
Qty: 90 TABLET | Refills: 0 | Status: SHIPPED | OUTPATIENT
Start: 2025-02-17

## 2025-02-17 NOTE — TELEPHONE ENCOUNTER
Last refill 12/20/23  Patient advised she can reschedule. She said she had a CT scan done but they could not get a good image. She wants to have the carotid doppler done at [place] if Dr Neff can order it there.   She said she has a sore on her tongue and is having difficulty swallowing. Patient advised to keep her appointment with Dr Neff this morning.

## 2025-02-17 NOTE — TELEPHONE ENCOUNTER
allopurinol 100 MG Oral Tab    Call to [facility]    She also asking to speak with Pippa regarding some testing she needs to schedule. She said she has appt. For 1130 today but its cold.

## 2025-02-17 NOTE — PROGRESS NOTES
Edith Eubanks is a 79 year old female.  HPI:   Pt has been having some issues with her vision.  She told the ophthalmologist that her vision was black for a few minutes after watching the squirrels in the snow on a chris day.  She has more trouble reading her phone and eye strain,  She has a hx of CAD and IHD with CHF.  She uses oxygen 24/7.  SHe has been having some paranoid thinking and memory changes lately and more dependence of neighbors and family.   Current Outpatient Medications   Medication Sig Dispense Refill    allopurinol 100 MG Oral Tab Take 1 tablet (100 mg total) by mouth daily. 90 tablet 0    budeson-glycopyrrol-formoterol (BREZTRI AEROSPHERE) 160-9-4.8 MCG/ACT Inhalation Aerosol Inhale 2 puffs into the lungs 2 (two) times daily. 1 each 0    ipratropium-albuterol (COMBIVENT RESPIMAT)  MCG/ACT Inhalation Aero Soln Inhale 1 puff into the lungs 4 (four) times daily. 3 each 3    bacitracin 500 UNIT/GM External Ointment Apply topically 2 (two) times daily. (Patient not taking: Reported on 1/31/2025)      potassium chloride 10 MEQ Oral Tab CR Take 2 tablets (20 mEq total) by mouth daily. 60 tablet 0    FUROSEMIDE 40 MG Oral Tab TAKE 1 TABLET THREE TIMES DAILY 270 tablet 3    dilTIAZem HCl ER Beads 240 MG Oral Capsule SR 24 Hr Take 1 capsule (240 mg total) by mouth 2 (two) times daily. 180 capsule 3    pravastatin 40 MG Oral Tab Take 1 tablet (40 mg total) by mouth nightly. 90 tablet 3    nitroglycerin 0.4 MG Sublingual SL Tab Place 1 tablet (0.4 mg total) under the tongue every 5 (five) minutes as needed for Chest pain. 25 tablet 0    metoprolol succinate ER 50 MG Oral Tablet 24 Hr Take 1 tablet (50 mg total) by mouth daily.      ondansetron (ZOFRAN) 4 mg tablet Take 1 tablet (4 mg total) by mouth every 8 (eight) hours as needed for Nausea. 20 tablet 2    aspirin 81 MG Oral Tab EC Take 1 tablet (81 mg total) by mouth daily.        Past Medical History:    Acute bronchitis    Arthritis    Black  stools    Blood in urine    Chest pain, unspecified    Chronic airway obstruction, not elsewhere classified    Chronic atrial fibrillation (HCC)    Congestive heart disease (HCC)    Congestive heart failure, unspecified    Constipation    COPD (chronic obstructive pulmonary disease) (HCC)    Coronary atherosclerosis of unspecified type of vessel, native or graft    Edema    Esophageal reflux    Factor XII deficiency disease (HCC)    Erlebrorn    Fatigue    Frequent use of laxatives    Generalized and unspecified atherosclerosis    Gout    Hematemesis    Irregular bowel habits    Leaking of urine    Painful swallowing    Parathyroid tumor    Wears glasses    Weight gain    West Nile Virus infection (HCC)      Social History:  Social History     Socioeconomic History    Marital status: OTHER    Number of children: 2   Occupational History    Occupation: Not Employed    Tobacco Use    Smoking status: Former     Current packs/day: 0.00     Average packs/day: 1 pack/day for 50.0 years (50.0 ttl pk-yrs)     Types: Cigarettes     Start date: 1/1/1960     Quit date: 1/1/2010     Years since quitting: 15.1    Smokeless tobacco: Never    Tobacco comments:     quit a few years ago   Substance and Sexual Activity    Alcohol use: No     Alcohol/week: 0.0 standard drinks of alcohol    Drug use: No   Other Topics Concern    Caffeine Concern No     Comment: About 1 drink per day     Exercise No     Social Drivers of Health     Transportation Needs: No Transportation Needs (9/10/2024)    Transportation Needs     Lack of Transportation: No    Received from [facility], [facility]    Housing Stability        REVIEW OF SYSTEMS:   GENERAL HEALTH: feels well otherwise  SKIN: denies any unusual skin lesions or rashes  RESPIRATORY: denies shortness of breath with exertion  CARDIOVASCULAR: denies chest pain on exertion  GI: denies abdominal pain and denies heartburn  NEURO: denies headaches    EXAM:    /64   Pulse 104   Temp 98.4 °F (36.9 °C) (Temporal)   Resp 24   Wt 168 lb 2 oz (76.3 kg)   SpO2 93%   BMI 30.75 kg/m²   GENERAL: well developed, well nourished,in no apparent distress  SKIN: no rashes,no suspicious lesions  HEENT: atraumatic, normocephalic,ears and throat are clear  NECK: supple,no adenopathy,no bruits  LUNGS: clear to auscultation  CARDIO: RRR without murmur  GI: good BS's,no masses, HSM or tenderness  EXTREMITIES: no cyanosis, clubbing or edema    ASSESSMENT AND PLAN:     Encounter Diagnoses   Name Primary?    Hypercholesteremia Yes    Dysuria    Needs dopplers repeated, she could have an MRI/A to look for intracranial blockages.  I will check her lipids.      Orders Placed This Encounter   Procedures    Lipid Panel [E]    Urine Dip, auto without Micro       Meds & Refills for this Visit:  Requested Prescriptions      No prescriptions requested or ordered in this encounter       Imaging & Consults:  None    Follow up as needed.     The patient indicates understanding of these issues and agrees to the plan.

## 2025-02-19 ENCOUNTER — TELEPHONE (OUTPATIENT)
Dept: FAMILY MEDICINE CLINIC | Facility: CLINIC | Age: 80
End: 2025-02-19

## 2025-02-24 ENCOUNTER — TELEPHONE (OUTPATIENT)
Dept: FAMILY MEDICINE CLINIC | Facility: CLINIC | Age: 80
End: 2025-02-24

## 2025-02-24 DIAGNOSIS — G45.3 AMAUROSIS FUGAX: Primary | ICD-10-CM

## 2025-02-24 DIAGNOSIS — I65.22 LEFT CAROTID STENOSIS: ICD-10-CM

## 2025-02-24 DIAGNOSIS — I65.23 BILATERAL CAROTID ARTERY STENOSIS: ICD-10-CM

## 2025-02-24 NOTE — TELEPHONE ENCOUNTER
Patient said that she is still having a bad headache. She said she almost went to the Er because it was at a level 10 out of 10. She said it is better today, she has been taking 1/2 of an ES Tylenol. She said she got new glasses at [facility] and her vision is blurry. She said that she is going to take her glasses back.  She would like to have the MRI but would like to go someplace where they have an open MRI.

## 2025-02-24 NOTE — TELEPHONE ENCOUNTER
[facility] has a wider and brighter MRI but not open. [facility] does not have one and state that the only one in the area is in [place].

## 2025-02-25 ENCOUNTER — HOSPITAL ENCOUNTER (OUTPATIENT)
Dept: ULTRASOUND IMAGING | Age: 80
Discharge: HOME | End: 2025-02-25
Attending: INTERNAL MEDICINE
Payer: MEDICARE

## 2025-02-25 DIAGNOSIS — I70.90 ARTERIOSCLEROSIS: ICD-10-CM

## 2025-02-25 PROCEDURE — 93880 EXTRACRANIAL BILAT STUDY: CPT | Performed by: INTERNAL MEDICINE

## 2025-02-25 NOTE — TELEPHONE ENCOUNTER
Patient called back and said she think there is an open MRI facility on [address]. It is called [facility] in [place]. They said that MRI is not open but it is a wide bore, short bore with the largest circumference.   She states that her oxygen tank is getting too heavy and she needs a portable concentrator.

## 2025-02-25 NOTE — TELEPHONE ENCOUNTER
Patient advised. She said she got new lenses from the eye doctor yesterday. She will call us and let us know if she wants to proceed with the MRI.

## 2025-02-26 ENCOUNTER — TELEPHONE (OUTPATIENT)
Dept: FAMILY MEDICINE CLINIC | Facility: CLINIC | Age: 80
End: 2025-02-26

## 2025-02-26 NOTE — TELEPHONE ENCOUNTER
Get MRI/A and decide what to do next regarding the circulation to the brain. Cannot have both tanks and concentrator, has to choose.

## 2025-02-26 NOTE — TELEPHONE ENCOUNTER
Patient advised that Dr Neff changed the order to MRI/MRA of brain and neck. She will call us back and let us know where she will go. Patient given number for Dr Edgar Doherty vascular surgeon.

## 2025-02-27 ENCOUNTER — TELEPHONE (OUTPATIENT)
Dept: FAMILY MEDICINE CLINIC | Facility: CLINIC | Age: 80
End: 2025-02-27

## 2025-02-27 NOTE — TELEPHONE ENCOUNTER
Patient said that she has been crying all night because her daughter will not call her back and let her know if she will drive her to the MRI in [place].   She asked if there is something other that Xanax she can take to relax her. She can get a  if she goes closer to him.   She also wants to know if there is someone she can call and talk to if she needs to,

## 2025-02-27 NOTE — TELEPHONE ENCOUNTER
Patient asked that order be faxed to [facility].  She asked what she can take to calm her down and still be able to drive home after she sits for awhile.

## 2025-02-28 ENCOUNTER — PATIENT MESSAGE (OUTPATIENT)
Dept: FAMILY MEDICINE CLINIC | Facility: CLINIC | Age: 80
End: 2025-02-28

## 2025-03-03 ENCOUNTER — TELEPHONE (OUTPATIENT)
Dept: FAMILY MEDICINE CLINIC | Facility: CLINIC | Age: 80
End: 2025-03-03

## 2025-03-03 NOTE — TELEPHONE ENCOUNTER
Patient advised. She said that she is going to drive to her daughter's house and she will take her to the imaging center and then drive her back to her car. She said that she has to drive home that day.   She said that her chest is ok but her blood pressure went down to 99/58.

## 2025-03-03 NOTE — TELEPHONE ENCOUNTER
Patient said that she is having the MRI and MRA back to back on Thursday 3/6/25. She said that she has to be in there for 1 1/2 hours on her back.   She said that she cannot lie on her back for this long. She said they told her she would need to take a sedative. They could give it to her there or they can do it under anesthesia but would need an order.   She said she thinks she has a full blown sinus infection. She said that she had one back in February and took Zithromax but the symptoms never went away. She said that she was digging in her ear because it felt clogged and then is started bleeding.  She said her friend Nisha got her so upset yesterday that she has been having chest pain. She said she told her the best thing that ever happened to her was her having a heart attack.   She said that she is having pressure in her chest and it feels like a toya horse. She thinks she pulled a muscle choking on pasta yesterday. She said that she had some dizziness yesterday and thought she was going to pass out. She said the pain is better today. She said she just checked her blood pressure was 104/86 and her pulse is 88.  Patient advised she needs to go to the ER if she has chest pain again.

## 2025-03-04 ENCOUNTER — TELEPHONE (OUTPATIENT)
Dept: FAMILY MEDICINE CLINIC | Facility: CLINIC | Age: 80
End: 2025-03-04

## 2025-03-04 RX ORDER — HYDROXYZINE PAMOATE 50 MG/1
50 CAPSULE ORAL 3 TIMES DAILY PRN
Qty: 30 CAPSULE | Refills: 0 | Status: SHIPPED | OUTPATIENT
Start: 2025-03-04 | End: 2025-03-14

## 2025-03-04 NOTE — TELEPHONE ENCOUNTER
Patient advised. She said that the pharmacist told her to take the Hydroxyzine 1 hour prior to the test. They told her it will not last the entire time she is having the MRI. She said she has two lumps and a divet to the back of her head and it is sore.  She also asked if she should hold her Furosemide before the MRI/MRA.

## 2025-03-04 NOTE — TELEPHONE ENCOUNTER
Patient said she will try the Hydroxizine. How long before the MRI should she take it?  Please send to [facility].  She said she is very congested and thinks she has a sinus infection. She wants to know what she can take. She has been using saline nasal spray.

## 2025-03-04 NOTE — TELEPHONE ENCOUNTER
Vanna from [facility] called.  She needs prior authorization for MRI of brain stem without contrast,  MRA for head without contrast & MRA for neck without contrast.  Fax:  [phone]

## 2025-03-05 ENCOUNTER — TELEPHONE (OUTPATIENT)
Dept: FAMILY MEDICINE CLINIC | Facility: CLINIC | Age: 80
End: 2025-03-05

## 2025-03-05 NOTE — TELEPHONE ENCOUNTER
Patient advised. Advised that the MRI/MRA has been approved. She said she is having a bad day. She said her head hurts at a level 10 out of 10, she feels like her head is going to explode. She took 1/2 of an ES Tylenol at 10am. She said that she doesn't know how she will take the noise in the MRI. Can she take Tylenol with the Hydroxyzine before the test tomorrow?

## 2025-03-05 NOTE — TELEPHONE ENCOUNTER
Charisma from [facility] called,  She needs prior authorization for CPT code 00427, 87900, 09681.  Fax:  [phone]. Requesting to get this ASAP as patient is scheduled tomorrow 3/6/25

## 2025-03-06 ENCOUNTER — TELEPHONE (OUTPATIENT)
Dept: FAMILY MEDICINE CLINIC | Facility: CLINIC | Age: 80
End: 2025-03-06

## 2025-03-06 NOTE — TELEPHONE ENCOUNTER
Patient is on her way to have MRI.  She received a message from her insurance company that just because it was approved doesn't mean they are going to pay for the procedure.  It depends on her benefits & eligibility.

## 2025-03-10 NOTE — TELEPHONE ENCOUNTER
Patient states that she completed all three tests without taking any medication.   She said that the base of her neck is very sore and she has has a bloody nose all morning. She complains that it hurts to talk and the pain is going into her ear. Offered patient an appointment with Dr Neff this week. She will call back to schedule.

## 2025-03-12 ENCOUNTER — TELEPHONE (OUTPATIENT)
Dept: FAMILY MEDICINE CLINIC | Facility: CLINIC | Age: 80
End: 2025-03-12

## 2025-03-12 NOTE — TELEPHONE ENCOUNTER
----- Message from Karlene STARK sent at 3/12/2025  8:55 AM CDT -----  Dr Tawanda Sharma returned a call from a couple days ago    [phone]

## 2025-03-12 NOTE — TELEPHONE ENCOUNTER
Patient said that the mucous in her throat is very thick and it is causing her to choke. She said she choked on a piece of fish this morning. She fell in her home two days ago. She said her lower leg is sore.  She said she cannot take Mucinex due to her blood pressure. Patient was coughing on the phone. Appointment scheduled with Dr Neff tomorrow at 330pm.

## 2025-03-13 ENCOUNTER — OFFICE VISIT (OUTPATIENT)
Dept: FAMILY MEDICINE CLINIC | Facility: CLINIC | Age: 80
End: 2025-03-13
Payer: MEDICARE

## 2025-03-13 VITALS
BODY MASS INDEX: 31 KG/M2 | DIASTOLIC BLOOD PRESSURE: 66 MMHG | HEART RATE: 75 BPM | OXYGEN SATURATION: 90 % | SYSTOLIC BLOOD PRESSURE: 108 MMHG | RESPIRATION RATE: 22 BRPM | WEIGHT: 171 LBS | TEMPERATURE: 98 F

## 2025-03-13 DIAGNOSIS — I65.23 BILATERAL CAROTID ARTERY STENOSIS: Primary | ICD-10-CM

## 2025-03-13 DIAGNOSIS — I50.22 CHRONIC SYSTOLIC CONGESTIVE HEART FAILURE (HCC): ICD-10-CM

## 2025-03-13 DIAGNOSIS — I48.20 ATRIAL FIBRILLATION, CHRONIC (HCC): ICD-10-CM

## 2025-03-13 DIAGNOSIS — N18.4 CKD (CHRONIC KIDNEY DISEASE) STAGE 4, GFR 15-29 ML/MIN (HCC): ICD-10-CM

## 2025-03-13 PROCEDURE — 3074F SYST BP LT 130 MM HG: CPT | Performed by: INTERNAL MEDICINE

## 2025-03-13 PROCEDURE — 1170F FXNL STATUS ASSESSED: CPT | Performed by: INTERNAL MEDICINE

## 2025-03-13 PROCEDURE — 1159F MED LIST DOCD IN RCRD: CPT | Performed by: INTERNAL MEDICINE

## 2025-03-13 PROCEDURE — 99214 OFFICE O/P EST MOD 30 MIN: CPT | Performed by: INTERNAL MEDICINE

## 2025-03-13 PROCEDURE — 3078F DIAST BP <80 MM HG: CPT | Performed by: INTERNAL MEDICINE

## 2025-03-13 PROCEDURE — G2211 COMPLEX E/M VISIT ADD ON: HCPCS | Performed by: INTERNAL MEDICINE

## 2025-03-13 NOTE — PROGRESS NOTES
Edith Eubanks is a 79 year old female.  HPI:   Pt has been dealing with her glasses.  She had a MRI/A of the head and neck and it has plaque in the right 70% and left 50%. Ischemic disease in the brain, but no stroke. Filling out an application for [employer] [place], [place]. She does not like her neighbors. She thinks her neighbor is robbing her blind.  No chest pain or dizziness. Legs are slightly swollen. She has been having more HA's at the back of the head.  She has hx of cervical DDD.   Current Outpatient Medications   Medication Sig Dispense Refill    hydrOXYzine Pamoate (VISTARIL) 50 MG Oral Cap Take 1 capsule (50 mg total) by mouth 3 (three) times daily as needed for Itching or Anxiety (45 minutes prior to proceedure). 30 capsule 0    ALPRAZolam (XANAX) 0.5 MG Oral Tab Take 1 tablet (0.5 mg total) by mouth 2 (two) times daily as needed for Anxiety. 4 tablet 0    allopurinol 100 MG Oral Tab Take 1 tablet (100 mg total) by mouth daily. 90 tablet 0    budeson-glycopyrrol-formoterol (BREZTRI AEROSPHERE) 160-9-4.8 MCG/ACT Inhalation Aerosol Inhale 2 puffs into the lungs 2 (two) times daily. 1 each 0    ipratropium-albuterol (COMBIVENT RESPIMAT)  MCG/ACT Inhalation Aero Soln Inhale 1 puff into the lungs 4 (four) times daily. 3 each 3    potassium chloride 10 MEQ Oral Tab CR Take 2 tablets (20 mEq total) by mouth daily. 60 tablet 0    FUROSEMIDE 40 MG Oral Tab TAKE 1 TABLET THREE TIMES DAILY 270 tablet 3    dilTIAZem HCl ER Beads 240 MG Oral Capsule SR 24 Hr Take 1 capsule (240 mg total) by mouth 2 (two) times daily. 180 capsule 3    nitroglycerin 0.4 MG Sublingual SL Tab Place 1 tablet (0.4 mg total) under the tongue every 5 (five) minutes as needed for Chest pain. 25 tablet 0    metoprolol succinate ER 50 MG Oral Tablet 24 Hr Take 1 tablet (50 mg total) by mouth daily.      ondansetron (ZOFRAN) 4 mg tablet Take 1 tablet (4 mg total) by mouth every 8 (eight) hours as needed for Nausea. 20 tablet 2    aspirin  81 MG Oral Tab EC Take 1 tablet (81 mg total) by mouth daily.      bacitracin 500 UNIT/GM External Ointment Apply topically 2 (two) times daily. (Patient not taking: Reported on 1/31/2025)        Past Medical History:    Acute bronchitis    Arthritis    Black stools    Blood in urine    Chest pain, unspecified    Chronic airway obstruction, not elsewhere classified    Chronic atrial fibrillation (HCC)    Congestive heart disease (HCC)    Congestive heart failure, unspecified    Constipation    COPD (chronic obstructive pulmonary disease) (HCC)    Coronary atherosclerosis of unspecified type of vessel, native or graft    Edema    Esophageal reflux    Factor XII deficiency disease (HCC)    Erlebrorn    Fatigue    Frequent use of laxatives    Generalized and unspecified atherosclerosis    Gout    Hematemesis    Irregular bowel habits    Leaking of urine    Painful swallowing    Parathyroid tumor    Wears glasses    Weight gain    West Nile Virus infection (HCC)      Social History:  Social History     Socioeconomic History    Marital status: OTHER    Number of children: 2   Occupational History    Occupation: Not Employed    Tobacco Use    Smoking status: Former     Current packs/day: 0.00     Average packs/day: 1 pack/day for 50.0 years (50.0 ttl pk-yrs)     Types: Cigarettes     Start date: 1/1/1960     Quit date: 1/1/2010     Years since quitting: 15.2    Smokeless tobacco: Never    Tobacco comments:     quit a few years ago   Substance and Sexual Activity    Alcohol use: No     Alcohol/week: 0.0 standard drinks of alcohol    Drug use: No   Other Topics Concern    Caffeine Concern No     Comment: About 1 drink per day     Exercise No     Social Drivers of Health     Transportation Needs: No Transportation Needs (9/10/2024)    Transportation Needs     Lack of Transportation: No    [facility], [facility]    Housing Stability        REVIEW OF SYSTEMS:   GENERAL  HEALTH: feels well otherwise  SKIN: denies any unusual skin lesions or rashes  RESPIRATORY: denies shortness of breath with exertion  CARDIOVASCULAR: denies chest pain on exertion  GI: denies abdominal pain and denies heartburn  NEURO: denies headaches    EXAM:   /66   Pulse 75   Temp 98.1 °F (36.7 °C) (Temporal)   Resp 22   Wt 171 lb (77.6 kg)   SpO2 90%   BMI 31.28 kg/m²   GENERAL: well developed, well nourished,in no apparent distress  SKIN: no rashes,no suspicious lesions  HEENT: atraumatic, normocephalic,ears and throat are clear  NECK: supple,no adenopathy,no bruits  LUNGS: clear to auscultation  CARDIO: RRR without murmur  GI: good BS's,no masses, HSM or tenderness  EXTREMITIES: no cyanosis, clubbing or edema    ASSESSMENT AND PLAN:     Encounter Diagnosis   Name Primary?    Bilateral carotid artery stenosis Yes   Pt has eye evidence of  arthrosclerosis nothing in the MRI/A for the kirsten and neck that looks correctable. Must be smaller vessel disease. This was a very difficult study to undergo due to anxiety about testing, transportation, and cost. She is still living alone and I have highly encouraged her to looking into supported living.     No orders of the defined types were placed in this encounter.      Meds & Refills for this Visit:  Requested Prescriptions      No prescriptions requested or ordered in this encounter       Imaging & Consults:  None    Follow up as needed.     The patient indicates understanding of these issues and agrees to the plan.

## 2025-03-17 ENCOUNTER — MED REC SCAN ONLY (OUTPATIENT)
Dept: FAMILY MEDICINE CLINIC | Facility: CLINIC | Age: 80
End: 2025-03-17

## 2025-03-18 ENCOUNTER — TELEPHONE (OUTPATIENT)
Dept: FAMILY MEDICINE CLINIC | Facility: CLINIC | Age: 80
End: 2025-03-18

## 2025-03-18 NOTE — TELEPHONE ENCOUNTER
This sounds more like a UTI. Use TUCKS wipes or witch hazel this is a topical for inflammation, but it is is not improving then see me. A picture can be helpful if it is discrete.

## 2025-03-18 NOTE — TELEPHONE ENCOUNTER
Patient said she used a public restroom on Friday and now she has a rash on her bottom and perineal area that is burning that started Saturday. She wants to know what she can put on it. It is very painful when she urinates and wipes.

## 2025-03-19 ENCOUNTER — PATIENT MESSAGE (OUTPATIENT)
Dept: FAMILY MEDICINE CLINIC | Facility: CLINIC | Age: 80
End: 2025-03-19

## 2025-03-20 ENCOUNTER — OFFICE VISIT (OUTPATIENT)
Dept: FAMILY MEDICINE CLINIC | Facility: CLINIC | Age: 80
End: 2025-03-20
Payer: MEDICARE

## 2025-03-20 ENCOUNTER — TELEPHONE (OUTPATIENT)
Dept: FAMILY MEDICINE CLINIC | Facility: CLINIC | Age: 80
End: 2025-03-20

## 2025-03-20 VITALS
TEMPERATURE: 98 F | WEIGHT: 169.25 LBS | BODY MASS INDEX: 31 KG/M2 | RESPIRATION RATE: 24 BRPM | SYSTOLIC BLOOD PRESSURE: 104 MMHG | OXYGEN SATURATION: 95 % | DIASTOLIC BLOOD PRESSURE: 62 MMHG | HEART RATE: 82 BPM

## 2025-03-20 DIAGNOSIS — I50.22 CHRONIC SYSTOLIC CONGESTIVE HEART FAILURE (HCC): ICD-10-CM

## 2025-03-20 DIAGNOSIS — R60.0 PERIPHERAL EDEMA: ICD-10-CM

## 2025-03-20 DIAGNOSIS — R21 RASH: Primary | ICD-10-CM

## 2025-03-20 PROCEDURE — G2211 COMPLEX E/M VISIT ADD ON: HCPCS | Performed by: INTERNAL MEDICINE

## 2025-03-20 PROCEDURE — 1159F MED LIST DOCD IN RCRD: CPT | Performed by: INTERNAL MEDICINE

## 2025-03-20 PROCEDURE — 3078F DIAST BP <80 MM HG: CPT | Performed by: INTERNAL MEDICINE

## 2025-03-20 PROCEDURE — 1160F RVW MEDS BY RX/DR IN RCRD: CPT | Performed by: INTERNAL MEDICINE

## 2025-03-20 PROCEDURE — 99214 OFFICE O/P EST MOD 30 MIN: CPT | Performed by: INTERNAL MEDICINE

## 2025-03-20 PROCEDURE — 3074F SYST BP LT 130 MM HG: CPT | Performed by: INTERNAL MEDICINE

## 2025-03-20 PROCEDURE — 1170F FXNL STATUS ASSESSED: CPT | Performed by: INTERNAL MEDICINE

## 2025-03-20 RX ORDER — CLOBETASOL PROPIONATE 0.5 MG/G
1 CREAM TOPICAL 2 TIMES DAILY
Qty: 60 G | Refills: 0 | Status: SHIPPED | OUTPATIENT
Start: 2025-03-20 | End: 2025-03-30

## 2025-03-20 RX ORDER — PRAVASTATIN SODIUM 40 MG
TABLET ORAL
COMMUNITY
Start: 2025-01-22

## 2025-03-20 NOTE — TELEPHONE ENCOUNTER
Phone call from patient.  States left leg is swollen and tingly.  Foot is swollen and discolored. No calf pain.   Right leg is also swollen but not as much as left.    Swelling started one week and has gotten worse.    Nothing different with shortness of breath.    Still has rash in \"private\" area.     Scheduled appointment for today.   Future Appointments   Date Time Provider Department Center   3/20/2025  2:45 PM Mady Neff MD [facility]   5/12/2025 11:00 AM Mady Neff MD [facility]

## 2025-03-21 NOTE — PROGRESS NOTES
Edith Eubanks is a 79 year old female.  HPI:   Pt has been having more swelling in the left greater than right leg. She has been taking her lasix.  She has a little irritation in the pudendal area after sitting at the car dealership for hours.  It burns externally when she wipes or urinates. No frequency.   Current Outpatient Medications   Medication Sig Dispense Refill    pravastatin 40 MG Oral Tab       clobetasol 0.05 % External Cream Apply 1 Application topically 2 (two) times daily for 10 days. 60 g 0    allopurinol 100 MG Oral Tab Take 1 tablet (100 mg total) by mouth daily. 90 tablet 0    budeson-glycopyrrol-formoterol (BREZTRI AEROSPHERE) 160-9-4.8 MCG/ACT Inhalation Aerosol Inhale 2 puffs into the lungs 2 (two) times daily. 1 each 0    ipratropium-albuterol (COMBIVENT RESPIMAT)  MCG/ACT Inhalation Aero Soln Inhale 1 puff into the lungs 4 (four) times daily. 3 each 3    potassium chloride 10 MEQ Oral Tab CR Take 2 tablets (20 mEq total) by mouth daily. 60 tablet 0    FUROSEMIDE 40 MG Oral Tab TAKE 1 TABLET THREE TIMES DAILY 270 tablet 3    dilTIAZem HCl ER Beads 240 MG Oral Capsule SR 24 Hr Take 1 capsule (240 mg total) by mouth 2 (two) times daily. 180 capsule 3    nitroglycerin 0.4 MG Sublingual SL Tab Place 1 tablet (0.4 mg total) under the tongue every 5 (five) minutes as needed for Chest pain. 25 tablet 0    metoprolol succinate ER 50 MG Oral Tablet 24 Hr Take 1 tablet (50 mg total) by mouth daily.      ondansetron (ZOFRAN) 4 mg tablet Take 1 tablet (4 mg total) by mouth every 8 (eight) hours as needed for Nausea. 20 tablet 2    aspirin 81 MG Oral Tab EC Take 1 tablet (81 mg total) by mouth daily.      ALPRAZolam (XANAX) 0.5 MG Oral Tab Take 1 tablet (0.5 mg total) by mouth 2 (two) times daily as needed for Anxiety. (Patient not taking: Reported on 3/20/2025) 4 tablet 0    bacitracin 500 UNIT/GM External Ointment Apply topically 2 (two) times daily. (Patient not taking: Reported on 3/20/2025)         Past Medical History:    Acute bronchitis    Arthritis    Black stools    Blood in urine    Chest pain, unspecified    Chronic airway obstruction, not elsewhere classified    Chronic atrial fibrillation (HCC)    Congestive heart disease (HCC)    Congestive heart failure, unspecified    Constipation    COPD (chronic obstructive pulmonary disease) (HCC)    Coronary atherosclerosis of unspecified type of vessel, native or graft    Edema    Esophageal reflux    Factor XII deficiency disease (HCC)    Erlebrorn    Fatigue    Frequent use of laxatives    Generalized and unspecified atherosclerosis    Gout    Hematemesis    Irregular bowel habits    Leaking of urine    Painful swallowing    Parathyroid tumor    Wears glasses    Weight gain    West Nile Virus infection (HCC)      Social History:  Social History     Socioeconomic History    Marital status: OTHER    Number of children: 2   Occupational History    Occupation: Not Employed    Tobacco Use    Smoking status: Former     Current packs/day: 0.00     Average packs/day: 1 pack/day for 50.0 years (50.0 ttl pk-yrs)     Types: Cigarettes     Start date: 1/1/1960     Quit date: 1/1/2010     Years since quitting: 15.2    Smokeless tobacco: Never    Tobacco comments:     quit a few years ago   Substance and Sexual Activity    Alcohol use: No     Alcohol/week: 0.0 standard drinks of alcohol    Drug use: No   Other Topics Concern    Caffeine Concern No     Comment: About 1 drink per day     Exercise No     Social Drivers of Health     Transportation Needs: No Transportation Needs (9/10/2024)    Transportation Needs     Lack of Transportation: No    Received [facility], [facility]    Housing Stability        REVIEW OF SYSTEMS:   GENERAL HEALTH: feels well otherwise  SKIN: denies any unusual skin lesions or rashes  RESPIRATORY: denies shortness of breath with exertion  CARDIOVASCULAR: denies chest pain on exertion  GI: denies  abdominal pain and denies heartburn  NEURO: denies headaches    EXAM:   /62   Pulse 82   Temp 98.1 °F (36.7 °C) (Temporal)   Resp 24   Wt 169 lb 4 oz (76.8 kg)   SpO2 95%   BMI 30.96 kg/m²   GENERAL: well developed, well nourished,in no apparent distress  SKIN: redness in the perineum, no open skin or drainage   HEENT: atraumatic, normocephalic,ears and throat are clear  NECK: supple,no adenopathy,no bruits  LUNGS: clear to auscultation  CARDIO: RRR without murmur  GI: good BS's,no masses, HSM or tenderness  EXTREMITIES: no cyanosis, 3 + pitting to mid calf bilaterally    ASSESSMENT AND PLAN:     Encounter Diagnoses   Name Primary?    Rash Yes    Chronic systolic congestive heart failure (HCC)     Peripheral edema    Add metolazone, she has some at home for 5 days. Use compression rap if needed. Clobetasol for rash.    No orders of the defined types were placed in this encounter.      Meds & Refills for this Visit:  Requested Prescriptions     Signed Prescriptions Disp Refills    clobetasol 0.05 % External Cream 60 g 0     Sig: Apply 1 Application topically 2 (two) times daily for 10 days.       Imaging & Consults:  None    Follow up as needed.    The patient indicates understanding of these issues and agrees to the plan.

## 2025-04-03 ENCOUNTER — TELEPHONE (OUTPATIENT)
Dept: FAMILY MEDICINE CLINIC | Facility: CLINIC | Age: 80
End: 2025-04-03

## 2025-04-03 NOTE — TELEPHONE ENCOUNTER
Patient advised to drink prune juice and take an enema if it doesn't work. Advised Dr Neff said yes to take Magnesium 400mg. V.O. Dr Neff/Eda FELIX

## 2025-04-03 NOTE — TELEPHONE ENCOUNTER
Patient advised, she has been taking Magnesium Oxide 400mg daily. She said that she has still not been able to have a bowel movement. She said she is only having small bowel movements and her abdomen is hard. She said she is very gassy.  She said she has not been taking Mineral Oil, she does not know where to get it or how much to take. She said her intestines are coming through her vagina. She said she does not know if she would be able to insert a suppository.   She said she was taking Senna but she choked on them because of the \"thing in her throat\". She said t is effecting her breathing.

## 2025-04-10 ENCOUNTER — TELEPHONE (OUTPATIENT)
Dept: FAMILY MEDICINE CLINIC | Facility: CLINIC | Age: 80
End: 2025-04-10

## 2025-04-10 RX ORDER — PREDNISONE 5 MG/1
5 TABLET ORAL DAILY
Qty: 14 TABLET | Refills: 0 | Status: SHIPPED | OUTPATIENT
Start: 2025-04-10 | End: 2025-04-24

## 2025-04-10 NOTE — TELEPHONE ENCOUNTER
Patient advised. She would like the steroid sent to [facility] in [place].   How much prune juice should she drink daily for constipation?

## 2025-04-10 NOTE — TELEPHONE ENCOUNTER
BAD NEWS, WE ARE NO LONGER CARRYING SAMPLES.  yOU CAN USE YOUR COMBIVENT 4 X A DAY, NOT 6 AND A STEROID ORAL LOW DOSE LIKE 5 MG COULD HELP

## 2025-04-10 NOTE — TELEPHONE ENCOUNTER
Patient said that she has a lot of mucous and she is short of breath. She has been using saline nasal spray but it not helping. She said that when she got out of the shower yesterday her oxygen saturation was 41%. She said that her oxygen saturation did come up eventually. She said the Breztri does help but she is almost out of it. She said she cannot afford to pay for it. She is also using her Combivent 1 puff 5-6x daily. She said she does not have an Albuterol.

## 2025-04-14 ENCOUNTER — TELEPHONE (OUTPATIENT)
Dept: FAMILY MEDICINE CLINIC | Facility: CLINIC | Age: 80
End: 2025-04-14

## 2025-04-14 NOTE — TELEPHONE ENCOUNTER
Patient said that she was choking on food yesterday. She said that she stuck her finger down her throat and she felt a big bone and it was pointy.She said her finger was full of pus and \"stringy stuff\". She wanted to pull it out but did not want to bleed to death.   She said two nights ago she called the ambulance because she couldn't breath. Patient got off the phone because her friend was calling her back. She said she was going to have her friend take her to the ER.

## 2025-04-14 NOTE — TELEPHONE ENCOUNTER
She wants to talk to you about the \"thing\" she has in her throat for about 4 years now because yesterday she touched it.  She is asking if she needs to be seen or go to the emergency room.  She wants a call back soon if possible

## 2025-04-14 NOTE — TELEPHONE ENCOUNTER
Patient advised. She said she also has not taken the Prednisone because she is scared to take it. She said that last time she took it years ago she passed out and fell on her face.

## 2025-04-15 ENCOUNTER — TELEPHONE (OUTPATIENT)
Dept: FAMILY MEDICINE CLINIC | Facility: CLINIC | Age: 80
End: 2025-04-15

## 2025-04-15 DIAGNOSIS — R13.10 DYSPHAGIA, UNSPECIFIED TYPE: Primary | ICD-10-CM

## 2025-04-15 NOTE — TELEPHONE ENCOUNTER
No take backs on injury to the central nervious system.  Plasticity or adaptability of the body changes with age. This is an insult and there is a response, when you are young things go back to the previous level of functioning after 50 this is not the case anymore, that's what makes us \"age\" and the accumulation of injuries weakens the body until we finally succumb.

## 2025-04-15 NOTE — TELEPHONE ENCOUNTER
In 2021 she had both an endoscopy and laryngoscopy.  I would not expect these to change much, but he can start less invasive with a contrast esophagram if she would like.  CT neck shows no neck mass or LAD.             Specimen Collected: 11/18/21  8:25 AM Last Resulted: 11/18/21  8:45 AM   Received From: [employer]  Result Received: 12/09/21 10:15 AM      Case Report   Surgical Pathology                                Case: [id number]                                    Authorizing Provider:  Raulito Miner MD     Collected:           12/17/2019 09:59 AM           Ordering Location:     [facility]  Received:            12/17/2019 02:14 PM           Pathologist:           Vinh David MD                                                          Specimens:   A) - Gastric biopsy                                                                                  B) - Esophagus distal, Distal Esophageal Biopsy                                                      C) - Esophagus mid, Mid Esophageal Biopsy                                                   Final Diagnosis:   A.  Gastric biopsy:  -Reactive gastropathy.  -Negative for helicobacter pylori-like organisms.     B.  Distal esophageal biopsy:  -Fragments of junctional mucosa with mild chronic gastric carditis.  -Negative for intestinal metaplasia.     C.  Mid esophageal biopsy:  -Fragments of esophageal squamous mucosa with focal fungal organisms consistent with Candida species.      Electronically signed by Vinh David MD on 12/19/2019 at  4:33 PM

## 2025-04-15 NOTE — TELEPHONE ENCOUNTER
Patient advised that Dr Neff wants her to see a gastroenterologist again. She states that she did not like the last one she saw. Referral placed to Dr Rivera.   Ren message sent to patient with this information.

## 2025-04-15 NOTE — TELEPHONE ENCOUNTER
Patient said that she called the ENT Dr Almonte's office and she cannot get in there until September. She said that she will be dead before then. She said that she is feeling very weak today. She said she had two swallow studies ordered by Dr Zamorano. The first one the camera broke. The second test was completed 12/23/21 at [facility] and was normal.  Patient said she is also worried about the plaque in her brain and in her eye. Is there any way this can be reversed?

## 2025-04-17 ENCOUNTER — TELEPHONE (OUTPATIENT)
Dept: FAMILY MEDICINE CLINIC | Facility: CLINIC | Age: 80
End: 2025-04-17

## 2025-04-17 NOTE — TELEPHONE ENCOUNTER
Sebas-  Spoke to Edith and she had questions about why she needs to go to see the GI consult. I told her that they need to do an assessment to see how they will treat or test. She agrees to call them back, was also c/o of throwing up one time after coughing. Was anxious and having waves of being dizzy. I got her to agree to call her friend to take her to the ER because she did not want me to call 911.

## 2025-04-17 NOTE — TELEPHONE ENCOUNTER
WANTS TO TALK TO NURSE ABOUT GASTRO  SHE WAS REFERRED TO & WHAT DR PINO WANTS THEM TO DO, CALL PATIENT BACK

## 2025-04-21 ENCOUNTER — TELEPHONE (OUTPATIENT)
Dept: FAMILY MEDICINE CLINIC | Facility: CLINIC | Age: 80
End: 2025-04-21

## 2025-04-21 RX ORDER — MAGNESIUM OXIDE 400 MG/1
400 TABLET ORAL DAILY
Qty: 90 TABLET | Refills: 0 | Status: SHIPPED | OUTPATIENT
Start: 2025-04-21 | End: 2025-07-20

## 2025-04-21 NOTE — TELEPHONE ENCOUNTER
Patient said that she is going to have an EGD on May 12th at [facility]. She rescheduled her Medicare supervisit  or May 8th. She does not think she needs pre-op clearance.   She said she cannot find Magnesium Oxide 400mg, can she take Magnesium Citrate 400mg. Patient advised yes she can take either.

## 2025-04-22 ENCOUNTER — TELEPHONE (OUTPATIENT)
Dept: FAMILY MEDICINE CLINIC | Facility: CLINIC | Age: 80
End: 2025-04-22

## 2025-04-22 NOTE — TELEPHONE ENCOUNTER
Patient left leg is swollen and she is wanting to know if she can take another FUROSEMIDE 40 MG Oral Tab.

## 2025-04-22 NOTE — TELEPHONE ENCOUNTER
Patient said that her left leg is really swollen. She said she missed taking two Furosemide yesterday. She said she usually takes 3 daily. Can she take an extra one? She has been having increased shortness of breath.   She said she is having cramping chest pain by her left breast. She said that she has been having a lot of difficulty swallowing. She said she did not  Prilosec. She takes Famotidine, she is going to take one right now. Patient advised if the chest pain continues she needs to go to the ER.   Patient said that she does not want the GI doctor to go down into her esophagus and stomach because of the all the risks. Patient advised that there are always risks involved but this is the only way to find out what is causing the symptoms.

## 2025-04-28 ENCOUNTER — TELEPHONE (OUTPATIENT)
Dept: FAMILY MEDICINE CLINIC | Facility: CLINIC | Age: 80
End: 2025-04-28

## 2025-04-28 NOTE — TELEPHONE ENCOUNTER
- continue ASA   Patient asked if they will remove anything when they do her EGD. Patient advised that she should call Dr Rivera's office and ask these questions. She said that she turned her head and felt like her oxygen was being cut off. She said the phlegm is very thick and stringy. Patient advised that she should go to the ER if she is having respiratory distress.

## 2025-04-28 NOTE — TELEPHONE ENCOUNTER
Patient stated that she can't breathe, patient stated its caused by \"that thing in my throat\" nurse would know.

## 2025-04-28 NOTE — TELEPHONE ENCOUNTER
Patient states she cannot move her head to the side and she \"can barely breathe\" due to the \"thing in her throat\". Asked patient for more details and she states she only wants to talk to \"her nurse\" and \"doesn't have time to tell me all about it\". Was able to talk with pt and she did calm down and states she's had this issue for some time and is more frustrated today about it. Has upcoming EGD and wants Pippa FELIX to discuss this with her. Refuses 911 / ER.

## 2025-05-05 ENCOUNTER — TELEPHONE (OUTPATIENT)
Dept: FAMILY MEDICINE CLINIC | Facility: CLINIC | Age: 80
End: 2025-05-05

## 2025-05-05 RX ORDER — ALLOPURINOL 100 MG/1
100 TABLET ORAL DAILY
Qty: 90 TABLET | Refills: 3 | Status: SHIPPED | OUTPATIENT
Start: 2025-05-05

## 2025-05-05 NOTE — TELEPHONE ENCOUNTER
She said that she does not have a  for her procedure next Monday and does not know what to do.  She is very nervous about this and wants to talk to you

## 2025-05-05 NOTE — TELEPHONE ENCOUNTER
Patient is still looking for a ride. She said that she was going to find out if the senior center can drive her but she needs a responsible person to accompany her. Patient given number for [facility] [phone].

## 2025-05-05 NOTE — TELEPHONE ENCOUNTER
Last refill: 02/17/25  Qty: 90  W/ 0 refills  Last ov: 03/20/25    Requested Prescriptions     Pending Prescriptions Disp Refills    ALLOPURINOL 100 MG Oral Tab [Pharmacy Med Name: Allopurinol Oral Tablet 100 MG] 90 tablet 3     Sig: TAKE 1 TABLET EVERY DAY     Future Appointments   Date Time Provider Department Center   5/8/2025 11:00 AM Mady Neff MD [facility]

## 2025-05-07 ENCOUNTER — TELEPHONE (OUTPATIENT)
Dept: FAMILY MEDICINE CLINIC | Facility: CLINIC | Age: 80
End: 2025-05-07

## 2025-05-07 DIAGNOSIS — Z12.31 ENCOUNTER FOR SCREENING MAMMOGRAM FOR BREAST CANCER: Primary | ICD-10-CM

## 2025-05-08 ENCOUNTER — LABORATORY ENCOUNTER (OUTPATIENT)
Dept: LAB | Age: 80
End: 2025-05-08
Attending: INTERNAL MEDICINE
Payer: MEDICARE

## 2025-05-08 ENCOUNTER — OFFICE VISIT (OUTPATIENT)
Dept: FAMILY MEDICINE CLINIC | Facility: CLINIC | Age: 80
End: 2025-05-08
Payer: MEDICARE

## 2025-05-08 VITALS
HEART RATE: 81 BPM | OXYGEN SATURATION: 96 % | RESPIRATION RATE: 24 BRPM | SYSTOLIC BLOOD PRESSURE: 110 MMHG | TEMPERATURE: 98 F | HEIGHT: 62 IN | DIASTOLIC BLOOD PRESSURE: 64 MMHG | WEIGHT: 165 LBS | BODY MASS INDEX: 30.36 KG/M2

## 2025-05-08 DIAGNOSIS — I50.22 CHRONIC SYSTOLIC CONGESTIVE HEART FAILURE (HCC): ICD-10-CM

## 2025-05-08 DIAGNOSIS — Z00.00 ENCOUNTER FOR ANNUAL HEALTH EXAMINATION: ICD-10-CM

## 2025-05-08 DIAGNOSIS — D68.2 FACTOR XII DEFICIENCY (HCC): ICD-10-CM

## 2025-05-08 DIAGNOSIS — I10 PRIMARY HYPERTENSION: ICD-10-CM

## 2025-05-08 DIAGNOSIS — E78.00 PURE HYPERCHOLESTEROLEMIA: ICD-10-CM

## 2025-05-08 DIAGNOSIS — J43.8 OTHER EMPHYSEMA (HCC): ICD-10-CM

## 2025-05-08 DIAGNOSIS — E11.9 DIABETES MELLITUS TYPE 2, DIET-CONTROLLED (HCC): ICD-10-CM

## 2025-05-08 DIAGNOSIS — N18.31 STAGE 3A CHRONIC KIDNEY DISEASE (HCC): ICD-10-CM

## 2025-05-08 DIAGNOSIS — I48.20 ATRIAL FIBRILLATION, CHRONIC (HCC): ICD-10-CM

## 2025-05-08 DIAGNOSIS — N18.31 STAGE 3A CHRONIC KIDNEY DISEASE (HCC): Primary | ICD-10-CM

## 2025-05-08 DIAGNOSIS — I27.20 PULMONARY HTN (HCC): ICD-10-CM

## 2025-05-08 DIAGNOSIS — I70.0 ABDOMINAL AORTIC ATHEROSCLEROSIS: ICD-10-CM

## 2025-05-08 LAB
ALBUMIN SERPL-MCNC: 4.5 G/DL (ref 3.2–4.8)
ALBUMIN/GLOB SERPL: 2.4 {RATIO} (ref 1–2)
ALP LIVER SERPL-CCNC: 108 U/L (ref 55–142)
ALT SERPL-CCNC: 15 U/L (ref 10–49)
ANION GAP SERPL CALC-SCNC: 4 MMOL/L (ref 0–18)
AST SERPL-CCNC: 23 U/L (ref ?–34)
BASOPHILS # BLD AUTO: 0.05 X10(3) UL (ref 0–0.2)
BASOPHILS NFR BLD AUTO: 0.8 %
BILIRUB SERPL-MCNC: 0.4 MG/DL (ref 0.2–1.1)
BUN BLD-MCNC: 27 MG/DL (ref 9–23)
CALCIUM BLD-MCNC: 9.6 MG/DL (ref 8.7–10.6)
CHLORIDE SERPL-SCNC: 102 MMOL/L (ref 98–112)
CHOLEST SERPL-MCNC: 138 MG/DL (ref ?–200)
CO2 SERPL-SCNC: 33 MMOL/L (ref 21–32)
CREAT BLD-MCNC: 1.19 MG/DL (ref 0.55–1.02)
CREAT UR-SCNC: 14.5 MG/DL
EGFRCR SERPLBLD CKD-EPI 2021: 47 ML/MIN/1.73M2 (ref 60–?)
EOSINOPHIL # BLD AUTO: 0.17 X10(3) UL (ref 0–0.7)
EOSINOPHIL NFR BLD AUTO: 2.6 %
ERYTHROCYTE [DISTWIDTH] IN BLOOD BY AUTOMATED COUNT: 17.8 %
EST. AVERAGE GLUCOSE BLD GHB EST-MCNC: 123 MG/DL (ref 68–126)
GLOBULIN PLAS-MCNC: 1.9 G/DL (ref 2–3.5)
GLUCOSE BLD-MCNC: 90 MG/DL (ref 70–99)
HBA1C MFR BLD: 5.9 % (ref ?–5.7)
HCT VFR BLD AUTO: 40.4 % (ref 35–48)
HDLC SERPL-MCNC: 60 MG/DL (ref 40–59)
HGB BLD-MCNC: 11.8 G/DL (ref 12–16)
IMM GRANULOCYTES # BLD AUTO: 0.03 X10(3) UL (ref 0–1)
IMM GRANULOCYTES NFR BLD: 0.5 %
LDLC SERPL CALC-MCNC: 64 MG/DL (ref ?–100)
LYMPHOCYTES # BLD AUTO: 0.99 X10(3) UL (ref 1–4)
LYMPHOCYTES NFR BLD AUTO: 15.3 %
MCH RBC QN AUTO: 23.1 PG (ref 26–34)
MCHC RBC AUTO-ENTMCNC: 29.2 G/DL (ref 31–37)
MCV RBC AUTO: 79.2 FL (ref 80–100)
MICROALBUMIN UR-MCNC: <0.3 MG/DL
MONOCYTES # BLD AUTO: 0.55 X10(3) UL (ref 0.1–1)
MONOCYTES NFR BLD AUTO: 8.5 %
NEUTROPHILS # BLD AUTO: 4.66 X10 (3) UL (ref 1.5–7.7)
NEUTROPHILS # BLD AUTO: 4.66 X10(3) UL (ref 1.5–7.7)
NEUTROPHILS NFR BLD AUTO: 72.3 %
NONHDLC SERPL-MCNC: 78 MG/DL (ref ?–130)
NT-PROBNP SERPL-MCNC: 646 PG/ML (ref ?–450)
OSMOLALITY SERPL CALC.SUM OF ELEC: 293 MOSM/KG (ref 275–295)
PLATELET # BLD AUTO: 155 10(3)UL (ref 150–450)
POTASSIUM SERPL-SCNC: 4.2 MMOL/L (ref 3.5–5.1)
PROT SERPL-MCNC: 6.4 G/DL (ref 5.7–8.2)
RBC # BLD AUTO: 5.1 X10(6)UL (ref 3.8–5.3)
SODIUM SERPL-SCNC: 139 MMOL/L (ref 136–145)
TRIGL SERPL-MCNC: 67 MG/DL (ref 30–149)
VLDLC SERPL CALC-MCNC: 10 MG/DL (ref 0–30)
WBC # BLD AUTO: 6.5 X10(3) UL (ref 4–11)

## 2025-05-08 PROCEDURE — 80061 LIPID PANEL: CPT

## 2025-05-08 PROCEDURE — 85025 COMPLETE CBC W/AUTO DIFF WBC: CPT

## 2025-05-08 PROCEDURE — 82570 ASSAY OF URINE CREATININE: CPT | Performed by: INTERNAL MEDICINE

## 2025-05-08 PROCEDURE — 36415 COLL VENOUS BLD VENIPUNCTURE: CPT

## 2025-05-08 PROCEDURE — 83036 HEMOGLOBIN GLYCOSYLATED A1C: CPT

## 2025-05-08 PROCEDURE — 82043 UR ALBUMIN QUANTITATIVE: CPT | Performed by: INTERNAL MEDICINE

## 2025-05-08 PROCEDURE — 83880 ASSAY OF NATRIURETIC PEPTIDE: CPT

## 2025-05-08 PROCEDURE — 80053 COMPREHEN METABOLIC PANEL: CPT

## 2025-05-08 RX ORDER — OMEPRAZOLE 20 MG/1
20 CAPSULE, DELAYED RELEASE ORAL DAILY
COMMUNITY
Start: 2025-04-14 | End: 2025-05-14

## 2025-05-09 NOTE — PROGRESS NOTES
Subjective:   Edith Eubanks is a 79 year old female who presents for a MA AHA (Medicare Advantage Annual Health Assessment) and Subsequent Annual Wellness visit (Pt already had Initial Annual Wellness) and scheduled follow up of multiple significant but stable problems.   History of Present Illness            Pt lives alone and has progressive lung and heart disease.  She has trouble swallowing and more recently feels like there is something STUCK in her throat. She has high anxiety and depends on neighbors and family for help with everyday chores. I feel she is appropriate for assisted living, but she is not yet willing to leave her home. She wears oxygen 2 L continuous and struggles in the summer months due to heat and humidity.     History/Other:   Fall Risk Assessment:   She has been screened for Falls and is High Risk. Fall Prevention information provided to patient in After Visit Summary.    Do you feel unsteady when standing or walking?: Yes  Do you worry about falling?: Yes  Have you fallen in the past year?: No     Cognitive Assessment:   She had a completely normal cognitive assessment - see flowsheet entries     Functional Ability/Status:   Edith Eubanks has some abnormal functions as listed below:  She has Dressing and/or Bathing issues based on screening of functional status.  Difficulty dressing or bathing?: Yes  Dressing: (Patient-Rptd) Able without help  She has Vision problems based on screening of functional status. She has Walking problems based on screening of functional status. She has problems with Daily Activities based on screening of functional status. She has problems with Memory based on screening of functional status.       Depression Screening (PHQ):  PHQ-2 SCORE: 0  , done 5/8/2025   If you checked off any problems, how difficult have these problems made it for you to do your work, take care of things at home, or get along with other people?: Not difficult at all    Last  [place] Suicide Screening on 5/8/2025 was No Risk.     <5 minutes spent screening and counseling for depression    Advanced Directives:   She does NOT have a Living Will. [Do you have a living will?: No]  She does NOT have a Power of  for Health Care. [Do you have a healthcare power of ?: No]  Discussed Advance Care Planning with patient (and family/surrogate if present). Standard forms made available to patient in After Visit Summary.      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    Hyperlipidemia    CKD (chronic kidney disease) stage 4, GFR 15-29 ml/min (HCC)    Atrial fibrillation, chronic (HCC)    Class 1 obesity    Hx of CABG    Long term (current) use of anticoagulants    SOB (shortness of breath) on exertion    Abdominal aortic atherosclerosis    Pulmonary HTN (HCC)    Thrombocytopenia    Diabetes mellitus type 2, diet-controlled (HCC)    Cellulitis of left lower extremity    Recurrent UTI    Generalized edema    Dysuria    Stage 3a chronic kidney disease (HCC)     Allergies:  She is allergic to cephalosporins, prednisone, sulfa antibiotics, tussin cough dm [dextromethorphan-guaifenesin], bactrim [sulfamethoxazole w/trimethoprim], ciprofloxacin, levaquin [levofloxacin], macrobid [nitrofurantoin], tramadol, amoxicillin-pot clavulanate, codeine, colchicine, and diflucan [fluconazole].    Current Medications:  Outpatient Medications Marked as Taking for the 5/8/25 encounter (Office Visit) with Mady Neff MD   Medication Sig    omeprazole 20 MG Oral Capsule Delayed Release Take 1 capsule (20 mg total) by mouth daily.    ALLOPURINOL 100 MG Oral Tab TAKE 1 TABLET EVERY DAY    magnesium oxide 400 MG Oral Tab Take 1 tablet (400 mg total) by mouth daily.     pravastatin 40 MG Oral Tab     budeson-glycopyrrol-formoterol (BREZTRI AEROSPHERE) 160-9-4.8 MCG/ACT Inhalation Aerosol Inhale 2 puffs into the lungs 2 (two) times daily.    ipratropium-albuterol (COMBIVENT RESPIMAT)  MCG/ACT Inhalation Aero Soln Inhale 1 puff into the lungs 4 (four) times daily.    potassium chloride 10 MEQ Oral Tab CR Take 2 tablets (20 mEq total) by mouth daily.    FUROSEMIDE 40 MG Oral Tab TAKE 1 TABLET THREE TIMES DAILY    dilTIAZem HCl ER Beads 240 MG Oral Capsule SR 24 Hr Take 1 capsule (240 mg total) by mouth 2 (two) times daily.    nitroglycerin 0.4 MG Sublingual SL Tab Place 1 tablet (0.4 mg total) under the tongue every 5 (five) minutes as needed for Chest pain.    metoprolol succinate ER 50 MG Oral Tablet 24 Hr Take 1 tablet (50 mg total) by mouth daily.    ondansetron (ZOFRAN) 4 mg tablet Take 1 tablet (4 mg total) by mouth every 8 (eight) hours as needed for Nausea.    aspirin 81 MG Oral Tab EC Take 1 tablet (81 mg total) by mouth daily.       Medical History:  She  has a past medical history of Acute bronchitis (11/29/2011), Arthritis, Black stools, Blood in urine, Chest pain, unspecified (12/15/2011), Chronic airway obstruction, not elsewhere classified (11/29/2011), Chronic atrial fibrillation ([facility]), Congestive heart disease ([facility]), Congestive heart failure, unspecified (11/29/2011), Constipation, COPD (chronic obstructive pulmonary disease) ([facility]), Coronary atherosclerosis of unspecified type of vessel, native or graft (11/29/2011), Edema (01/12/2012), Esophageal reflux (01/12/2012), Factor XII deficiency disease ([facility]) (1999), Fatigue, Frequent use of laxatives, Generalized and unspecified atherosclerosis (11/29/2011), Gout, Hematemesis (12/13/2011), Irregular bowel habits, Leaking of urine, Painful swallowing, Parathyroid tumor, Wears glasses, Weight gain, and 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  Surgical History:  She  has a past surgical history that includes tonsillectomy;  tubal ligation (1982); skin surgery; cabg (04/05/2011); carotid exam (03/12/2011); other cardiology; cholecystectomy; other; angioplasty (coronary); and colonoscopy.   Family History:  Her family history includes Breast Cancer (age of onset: 60) in her paternal aunt, paternal aunt, and paternal cousin female; Cancer in an other family member; Heart Disease in her father and mother; Hypertension in her mother.  Social History:  She  reports that she quit smoking about 15 years ago. Her smoking use included cigarettes. She started smoking about 65 years ago. She has a 50 pack-year smoking history. She has never used smokeless tobacco. She reports that she does not drink alcohol and does not use drugs.    Tobacco:  She smoked tobacco in the past but quit greater than 12 months ago.  Tobacco Use[1]     CAGE Alcohol Screen:   CAGE screening score of 0 on 5/8/2025, showing low risk of alcohol abuse.      Patient Care Team:  Mady Neff MD as PCP - General (Family Practice)  Matt Metzger MD (CARDIOLOGY)  Michelle Tovar PA-C as Consulting Physician (Physician Assistant)  Stepan Zamorano MD (GASTROENTEROLOGY)  Mady Neff MD (Family Medicine)    Review of Systems  GENERAL: feels well otherwise  SKIN: denies any unusual skin lesions  EYES: denies blurred vision or double vision  HEENT: denies nasal congestion, sinus pain or ST  LUNGS: denies shortness of breath with exertion  CARDIOVASCULAR: denies chest pain on exertion  GI: denies abdominal pain, denies heartburn  : denies dysuria, vaginal discharge or itching, no complaint of urinary incontinence   MUSCULOSKELETAL: denies back pain  NEURO: denies headaches  PSYCHE: denies depression or anxiety  HEMATOLOGIC: denies hx of anemia  ENDOCRINE: denies thyroid history  ALL/ASTHMA: denies hx of allergy or asthma    Objective:   Physical Exam  General Appearance:  Alert, cooperative, no distress, appears stated age   Head:  Normocephalic, without obvious  abnormality, atraumatic   Eyes:  PERRL, conjunctiva/corneas clear, EOM's intact both eyes   Ears:  Normal TM's and external ear canals, both ears   Nose: Nares normal, septum midline,mucosa normal, no drainage or sinus tenderness   Throat: Lips, mucosa, and tongue normal; teeth and gums normal   Neck: Supple, symmetrical, trachea midline, no adenopathy;  thyroid: not enlarged, symmetric, no tenderness/mass/nodules; no carotid bruit or JVD   Back:   Symmetric, no curvature, ROM normal, no CVA tenderness   Lungs:   Clear to auscultation bilaterally, respirations unlabored   Heart:  Regular rate and rhythm, S1 and S2 normal, no murmur, rub, or gallop   Abdomen:   Soft, non-tender, bowel sounds active all four quadrants,  no masses, no organomegaly   Pelvic: Deferred   Extremities: Extremities normal, atraumatic, no cyanosis or edema   Pulses: 2+ and symmetric   Skin: Skin color, texture, turgor normal, no rashes or lesions   Lymph nodes: Cervical, supraclavicular, and axillary nodes normal   Neurologic: Normal       /64   Pulse 81   Temp 98.2 °F (36.8 °C) (Temporal)   Resp 24   Ht 5' 2\" (1.575 m)   Wt 165 lb (74.8 kg)   SpO2 96%   BMI 30.18 kg/m²  Estimated body mass index is 30.18 kg/m² as calculated from the following:    Height as of this encounter: 5' 2\" (1.575 m).    Weight as of this encounter: 165 lb (74.8 kg).    Medicare Hearing Assessment:   Hearing Screening    Time taken: 5/9/2025  6:54 AM  Entry User: Mady Neff MD  Screening Method: Whisper Test  Whisper Test Result: Fail         Visual Acuity:   Right Eye Visual Acuity: Corrected Right Eye Chart Acuity: 20/40   Left Eye Visual Acuity: Corrected Left Eye Chart Acuity: 20/40   Both Eyes Visual Acuity: Corrected Both Eyes Chart Acuity: 20/40   Able To Tolerate Visual Acuity: Yes        Assessment & Plan:   Edith Eubanks is a 79 year old female who presents for a Medicare Assessment.     1. Stage 3a chronic kidney disease (HCC)  (Primary)  -     Comp Metabolic Panel (14); Future; Expected date: 05/08/2025  2. Factor XII deficiency (HCC)  3. Other emphysema (HCC)  4. Diabetes mellitus type 2, diet-controlled (HCC)  -     Lipid Panel; Future; Expected date: 05/08/2025  -     Hemoglobin A1C; Future; Expected date: 05/08/2025  -     Microalb/Creat Ratio, Random Urine; Future; Expected date: 05/08/2025  -     Microalb/Creat Ratio, Random Urine  5. Pure hypercholesterolemia  6. Primary hypertension  -     CBC With Differential With Platelet; Future; Expected date: 05/08/2025  7. Pulmonary HTN (HCC)  Overview:  12/12/19 Echo.    8. Chronic systolic congestive heart failure (HCC)  -     Pro Beta Natriuretic Peptide; Future; Expected date: 05/08/2025  9. Atrial fibrillation, chronic (HCC)  10. Abdominal aortic atherosclerosis  Overview:  12/17/19 CT abd/pelvis  11. Encounter for annual health examination  [unfilled]      1. Stage 3a chronic kidney disease (HCC)  GFR 47 and stable  - CMP Now; Future    2. Factor XII deficiency (HCC)  Takes ASA daily anything more causes GI blood loss and anemia    3. Other emphysema (HCC)  O2 dependent and uses BREZTRI when she can afford it and combivent, she has not smoked for a decade.     4. Diabetes mellitus type 2, diet-controlled (HCC)  Continues to be in the prediabetic range; check eery 6 months. She eats well and prepares all her own food.   - Lipids Now; Future  - Hemoglobin A1C Now; Future  - Microalb/Creat Ratio, Random Urine Now; Future  - Microalb/Creat Ratio, Random Urine Now    5. Pure hypercholesterolemia  Well controlled, CCM    6. Primary hypertension  Well controlled, CCM  - CBC W Differential W Platelet [E]; Future    7. Pulmonary HTN (HCC)  No change. O2 and diltiazem, her blood pressure is low normal     8. Chronic systolic congestive heart failure (HCC)  Compensated, using lasix and electrolytes are normal  - Pro Beta Natriuretic Peptide [E]; Future    9. Atrial fibrillation, chronic  (HCC)  Rate controlled, on ASA    10. Abdominal aortic atherosclerosis  Moderate to severe plaque in the aorta, should have a doppler US to check flow    11. Encounter for annual health examination  done        The patient indicates understanding of these issues and agrees to the plan.  Reinforced healthy diet, lifestyle, and exercise.      No follow-ups on file.     Mady Neff MD, 5/9/2025     Supplementary Documentation:   General Health:  In the past six months, have you lost more than 10 pounds without trying?: (Patient-Rptd) 2 - No  Has your appetite been poor?: (Patient-Rptd) No  Type of Diet: (Patient-Rptd) Low Salt  How does the patient maintain a good energy level?: (Patient-Rptd) Other  How would you describe your daily physical activity?: (Patient-Rptd) Light  How would you describe your current health state?: (Patient-Rptd) Fair  How do you maintain positive mental well-being?: Social Interaction, Puzzles  On a scale of 0 to 10, with 0 being no pain and 10 being severe pain, what is your pain level?: (Patient-Rptd) 2 - (Mild)  In the past six months, have you experienced urine leakage?: (Patient-Rptd) 0-No  At any time do you feel concerned for the safety/well-being of yourself and/or your children, in your home or elsewhere?: (Patient-Rptd) No  Have you had any immunizations at another office such as Influenza, Hepatitis B, Tetanus, or Pneumococcal?: (Patient-Rptd) No    Health Maintenance   Topic Date Due    Diabetes Care Dilated Eye Exam  Never done    COVID-19 Vaccine (5 - 2024-25 season) 09/01/2024    Annual Well Visit  01/01/2025    Diabetes Care Foot Exam  08/28/2025    Influenza Vaccine (Season Ended) 10/01/2025    Diabetes Care A1C  11/08/2025    Diabetes Care: GFR  05/08/2026    DEXA Scan  Completed    Annual Depression Screening  Completed    Fall Risk Screening (Annual)  Completed    Diabetes Care: Microalb/Creat Ratio (Annual)  Completed    Pneumococcal Vaccine: 50+ Years  Completed     Zoster Vaccines  Completed    Meningococcal B Vaccine  Aged Out    Colonoscopy  Discontinued          [1]   Social History  Tobacco Use   Smoking Status Former    Current packs/day: 0.00    Average packs/day: 1 pack/day for 50.0 years (50.0 ttl pk-yrs)    Types: Cigarettes    Start date: 1/1/1960    Quit date: 1/1/2010    Years since quitting: 15.3   Smokeless Tobacco Never   Tobacco Comments    quit a few years ago

## 2025-05-14 ENCOUNTER — TELEPHONE (OUTPATIENT)
Dept: FAMILY MEDICINE CLINIC | Facility: CLINIC | Age: 80
End: 2025-05-14

## 2025-05-14 NOTE — TELEPHONE ENCOUNTER
[place] [facility] [place] new SOB and melena since MONDAY 5/12/2025

## 2025-05-14 NOTE — TELEPHONE ENCOUNTER
Patient said that she thinks she was allergic to the anesthesia that she had during her EGD. She said that she called Dr Rivera's office and they wanted her to go to the ER. She said she did not want to go to the ER. She said her stool is black and her urine was red this morning. Patient said Dr Rivera told her she has bleeding in her stomach. She said that Dr Rivera's office wants her to have more blood work done. They put her on Pantoprazole twice daily.   She is asking if she can have the blood work done here.

## 2025-05-15 ENCOUNTER — TELEPHONE (OUTPATIENT)
Dept: FAMILY MEDICINE CLINIC | Facility: CLINIC | Age: 80
End: 2025-05-15

## 2025-05-15 ENCOUNTER — HOSPITAL ENCOUNTER (OUTPATIENT)
Age: 80
Discharge: LEFT BEFORE DISCHARGE | End: 2025-05-15
Payer: MEDICARE

## 2025-05-15 VITALS
DIASTOLIC BLOOD PRESSURE: 53 MMHG | HEART RATE: 90 BPM | RESPIRATION RATE: 26 BRPM | OXYGEN SATURATION: 92 % | TEMPERATURE: 98 F | SYSTOLIC BLOOD PRESSURE: 115 MMHG

## 2025-05-15 DIAGNOSIS — R07.9 CHEST PAIN OF UNCERTAIN ETIOLOGY: Primary | ICD-10-CM

## 2025-05-15 DIAGNOSIS — K92.1 BLACK STOOLS: ICD-10-CM

## 2025-05-15 DIAGNOSIS — K92.1 MELENA: Primary | ICD-10-CM

## 2025-05-15 PROCEDURE — 93000 ELECTROCARDIOGRAM COMPLETE: CPT | Performed by: NURSE PRACTITIONER

## 2025-05-15 PROCEDURE — 99215 OFFICE O/P EST HI 40 MIN: CPT | Performed by: NURSE PRACTITIONER

## 2025-05-15 NOTE — ED INITIAL ASSESSMENT (HPI)
Pt has hx of intermittent black stools. EGD done on Monday and was told she was bleeding in her stomach and that she has a hiatal hernia. Pt states she is here because her doctor is worried she is anemic due to her bleeding.. pt states she had blood in her urine on Monday and Tuesday, but it is clear today. Denies seeing any bright red blood in her stool. Denies chest pain. Pt ambulates with oxygen. Current setting was at 2L on arrival. Pt was switched over to our oxygen tank and increased to 3L due to her low oxygen level on arrival. Provider aware. Pt was advised to go to the ER by her pcp but refused. Pt was then advised to come here for blood work.

## 2025-05-15 NOTE — TELEPHONE ENCOUNTER
Patient called back and said the Fire department came and told her there is not a Co2 leak in her apartment. Patient states that the chest pain did not resolve with Nitroglycerin, but it has resolved since then. Patient advised to go to ER to be evlauated. Patient states she cannot afford to go to the ER. Patient advised to go to urgent care to be evaluated for anemia. Patient agreed to go to urgent care.

## 2025-05-15 NOTE — TELEPHONE ENCOUNTER
Patient said her urine is clear and her stool is not black anymore.  She said that when she went to [facility] they told her they were not equipped to draw blood there and they wanted to call an ambulance. She said that she refused to go.   Should she have blood work done here?

## 2025-05-15 NOTE — TELEPHONE ENCOUNTER
Patient called this morning and spoke with Dr Neff. She complained of shortness of breath and sternal pain. Her Co2 detector was also beeping. Dr Neff advised patient to take a Nitroglycerin and if the pain resolves she needs to go to the ER. Called [place] [employer] to check patient's apartment for C02.

## 2025-05-15 NOTE — ED PROVIDER NOTES
Patient Seen in: [facility]      History     Chief Complaint   Patient presents with    GI Bleeding     Stated Complaint: Weak since EGD; Black Stool; Red Urine    Subjective:   79-year-old female with history of CHF, COPD oxygen dependent, with weakness, black stools since her EGD from this past Monday.  States she was told she had bleeding in her stomach.  States this morning she spoke with her doctor around 8 AM at that time she had chest pain.  She had nitroglycerin which seem to work but she states she is having no pain now in the chest.      History of Present Illness                  Objective:     Past Medical History:    Acute bronchitis    Arthritis    Black stools    Blood in urine    Chest pain, unspecified    Chronic airway obstruction, not elsewhere classified    Chronic atrial fibrillation (HCC)    Congestive heart disease (HCC)    Congestive heart failure, unspecified    Constipation    COPD (chronic obstructive pulmonary disease) (HCC)    Coronary atherosclerosis of unspecified type of vessel, native or graft    Edema    Esophageal reflux    Factor XII deficiency disease (HCC)    Erlebrorn    Fatigue    Frequent use of laxatives    Generalized and unspecified atherosclerosis    Gout    Hematemesis    Irregular bowel habits    Leaking of urine    Painful swallowing    Parathyroid tumor    Wears glasses    Weight gain    West Nile Virus infection (HCC)              Past Surgical History:   Procedure Laterality Date    Angioplasty (coronary)      Cabg  04/05/2011    Dr.Brian Lala    Carotid exam  03/12/2011    r Carotid Enderterectomy / Dr Doherty    [facility]; 5/2012    Colonoscopy      Other      14in of colon removed    Other cardiology      Peripheral Vascular Surgery     Skin surgery      Reconstruction of Left side of face    Tonsillectomy      Tubal ligation  1982                Social History     Socioeconomic History    Marital status: OTHER    Number of  children: 2   Occupational History    Occupation: Not Employed    Tobacco Use    Smoking status: Former     Current packs/day: 0.00     Average packs/day: 1 pack/day for 50.0 years (50.0 ttl pk-yrs)     Types: Cigarettes     Start date: 1/1/1960     Quit date: 1/1/2010     Years since quitting: 15.3    Smokeless tobacco: Never    Tobacco comments:     quit a few years ago   Vaping Use    Vaping status: Never Used   Substance and Sexual Activity    Alcohol use: No     Alcohol/week: 0.0 standard drinks of alcohol    Drug use: No   Other Topics Concern    Caffeine Concern No     Comment: About 1 drink per day     Exercise No     Social Drivers of Health     Transportation Needs: No Transportation Needs (9/10/2024)    Transportation Needs     Lack of Transportation: No    Received from [facility]    Housing Stability              Review of Systems   Constitutional: Negative.    Cardiovascular:  Positive for chest pain.   Gastrointestinal:  Positive for blood in stool.   Neurological:  Positive for weakness.   All other systems reviewed and are negative.      Positive for stated complaint: Weak since EGD; Black Stool; Red Urine  Other systems are as noted in HPI.  Constitutional and vital signs reviewed.      All other systems reviewed and negative except as noted above.                  Physical Exam     ED Triage Vitals [05/15/25 1055]   /53   Pulse 90   Resp 26   Temp 98.2 °F (36.8 °C)   Temp src Oral   SpO2 92 %   O2 Device Nasal cannula       Current Vitals:   Vital Signs  BP: 115/53  Pulse: 90  Resp: 26  Temp: 98.2 °F (36.8 °C)  Temp src: Oral    Oxygen Therapy  SpO2: 92 %  O2 Device: Nasal cannula  O2 Flow Rate (L/min): 3 L/min          Physical Exam  Vitals and nursing note reviewed.   Constitutional:       Appearance: Normal appearance. She is not ill-appearing, toxic-appearing or diaphoretic.   Cardiovascular:      Rate and Rhythm: Normal rate. Rhythm irregular.      Pulses: Normal  pulses.      Heart sounds: Normal heart sounds.   Pulmonary:      Effort: Pulmonary effort is normal. No respiratory distress.      Breath sounds: Normal breath sounds. No stridor. No wheezing, rhonchi or rales.   Skin:     Capillary Refill: Capillary refill takes less than 2 seconds.   Neurological:      Mental Status: She is alert and oriented to person, place, and time.   Psychiatric:         Mood and Affect: Mood normal.         Behavior: Behavior normal.                   ED Course   Labs Reviewed - No data to display  EKG    Rate, intervals and axes as noted on EKG Report.  Rate: 82  Rhythm: Atrial Fibrillation  Reading: Atrial fibrillation with PVCs              Results                              MDM              Medical Decision Making  79-year-old female who had an EGD on Monday and was told that had stomach bleeding.  Has had black stools since send generalized weakness.  No strokelike deficits.  Chest pain this morning that resolved with nitroglycerin.  With her history and symptomology, needs high-level care in the emergency department for full evaluation.  I did strongly recommend she get a ride to the emergency department via ambulance/911.  She refused this.Risks/benefits/alternatives discussed with patient.  She voices understanding, and still adamant that she does not want an ambulance.  States she will drive herself to [facility] emergency department which is about 8 miles from her house.  States she is willing to sign the AMA form.      Amount and/or Complexity of Data Reviewed  ECG/medicine tests: ordered. Decision-making details documented in ED Course.          Disposition and Plan     Clinical Impression:  1. Chest pain of uncertain etiology    2. Black stools         Disposition:  [place]  5/15/2025 11:18 am    Follow-up:  No follow-up provider specified.        Medications Prescribed:  Current Discharge Medication List                Supplementary Documentation:

## 2025-05-16 ENCOUNTER — LABORATORY ENCOUNTER (OUTPATIENT)
Dept: LAB | Age: 80
End: 2025-05-16
Attending: INTERNAL MEDICINE
Payer: MEDICARE

## 2025-05-16 DIAGNOSIS — K92.1 MELENA: ICD-10-CM

## 2025-05-16 LAB
ALBUMIN SERPL-MCNC: 4.3 G/DL (ref 3.2–4.8)
ALBUMIN/GLOB SERPL: 2 {RATIO} (ref 1–2)
ALP LIVER SERPL-CCNC: 106 U/L (ref 55–142)
ALT SERPL-CCNC: 12 U/L (ref 10–49)
ANION GAP SERPL CALC-SCNC: 9 MMOL/L (ref 0–18)
AST SERPL-CCNC: 18 U/L (ref ?–34)
BASOPHILS # BLD AUTO: 0.04 X10(3) UL (ref 0–0.2)
BASOPHILS NFR BLD AUTO: 0.5 %
BILIRUB SERPL-MCNC: 0.6 MG/DL (ref 0.2–1.1)
BUN BLD-MCNC: 17 MG/DL (ref 9–23)
CALCIUM BLD-MCNC: 9.3 MG/DL (ref 8.7–10.6)
CHLORIDE SERPL-SCNC: 102 MMOL/L (ref 98–112)
CO2 SERPL-SCNC: 31 MMOL/L (ref 21–32)
CREAT BLD-MCNC: 1.22 MG/DL (ref 0.55–1.02)
EGFRCR SERPLBLD CKD-EPI 2021: 45 ML/MIN/1.73M2 (ref 60–?)
EOSINOPHIL # BLD AUTO: 0.15 X10(3) UL (ref 0–0.7)
EOSINOPHIL NFR BLD AUTO: 1.9 %
ERYTHROCYTE [DISTWIDTH] IN BLOOD BY AUTOMATED COUNT: 19.2 %
FASTING STATUS PATIENT QL REPORTED: YES
GLOBULIN PLAS-MCNC: 2.2 G/DL (ref 2–3.5)
GLUCOSE BLD-MCNC: 119 MG/DL (ref 70–99)
HCT VFR BLD AUTO: 40.5 % (ref 35–48)
HGB BLD-MCNC: 11.3 G/DL (ref 12–16)
IMM GRANULOCYTES # BLD AUTO: 0.03 X10(3) UL (ref 0–1)
IMM GRANULOCYTES NFR BLD: 0.4 %
LYMPHOCYTES # BLD AUTO: 0.83 X10(3) UL (ref 1–4)
LYMPHOCYTES NFR BLD AUTO: 10.3 %
MCH RBC QN AUTO: 22.9 PG (ref 26–34)
MCHC RBC AUTO-ENTMCNC: 27.9 G/DL (ref 31–37)
MCV RBC AUTO: 82 FL (ref 80–100)
MONOCYTES # BLD AUTO: 0.74 X10(3) UL (ref 0.1–1)
MONOCYTES NFR BLD AUTO: 9.2 %
NEUTROPHILS # BLD AUTO: 6.23 X10 (3) UL (ref 1.5–7.7)
NEUTROPHILS # BLD AUTO: 6.23 X10(3) UL (ref 1.5–7.7)
NEUTROPHILS NFR BLD AUTO: 77.7 %
OSMOLALITY SERPL CALC.SUM OF ELEC: 297 MOSM/KG (ref 275–295)
PLATELET # BLD AUTO: 155 10(3)UL (ref 150–450)
POTASSIUM SERPL-SCNC: 4 MMOL/L (ref 3.5–5.1)
PROT SERPL-MCNC: 6.5 G/DL (ref 5.7–8.2)
Q-T INTERVAL: 368 MS
QRS DURATION: 88 MS
QTC CALCULATION (BEZET): 429 MS
R AXIS: -20 DEGREES
RBC # BLD AUTO: 4.94 X10(6)UL (ref 3.8–5.3)
SODIUM SERPL-SCNC: 142 MMOL/L (ref 136–145)
T AXIS: 83 DEGREES
VENTRICULAR RATE: 82 BPM
WBC # BLD AUTO: 8 X10(3) UL (ref 4–11)

## 2025-05-16 PROCEDURE — 36415 COLL VENOUS BLD VENIPUNCTURE: CPT

## 2025-05-16 PROCEDURE — 80053 COMPREHEN METABOLIC PANEL: CPT

## 2025-05-16 PROCEDURE — 85025 COMPLETE CBC W/AUTO DIFF WBC: CPT

## 2025-05-17 ENCOUNTER — TELEPHONE (OUTPATIENT)
Dept: FAMILY MEDICINE CLINIC | Facility: CLINIC | Age: 80
End: 2025-05-17

## 2025-05-17 RX ORDER — NITROGLYCERIN 0.4 MG/1
0.4 TABLET SUBLINGUAL EVERY 5 MIN PRN
Qty: 25 TABLET | Refills: 0 | Status: SHIPPED | OUTPATIENT
Start: 2025-05-17

## 2025-05-17 NOTE — TELEPHONE ENCOUNTER
Patient said that she got a letter from the state that she needs to renew her license. She said that she doesn't think Dr Neff will let her get her license renewed. She needs a paper filled out by Dr Neff. She said that it was charted she is afraid of failing. She said she never said that.   Nitroglycerine refilled V.O. Dr Neff/Eda FELIX

## 2025-05-17 NOTE — TELEPHONE ENCOUNTER
Refill nitroGLYCERIN (NITROSTAT) 0.4 MG Sublingual SL Tab to [facility], patient also wants to talk to nurse today, call her back

## 2025-05-19 ENCOUNTER — TELEPHONE (OUTPATIENT)
Dept: FAMILY MEDICINE CLINIC | Facility: CLINIC | Age: 80
End: 2025-05-19

## 2025-05-19 NOTE — TELEPHONE ENCOUNTER
Patient said that she has been having increase shortness of breath since Monday. She said that she has her oxygen on 2.5L. She said that when she woke up her oxygen was 84%. She said it is up to 95% right now. She said her heart rate dropped down to 45 then went up to the 50's while she was praying. She said it is in the 80's this morning.  She said she had an EKG done at [facility] urgent care.

## 2025-05-19 NOTE — TELEPHONE ENCOUNTER
Patient would like to speak with nurse regarding her results from testing that she just had done.

## 2025-05-22 RX ORDER — FUROSEMIDE 40 MG/1
40 TABLET ORAL 3 TIMES DAILY
Qty: 270 TABLET | Refills: 1 | Status: SHIPPED | OUTPATIENT
Start: 2025-05-22

## 2025-05-22 NOTE — TELEPHONE ENCOUNTER
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 Last Office visit notes faxed to [facility] for oxygen qualification.

## 2025-05-22 NOTE — TELEPHONE ENCOUNTER
FUROSEMIDE 40 MG Oral Tab   Call to mail order [facility]     Edith also wanted to thank Pippa and Dr. Neff for taking the time to fill out the paperwork for her yesterday.

## 2025-05-23 ENCOUNTER — TELEPHONE (OUTPATIENT)
Dept: FAMILY MEDICINE CLINIC | Facility: CLINIC | Age: 80
End: 2025-05-23
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
## 2025-05-23 NOTE — TELEPHONE ENCOUNTER
Patient paged on call last evening. On oxygen and was unable to get oxygen out of concentrator. She was very shortness of breath. [employer] handles her equipment but they were closed. Recommended ER eval if unable to get enough oxygen. TAMI

## 2025-06-11 ENCOUNTER — HOSPITAL ENCOUNTER (OUTPATIENT)
Dept: MAMMOGRAPHY | Age: 80
Discharge: HOME | End: 2025-06-11
Attending: INTERNAL MEDICINE
Payer: MEDICARE

## 2025-06-11 DIAGNOSIS — Z12.31 ENCOUNTER FOR SCREENING MAMMOGRAM FOR BREAST CANCER: ICD-10-CM

## 2025-06-11 PROCEDURE — 77067 SCR MAMMO BI INCL CAD: CPT | Performed by: INTERNAL MEDICINE

## 2025-06-11 PROCEDURE — 77063 BREAST TOMOSYNTHESIS BI: CPT | Performed by: INTERNAL MEDICINE

## 2025-06-18 ENCOUNTER — TELEPHONE (OUTPATIENT)
Dept: FAMILY MEDICINE CLINIC | Facility: CLINIC | Age: 80
End: 2025-06-18

## 2025-06-18 NOTE — TELEPHONE ENCOUNTER
Patient would like nurse Das to call her back - she spoke with her cardiologist and they are not able to get her in any time soon unless she drives to [place]. She was then informed to call pcp office for further instructions.     Would like call as soon as possible

## 2025-06-18 NOTE — TELEPHONE ENCOUNTER
Patient said she was in the ER yesterday because she was bit on the ankle by her dog's friend.   She said that she called Dr Patiño's office because she has had an aching pain in the center of her chest for the last week. She could not get an appointment until July 18th. They told her to check with her PCP. She said she took a Nitroglycerin the other night and it helped but the pain was already dissipating.   She said she did not tell the ER that she was having chest pain so they did not do an EKG at the ER. She denies having chest pain right now but asked if Dr Neff could see her to make sure it is not her heart. She said she has had a hard time breathing without her oxygen. She has been looking at assisted living.

## 2025-06-18 NOTE — TELEPHONE ENCOUNTER
Patient advised, appointment scheduled tomorrow at 1045am. She is currently not having any chest pain. Advised to go to the Er if she develops chest pan.

## 2025-06-19 ENCOUNTER — OFFICE VISIT (OUTPATIENT)
Dept: FAMILY MEDICINE CLINIC | Facility: CLINIC | Age: 80
End: 2025-06-19
Payer: MEDICARE

## 2025-06-19 VITALS
OXYGEN SATURATION: 92 % | WEIGHT: 158 LBS | BODY MASS INDEX: 29 KG/M2 | DIASTOLIC BLOOD PRESSURE: 58 MMHG | HEART RATE: 75 BPM | RESPIRATION RATE: 24 BRPM | TEMPERATURE: 100 F | SYSTOLIC BLOOD PRESSURE: 110 MMHG

## 2025-06-19 DIAGNOSIS — R07.2 PRECORDIAL PAIN: Primary | ICD-10-CM

## 2025-06-19 DIAGNOSIS — J43.2 CENTRILOBULAR EMPHYSEMA (HCC): ICD-10-CM

## 2025-06-19 LAB
ATRIAL RATE: 73 BPM
Q-T INTERVAL: 414 MS
QRS DURATION: 90 MS
QTC CALCULATION (BEZET): 456 MS
R AXIS: -32 DEGREES
T AXIS: 61 DEGREES
VENTRICULAR RATE: 73 BPM

## 2025-06-19 RX ORDER — PANTOPRAZOLE SODIUM 40 MG/1
40 TABLET, DELAYED RELEASE ORAL
COMMUNITY
Start: 2025-05-12

## 2025-06-19 NOTE — PROGRESS NOTES
Edith Eubanks is a 79 year old female.  HPI:   Pt has an episode of low sats and SOB at [employer] after eating a few bites of a hamberger with swiss cheese.  She took her combivent x 2 and recovered.  She has a hx of COPD and CAD. She drank some lemon water and felt better.  She coughed up some mucus, white and thick.    Can speak, no SOB. Pain in the left eye and headaches from her new glasses.        SKIN: denies any unusual skin lesions or rashes  RESPIRATORY: denies shortness of breath with exertion  CARDIOVASCULAR: denies chest pain on exertion  GI: denies abdominal pain and denies heartburn  NEURO: denies headaches    EXAM:   /58   Pulse 75   Temp 99.7 °F (37.6 °C) (Temporal)   Resp 24   Wt 158 lb (71.7 kg)   SpO2 92%   BMI 28.90 kg/m²   GENERAL: well developed, well nourished,in no apparent distress  SKIN: no rashes,no suspicious lesions  HEENT: atraumatic, normocephalic,ears and throat are clear  NECK: supple,no adenopathy,no bruits  LUNGS: clear to auscultation  CARDIO: RRR without murmur  GI: good BS's,no masses, HSM or tenderness  EXTREMITIES: no cyanosis, clubbing or edema    ASSESSMENT AND PLAN:     Encounter Diagnoses   Name Primary?    Precordial pain Yes    Centrilobular emphysema (HCC)    Pt had an EKG, no change, AFIB with 1 PVC.  Likely upper airway obstruction. Needs to seek cooling station for the heat this weekend.   No orders of the defined types were placed in this encounter.      Meds & Refills for this Visit:  Requested Prescriptions      No prescriptions requested or ordered in this encounter       Imaging & Consults:  ELECTROCARDIOGRAM, COMPLETE    Follow up as needed.     The patient indicates understanding of these issues and agrees to the plan.

## 2025-06-23 ENCOUNTER — TELEPHONE (OUTPATIENT)
Dept: FAMILY MEDICINE CLINIC | Facility: CLINIC | Age: 80
End: 2025-06-23
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
## 2025-06-23 NOTE — TELEPHONE ENCOUNTER
Yesterday, patient was nausea, had headache & dizzy.  Today she just has a headache.  She just ate a hamburger & feels a little sick to stomach.  She was outside a little yesterday..  She wanted to know if that was heat related.     Pippa will call back

## 2025-06-23 NOTE — TELEPHONE ENCOUNTER
Can use neosporin o  wound and just has to be monitoring your insensible water losses (sweating) -- can use lasix, but weights can help too is weight >3 pounds weight loss do bot take.

## 2025-06-30 RX ORDER — DILTIAZEM HYDROCHLORIDE 240 MG/1
240 CAPSULE, EXTENDED RELEASE ORAL 2 TIMES DAILY
Qty: 180 CAPSULE | Refills: 3 | Status: SHIPPED | OUTPATIENT
Start: 2025-06-30

## 2025-06-30 NOTE — TELEPHONE ENCOUNTER
Last refill 05/13/24  Qty 180  W 3 refills  Last ov 06/19/25    Requested Prescriptions     Pending Prescriptions Disp Refills    TIADYLT  MG Oral Capsule SR 24 Hr [Pharmacy Med Name: Tiadylt ER Oral Capsule Extended Release 24 Hour 240 MG] 180 capsule 3     Sig: TAKE 1 CAPSULE TWICE DAILY     No future appointments.

## 2025-07-02 ENCOUNTER — OFFICE VISIT (OUTPATIENT)
Dept: FAMILY MEDICINE CLINIC | Facility: CLINIC | Age: 80
End: 2025-07-02
Payer: MEDICARE

## 2025-07-02 VITALS
HEART RATE: 74 BPM | RESPIRATION RATE: 24 BRPM | TEMPERATURE: 98 F | OXYGEN SATURATION: 95 % | DIASTOLIC BLOOD PRESSURE: 60 MMHG | WEIGHT: 161.5 LBS | BODY MASS INDEX: 30 KG/M2 | SYSTOLIC BLOOD PRESSURE: 112 MMHG

## 2025-07-02 DIAGNOSIS — J43.2 CENTRILOBULAR EMPHYSEMA (HCC): Primary | ICD-10-CM

## 2025-07-02 LAB
APPEARANCE: CLEAR
BILIRUBIN: NEGATIVE
GLUCOSE (URINE DIPSTICK): NEGATIVE MG/DL
KETONES (URINE DIPSTICK): NEGATIVE MG/DL
LEUKOCYTES: NEGATIVE
MULTISTIX LOT#: ABNORMAL NUMERIC
NITRITE, URINE: NEGATIVE
PH, URINE: 7 (ref 4.5–8)
PROTEIN (URINE DIPSTICK): NEGATIVE MG/DL
SPECIFIC GRAVITY: 1.01 (ref 1–1.03)
URINE-COLOR: YELLOW
UROBILINOGEN,SEMI-QN: 0.2 MG/DL (ref 0–1.9)

## 2025-07-02 PROCEDURE — 3074F SYST BP LT 130 MM HG: CPT | Performed by: INTERNAL MEDICINE

## 2025-07-02 PROCEDURE — 94640 AIRWAY INHALATION TREATMENT: CPT | Performed by: INTERNAL MEDICINE

## 2025-07-02 PROCEDURE — 99214 OFFICE O/P EST MOD 30 MIN: CPT | Performed by: INTERNAL MEDICINE

## 2025-07-02 PROCEDURE — 87086 URINE CULTURE/COLONY COUNT: CPT | Performed by: INTERNAL MEDICINE

## 2025-07-02 PROCEDURE — 1159F MED LIST DOCD IN RCRD: CPT | Performed by: INTERNAL MEDICINE

## 2025-07-02 PROCEDURE — 81003 URINALYSIS AUTO W/O SCOPE: CPT | Performed by: INTERNAL MEDICINE

## 2025-07-02 PROCEDURE — 3078F DIAST BP <80 MM HG: CPT | Performed by: INTERNAL MEDICINE

## 2025-07-02 PROCEDURE — 1160F RVW MEDS BY RX/DR IN RCRD: CPT | Performed by: INTERNAL MEDICINE

## 2025-07-02 PROCEDURE — 1170F FXNL STATUS ASSESSED: CPT | Performed by: INTERNAL MEDICINE

## 2025-07-02 RX ORDER — IPRATROPIUM BROMIDE AND ALBUTEROL SULFATE 2.5; .5 MG/3ML; MG/3ML
3 SOLUTION RESPIRATORY (INHALATION) ONCE
Status: COMPLETED | OUTPATIENT
Start: 2025-07-02 | End: 2025-07-02

## 2025-07-02 RX ADMIN — IPRATROPIUM BROMIDE AND ALBUTEROL SULFATE 3 ML: 2.5; .5 SOLUTION RESPIRATORY (INHALATION) at 14:21:00

## 2025-07-03 ENCOUNTER — TELEPHONE (OUTPATIENT)
Dept: FAMILY MEDICINE CLINIC | Facility: CLINIC | Age: 80
End: 2025-07-03

## 2025-07-03 RX ORDER — PREDNISONE 20 MG/1
20 TABLET ORAL DAILY
Qty: 14 TABLET | Refills: 0 | Status: SHIPPED | OUTPATIENT
Start: 2025-07-03 | End: 2025-07-17

## 2025-07-03 NOTE — PROGRESS NOTES
Edith Eubanks is a 79 year old female.  HPI:   Pt has been having more difficulty breathing, Urine has some trace blood, but thinks she had blood in the urine the night before her visit. She has no peripheral edema, but was bit by a dog 2 weeks ago and the right lateral ankle is still a little tender and swollen.  She said there was a discharge when the scab opened a little. No fevers.   Current Medications[1]   Past Medical History[2]   Social History:  Short Social Hx on File[3]     REVIEW OF SYSTEMS:   GENERAL HEALTH: feels well otherwise  SKIN: denies any unusual skin lesions or rashes  RESPIRATORY: denies shortness of breath with exertion  CARDIOVASCULAR: denies chest pain on exertion  GI: denies abdominal pain and denies heartburn  NEURO: denies headaches    EXAM:   /60   Pulse 74   Temp 98.2 °F (36.8 °C) (Temporal)   Resp 24   Wt 161 lb 8 oz (73.3 kg)   SpO2 95%   BMI 29.54 kg/m²   GENERAL: well developed, well nourished,in no apparent distress  SKIN: no rashes,no suspicious lesions  HEENT: atraumatic, normocephalic,ears and throat are clear  NECK: supple,no adenopathy,no bruits  LUNGS: wheezing bilaterally.   CARDIO: RRR without murmur  GI: good BS's,no masses, HSM or tenderness  EXTREMITIES: no cyanosis, clubbing or edema    ASSESSMENT AND PLAN:     Encounter Diagnosis   Name Primary?    Centrilobular emphysema (HCC) Yes   COPD exacerbation, given a breathing treatment in the office with improvement.  She has inhalers at home and needs to restart a corticosteroid,  She really does not like oral steroids, they make her flush, heart race, and anxious.      Orders Placed This Encounter   Procedures    Urine Dip, auto without Micro    Urine Culture, Routine [E]       Meds & Refills for this Visit:  Requested Prescriptions      No prescriptions requested or ordered in this encounter       Imaging & Consults:  None    Follow up as needed.     The patient indicates understanding of these issues and  agrees to the plan.         [1]   Current Outpatient Medications   Medication Sig Dispense Refill    dilTIAZem HCl ER Beads (TIADYLT ER) 240 MG Oral Capsule SR 24 Hr Take 1 capsule (240 mg total) by mouth 2 (two) times daily. 180 capsule 3    pantoprazole 40 MG Oral Tab EC Take 1 tablet (40 mg total) by mouth 2 (two) times daily before meals.      furosemide 40 MG Oral Tab Take 1 tablet (40 mg total) by mouth 3 (three) times daily. 270 tablet 1    nitroglycerin 0.4 MG Sublingual SL Tab Place 1 tablet (0.4 mg total) under the tongue every 5 (five) minutes as needed for Chest pain. 25 tablet 0    ALLOPURINOL 100 MG Oral Tab TAKE 1 TABLET EVERY DAY 90 tablet 3    magnesium oxide 400 MG Oral Tab Take 1 tablet (400 mg total) by mouth daily. 90 tablet 0    pravastatin 40 MG Oral Tab       budeson-glycopyrrol-formoterol (BREZTRI AEROSPHERE) 160-9-4.8 MCG/ACT Inhalation Aerosol Inhale 2 puffs into the lungs 2 (two) times daily. 1 each 0    ipratropium-albuterol (COMBIVENT RESPIMAT)  MCG/ACT Inhalation Aero Soln Inhale 1 puff into the lungs 4 (four) times daily. 3 each 3    potassium chloride 10 MEQ Oral Tab CR Take 2 tablets (20 mEq total) by mouth daily. 60 tablet 0    metoprolol succinate ER 50 MG Oral Tablet 24 Hr Take 1 tablet (50 mg total) by mouth daily.      aspirin 81 MG Oral Tab EC Take 1 tablet (81 mg total) by mouth daily.      ondansetron (ZOFRAN) 4 mg tablet Take 1 tablet (4 mg total) by mouth every 8 (eight) hours as needed for Nausea. (Patient not taking: Reported on 7/2/2025) 20 tablet 2   [2]   Past Medical History:   Acute bronchitis    Arthritis    Black stools    Blood in urine    Chest pain, unspecified    Chronic airway obstruction, not elsewhere classified    Chronic atrial fibrillation (HCC)    Congestive heart disease (HCC)    Congestive heart failure, unspecified    Constipation    COPD (chronic obstructive pulmonary disease) (HCC)    Coronary atherosclerosis of unspecified type of vessel,  native or graft    Edema    Esophageal reflux    Factor XII deficiency disease (HCC)    Erlebrorn    Fatigue    Frequent use of laxatives    Generalized and unspecified atherosclerosis    Gout    Hematemesis    Irregular bowel habits    Leaking of urine    Painful swallowing    Parathyroid tumor    Wears glasses    Weight gain    West Nile Virus infection (HCC)   [3]   Social History  Socioeconomic History    Marital status: OTHER    Number of children: 2   Occupational History    Occupation: Not Employed    Tobacco Use    Smoking status: Former     Current packs/day: 0.00     Average packs/day: 1 pack/day for 50.0 years (50.0 ttl pk-yrs)     Types: Cigarettes     Start date: 1/1/1960     Quit date: 1/1/2010     Years since quitting: 15.5    Smokeless tobacco: Never    Tobacco comments:     quit a few years ago   Vaping Use    Vaping status: Never Used   Substance and Sexual Activity    Alcohol use: No     Alcohol/week: 0.0 standard drinks of alcohol    Drug use: No   Other Topics Concern    Caffeine Concern No     Comment: About 1 drink per day     Exercise No     Social Drivers of Health     Transportation Needs: No Transportation Needs (9/10/2024)    Transportation Needs     Lack of Transportation: No    Received from [facility]    Housing Stability

## 2025-07-03 NOTE — TELEPHONE ENCOUNTER
Patient advised. She said she has a Breztri left. She asked if she should use this? If she takes the Breztri should she still take the Ipratropium?
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
## 2025-07-03 NOTE — TELEPHONE ENCOUNTER
Patient called back and said that she recently got home from the store. She said she had her portable tank on 2.5L and her oxygen saturation was 84%. She asked how high she can turn up her oxygen. Patient advised she can turn it up to 3L. V.O. Dr Neff/Eda FELIX. Patient advised to sit down and take some deep breaths through her nose. She states that her oxygen saturation went up to 90%. Patient asked if she wants Dr Neff to prescribe a steroid. She asked if Dr Neff thinks she needs it. She would not be able to pick it up until Sunday.

## 2025-07-14 ENCOUNTER — TELEPHONE (OUTPATIENT)
Dept: FAMILY MEDICINE CLINIC | Facility: CLINIC | Age: 80
End: 2025-07-14

## 2025-07-14 NOTE — TELEPHONE ENCOUNTER
Patient was very upset stating she is having a difficult time breathing and swallowing due to having thick mucous. She said that her oxygen saturation is 90% on 2L of oxygen, bp was 122/70, pulse 77. She said she has 4 Prednisone left and feels that it has helped her breathing but she wants a refill. She has been using Breztri and rinsing her mouth after. She denies having any spots on her tongue or cheeks. She said she really needs to be seen today and asked if Dr Neff would work her in.

## 2025-07-15 ENCOUNTER — OFFICE VISIT (OUTPATIENT)
Dept: FAMILY MEDICINE CLINIC | Facility: CLINIC | Age: 80
End: 2025-07-15
Payer: MEDICARE

## 2025-07-15 VITALS
WEIGHT: 158 LBS | OXYGEN SATURATION: 93 % | HEART RATE: 72 BPM | RESPIRATION RATE: 24 BRPM | TEMPERATURE: 98 F | DIASTOLIC BLOOD PRESSURE: 60 MMHG | SYSTOLIC BLOOD PRESSURE: 118 MMHG | BODY MASS INDEX: 29 KG/M2

## 2025-07-15 DIAGNOSIS — R06.02 SOB (SHORTNESS OF BREATH) ON EXERTION: Primary | ICD-10-CM

## 2025-07-15 DIAGNOSIS — N18.4 CKD (CHRONIC KIDNEY DISEASE) STAGE 4, GFR 15-29 ML/MIN (HCC): ICD-10-CM

## 2025-07-15 PROCEDURE — 3078F DIAST BP <80 MM HG: CPT | Performed by: INTERNAL MEDICINE

## 2025-07-15 PROCEDURE — 1170F FXNL STATUS ASSESSED: CPT | Performed by: INTERNAL MEDICINE

## 2025-07-15 PROCEDURE — 99214 OFFICE O/P EST MOD 30 MIN: CPT | Performed by: INTERNAL MEDICINE

## 2025-07-15 PROCEDURE — 3074F SYST BP LT 130 MM HG: CPT | Performed by: INTERNAL MEDICINE

## 2025-07-15 PROCEDURE — G2211 COMPLEX E/M VISIT ADD ON: HCPCS | Performed by: INTERNAL MEDICINE

## 2025-07-15 RX ORDER — PREDNISONE 10 MG/1
TABLET ORAL
Qty: 16 TABLET | Refills: 0 | Status: SHIPPED | OUTPATIENT
Start: 2025-07-15 | End: 2025-07-31

## 2025-07-15 NOTE — PROGRESS NOTES
Edith Eubanks is a 79 year old female.  HPI:   Pt has been feeling much better since the steroid was increased. She has been breathing better.  She is really choking a lot more. She uses apple to help with pills. She sees Dr Metzger next week.    REVIEW OF SYSTEMS:   GENERAL HEALTH: feels well otherwise  SKIN: denies any unusual skin lesions or rashes  RESPIRATORY: denies shortness of breath with exertion  CARDIOVASCULAR: denies chest pain on exertion  GI: denies abdominal pain and denies heartburn  NEURO: denies headaches    EXAM:   /60   Pulse 72   Temp 97.9 °F (36.6 °C) (Temporal)   Resp 24   Wt 158 lb (71.7 kg)   SpO2 93%   BMI 28.90 kg/m²   GENERAL: well developed, well nourished,in no apparent distress  SKIN: no rashes,no suspicious lesions  HEENT: atraumatic, normocephalic,ears and throat are clear  NECK: supple,no adenopathy,no bruits  LUNGS: clear to auscultation  CARDIO: RRR without murmur  GI: good BS's,no masses, HSM or tenderness  EXTREMITIES: no cyanosis, clubbing or edema    ASSESSMENT AND PLAN:     Encounter Diagnoses   Name Primary?    SOB (shortness of breath) on exertion Yes    CKD (chronic kidney disease) stage 4, GFR 15-29 ml/min ([facility], [facility])    COPD exacerbation much better with prednisone. A little more heart racing.  No dizziness or lightheadedness.     No orders of the defined types were placed in this encounter.      Meds & Refills for this Visit:  Requested Prescriptions     Signed Prescriptions Disp Refills    predniSONE 10 MG Oral Tab 16 tablet 0     Sig: Take 2 tablets (20 mg total) by mouth daily for 4 days, THEN 1 tablet (10 mg total) daily for 4 days, THEN 0.5 tablets (5 mg total) daily for 8 days.       Imaging & Consults:  None    Follow up as needed.     The patient indicates understanding of these issues and agrees to the plan.

## 2025-07-19 ENCOUNTER — TELEPHONE (OUTPATIENT)
Dept: FAMILY MEDICINE CLINIC | Facility: CLINIC | Age: 80
End: 2025-07-19

## 2025-07-19 NOTE — TELEPHONE ENCOUNTER
Spoke with patient regarding her left leg is more swollen than usual and now numb.  Please see her visit with Dr Neff on 7/15 for SOB, currently taking Prednisone.  BP while on the phone was 114/71 HR 98.  Patient states she saw Dr Patiño Cardiology yesterday as well.

## 2025-07-19 NOTE — TELEPHONE ENCOUNTER
Per Dr Marshall she would like the patient to be evaluated and have a venous doppler to rule out a DVT. Discussed with patient and doesn't want to go to the hospital. I explained the concerns the patient is aware and will elevate her leg and if worsens will drive herself.

## 2025-07-22 ENCOUNTER — TELEPHONE (OUTPATIENT)
Dept: FAMILY MEDICINE CLINIC | Facility: CLINIC | Age: 80
End: 2025-07-22

## 2025-07-22 RX ORDER — IPRATROPIUM BROMIDE AND ALBUTEROL 20; 100 UG/1; UG/1
SPRAY, METERED RESPIRATORY (INHALATION)
Qty: 12 G | Refills: 2 | Status: SHIPPED | OUTPATIENT
Start: 2025-07-22

## 2025-07-22 NOTE — TELEPHONE ENCOUNTER
Ipratropium-Albuterol (COMBIVENT IN)   Edith is asking if Dr. Neff will give a verbal so she can get this mediation. Just call this number and ask for pharmacist for verbal.   [phone]    She said they will release as soon as Dr. Neff gives verbal.

## 2025-07-22 NOTE — TELEPHONE ENCOUNTER
Per Kaylan at [facility] the prescription was filled today and will be sent out today.

## 2025-07-22 NOTE — TELEPHONE ENCOUNTER
Requested Prescriptions     Pending Prescriptions Disp Refills    COMBIVENT RESPIMAT  MCG/ACT Inhalation Aero Soln [Pharmacy Med Name: COMBIVENT RESPIMAT 20-100mcg SPRY] 12 g 2     Sig: INHALE 1 PUFF BY MOUTH FOUR TIMES DAILY (EVERY 6 HOURS)     Last refill 10/31/25 #3 x 3   LOV 7/15/25  No future appointments.    Asthma & COPD Medication Protocol Aravbs1207/22/2025 11:35 AM   Protocol Details Medication is active on med list    Appointment in past 6 or next 3 months

## 2025-07-23 ENCOUNTER — TELEPHONE (OUTPATIENT)
Dept: FAMILY MEDICINE CLINIC | Facility: CLINIC | Age: 80
End: 2025-07-23

## 2025-07-23 NOTE — TELEPHONE ENCOUNTER
Patient said that her legs and face are swollen.   She has taken three 40mg Furosemide today. She asked if she can another one. Patient advised she can take 1/2 of a 40mg Furosemide right now. CATRINA. Dr Neff/Eda FELIX

## 2025-07-31 ENCOUNTER — TELEPHONE (OUTPATIENT)
Dept: FAMILY MEDICINE CLINIC | Facility: CLINIC | Age: 80
End: 2025-07-31

## 2025-08-02 ENCOUNTER — TELEPHONE (OUTPATIENT)
Dept: FAMILY MEDICINE CLINIC | Facility: CLINIC | Age: 80
End: 2025-08-02

## 2025-08-02 DIAGNOSIS — J43.2 CENTRILOBULAR EMPHYSEMA (HCC): Primary | ICD-10-CM

## 2025-08-02 DIAGNOSIS — R06.02 SOB (SHORTNESS OF BREATH) ON EXERTION: ICD-10-CM

## 2025-08-02 RX ORDER — PREDNISONE 10 MG/1
TABLET ORAL
Qty: 16 TABLET | Refills: 0 | Status: SHIPPED | OUTPATIENT
Start: 2025-08-02 | End: 2025-08-18

## 2025-08-12 ENCOUNTER — TELEPHONE (OUTPATIENT)
Dept: FAMILY MEDICINE CLINIC | Facility: CLINIC | Age: 80
End: 2025-08-12

## 2025-08-13 ENCOUNTER — MED REC SCAN ONLY (OUTPATIENT)
Dept: FAMILY MEDICINE CLINIC | Facility: CLINIC | Age: 80
End: 2025-08-13

## 2025-08-13 ENCOUNTER — OFFICE VISIT (OUTPATIENT)
Dept: FAMILY MEDICINE CLINIC | Facility: CLINIC | Age: 80
End: 2025-08-13

## 2025-08-13 VITALS
BODY MASS INDEX: 30 KG/M2 | HEART RATE: 88 BPM | SYSTOLIC BLOOD PRESSURE: 106 MMHG | WEIGHT: 161.38 LBS | OXYGEN SATURATION: 94 % | DIASTOLIC BLOOD PRESSURE: 58 MMHG | TEMPERATURE: 98 F | RESPIRATION RATE: 24 BRPM

## 2025-08-13 DIAGNOSIS — B37.0 THRUSH, ORAL: Primary | ICD-10-CM

## 2025-08-13 PROCEDURE — 3078F DIAST BP <80 MM HG: CPT | Performed by: INTERNAL MEDICINE

## 2025-08-13 PROCEDURE — 3074F SYST BP LT 130 MM HG: CPT | Performed by: INTERNAL MEDICINE

## 2025-08-13 PROCEDURE — 1170F FXNL STATUS ASSESSED: CPT | Performed by: INTERNAL MEDICINE

## 2025-08-13 PROCEDURE — 1159F MED LIST DOCD IN RCRD: CPT | Performed by: INTERNAL MEDICINE

## 2025-08-13 PROCEDURE — 1160F RVW MEDS BY RX/DR IN RCRD: CPT | Performed by: INTERNAL MEDICINE

## 2025-08-13 PROCEDURE — 99214 OFFICE O/P EST MOD 30 MIN: CPT | Performed by: INTERNAL MEDICINE

## 2025-08-13 RX ORDER — CLOTRIMAZOLE 10 MG/1
10 LOZENGE ORAL
Qty: 50 LOZENGE | Refills: 0 | Status: SHIPPED | OUTPATIENT
Start: 2025-08-13 | End: 2025-08-23

## 2025-08-14 ENCOUNTER — TELEPHONE (OUTPATIENT)
Dept: FAMILY MEDICINE CLINIC | Facility: CLINIC | Age: 80
End: 2025-08-14

## 2025-08-20 ENCOUNTER — TELEPHONE (OUTPATIENT)
Dept: FAMILY MEDICINE CLINIC | Facility: CLINIC | Age: 80
End: 2025-08-20

## (undated) DIAGNOSIS — Z12.31 SCREENING MAMMOGRAM FOR BREAST CANCER: Primary | ICD-10-CM

## (undated) DEVICE — FILTERLINE NASAL ADULT O2/CO2

## (undated) DEVICE — FORCEP BIOPSY RJ4 LG CAP W/ND

## (undated) DEVICE — Device: Brand: DEFENDO AIR/WATER/SUCTION AND BIOPSY VALVE

## (undated) DEVICE — 1200CC GUARDIAN II: Brand: GUARDIAN

## (undated) DEVICE — ENDOSCOPY PACK UPPER: Brand: MEDLINE INDUSTRIES, INC.

## (undated) DEVICE — 3M™ RED DOT™ MONITORING ELECTRODE WITH FOAM TAPE AND STICKY GEL, 50/BAG, 20/CASE, 72/PLT 2570: Brand: RED DOT™

## (undated) NOTE — LETTER
02/29/20        [address]      Dear Tyler Carrillo records indicate that you have outstanding lab work and or testing that was ordered for you and has not yet been completed:  Orders Placed This Encounter      Carlo

## (undated) NOTE — LETTER
[address] [address]  [place], [address] [facility]    Consent for Operation  Date: __________________                                Time: _______________    1.  I authorize the performance upon Vernard Osgood the following operation:  Procedu will obtain the original videotape. The [facility] will not be responsible for storage or maintenance of this tape. 7. For the purpose of advancing medical education, I consent to the admittance of observers to the Operating Room.   8. I authorize the use of ___________________________    When the patient is a minor or mentally incompetent to give consent:  Signature of person authorized to consent for patient: ___________________________   Relationship to patient: _____________________________________________ medicines may increase my risk of anesthetic complications. iv. If I am allergic to anything or have had a reaction to anesthesia before. 3. I understand how the anesthesia medicine will help me (benefits).   4. I understand that with any type of anesthes or their representative has agreed to have anesthesia services.     _____________________________________________________________________________  Witness        Date   Time  I have verified that the signature is that of the patient or patient’s representat

## (undated) NOTE — LETTER
ASTHMA ACTION PLAN for Suma Arevalo     : 1945     Date: 2021  Provider:  Eladio Tierney MD  Phone for doctor or clinic:  [facility], [address] [place], [place] [id number] [phone]    ACT

## (undated) NOTE — LETTER
01/03/19        [address]            Dear Rosalba Ruiz records indicate that you are due for fasting blood work (Vit D, CMP, TSH )    Please call our office during normal business hours so that we may schedule thi

## (undated) NOTE — LETTER
Date & Time: 10/22/2019, 1:30 PM  Patient: Ludwig Sizer  Encounter Provider(s):    MD Milo Meyers APRN         This certifies that Gabi Taylor, a patient at an [facility] facility, am leaving the faci

## (undated) NOTE — Clinical Note
Initial assessment completed with patient.  Appointment scheduled for 9/12/24.  Patient agrees to additional follow-up calls from nurse care manager.  Thank you!

## (undated) NOTE — LETTER
01/03/19        [address]          Dear Kyle Breeding records indicate that you are due for fasting blood work (TSH, CMP, Vit D)    Please call our office during normal business hours so that we may schedule this a

## (undated) NOTE — LETTER
Date & Time: 5/15/2025, 11:20 AM  Patient: Edith Eubanks  Encounter Provider(s):    Jameel Mayfield APRN         This certifies that I, Edith Eubanks, a patient at an [facility], am leaving the facility voluntarily and against the advice of my physician.    I acknowledge that I have been:    1. informed that my physician believes that I need to receive care here;  2. informed that if I leave, I could become sicker or even die; and  3. provided discharge instructions consistent with my current diagnosis.    I hereby release my physician, the facility, and its employees from all responsibility for any ill effects which may result from this action.        __________________________________  Patient or authorized caregiver signature    __________________________________  RN signature    If no patient or patient representative signature was obtained, sign below to acknowledge that the form was reviewed with the patient and that the patient refused to sign.    __________________________________  RN signature

## (undated) NOTE — MR AVS SNAPSHOT
[facility]  [address] [facility] [address]  [phone]               Thank you for choosing us for your health care visit with Joshua Worrell MD.  We are glad to serve you and happy to provide you with this summary o At Least  Annually for Diabetics HGBA1C (%)   Date Value   12/12/2015 5.7*   04/01/2011 6.0*    No flowsheet data found.     Fasting Blood Sugar (FSB)   Patient must be diagnosed with one of the following:   • Hypertension   • Dyslipidemia   • Obesity (BMI this or any previous visit. No flowsheet data found. Fecal Occult Blood   Covered Annually No results found for: FOB, OCCULTSTOOL No flowsheet data found.      Barium Enema-   uncomfortable but covered  Covered but uncomfortable   Glaucoma Screening Please get once after your 65th birthday    Hepatitis B for Moderate/High Risk       No orders found for this or any previous visit.  Medium/high risk factors:   End-stage renal disease   Hemophiliacs who received Factor VIII or IX concentrates   Clients of recent med list.                aspirin 81 MG Chew   Chew 1 tablet (81 mg total) by mouth daily. Biotin 5000 MCG Caps   Take 1 tablet by mouth daily. Calcium Carbonate-Vitamin D 600-400 MG-UNIT Tabs   Take 1 tablet by mouth daily. 60-75 md/dL  Below average CHD risk        LDL Cholesterol 93 <130 mg/dL    VLDL 24 5-40 mg/dL    Chol/HDL Ratio 3.54 <4.44      Interpretation of CHOL/HDL ratios:      Male:          Female:          Risk:      3.43           3.27             One half a Healthy Diet and Regular Exercise  [employer] [facility] for making healthy food choices  -   Enjoy your food, but eat less. Fully enjoy your food when eating. Don’t eat while distracted and slow down. Avoid over sized portions.    Marcelino Casillas